# Patient Record
Sex: FEMALE | Race: BLACK OR AFRICAN AMERICAN | NOT HISPANIC OR LATINO | Employment: OTHER | URBAN - METROPOLITAN AREA
[De-identification: names, ages, dates, MRNs, and addresses within clinical notes are randomized per-mention and may not be internally consistent; named-entity substitution may affect disease eponyms.]

---

## 2017-01-03 ENCOUNTER — PATIENT OUTREACH (OUTPATIENT)
Dept: ADMINISTRATIVE | Facility: CLINIC | Age: 77
End: 2017-01-03
Payer: MEDICARE

## 2017-01-03 NOTE — PATIENT INSTRUCTIONS
Discharge Instructions for Aortic Valve Stenosis  You have been diagnosed with aortic valve stenosis. This means the aortic valve in your heart is stiff or remains in a near-closed position and has trouble opening. Because of this, your heart has to work harder to push the blood through the valve. In some cases, this extra work makes the muscle of the heart thicken. The extra work can tire the heart and cause its muscle to weaken over time. This condition often changes very slowly and doesn't need to be treated. But in some people, it may get worse faster and need to be treated. Some people can control their stenosis with medicines. In some severe cases, surgery is needed.  Home care  · Check with your doctor before taking any over-the-counter medicines, herbal products, or vitamins.  · Take your medicines exactly as directed. Dont skip doses.  · Keep all follow-up appointments. Some people with aortic stenosis dont have symptoms. Others need close follow-up and surgery.  Lifestyle changes  · Maintain a healthy weight. Get help to lose any extra pounds.  · Ask your doctor if an exercise program is right for you. Some people with aortic stenosis need to be very careful about exercise as it can result in fainting.  · Break the smoking habit. Enroll in a stop-smoking program to improve your chances of success.  Follow-up care  Make a follow-up appointment with your healthcare provider, or as directed.  When to call 911  Call 911 or go to the emergency room right away if you have any of the following:  · Chest pain or shortness of breath  · Weakness in the muscles of your face, arms, or legs  · Trouble speaking  · Rapid pulse or pounding heartbeat  · Fainting or dizziness  · Sudden vertigo   © 3129-3551 [employer]. [address] [place] [address], [place] [address]. All rights reserved. This information is not intended as a substitute for professional medical care. Always follow your healthcare professional's  instructions.

## 2017-01-17 DIAGNOSIS — K21.9 GASTROESOPHAGEAL REFLUX DISEASE WITHOUT ESOPHAGITIS: ICD-10-CM

## 2017-01-17 RX ORDER — OMEPRAZOLE 40 MG/1
CAPSULE, DELAYED RELEASE ORAL
Qty: 90 CAPSULE | Refills: 0 | Status: SHIPPED | OUTPATIENT
Start: 2017-01-17 | End: 2018-02-17 | Stop reason: SDUPTHER

## 2017-01-20 DIAGNOSIS — I10 BENIGN ESSENTIAL HTN: ICD-10-CM

## 2017-01-20 RX ORDER — CLONIDINE HYDROCHLORIDE 0.1 MG/1
TABLET ORAL
Qty: 180 TABLET | Refills: 0 | Status: SHIPPED | OUTPATIENT
Start: 2017-01-20 | End: 2017-04-11 | Stop reason: SDUPTHER

## 2017-01-21 DIAGNOSIS — F41.9 ANXIETY: ICD-10-CM

## 2017-01-23 RX ORDER — ALPRAZOLAM 0.5 MG/1
TABLET ORAL
Qty: 60 TABLET | Refills: 0 | Status: SHIPPED | OUTPATIENT
Start: 2017-01-23 | End: 2017-03-22 | Stop reason: SDUPTHER

## 2017-01-28 DIAGNOSIS — G47.00 INSOMNIA: ICD-10-CM

## 2017-01-30 RX ORDER — QUETIAPINE FUMARATE 100 MG/1
TABLET, FILM COATED ORAL
Qty: 90 TABLET | Refills: 0 | Status: SHIPPED | OUTPATIENT
Start: 2017-01-30 | End: 2017-05-02 | Stop reason: SDUPTHER

## 2017-02-01 ENCOUNTER — OFFICE VISIT (OUTPATIENT)
Dept: CARDIOLOGY | Facility: CLINIC | Age: 77
End: 2017-02-01
Payer: MEDICARE

## 2017-02-01 ENCOUNTER — LAB VISIT (OUTPATIENT)
Dept: LAB | Facility: HOSPITAL | Age: 77
End: 2017-02-01
Attending: INTERNAL MEDICINE
Payer: MEDICARE

## 2017-02-01 ENCOUNTER — HOSPITAL ENCOUNTER (OUTPATIENT)
Dept: CARDIOLOGY | Facility: CLINIC | Age: 77
Discharge: HOME | End: 2017-02-01
Payer: MEDICARE

## 2017-02-01 VITALS
OXYGEN SATURATION: 98 % | DIASTOLIC BLOOD PRESSURE: 74 MMHG | SYSTOLIC BLOOD PRESSURE: 132 MMHG | BODY MASS INDEX: 31.9 KG/M2 | HEIGHT: 69 IN | HEART RATE: 86 BPM | WEIGHT: 215.38 LBS

## 2017-02-01 DIAGNOSIS — I35.0 NONRHEUMATIC AORTIC VALVE STENOSIS: Primary | ICD-10-CM

## 2017-02-01 DIAGNOSIS — E78.5 HYPERLIPIDEMIA, UNSPECIFIED HYPERLIPIDEMIA TYPE: ICD-10-CM

## 2017-02-01 DIAGNOSIS — I35.0 AORTIC VALVE STENOSIS, UNSPECIFIED ETIOLOGY: ICD-10-CM

## 2017-02-01 DIAGNOSIS — N18.30 CKD (CHRONIC KIDNEY DISEASE), STAGE III: ICD-10-CM

## 2017-02-01 DIAGNOSIS — I10 ESSENTIAL HYPERTENSION: ICD-10-CM

## 2017-02-01 LAB
CREAT SERPL-MCNC: 1.4 MG/DL
DIASTOLIC DYSFUNCTION: YES
ERYTHROCYTE [DISTWIDTH] IN BLOOD BY AUTOMATED COUNT: 15.1 %
EST. GFR  (AFRICAN AMERICAN): 42.4 ML/MIN/1.73 M^2
EST. GFR  (NON AFRICAN AMERICAN): 36.8 ML/MIN/1.73 M^2
ESTIMATED PA SYSTOLIC PRESSURE: 30.04
HCT VFR BLD AUTO: 32.4 %
HGB BLD-MCNC: 10.8 G/DL
MCH RBC QN AUTO: 24.7 PG
MCHC RBC AUTO-ENTMCNC: 33.3 %
MCV RBC AUTO: 74 FL
MITRAL VALVE MOBILITY: NORMAL
PLATELET # BLD AUTO: 159 K/UL
PMV BLD AUTO: 11.6 FL
RBC # BLD AUTO: 4.37 M/UL
RETIRED EF AND QEF - SEE NOTES: 65 (ref 55–65)
TRICUSPID VALVE REGURGITATION: ABNORMAL
WBC # BLD AUTO: 10.03 K/UL

## 2017-02-01 PROCEDURE — 1160F RVW MEDS BY RX/DR IN RCRD: CPT | Mod: S$GLB,,, | Performed by: PHYSICIAN ASSISTANT

## 2017-02-01 PROCEDURE — 82565 ASSAY OF CREATININE: CPT

## 2017-02-01 PROCEDURE — 99999 PR PBB SHADOW E&M-EST. PATIENT-LVL IV: CPT | Mod: PBBFAC,,, | Performed by: PHYSICIAN ASSISTANT

## 2017-02-01 PROCEDURE — 93306 TTE W/DOPPLER COMPLETE: CPT | Mod: S$GLB,,, | Performed by: INTERNAL MEDICINE

## 2017-02-01 PROCEDURE — 1157F ADVNC CARE PLAN IN RCRD: CPT | Mod: S$GLB,,, | Performed by: PHYSICIAN ASSISTANT

## 2017-02-01 PROCEDURE — 1126F AMNT PAIN NOTED NONE PRSNT: CPT | Mod: S$GLB,,, | Performed by: PHYSICIAN ASSISTANT

## 2017-02-01 PROCEDURE — 3075F SYST BP GE 130 - 139MM HG: CPT | Mod: S$GLB,,, | Performed by: PHYSICIAN ASSISTANT

## 2017-02-01 PROCEDURE — 85027 COMPLETE CBC AUTOMATED: CPT

## 2017-02-01 PROCEDURE — 1159F MED LIST DOCD IN RCRD: CPT | Mod: S$GLB,,, | Performed by: PHYSICIAN ASSISTANT

## 2017-02-01 PROCEDURE — 99214 OFFICE O/P EST MOD 30 MIN: CPT | Mod: S$GLB,,, | Performed by: PHYSICIAN ASSISTANT

## 2017-02-01 PROCEDURE — 3078F DIAST BP <80 MM HG: CPT | Mod: S$GLB,,, | Performed by: PHYSICIAN ASSISTANT

## 2017-02-01 PROCEDURE — 36415 COLL VENOUS BLD VENIPUNCTURE: CPT

## 2017-02-01 NOTE — PROGRESS NOTES
Subjective:    Patient ID:  Emily Alicia is a 76 y.o. female who presents for follow-up of TAVR    Referring: Dr. Gasper Duran    HPI  Ms. Alicia presents for 1 mon f/u s/p 23mm Jhonny S3 TAVR via L TF access. She is without significant cardiovascular complaints today. Her WONG has improved somewhat. She states she thought she would have more stamina by now, but she realizes it has only been 1 month since her procedure. She has tried to remain as active as possible. She denies CP, PND, orthopnea, or LE edema. She takes her prn Lasix about twice per week. She has not yet seen Dr. Duran, but she will schedule an appointment with him in the near future.     NYHA Class II, CCS Class 0    Review of Systems   Constitution: Negative for chills, diaphoresis, fever, weakness, weight gain and weight loss.   HENT: Negative for headaches and sore throat.    Eyes: Negative for blurred vision, vision loss in left eye, vision loss in right eye and visual disturbance.   Cardiovascular: Negative for chest pain, claudication, dyspnea on exertion, leg swelling, near-syncope, orthopnea, palpitations, paroxysmal nocturnal dyspnea and syncope.   Respiratory: Negative for cough, hemoptysis, shortness of breath, sputum production and wheezing.    Endocrine: Negative for cold intolerance and heat intolerance.   Hematologic/Lymphatic: Negative for adenopathy. Does not bruise/bleed easily.   Skin: Negative for rash.   Musculoskeletal: Negative for falls, muscle weakness and myalgias.   Gastrointestinal: Negative for abdominal pain, change in bowel habit, constipation, diarrhea, melena and nausea.   Genitourinary: Negative for bladder incontinence.   Neurological: Negative for dizziness, focal weakness, light-headedness and numbness.   Psychiatric/Behavioral: Negative for altered mental status.         Vitals:    02/01/17 0912 02/01/17 0913   BP: 130/76 132/74   BP Location: Right arm Left arm   Patient Position: Sitting Sitting   BP Method:  "Manual Manual   Pulse: 86 86   SpO2: 98%    Weight: 97.7 kg (215 lb 6.2 oz)    Height: 5' 9" (1.753 m)    Body mass index is 31.81 kg/(m^2).    Objective:    Physical Exam   Constitutional: She is oriented to person, place, and time. She appears well-developed and well-nourished. No distress.   HENT:   Head: Normocephalic and atraumatic.   Mouth/Throat: Oropharynx is clear and moist.   Eyes: Conjunctivae and EOM are normal. Pupils are equal, round, and reactive to light. No scleral icterus.   Neck: Neck supple. No JVD present. No tracheal deviation present.   Cardiovascular: Normal rate and regular rhythm.  Exam reveals no gallop and no friction rub.    Murmur heard.  Pulmonary/Chest: Effort normal and breath sounds normal. No respiratory distress. She has no wheezes. She has no rales. She exhibits no tenderness.   Abdominal: Soft. Bowel sounds are normal. She exhibits no distension. There is no hepatosplenomegaly. There is no tenderness.   Musculoskeletal: She exhibits no edema or tenderness.   Neurological: She is alert and oriented to person, place, and time.   Skin: Skin is warm and dry. No rash noted. No erythema.   Psychiatric: She has a normal mood and affect. Her behavior is normal.         Assessment:       1. Nonrheumatic aortic valve stenosis - s/p 23mm Jhonny S3 TAVR via L TF access. Doing well. On ASA and Plavix.    2. Essential hypertension - well controlled   3. CKD (chronic kidney disease), stage III - stable   4. Hyperlipidemia, unspecified hyperlipidemia type - stable, on statin        Plan:       Follow up in 1 year (December 2017) with echo.  SBEP for life.  ASA indefinitely. Ok to D/C Plavix in 6 months (June 2017).            "

## 2017-02-14 ENCOUNTER — OFFICE VISIT (OUTPATIENT)
Dept: INTERNAL MEDICINE | Facility: CLINIC | Age: 77
End: 2017-02-14
Payer: MEDICARE

## 2017-02-14 VITALS
HEART RATE: 76 BPM | WEIGHT: 216 LBS | TEMPERATURE: 99 F | OXYGEN SATURATION: 97 % | BODY MASS INDEX: 31.99 KG/M2 | HEIGHT: 69 IN | RESPIRATION RATE: 20 BRPM | SYSTOLIC BLOOD PRESSURE: 122 MMHG | DIASTOLIC BLOOD PRESSURE: 86 MMHG

## 2017-02-14 DIAGNOSIS — I50.33 ACUTE ON CHRONIC DIASTOLIC HEART FAILURE: ICD-10-CM

## 2017-02-14 DIAGNOSIS — J01.10 ACUTE FRONTAL SINUSITIS, RECURRENCE NOT SPECIFIED: ICD-10-CM

## 2017-02-14 DIAGNOSIS — J20.9 ACUTE BRONCHITIS, UNSPECIFIED ORGANISM: ICD-10-CM

## 2017-02-14 PROCEDURE — 99999 PR PBB SHADOW E&M-EST. PATIENT-LVL IV: CPT | Mod: PBBFAC,,, | Performed by: NURSE PRACTITIONER

## 2017-02-14 PROCEDURE — 1160F RVW MEDS BY RX/DR IN RCRD: CPT | Mod: S$GLB,,, | Performed by: NURSE PRACTITIONER

## 2017-02-14 PROCEDURE — 3079F DIAST BP 80-89 MM HG: CPT | Mod: S$GLB,,, | Performed by: NURSE PRACTITIONER

## 2017-02-14 PROCEDURE — 3074F SYST BP LT 130 MM HG: CPT | Mod: S$GLB,,, | Performed by: NURSE PRACTITIONER

## 2017-02-14 PROCEDURE — 99213 OFFICE O/P EST LOW 20 MIN: CPT | Mod: 25,S$GLB,, | Performed by: NURSE PRACTITIONER

## 2017-02-14 PROCEDURE — 1159F MED LIST DOCD IN RCRD: CPT | Mod: S$GLB,,, | Performed by: NURSE PRACTITIONER

## 2017-02-14 PROCEDURE — 96372 THER/PROPH/DIAG INJ SC/IM: CPT | Mod: S$GLB,,, | Performed by: NURSE PRACTITIONER

## 2017-02-14 PROCEDURE — 1126F AMNT PAIN NOTED NONE PRSNT: CPT | Mod: S$GLB,,, | Performed by: NURSE PRACTITIONER

## 2017-02-14 PROCEDURE — 1157F ADVNC CARE PLAN IN RCRD: CPT | Mod: S$GLB,,, | Performed by: NURSE PRACTITIONER

## 2017-02-14 RX ORDER — AZITHROMYCIN 250 MG/1
TABLET, FILM COATED ORAL
Qty: 6 TABLET | Refills: 0 | Status: SHIPPED | OUTPATIENT
Start: 2017-02-14 | End: 2017-02-19

## 2017-02-14 RX ORDER — METHYLPREDNISOLONE ACETATE 80 MG/ML
80 INJECTION, SUSPENSION INTRA-ARTICULAR; INTRALESIONAL; INTRAMUSCULAR; SOFT TISSUE
Status: COMPLETED | OUTPATIENT
Start: 2017-02-14 | End: 2017-02-14

## 2017-02-14 RX ORDER — BENZONATATE 100 MG/1
100 CAPSULE ORAL 3 TIMES DAILY PRN
Qty: 30 CAPSULE | Refills: 1 | Status: SHIPPED | OUTPATIENT
Start: 2017-02-14 | End: 2017-02-24

## 2017-02-14 RX ADMIN — METHYLPREDNISOLONE ACETATE 80 MG: 80 INJECTION, SUSPENSION INTRA-ARTICULAR; INTRALESIONAL; INTRAMUSCULAR; SOFT TISSUE at 01:02

## 2017-02-14 NOTE — MR AVS SNAPSHOT
[facility]  [address]  [facility] [address]  Phone: [phone]  Fax: [phone]                  Emily Alicia   2017 1:15 PM   Office Visit    Description:  Female : 1940   Provider:  Caroline Do NP   Department:  [facility]           Reason for Visit     Nasal Congestion     Cough           Diagnoses this Visit        Comments    Acute frontal sinusitis, recurrence not specified         Acute bronchitis, unspecified organism         Acute on chronic diastolic heart failure                To Do List           Goals (5 Years of Data)     None      Follow-Up and Disposition     Return if symptoms worsen or fail to improve.       These Medications        Disp Refills Start End    azithromycin (Z-ELLEN) 250 MG tablet 6 tablet 0 2017    Take 2 tablets by mouth on day 1; Take 1 tablet by mouth on days 2-5    Pharmacy: [facility] [address] [place] [address] Ph #: [phone]       benzonatate (TESSALON) 100 MG capsule 30 capsule 1 2017    Take 1 capsule (100 mg total) by mouth 3 (three) times daily as needed for Cough. - Oral    Pharmacy: [facility] [address] [place] [address] Ph #: [phone]         [facility] On Call     [facility] On Call Nurse Care Line -  Assistance  Registered nurses in the [facility] provide clinical advisement, health education, appointment booking, and other advisory services.  Call for this free service at [phone].             Medications           Message regarding Medications     Verify the changes and/or additions to your medication regime listed below are the same as discussed with your clinician today.  If any of these changes or additions are incorrect, please notify your healthcare provider.        START taking these NEW medications        Refills    azithromycin (Z-ELLEN) 250 MG tablet 0    Sig: Take 2 tablets by mouth on day 1; Take 1  tablet by mouth on days 2-5    Class: Normal    benzonatate (TESSALON) 100 MG capsule 1    Sig: Take 1 capsule (100 mg total) by mouth 3 (three) times daily as needed for Cough.    Class: Normal    Route: Oral           Verify that the below list of medications is an accurate representation of the medications you are currently taking.  If none reported, the list may be blank. If incorrect, please contact your healthcare provider. Carry this list with you in case of emergency.           Current Medications     alprazolam (XANAX) 0.5 MG tablet TAKE ONE TABLET BY MOUTH TWICE DAILY AS NEEDED    aspirin (ECOTRIN) 81 MG EC tablet Take 81 mg by mouth once daily.    atorvastatin (LIPITOR) 10 MG tablet Take 10 mg by mouth once daily.     cloNIDine (CATAPRES) 0.1 MG tablet TAKE ONE TABLET BY MOUTH TWICE DAILY    clopidogrel (PLAVIX) 75 mg tablet Take 1 tablet (75 mg total) by mouth once daily. Take 4 tablets tonight, then continue 1 daily PO.    furosemide (LASIX) 40 MG tablet Take 1 tablet (40 mg total) by mouth once daily.    gabapentin (NEURONTIN) 300 MG capsule Take 300 mg by mouth 2 (two) times daily.     hydrocodone-acetaminophen 10-325mg (NORCO)  mg Tab Take 1 tablet by mouth every 4 (four) hours as needed.     metoprolol succinate (TOPROL-XL) 200 MG 24 hr tablet TAKE ONE TABLET BY MOUTH TWICE DAILY    naproxen (NAPROSYN) 500 MG tablet TAKE ONE TABLET BY MOUTH TWICE DAILY    omeprazole (PRILOSEC) 40 MG capsule TAKE ONE CAPSULE BY MOUTH ONCE DAILY    quetiapine (SEROQUEL) 100 MG Tab TAKE ONE TABLET BY MOUTH ONCE DAILY    TRAVATAN Z 0.004 % Drop INSTILL ONE DROP TO LEFT EYE EVERY EVENING    triamterene-hydrochlorothiazide 37.5-25 mg (MAXZIDE-25) 37.5-25 mg per tablet     azithromycin (Z-ELLEN) 250 MG tablet Take 2 tablets by mouth on day 1; Take 1 tablet by mouth on days 2-5    benzonatate (TESSALON) 100 MG capsule Take 1 capsule (100 mg total) by mouth 3 (three) times daily as needed for Cough.    oxybutynin  "(DITROPAN-XL) 5 MG TR24 Take 1 tablet (5 mg total) by mouth once daily.           Clinical Reference Information           Your Vitals Were     BP Pulse Temp Resp Height Weight    122/86 76 98.5 °F (36.9 °C) (Oral) 20 5' 9" (1.753 m) 98 kg (216 lb)    Last Period SpO2 BMI          09/17/1970 97% 31.9 kg/m2        Blood Pressure          Most Recent Value    BP  122/86      Allergies as of 2/14/2017     No Known Allergies      Immunizations Administered on Date of Encounter - 2/14/2017     None      [facility] Sign-Up     Activating your [facility] account is as easy as 1-2-3!     1) Visit [email or web address], select Sign Up Now, enter this activation code and your date of birth, then select Next.  Activation code not generated  Current Patient Portal Status: Account disabled      2) Create a username and password to use when you visit [facility] in the future and select a security question in case you lose your password and select Next.    3) Enter your e-mail address and click Sign Up!    Additional Information  If you have questions, please e-mail [email or web address] or call [phone] to talk to our [facility] staff. Remember, [facility] is NOT to be used for urgent needs. For medical emergencies, dial 911.         Language Assistance Services     ATTENTION: Language assistance services are available, free of charge. Please call [phone].      ATENCIÓN: Si habla español, tiene a schneider disposición servicios gratuitos de asistencia lingüística. Llame al [phone].     [facility] Ý: N?u b?n nói Ti?ng Vi?t, có các d?ch v? h? tr? ngôn ng? mi?n phí dành cho b?n. G?i s? [phone].         [facility] complies with applicable Federal civil rights laws and does not discriminate on the basis of race, color, national origin, age, disability, or sex.        "

## 2017-02-14 NOTE — PROGRESS NOTES
Subjective:       Patient ID: Emily Alicia is a 76 y.o. female.    Chief Complaint: Nasal Congestion (x saturday) and Cough    Patient is known, to me and presents with   Chief Complaint   Patient presents with    Nasal Congestion     x saturday    Cough   .  Denies chest pain and shortness of breath.  Patient presents with nasal congestion and cough. Also recently had aortic valve replacement at [facility] in December and is doing well   HPI  Review of Systems   Constitutional: Negative.    HENT: Positive for congestion, postnasal drip and sore throat.    Eyes: Negative.    Respiratory: Positive for cough.    Cardiovascular: Negative.    Skin: Negative.    Hematological: Negative.        Objective:      Physical Exam   Constitutional: She appears well-developed and well-nourished. No distress.   HENT:   Head: Normocephalic and atraumatic.   Right Ear: Tympanic membrane mobility is abnormal.   Left Ear: Tympanic membrane mobility is abnormal.   Nose: Mucosal edema present.   Mouth/Throat: No oropharyngeal exudate or posterior oropharyngeal erythema.   Eyes: Conjunctivae are normal. Right eye exhibits no discharge. Left eye exhibits no discharge.   Neck: Normal range of motion. Neck supple. No JVD present. No thyromegaly present.   Cardiovascular: Normal rate and regular rhythm.    Murmur heard.  Pulmonary/Chest: Effort normal and breath sounds normal. No stridor. She has no wheezes.   Lymphadenopathy:     She has no cervical adenopathy.   Skin: Skin is warm and dry. No rash noted. She is not diaphoretic. No erythema. No pallor.       Assessment:       1. Acute frontal sinusitis, recurrence not specified    2. Acute bronchitis, unspecified organism    3. Acute on chronic diastolic heart failure        Plan:   Emily was seen today for nasal congestion and cough.    Diagnoses and all orders for this visit:    Acute frontal sinusitis, recurrence not specified  -     azithromycin (Z-ELLEN) 250 MG tablet; Take 2 tablets by mouth on  "day 1; Take 1 tablet by mouth on days 2-5  -     methylPREDNISolone acetate injection 80 mg; Inject 1 mL (80 mg total) into the muscle one time.    Acute bronchitis, unspecified organism  -     azithromycin (Z-ELLEN) 250 MG tablet; Take 2 tablets by mouth on day 1; Take 1 tablet by mouth on days 2-5  -     benzonatate (TESSALON) 100 MG capsule; Take 1 capsule (100 mg total) by mouth 3 (three) times daily as needed for Cough.  -     methylPREDNISolone acetate injection 80 mg; Inject 1 mL (80 mg total) into the muscle one time.    Acute on chronic diastolic heart failure    "This note will not be shared with the patient."  Increase fluids  RTC as scheduled  "

## 2017-03-22 DIAGNOSIS — F41.9 ANXIETY: ICD-10-CM

## 2017-03-23 RX ORDER — ALPRAZOLAM 0.5 MG/1
TABLET ORAL
Qty: 60 TABLET | Refills: 2 | Status: SHIPPED | OUTPATIENT
Start: 2017-03-23 | End: 2017-09-18 | Stop reason: SDUPTHER

## 2017-04-11 DIAGNOSIS — I10 BENIGN ESSENTIAL HTN: ICD-10-CM

## 2017-04-11 RX ORDER — CLONIDINE HYDROCHLORIDE 0.1 MG/1
0.1 TABLET ORAL 3 TIMES DAILY
Qty: 270 TABLET | Refills: 0 | Status: SHIPPED | OUTPATIENT
Start: 2017-04-11 | End: 2017-05-18 | Stop reason: SDUPTHER

## 2017-04-11 NOTE — TELEPHONE ENCOUNTER
----- Message from Jaja Harp MA sent at 2017 12:50 PM CDT -----  Contact: Patient  Emily Alicia  MRN: [id number]  : 1940  PCP: Caroline Do  Home Phone      [phone]  Work Phone      Not on file.  Mobile          [phone]      MESSAGE: Patient needs new scripts for Clonidine, she takes it 3 x daily.  Send to [facility] ([place])

## 2017-05-02 DIAGNOSIS — G47.00 INSOMNIA: ICD-10-CM

## 2017-05-02 RX ORDER — QUETIAPINE FUMARATE 100 MG/1
TABLET, FILM COATED ORAL
Qty: 90 TABLET | Refills: 0 | Status: SHIPPED | OUTPATIENT
Start: 2017-05-02 | End: 2017-08-29 | Stop reason: SDUPTHER

## 2017-05-18 DIAGNOSIS — I10 BENIGN ESSENTIAL HTN: ICD-10-CM

## 2017-05-18 RX ORDER — CLONIDINE HYDROCHLORIDE 0.1 MG/1
TABLET ORAL
Qty: 270 TABLET | Refills: 0 | Status: SHIPPED | OUTPATIENT
Start: 2017-05-18 | End: 2017-07-25 | Stop reason: CLARIF

## 2017-06-08 DIAGNOSIS — M15.9 PRIMARY OSTEOARTHRITIS INVOLVING MULTIPLE JOINTS: ICD-10-CM

## 2017-06-08 DIAGNOSIS — I10 ESSENTIAL HYPERTENSION: ICD-10-CM

## 2017-06-08 RX ORDER — METOPROLOL SUCCINATE 200 MG/1
TABLET, EXTENDED RELEASE ORAL
Qty: 180 TABLET | Refills: 0 | Status: SHIPPED | OUTPATIENT
Start: 2017-06-08 | End: 2017-11-27 | Stop reason: SDUPTHER

## 2017-06-08 RX ORDER — NAPROXEN 500 MG/1
TABLET ORAL
Qty: 180 TABLET | Refills: 0 | Status: SHIPPED | OUTPATIENT
Start: 2017-06-08 | End: 2018-07-17 | Stop reason: SDUPTHER

## 2017-07-03 DIAGNOSIS — I10 ESSENTIAL HYPERTENSION: ICD-10-CM

## 2017-07-03 RX ORDER — LOSARTAN POTASSIUM AND HYDROCHLOROTHIAZIDE 25; 100 MG/1; MG/1
TABLET ORAL
Qty: 90 TABLET | Refills: 0 | Status: SHIPPED | OUTPATIENT
Start: 2017-07-03 | End: 2017-07-25 | Stop reason: CLARIF

## 2017-07-17 PROBLEM — G89.29 CHRONIC PAIN OF LEFT KNEE: Status: ACTIVE | Noted: 2017-07-17

## 2017-07-17 PROBLEM — M25.562 CHRONIC PAIN OF LEFT KNEE: Status: ACTIVE | Noted: 2017-07-17

## 2017-08-02 PROBLEM — Z96.659 S/P TOTAL KNEE REPLACEMENT: Status: ACTIVE | Noted: 2017-08-02

## 2017-08-18 ENCOUNTER — TELEPHONE (OUTPATIENT)
Dept: INTERNAL MEDICINE | Facility: CLINIC | Age: 77
End: 2017-08-18

## 2017-08-18 NOTE — TELEPHONE ENCOUNTER
Nurse with [facility] care calling to report pt s/p left knee sx    Wound to crease of buttocks 2ccm x 1cm stage 2  Requesting if ok to use barrier cream to wound. She states pt states they used duoderm in hospital, but nurse does not think this will stay on wound area and would like to try barrier cream

## 2017-08-22 DIAGNOSIS — Z12.11 COLON CANCER SCREENING: ICD-10-CM

## 2017-08-29 ENCOUNTER — OFFICE VISIT (OUTPATIENT)
Dept: INTERNAL MEDICINE | Facility: CLINIC | Age: 77
End: 2017-08-29
Payer: MEDICARE

## 2017-08-29 ENCOUNTER — TELEPHONE (OUTPATIENT)
Dept: INTERNAL MEDICINE | Facility: CLINIC | Age: 77
End: 2017-08-29

## 2017-08-29 VITALS
HEIGHT: 70 IN | HEART RATE: 80 BPM | SYSTOLIC BLOOD PRESSURE: 140 MMHG | DIASTOLIC BLOOD PRESSURE: 88 MMHG | TEMPERATURE: 98 F | RESPIRATION RATE: 20 BRPM | BODY MASS INDEX: 29.92 KG/M2 | WEIGHT: 209 LBS | OXYGEN SATURATION: 96 %

## 2017-08-29 DIAGNOSIS — S90.822A BLISTER OF LEFT HEEL, INITIAL ENCOUNTER: ICD-10-CM

## 2017-08-29 DIAGNOSIS — L89.159 DECUBITUS ULCER OF SACRAL REGION, UNSPECIFIED ULCER STAGE: Primary | ICD-10-CM

## 2017-08-29 DIAGNOSIS — G47.00 INSOMNIA: ICD-10-CM

## 2017-08-29 PROCEDURE — 99999 PR PBB SHADOW E&M-EST. PATIENT-LVL V: CPT | Mod: PBBFAC,,, | Performed by: NURSE PRACTITIONER

## 2017-08-29 PROCEDURE — 3008F BODY MASS INDEX DOCD: CPT | Mod: S$GLB,,, | Performed by: NURSE PRACTITIONER

## 2017-08-29 PROCEDURE — 3077F SYST BP >= 140 MM HG: CPT | Mod: S$GLB,,, | Performed by: NURSE PRACTITIONER

## 2017-08-29 PROCEDURE — 1159F MED LIST DOCD IN RCRD: CPT | Mod: S$GLB,,, | Performed by: NURSE PRACTITIONER

## 2017-08-29 PROCEDURE — 99213 OFFICE O/P EST LOW 20 MIN: CPT | Mod: S$GLB,,, | Performed by: NURSE PRACTITIONER

## 2017-08-29 PROCEDURE — 3079F DIAST BP 80-89 MM HG: CPT | Mod: S$GLB,,, | Performed by: NURSE PRACTITIONER

## 2017-08-29 PROCEDURE — 1125F AMNT PAIN NOTED PAIN PRSNT: CPT | Mod: S$GLB,,, | Performed by: NURSE PRACTITIONER

## 2017-08-29 RX ORDER — SULFAMETHOXAZOLE AND TRIMETHOPRIM 800; 160 MG/1; MG/1
TABLET ORAL
COMMUNITY
Start: 2017-07-31 | End: 2017-08-29

## 2017-08-29 RX ORDER — SILVER SULFADIAZINE 10 G/1000G
CREAM TOPICAL DAILY
Qty: 400 G | Refills: 1 | Status: SHIPPED | OUTPATIENT
Start: 2017-08-29 | End: 2019-03-04

## 2017-08-29 RX ORDER — OXYCODONE 18 MG/1
CAPSULE, EXTENDED RELEASE ORAL
Status: ON HOLD | COMMUNITY
Start: 2017-07-26 | End: 2021-02-04 | Stop reason: HOSPADM

## 2017-08-29 NOTE — PROGRESS NOTES
"Subjective:       Patient ID: Emily Alicia is a 76 y.o. female.    Chief Complaint: Blister (L. heel - x 2 days) and Other (sore on buttocks x few weeks PS:4)    Patient is known, to me and presents with   Chief Complaint   Patient presents with    Blister     L. heel - x 2 days    Other     sore on buttocks x few weeks PS:4   .  Denies chest pain and shortness of breath.  Patient presents with blister to left heel and breakdown to buttocks. Had left knee replacement at the beginning of august and no longer has home health   HPI  Review of Systems   Constitutional: Negative.    Respiratory: Negative.    Cardiovascular: Negative.    Musculoskeletal:        See hpi   Skin: Positive for color change and wound.       Objective:      Physical Exam   Constitutional: She appears well-developed and well-nourished. No distress.   Neck: Normal range of motion. Neck supple. No JVD present. No tracheal deviation present. No thyromegaly present.   Cardiovascular: Normal rate, regular rhythm and normal heart sounds.    No murmur heard.  Pulmonary/Chest: Effort normal and breath sounds normal. No stridor. She has no wheezes.   Lymphadenopathy:     She has no cervical adenopathy.   Skin: Skin is warm and dry. Capillary refill takes less than 2 seconds. No rash noted. She is not diaphoretic. There is erythema. No pallor.        Small breakdown to sacral area with no breakdown and large left heel blister intact. No evidence of infection       Assessment:       1. Decubitus ulcer of sacral region, unspecified ulcer stage    2. Blister of left heel, initial encounter        Plan:   Emily was seen today for blister and other.    Diagnoses and all orders for this visit:    Decubitus ulcer of sacral region, unspecified ulcer stage  -     collagenase (SANTYL) ointment; Apply topically once daily.    Blister of left heel, initial encounter    "This note will not be shared with the patient."  Will treat with santyl on sacrum and than keep " blister intact for now  Will see next week for re evaluation  RTC as scheduled

## 2017-08-29 NOTE — PATIENT INSTRUCTIONS
Blister (Adult)  A blister is a raised area of skin with clear, watery fluid inside. A blister can occur when the skin is damaged. Blisters can hurt when they are pressed, or if they break open.  Blisters can be caused in many ways. This can happen if the skin is rubbed too hard or often. Or they can occur if the skin is hurt by the sun, a virus, or even a medicine.  Most blisters need little treatment. They often dry up and go away in a few days to weeks after the cause is stopped. A blister may need to be cleaned. A broken (open) blister may be bandaged to prevent infection. Blisters caused by insect bites or drug reactions may be more serious. These should be looked at by a healthcare provider.  Home care  Your healthcare provider may prescribe pain medicine. He or she may also prescribe an antibiotic cream or ointment to put on an open blister. Follow all instructions when using these medicines.  General care:  · Follow all instructions on how to care for the blister. If a bandage was put on, change the bandage as instructed. .  · If the blister breaks, the area will leak a clear fluid for a day or 2. Wash the area with soap and water every day or as advised by your healthcare provider.  · You may use over-the-counter pain medicines to control pain, unless another medicine was prescribed. If you have chronic liver or kidney disease, talk with your healthcare provider before using these medicines. Also talk with your provider if you've had a stomach ulcer or gastrointestinal bleeding.  Follow-up care  Follow up with your healthcare provider, or as advised.  When to seek medical advice  Call your healthcare provider right away if any of these occur:  · Fever of 100.4°F (38°C) or higher  · Redness or swelling that is new or gets worse  · Foul-smelling fluid leaking from the blister  · Pain doesnt go away, or gets worse  · Increase in size of the blister  · Blister doesnt get better after several days  Date Last  Reviewed: 9/1/2016 © 2000-2016 [employer]. [address] [place] [address], [place] [address]. All rights reserved. This information is not intended as a substitute for professional medical care. Always follow your healthcare professional's instructions.        Decubitus Ulcer    A decubitus ulcer starts as a red, tender area on skin (pressure sore). It is caused by lying on a bony area for long periods of time without turning. This reduces the amount of blood flow to that part of the skin. Decubitus ulcers usually occur on the lower back, buttocks, or heels. They affect people who spend most or all of their time in bed. These ulcers take a long time to heal, depending on how big they are and how deep they are.  If a decubitus ulcer becomes infected, it will cause redness in the skin around the ulcer. Pus will drain from the wound. If not treated early, a decubitus infection can spread to the bloodstream or nearby bone.  Home care  Follow these guidelines to help you care for your wound at home:  · Look at your ulcer every day with good lighting to watch for signs of infection. At the same time, check other skin pressure points for early signs of a skin changes.  · Change positions every few hours. This will let blood flow to the pressure areas. This is essential for healing to occur. A foam, water, or air mattress or gel pad will help reduce the pressure on the skin.  · Your healthcare provider may give you a special skin covering that stays in place on the ulcer. If a simple bandage is used, change it daily. Clean the ulcer with sterile saline or another solution your doctor tells you to use. Pat dry. Apply any prescribed lotion or cream. Cover with a clean, dry gauze pad.  · Bed linens should be kept clean and dry.  · Avoid soiling the ulcer with feces or urine. If this isnt possible, keep the time of contact with the feces or urine to a minimum.  Follow-up care  Follow up with your healthcare provider,  or as advised.  When to seek medical advice  Call your healthcare provider right away if any of these occur:  · Redness around the wound that gets worse  · Pain or swelling near the wound that gets worse  · Pus drains from a wound thats not already treated  · Unexplained fever of 100.4°F (38°C) or higher, or as directed by your healthcare provider  Date Last Reviewed: 10/1/2016  © 7456-1144 [employer]. [address] [place] [address], [place] [address]. All rights reserved. This information is not intended as a substitute for professional medical care. Always follow your healthcare professional's instructions.

## 2017-08-31 RX ORDER — QUETIAPINE FUMARATE 100 MG/1
TABLET, FILM COATED ORAL
Qty: 90 TABLET | Refills: 0 | Status: SHIPPED | OUTPATIENT
Start: 2017-08-31 | End: 2017-10-26

## 2017-09-18 DIAGNOSIS — F41.9 ANXIETY: ICD-10-CM

## 2017-09-18 RX ORDER — ALPRAZOLAM 0.5 MG/1
TABLET ORAL
Qty: 60 TABLET | Refills: 2 | Status: SHIPPED | OUTPATIENT
Start: 2017-09-18 | End: 2018-03-07 | Stop reason: SDUPTHER

## 2017-10-02 DIAGNOSIS — I35.0 AORTIC VALVE STENOSIS, ETIOLOGY OF CARDIAC VALVE DISEASE UNSPECIFIED: Primary | ICD-10-CM

## 2017-10-19 ENCOUNTER — TELEPHONE (OUTPATIENT)
Dept: CARDIOLOGY | Facility: CLINIC | Age: 77
End: 2017-10-19

## 2017-10-19 NOTE — TELEPHONE ENCOUNTER
----- Message from Zeenat Mattson sent at 10/19/2017 11:40 AM CDT -----  Contact: self  Pt received a valve replacement on 12/29/16. Pt stated that she received a paper in the mail stating that she needs to schedule an appt for a checkup, but is unsure of who she is supposed to follow up with. Please advise.    Pt can be reached at [phone] or [phone].

## 2017-10-25 ENCOUNTER — TELEPHONE (OUTPATIENT)
Dept: INTERNAL MEDICINE | Facility: CLINIC | Age: 77
End: 2017-10-25

## 2017-10-25 NOTE — TELEPHONE ENCOUNTER
Patient calling, states she is currently on Seroquel 100 mg daily. Would like to increase to 200mg daily. States she has a friend who is on that dose and is doing well. States she has noticed her depression has increased. Advise.    Pharmacy: [facility] in [place]    Phone: [phone]

## 2017-10-26 RX ORDER — QUETIAPINE FUMARATE 200 MG/1
200 TABLET, FILM COATED ORAL NIGHTLY PRN
Qty: 30 TABLET | Refills: 2 | Status: SHIPPED | OUTPATIENT
Start: 2017-10-26 | End: 2018-03-07 | Stop reason: SDUPTHER

## 2017-10-26 NOTE — TELEPHONE ENCOUNTER
Pt notified of dr note/julito, states she has tried the 200 mg at night when she couldn't sleep at times and it helped with no symptoms of confusion or off balance to fall  Please advise  Thanks!

## 2017-11-10 DIAGNOSIS — N39.3 STRESS INCONTINENCE: ICD-10-CM

## 2017-11-13 RX ORDER — OXYBUTYNIN CHLORIDE 5 MG/1
TABLET, EXTENDED RELEASE ORAL
Qty: 90 TABLET | Refills: 1 | Status: SHIPPED | OUTPATIENT
Start: 2017-11-13 | End: 2019-03-04 | Stop reason: SDUPTHER

## 2017-11-24 DIAGNOSIS — I10 ESSENTIAL HYPERTENSION: ICD-10-CM

## 2017-11-24 RX ORDER — METOPROLOL SUCCINATE 200 MG/1
TABLET, EXTENDED RELEASE ORAL
Qty: 180 TABLET | Refills: 0 | Status: CANCELLED | OUTPATIENT
Start: 2017-11-24

## 2017-11-27 ENCOUNTER — TELEPHONE (OUTPATIENT)
Dept: INTERNAL MEDICINE | Facility: CLINIC | Age: 77
End: 2017-11-27

## 2017-11-27 DIAGNOSIS — I10 ESSENTIAL HYPERTENSION: ICD-10-CM

## 2017-11-27 RX ORDER — VALSARTAN AND HYDROCHLOROTHIAZIDE 320; 25 MG/1; MG/1
1 TABLET, FILM COATED ORAL EVERY MORNING
Qty: 90 TABLET | Refills: 1 | Status: SHIPPED | OUTPATIENT
Start: 2017-11-27 | End: 2018-09-13 | Stop reason: DRUGHIGH

## 2017-11-27 RX ORDER — METOPROLOL SUCCINATE 200 MG/1
TABLET, EXTENDED RELEASE ORAL
Qty: 180 TABLET | Refills: 0 | Status: SHIPPED | OUTPATIENT
Start: 2017-11-27 | End: 2018-04-10 | Stop reason: SDUPTHER

## 2017-11-27 RX ORDER — LOSARTAN POTASSIUM AND HYDROCHLOROTHIAZIDE 25; 100 MG/1; MG/1
TABLET ORAL
Qty: 90 TABLET | Refills: 0 | Status: SHIPPED | OUTPATIENT
Start: 2017-11-27 | End: 2018-03-09 | Stop reason: SDUPTHER

## 2017-11-27 NOTE — TELEPHONE ENCOUNTER
----- Message from Jaja Harp MA sent at 2017 10:23 AM CST -----  Contact: Patient  Emily Alicia  MRN: [id number]  : 1940  PCP: Caroline Do  Home Phone      [phone]  Work Phone      Not on file.  Mobile          [phone]      MESSAGE: Patient needs refills on  Losartan and Toprol.  Send to [facility] ([place])

## 2017-12-05 ENCOUNTER — OFFICE VISIT (OUTPATIENT)
Dept: CARDIOLOGY | Facility: CLINIC | Age: 77
End: 2017-12-05
Payer: MEDICARE

## 2017-12-05 ENCOUNTER — HOSPITAL ENCOUNTER (OUTPATIENT)
Dept: CARDIOLOGY | Facility: CLINIC | Age: 77
Discharge: HOME | End: 2017-12-05
Payer: MEDICARE

## 2017-12-05 VITALS
HEART RATE: 73 BPM | SYSTOLIC BLOOD PRESSURE: 140 MMHG | HEIGHT: 70 IN | BODY MASS INDEX: 29.92 KG/M2 | DIASTOLIC BLOOD PRESSURE: 69 MMHG | WEIGHT: 209 LBS | OXYGEN SATURATION: 99 %

## 2017-12-05 DIAGNOSIS — I70.0 ABDOMINAL AORTIC ATHEROSCLEROSIS: ICD-10-CM

## 2017-12-05 DIAGNOSIS — Z96.659 STATUS POST TOTAL KNEE REPLACEMENT, UNSPECIFIED LATERALITY: ICD-10-CM

## 2017-12-05 DIAGNOSIS — E66.9 CLASS 1 OBESITY WITH BODY MASS INDEX (BMI) OF 30.0 TO 30.9 IN ADULT, UNSPECIFIED OBESITY TYPE, UNSPECIFIED WHETHER SERIOUS COMORBIDITY PRESENT: ICD-10-CM

## 2017-12-05 DIAGNOSIS — I35.0 SEVERE AORTIC STENOSIS: ICD-10-CM

## 2017-12-05 DIAGNOSIS — E78.5 HYPERLIPIDEMIA, UNSPECIFIED HYPERLIPIDEMIA TYPE: ICD-10-CM

## 2017-12-05 DIAGNOSIS — N18.30 CKD (CHRONIC KIDNEY DISEASE), STAGE III: ICD-10-CM

## 2017-12-05 DIAGNOSIS — I50.33 ACUTE ON CHRONIC DIASTOLIC HEART FAILURE: ICD-10-CM

## 2017-12-05 DIAGNOSIS — G89.29 CHRONIC PAIN OF LEFT KNEE: ICD-10-CM

## 2017-12-05 DIAGNOSIS — I10 ESSENTIAL HYPERTENSION: Primary | ICD-10-CM

## 2017-12-05 DIAGNOSIS — M25.562 CHRONIC PAIN OF LEFT KNEE: ICD-10-CM

## 2017-12-05 DIAGNOSIS — I35.0 AORTIC VALVE STENOSIS, ETIOLOGY OF CARDIAC VALVE DISEASE UNSPECIFIED: ICD-10-CM

## 2017-12-05 LAB
ESTIMATED PA SYSTOLIC PRESSURE: 37.57
RETIRED EF AND QEF - SEE NOTES: 60 (ref 55–65)
TRICUSPID VALVE REGURGITATION: NORMAL

## 2017-12-05 PROCEDURE — 99214 OFFICE O/P EST MOD 30 MIN: CPT | Mod: S$GLB,,, | Performed by: INTERNAL MEDICINE

## 2017-12-05 PROCEDURE — 93306 TTE W/DOPPLER COMPLETE: CPT | Mod: S$GLB,,, | Performed by: INTERNAL MEDICINE

## 2017-12-05 PROCEDURE — 99999 PR PBB SHADOW E&M-EST. PATIENT-LVL III: CPT | Mod: PBBFAC,,,

## 2017-12-05 RX ORDER — TRIAMTERENE AND HYDROCHLOROTHIAZIDE 37.5; 25 MG/1; MG/1
1 CAPSULE ORAL EVERY MORNING
Status: ON HOLD | COMMUNITY
End: 2021-02-04 | Stop reason: HOSPADM

## 2017-12-05 NOTE — PROGRESS NOTES
Subjective:    Patient ID:  Emily Alicia is a 77 y.o. female who presents for follow-up of TAVR.      HPI  Ms. Alicia presents for 1 mon f/u s/p 23mm Jhonny S3 TAVR via L TF access. Since TAVR she reports improvement in SOB/WONG and is without cardiovascular complaints today. She is recently s/p elective left total knee replacement on 8-1-17. She has recently been seen by Dr. Betlran Gandhi at [facility] in [place].     NYHA Class II, CCS Class 0    Review of Systems   Constitution: Negative for chills, diaphoresis, fever, weakness, weight gain and weight loss.   HENT: Negative for sore throat.    Eyes: Negative for blurred vision, vision loss in left eye, vision loss in right eye and visual disturbance.   Cardiovascular: Negative for chest pain, claudication, dyspnea on exertion, leg swelling, near-syncope, orthopnea, palpitations, paroxysmal nocturnal dyspnea and syncope.   Respiratory: Negative for cough, hemoptysis, shortness of breath, sputum production and wheezing.    Endocrine: Negative for cold intolerance and heat intolerance.   Hematologic/Lymphatic: Negative for adenopathy. Does not bruise/bleed easily.   Skin: Negative for rash.   Musculoskeletal: Negative for falls, muscle weakness and myalgias.   Gastrointestinal: Negative for abdominal pain, change in bowel habit, constipation, diarrhea, melena and nausea.   Genitourinary: Negative for bladder incontinence.   Neurological: Negative for dizziness, focal weakness, headaches, light-headedness and numbness.   Psychiatric/Behavioral: Negative for altered mental status.       Body mass index is 29.99 kg/m².    There were no vitals filed for this visit.There is no height or weight on file to calculate BMI.    Objective:    Physical Exam   Constitutional: She is oriented to person, place, and time. She appears well-developed and well-nourished. No distress.   HENT:   Head: Normocephalic and atraumatic.   Mouth/Throat: Oropharynx is clear and moist.   Eyes: Conjunctivae and  EOM are normal. Pupils are equal, round, and reactive to light. No scleral icterus.   Neck: Neck supple. No JVD present. No tracheal deviation present.   Cardiovascular: Normal rate and regular rhythm.  Exam reveals no gallop and no friction rub.    Murmur heard.  Pulmonary/Chest: Effort normal and breath sounds normal. No respiratory distress. She has no wheezes. She has no rales. She exhibits no tenderness.   Abdominal: Soft. Bowel sounds are normal. She exhibits no distension. There is no hepatosplenomegaly. There is no tenderness.   Musculoskeletal: She exhibits no edema or tenderness.   Neurological: She is alert and oriented to person, place, and time.   Skin: Skin is warm and dry. No rash noted. No erythema.   Psychiatric: She has a normal mood and affect. Her behavior is normal.   ECHO 12/5/2017: CONCLUSIONS     1 - Concentric remodeling.     2 - No wall motion abnormalities.     3 - Normal left ventricular systolic function (EF 60-65%).     4 - Moderate left atrial enlargement.     5 - Normal right ventricular systolic function .     6 - The estimated PA systolic pressure is 38 mmHg.     7 - S/P transcatheter AVR, AMAURI = 1.93 cm2, AVAi = 0.91 cm2/m2, peak velocity = 2.66 m/s, mean gradient = 15 mmHg.     8 - Mild tricuspid regurgitation.     9 - Atrial septal aneurysm .     10 - S/p 23mm Jhonny S3 TF TAVR.       Assessment:            Patient Active Problem List   Diagnosis    HTN (hypertension)- Controlled on   toprol Xl, clonidine, valsartan-HCTZ    Hyperlipidemia- Stable on atorvastatin     Obesity- Body mass index is 29.99 kg/m².      CKD (chronic kidney disease), stage III- Last Crt 0.9 from 1.8    TR (tricuspid regurgitation)    Severe aortic stenosis- s/p 23mm Jhonny S3 TAVR via L TF access, On ASA alone     Chronic diastolic heart failure- Clinically well compensated- on Lasix 40mg daily     Chronic pain of left knee- s/p L knee replacement     Abdominal aortic atherosclerosis- Stable on CT         Plan:      F/u with Dr. Duran as scheduled   SBEP for life.  ASA indefinitely.

## 2018-01-09 DIAGNOSIS — I10 BENIGN ESSENTIAL HTN: ICD-10-CM

## 2018-01-09 RX ORDER — CLONIDINE HYDROCHLORIDE 0.1 MG/1
TABLET ORAL
Qty: 270 TABLET | Refills: 0 | Status: SHIPPED | OUTPATIENT
Start: 2018-01-09 | End: 2018-04-25 | Stop reason: SDUPTHER

## 2018-02-17 DIAGNOSIS — K21.9 GASTROESOPHAGEAL REFLUX DISEASE WITHOUT ESOPHAGITIS: ICD-10-CM

## 2018-02-19 RX ORDER — OMEPRAZOLE 40 MG/1
CAPSULE, DELAYED RELEASE ORAL
Qty: 90 CAPSULE | Refills: 0 | Status: SHIPPED | OUTPATIENT
Start: 2018-02-19 | End: 2018-04-09 | Stop reason: SDUPTHER

## 2018-03-07 DIAGNOSIS — F41.9 ANXIETY: ICD-10-CM

## 2018-03-08 RX ORDER — QUETIAPINE FUMARATE 200 MG/1
TABLET, FILM COATED ORAL
Qty: 30 TABLET | Refills: 2 | Status: SHIPPED | OUTPATIENT
Start: 2018-03-08 | End: 2018-06-26 | Stop reason: SDUPTHER

## 2018-03-08 RX ORDER — ALPRAZOLAM 0.5 MG/1
TABLET ORAL
Qty: 60 TABLET | Refills: 2 | Status: SHIPPED | OUTPATIENT
Start: 2018-03-08 | End: 2018-09-11 | Stop reason: SDUPTHER

## 2018-03-09 DIAGNOSIS — I10 ESSENTIAL HYPERTENSION: ICD-10-CM

## 2018-03-09 RX ORDER — LOSARTAN POTASSIUM AND HYDROCHLOROTHIAZIDE 25; 100 MG/1; MG/1
TABLET ORAL
Qty: 90 TABLET | Refills: 0 | Status: SHIPPED | OUTPATIENT
Start: 2018-03-09 | End: 2018-04-09 | Stop reason: SDUPTHER

## 2018-04-09 DIAGNOSIS — I10 ESSENTIAL HYPERTENSION: ICD-10-CM

## 2018-04-09 DIAGNOSIS — K21.9 GASTROESOPHAGEAL REFLUX DISEASE WITHOUT ESOPHAGITIS: ICD-10-CM

## 2018-04-09 RX ORDER — LOSARTAN POTASSIUM AND HYDROCHLOROTHIAZIDE 25; 100 MG/1; MG/1
TABLET ORAL
Qty: 90 TABLET | Refills: 0 | Status: SHIPPED | OUTPATIENT
Start: 2018-04-09 | End: 2018-11-07 | Stop reason: SDUPTHER

## 2018-04-09 RX ORDER — OMEPRAZOLE 40 MG/1
CAPSULE, DELAYED RELEASE ORAL
Qty: 90 CAPSULE | Refills: 0 | Status: SHIPPED | OUTPATIENT
Start: 2018-04-09 | End: 2021-02-18

## 2018-04-10 DIAGNOSIS — I10 ESSENTIAL HYPERTENSION: ICD-10-CM

## 2018-04-10 RX ORDER — METOPROLOL SUCCINATE 200 MG/1
TABLET, EXTENDED RELEASE ORAL
Qty: 180 TABLET | Refills: 0 | Status: SHIPPED | OUTPATIENT
Start: 2018-04-10 | End: 2018-08-24 | Stop reason: SDUPTHER

## 2018-04-25 DIAGNOSIS — I10 BENIGN ESSENTIAL HTN: ICD-10-CM

## 2018-04-26 RX ORDER — CLONIDINE HYDROCHLORIDE 0.1 MG/1
TABLET ORAL
Qty: 270 TABLET | Refills: 0 | Status: SHIPPED | OUTPATIENT
Start: 2018-04-26 | End: 2018-08-20 | Stop reason: SDUPTHER

## 2018-06-27 RX ORDER — QUETIAPINE FUMARATE 200 MG/1
TABLET, FILM COATED ORAL
Qty: 30 TABLET | Refills: 2 | Status: SHIPPED | OUTPATIENT
Start: 2018-06-27 | End: 2018-06-28 | Stop reason: SDUPTHER

## 2018-06-28 RX ORDER — QUETIAPINE FUMARATE 200 MG/1
200 TABLET, FILM COATED ORAL NIGHTLY PRN
Qty: 30 TABLET | Refills: 2 | Status: SHIPPED | OUTPATIENT
Start: 2018-06-28 | End: 2018-09-13 | Stop reason: SDUPTHER

## 2018-07-17 ENCOUNTER — TELEPHONE (OUTPATIENT)
Dept: INTERNAL MEDICINE | Facility: CLINIC | Age: 78
End: 2018-07-17

## 2018-07-17 DIAGNOSIS — M15.9 PRIMARY OSTEOARTHRITIS INVOLVING MULTIPLE JOINTS: ICD-10-CM

## 2018-07-17 RX ORDER — NAPROXEN 500 MG/1
500 TABLET ORAL 2 TIMES DAILY
Qty: 180 TABLET | Refills: 0 | Status: SHIPPED | OUTPATIENT
Start: 2018-07-17 | End: 2019-02-01 | Stop reason: SDUPTHER

## 2018-07-17 RX ORDER — FUROSEMIDE 40 MG/1
40 TABLET ORAL DAILY
Qty: 30 TABLET | Refills: 2 | Status: ON HOLD | OUTPATIENT
Start: 2018-07-17 | End: 2023-12-01 | Stop reason: HOSPADM

## 2018-07-17 NOTE — TELEPHONE ENCOUNTER
----- Message from Jaja Harp MA sent at 7/17/2018 11:22 AM CDT -----  Contact: Patient   Patient is requesting new scripts for Lasix and Naproxen. LOV 08/29/2017  Send to [facility] ([place])

## 2018-08-20 DIAGNOSIS — I10 BENIGN ESSENTIAL HTN: ICD-10-CM

## 2018-08-20 RX ORDER — CLONIDINE HYDROCHLORIDE 0.1 MG/1
TABLET ORAL
Qty: 270 TABLET | Refills: 0 | Status: SHIPPED | OUTPATIENT
Start: 2018-08-20 | End: 2018-12-19 | Stop reason: SDUPTHER

## 2018-08-24 DIAGNOSIS — I10 ESSENTIAL HYPERTENSION: ICD-10-CM

## 2018-08-24 DIAGNOSIS — K21.9 GASTROESOPHAGEAL REFLUX DISEASE WITHOUT ESOPHAGITIS: ICD-10-CM

## 2018-08-27 RX ORDER — OMEPRAZOLE 40 MG/1
CAPSULE, DELAYED RELEASE ORAL
Qty: 90 CAPSULE | Refills: 0 | Status: SHIPPED | OUTPATIENT
Start: 2018-08-27 | End: 2019-03-04

## 2018-08-27 RX ORDER — METOPROLOL SUCCINATE 200 MG/1
TABLET, EXTENDED RELEASE ORAL
Qty: 180 TABLET | Refills: 0 | Status: SHIPPED | OUTPATIENT
Start: 2018-08-27 | End: 2019-02-01 | Stop reason: SDUPTHER

## 2018-09-11 DIAGNOSIS — F41.9 ANXIETY: ICD-10-CM

## 2018-09-11 RX ORDER — ALPRAZOLAM 0.5 MG/1
TABLET ORAL
Qty: 60 TABLET | Refills: 2 | Status: SHIPPED | OUTPATIENT
Start: 2018-09-11 | End: 2018-09-13 | Stop reason: SDUPTHER

## 2018-09-13 ENCOUNTER — OFFICE VISIT (OUTPATIENT)
Dept: INTERNAL MEDICINE | Facility: CLINIC | Age: 78
End: 2018-09-13
Payer: MEDICARE

## 2018-09-13 VITALS
HEIGHT: 70 IN | BODY MASS INDEX: 33.07 KG/M2 | DIASTOLIC BLOOD PRESSURE: 82 MMHG | RESPIRATION RATE: 18 BRPM | WEIGHT: 231 LBS | SYSTOLIC BLOOD PRESSURE: 120 MMHG | HEART RATE: 73 BPM | OXYGEN SATURATION: 96 %

## 2018-09-13 DIAGNOSIS — G89.29 CHRONIC PAIN OF LEFT KNEE: ICD-10-CM

## 2018-09-13 DIAGNOSIS — E66.9 OBESITY (BMI 30-39.9): ICD-10-CM

## 2018-09-13 DIAGNOSIS — G47.00 INSOMNIA, UNSPECIFIED TYPE: ICD-10-CM

## 2018-09-13 DIAGNOSIS — I10 ESSENTIAL HYPERTENSION: Primary | ICD-10-CM

## 2018-09-13 DIAGNOSIS — N18.30 CKD (CHRONIC KIDNEY DISEASE), STAGE III: ICD-10-CM

## 2018-09-13 DIAGNOSIS — F41.9 ANXIETY: ICD-10-CM

## 2018-09-13 DIAGNOSIS — I70.0 ABDOMINAL AORTIC ATHEROSCLEROSIS: ICD-10-CM

## 2018-09-13 DIAGNOSIS — I50.32 CHRONIC DIASTOLIC HEART FAILURE: ICD-10-CM

## 2018-09-13 DIAGNOSIS — E78.5 HYPERLIPIDEMIA LDL GOAL <70: ICD-10-CM

## 2018-09-13 DIAGNOSIS — M25.562 CHRONIC PAIN OF LEFT KNEE: ICD-10-CM

## 2018-09-13 LAB
ALBUMIN SERPL BCP-MCNC: 3.7 G/DL
ALBUMIN/CREAT UR: 11.1 UG/MG
ALP SERPL-CCNC: 91 U/L
ALT SERPL W/O P-5'-P-CCNC: 14 U/L
ANION GAP SERPL CALC-SCNC: 8 MMOL/L
AST SERPL-CCNC: 21 U/L
BASOPHILS # BLD AUTO: 0.07 K/UL
BASOPHILS NFR BLD: 0.7 %
BILIRUB SERPL-MCNC: 0.5 MG/DL
BUN SERPL-MCNC: 33 MG/DL
CALCIUM SERPL-MCNC: 9.9 MG/DL
CHLORIDE SERPL-SCNC: 108 MMOL/L
CHOLEST SERPL-MCNC: 186 MG/DL
CHOLEST/HDLC SERPL: 3.4 {RATIO}
CO2 SERPL-SCNC: 26 MMOL/L
CREAT SERPL-MCNC: 1.3 MG/DL
CREAT UR-MCNC: 90 MG/DL
DIFFERENTIAL METHOD: ABNORMAL
EOSINOPHIL # BLD AUTO: 0.2 K/UL
EOSINOPHIL NFR BLD: 1.8 %
ERYTHROCYTE [DISTWIDTH] IN BLOOD BY AUTOMATED COUNT: 15.5 %
EST. GFR  (AFRICAN AMERICAN): 46 ML/MIN/1.73 M^2
EST. GFR  (NON AFRICAN AMERICAN): 39.9 ML/MIN/1.73 M^2
GLUCOSE SERPL-MCNC: 106 MG/DL
HCT VFR BLD AUTO: 33.4 %
HDLC SERPL-MCNC: 55 MG/DL
HDLC SERPL: 29.6 %
HGB BLD-MCNC: 11.1 G/DL
IMM GRANULOCYTES # BLD AUTO: 0.05 K/UL
IMM GRANULOCYTES NFR BLD AUTO: 0.5 %
LDLC SERPL CALC-MCNC: 110.4 MG/DL
LYMPHOCYTES # BLD AUTO: 2.8 K/UL
LYMPHOCYTES NFR BLD: 27 %
MCH RBC QN AUTO: 25.4 PG
MCHC RBC AUTO-ENTMCNC: 33.2 G/DL
MCV RBC AUTO: 76 FL
MICROALBUMIN UR DL<=1MG/L-MCNC: 10 UG/ML
MONOCYTES # BLD AUTO: 1.2 K/UL
MONOCYTES NFR BLD: 11.3 %
NEUTROPHILS # BLD AUTO: 6 K/UL
NEUTROPHILS NFR BLD: 58.7 %
NONHDLC SERPL-MCNC: 131 MG/DL
NRBC BLD-RTO: 0 /100 WBC
PLATELET # BLD AUTO: 164 K/UL
PMV BLD AUTO: 11.9 FL
POTASSIUM SERPL-SCNC: 4.5 MMOL/L
PROT SERPL-MCNC: 7.3 G/DL
RBC # BLD AUTO: 4.37 M/UL
SODIUM SERPL-SCNC: 142 MMOL/L
TRIGL SERPL-MCNC: 103 MG/DL
TSH SERPL DL<=0.005 MIU/L-ACNC: 1.39 UIU/ML
WBC # BLD AUTO: 10.29 K/UL

## 2018-09-13 PROCEDURE — 84443 ASSAY THYROID STIM HORMONE: CPT

## 2018-09-13 PROCEDURE — 80061 LIPID PANEL: CPT

## 2018-09-13 PROCEDURE — 1101F PT FALLS ASSESS-DOCD LE1/YR: CPT | Mod: CPTII,,, | Performed by: NURSE PRACTITIONER

## 2018-09-13 PROCEDURE — 82043 UR ALBUMIN QUANTITATIVE: CPT

## 2018-09-13 PROCEDURE — 99215 OFFICE O/P EST HI 40 MIN: CPT | Mod: PBBFAC,PN | Performed by: NURSE PRACTITIONER

## 2018-09-13 PROCEDURE — 80053 COMPREHEN METABOLIC PANEL: CPT

## 2018-09-13 PROCEDURE — 36415 COLL VENOUS BLD VENIPUNCTURE: CPT

## 2018-09-13 PROCEDURE — 99214 OFFICE O/P EST MOD 30 MIN: CPT | Mod: S$PBB,,, | Performed by: NURSE PRACTITIONER

## 2018-09-13 PROCEDURE — 85025 COMPLETE CBC W/AUTO DIFF WBC: CPT

## 2018-09-13 PROCEDURE — 3079F DIAST BP 80-89 MM HG: CPT | Mod: CPTII,,, | Performed by: NURSE PRACTITIONER

## 2018-09-13 PROCEDURE — 99999 PR PBB SHADOW E&M-EST. PATIENT-LVL V: CPT | Mod: PBBFAC,,, | Performed by: NURSE PRACTITIONER

## 2018-09-13 PROCEDURE — 3074F SYST BP LT 130 MM HG: CPT | Mod: CPTII,,, | Performed by: NURSE PRACTITIONER

## 2018-09-13 RX ORDER — QUETIAPINE FUMARATE 200 MG/1
200 TABLET, FILM COATED ORAL NIGHTLY PRN
Qty: 30 TABLET | Refills: 5 | Status: SHIPPED | OUTPATIENT
Start: 2018-09-13 | End: 2019-06-05 | Stop reason: SDUPTHER

## 2018-09-13 RX ORDER — ALPRAZOLAM 0.5 MG/1
0.5 TABLET ORAL 2 TIMES DAILY PRN
Qty: 60 TABLET | Refills: 5 | Status: SHIPPED | OUTPATIENT
Start: 2018-09-13 | End: 2019-04-08 | Stop reason: SDUPTHER

## 2018-09-13 NOTE — PATIENT INSTRUCTIONS
4 Steps for Eating Healthier  Changing the way you eat can improve your health. It can lower your cholesterol and blood pressure, and help you stay at a healthy weight. Your diet doesnt have to be bland and boring to be healthy. Just watch your calories and follow these steps:    1. Eat fewer unhealthy fats  · Choose more fish and lean meats instead of fatty cuts of meat.  · Skip butter and lard, and use less margarine.  · Pass on foods that have palm, coconut, or hydrogenated oils.  · Eat fewer high-fat dairy foods like cheese, ice cream, and whole milk.  · Get a heart-healthy cookbook and try some low-fat recipes.  2. Go light on salt  · Keep the saltshaker off the table.  · Limit high-salt ingredients, such as soy sauce, bouillon, and garlic salt.  · Instead of adding salt when cooking, season your food with herbs and flavorings. Try lemon, garlic, and onion.  · Limit convenience foods, such as boxed or canned foods and restaurant food.  · Read food labels and choose lower-sodium options.  3. Limit sugar  · Pause before you add sugars to pancakes, cereal, coffee, or tea. This includes white and brown table sugar, syrup, honey, and molasses. Cut your usual amount by half.  · Use non-sugar sweeteners. Stevia, aspartame, and sucralose can satisfy a sweet tooth without adding calories.  · Swap out sugar-filled soda and other drinks. Buy sugar-free or low-calorie beverages. Remember water is always the best choice.  · Read labels and choose foods with less added sugar. Keep in mind that dairy foods and foods with fruit will have some natural sugar.  · Cut the sugar in recipes by 1/3 to 1/2. Boost the flavor with extracts like almond, vanilla, or orange. Or add spices such as cinnamon or nutmeg.  4. Eat more fiber  · Eat fresh fruits and vegetables every day.  · Boost your diet with whole grains. Go for oats, whole-grain rice, and bran.  · Add beans and lentils to your meals.  · Drink more water to match your fiber  increase. This is to help prevent constipation.  Date Last Reviewed: 5/11/2015  © 3984-1689 [employer]. [address] [place] [address], [place] [address]. All rights reserved. This information is not intended as a substitute for professional medical care. Always follow your healthcare professional's instructions.

## 2018-09-13 NOTE — PROGRESS NOTES
Subjective:       Patient ID: Emily Alicia is a 77 y.o. female.    Chief Complaint: Follow-up (check up with refills)    Patient is known, to me and presents with   Chief Complaint   Patient presents with    Follow-up     check up with refills   .  Denies chest pain and shortness of breath.  Patient presents for check up and labs. She is doing ok and declines all screenings at this time. Needs a few refills. Declines flu shot as well   HPI  Review of Systems   Constitutional: Negative for activity change, appetite change, fatigue, fever and unexpected weight change.   HENT: Negative for congestion, ear discharge, ear pain, hearing loss, postnasal drip and tinnitus.    Eyes: Negative for photophobia, pain and visual disturbance.   Respiratory: Negative for cough, shortness of breath, wheezing and stridor.    Cardiovascular: Negative for chest pain, palpitations and leg swelling.   Gastrointestinal: Negative for abdominal distention.   Genitourinary: Negative for difficulty urinating, dysuria, frequency, hematuria and urgency.   Musculoskeletal: Positive for arthralgias. Negative for back pain, gait problem, joint swelling and neck pain.   Skin: Negative.    Neurological: Negative for dizziness, seizures, syncope, weakness, light-headedness, numbness and headaches.   Hematological: Negative for adenopathy. Does not bruise/bleed easily.   Psychiatric/Behavioral: Negative for behavioral problems, confusion, decreased concentration, dysphoric mood, hallucinations, self-injury, sleep disturbance and suicidal ideas. The patient is not nervous/anxious and is not hyperactive.        Objective:      Physical Exam   Constitutional: She is oriented to person, place, and time. She appears well-developed and well-nourished. No distress.   HENT:   Head: Normocephalic and atraumatic.   Right Ear: External ear normal.   Left Ear: External ear normal.   Mouth/Throat: Oropharynx is clear and moist. No oropharyngeal exudate.   Eyes:  Conjunctivae and EOM are normal. Pupils are equal, round, and reactive to light. Right eye exhibits no discharge. Left eye exhibits no discharge.   Neck: Normal range of motion. Neck supple. No JVD present. No thyromegaly present.   Cardiovascular: Normal rate, regular rhythm and intact distal pulses. Exam reveals no gallop and no friction rub.   Murmur heard.  Pulmonary/Chest: Effort normal and breath sounds normal. No stridor. No respiratory distress. She has no wheezes. She has no rales. She exhibits no tenderness.   Abdominal: Soft. Bowel sounds are normal. She exhibits no distension and no mass. There is no tenderness. There is no rebound.   Musculoskeletal: She exhibits no edema or tenderness.   Lymphadenopathy:     She has no cervical adenopathy.   Neurological: She is alert and oriented to person, place, and time. She has normal reflexes. She displays normal reflexes. No cranial nerve deficit. She exhibits normal muscle tone. Coordination normal.   Skin: Skin is warm and dry. Capillary refill takes less than 2 seconds. No rash noted. No erythema. No pallor.   Psychiatric: She has a normal mood and affect. Her behavior is normal. Judgment and thought content normal.       Assessment:       1. Essential hypertension    2. Hyperlipidemia LDL goal <70    3. Chronic diastolic heart failure    4. Abdominal aortic atherosclerosis    5. Chronic pain of left knee    6. Insomnia, unspecified type    7. CKD (chronic kidney disease), stage III    8. Anxiety    9. Obesity (BMI 30-39.9)        Plan:   Emily was seen today for follow-up.    Diagnoses and all orders for this visit:    Essential hypertension  -     CBC auto differential; Future  -     Comprehensive metabolic panel; Future  -     Microalbumin/creatinine urine ratio; Future    Hyperlipidemia LDL goal <70  -     CBC auto differential; Future  -     Comprehensive metabolic panel; Future  -     TSH; Future  -     Lipid panel; Future    Chronic diastolic heart  "failure  -     CBC auto differential; Future  -     Comprehensive metabolic panel; Future  stable    Abdominal aortic atherosclerosis  -     CBC auto differential; Future  -     Comprehensive metabolic panel; Future  stable    Chronic pain of left knee  -     CBC auto differential; Future  -     Comprehensive metabolic panel; Future    Insomnia, unspecified type  -     CBC auto differential; Future  -     Comprehensive metabolic panel; Future  -     QUEtiapine (SEROQUEL) 200 MG Tab; Take 1 tablet (200 mg total) by mouth nightly as needed.    CKD (chronic kidney disease), stage III  -     CBC auto differential; Future  -     Comprehensive metabolic panel; Future    Anxiety  -     ALPRAZolam (XANAX) 0.5 MG tablet; Take 1 tablet (0.5 mg total) by mouth 2 (two) times daily as needed.  -     CBC auto differential; Future  -     Comprehensive metabolic panel; Future    Obesity (BMI 30-39.9)  -     CBC auto differential; Future  -     Comprehensive metabolic panel; Future    "This note will not be shared with the patient."  Lab drawn today CBC, CMP, TSH, FLP  Limit the cholesterol in your diet to less than 300 mg per day.  Fats should contribute no more than 20 to 35% of your daily calories.  Less than 7 to 10% of your calories should come from saturated fat.  Avoid saturated fat products e.g., butter, some oils, meat, and poultry fat contain a lot of saturated fat.  Check food labels for fat and cholesterol content. Choose the foods with less fat per serving.  Limit the amount of butter and margarine you eat.  Use salad dressings and margarine made with polyunsaturated and monunsaturated fats.  Use egg whites or egg substitutes rather than whole eggs.  Replace whole-milk dairy products with nonfat or low-fat mild, cheese, spreads, and yogurt.  Eat skinless chicken, turkey, fish, and meatless entrees more often than red meat.  Choose lean cuts of meat and trim off all visible fat. Keep portion sizes moderate.  Avoid fatty " desserts such as ice cream, cream-filled cakes, and cheesecakes. Choose fresh fruits, nonfat frozen yogurt, Popsicles, etc.  Reduce the amount of fried foods, vending machine food, and fast food you eat.  Eat fruits and vegetables (especially fresh fruits and leafy vegetables), beans, and whole grains daily. The fiber in these foods helps lower cholesterol.  Look for low-fat or nonfat varieties of the foods you like to eat or look for substitutes.  You may need to exercise 60 minutes a day to prevent weight gain and 90 minutes a day to lose weight.  RTC in six months.

## 2018-11-07 DIAGNOSIS — I10 ESSENTIAL HYPERTENSION: ICD-10-CM

## 2018-11-07 RX ORDER — LOSARTAN POTASSIUM AND HYDROCHLOROTHIAZIDE 25; 100 MG/1; MG/1
TABLET ORAL
Qty: 90 TABLET | Refills: 0 | Status: SHIPPED | OUTPATIENT
Start: 2018-11-07 | End: 2019-03-04 | Stop reason: SDUPTHER

## 2018-12-19 DIAGNOSIS — I10 BENIGN ESSENTIAL HTN: ICD-10-CM

## 2018-12-20 RX ORDER — CLONIDINE HYDROCHLORIDE 0.1 MG/1
TABLET ORAL
Qty: 270 TABLET | Refills: 0 | Status: SHIPPED | OUTPATIENT
Start: 2018-12-20 | End: 2019-03-04 | Stop reason: SDUPTHER

## 2019-01-07 ENCOUNTER — TELEPHONE (OUTPATIENT)
Dept: INTERNAL MEDICINE | Facility: CLINIC | Age: 79
End: 2019-01-07

## 2019-01-07 NOTE — TELEPHONE ENCOUNTER
She is a 78 year old with horrible OA so I am ok with her taking pain medication as needed along with her anxiety medications

## 2019-01-07 NOTE — TELEPHONE ENCOUNTER
[facility] called just to make sure you are aware that you are prescribing pt xanax as well as her being on hydrocodone  from another provider  please advise  Thanks!

## 2019-02-01 DIAGNOSIS — M15.9 PRIMARY OSTEOARTHRITIS INVOLVING MULTIPLE JOINTS: ICD-10-CM

## 2019-02-01 DIAGNOSIS — I10 ESSENTIAL HYPERTENSION: ICD-10-CM

## 2019-02-05 RX ORDER — NAPROXEN 500 MG/1
TABLET ORAL
Qty: 180 TABLET | Refills: 0 | Status: SHIPPED | OUTPATIENT
Start: 2019-02-05 | End: 2019-07-22 | Stop reason: SDUPTHER

## 2019-02-05 RX ORDER — METOPROLOL SUCCINATE 200 MG/1
TABLET, EXTENDED RELEASE ORAL
Qty: 180 TABLET | Refills: 0 | Status: SHIPPED | OUTPATIENT
Start: 2019-02-05 | End: 2019-08-14 | Stop reason: SDUPTHER

## 2019-03-04 ENCOUNTER — OFFICE VISIT (OUTPATIENT)
Dept: INTERNAL MEDICINE | Facility: CLINIC | Age: 79
End: 2019-03-04
Payer: MEDICARE

## 2019-03-04 ENCOUNTER — LAB VISIT (OUTPATIENT)
Dept: LAB | Facility: HOSPITAL | Age: 79
End: 2019-03-04
Attending: NURSE PRACTITIONER
Payer: MEDICARE

## 2019-03-04 VITALS
HEART RATE: 82 BPM | SYSTOLIC BLOOD PRESSURE: 132 MMHG | RESPIRATION RATE: 16 BRPM | OXYGEN SATURATION: 99 % | DIASTOLIC BLOOD PRESSURE: 66 MMHG | BODY MASS INDEX: 33.62 KG/M2 | WEIGHT: 234.81 LBS | HEIGHT: 70 IN

## 2019-03-04 DIAGNOSIS — N39.0 URINARY TRACT INFECTION WITH HEMATURIA, SITE UNSPECIFIED: ICD-10-CM

## 2019-03-04 DIAGNOSIS — I10 ESSENTIAL HYPERTENSION: ICD-10-CM

## 2019-03-04 DIAGNOSIS — N39.0 URINARY TRACT INFECTION WITH HEMATURIA, SITE UNSPECIFIED: Primary | ICD-10-CM

## 2019-03-04 DIAGNOSIS — R31.9 URINARY TRACT INFECTION WITH HEMATURIA, SITE UNSPECIFIED: ICD-10-CM

## 2019-03-04 DIAGNOSIS — R31.9 URINARY TRACT INFECTION WITH HEMATURIA, SITE UNSPECIFIED: Primary | ICD-10-CM

## 2019-03-04 DIAGNOSIS — I10 BENIGN ESSENTIAL HTN: ICD-10-CM

## 2019-03-04 DIAGNOSIS — N39.3 STRESS INCONTINENCE: ICD-10-CM

## 2019-03-04 LAB
BILIRUB SERPL-MCNC: ABNORMAL MG/DL
BLOOD URINE, POC: 50
COLOR, POC UA: CLEAR
GLUCOSE UR QL STRIP: NORMAL
KETONES UR QL STRIP: ABNORMAL
LEUKOCYTE ESTERASE URINE, POC: ABNORMAL
NITRITE, POC UA: ABNORMAL
PH, POC UA: 6
PROTEIN, POC: ABNORMAL
SPECIFIC GRAVITY, POC UA: 1
UROBILINOGEN, POC UA: NORMAL

## 2019-03-04 PROCEDURE — 3078F DIAST BP <80 MM HG: CPT | Mod: CPTII,S$GLB,, | Performed by: NURSE PRACTITIONER

## 2019-03-04 PROCEDURE — 81002 URINALYSIS NONAUTO W/O SCOPE: CPT | Mod: S$GLB,,, | Performed by: NURSE PRACTITIONER

## 2019-03-04 PROCEDURE — 81002 POCT URINE DIPSTICK WITHOUT MICROSCOPE: ICD-10-PCS | Mod: S$GLB,,, | Performed by: NURSE PRACTITIONER

## 2019-03-04 PROCEDURE — 99213 PR OFFICE/OUTPT VISIT, EST, LEVL III, 20-29 MIN: ICD-10-PCS | Mod: S$GLB,,, | Performed by: NURSE PRACTITIONER

## 2019-03-04 PROCEDURE — 99999 PR PBB SHADOW E&M-EST. PATIENT-LVL IV: ICD-10-PCS | Mod: PBBFAC,,, | Performed by: NURSE PRACTITIONER

## 2019-03-04 PROCEDURE — 87086 URINE CULTURE/COLONY COUNT: CPT

## 2019-03-04 PROCEDURE — 99999 PR PBB SHADOW E&M-EST. PATIENT-LVL IV: CPT | Mod: PBBFAC,,, | Performed by: NURSE PRACTITIONER

## 2019-03-04 PROCEDURE — 99213 OFFICE O/P EST LOW 20 MIN: CPT | Mod: S$GLB,,, | Performed by: NURSE PRACTITIONER

## 2019-03-04 PROCEDURE — 1101F PT FALLS ASSESS-DOCD LE1/YR: CPT | Mod: CPTII,S$GLB,, | Performed by: NURSE PRACTITIONER

## 2019-03-04 PROCEDURE — 3075F PR MOST RECENT SYSTOLIC BLOOD PRESS GE 130-139MM HG: ICD-10-PCS | Mod: CPTII,S$GLB,, | Performed by: NURSE PRACTITIONER

## 2019-03-04 PROCEDURE — 3078F PR MOST RECENT DIASTOLIC BLOOD PRESSURE < 80 MM HG: ICD-10-PCS | Mod: CPTII,S$GLB,, | Performed by: NURSE PRACTITIONER

## 2019-03-04 PROCEDURE — 3075F SYST BP GE 130 - 139MM HG: CPT | Mod: CPTII,S$GLB,, | Performed by: NURSE PRACTITIONER

## 2019-03-04 PROCEDURE — 1101F PR PT FALLS ASSESS DOC 0-1 FALLS W/OUT INJ PAST YR: ICD-10-PCS | Mod: CPTII,S$GLB,, | Performed by: NURSE PRACTITIONER

## 2019-03-04 RX ORDER — OXYBUTYNIN CHLORIDE 5 MG/1
5 TABLET, EXTENDED RELEASE ORAL DAILY
Qty: 30 TABLET | Refills: 5 | Status: SHIPPED | OUTPATIENT
Start: 2019-03-04 | End: 2020-03-06

## 2019-03-04 RX ORDER — CIPROFLOXACIN 500 MG/1
500 TABLET ORAL 2 TIMES DAILY
Qty: 14 TABLET | Refills: 0 | Status: SHIPPED | OUTPATIENT
Start: 2019-03-04 | End: 2019-03-11 | Stop reason: SDUPTHER

## 2019-03-04 RX ORDER — LOSARTAN POTASSIUM AND HYDROCHLOROTHIAZIDE 25; 100 MG/1; MG/1
1 TABLET ORAL DAILY
Qty: 90 TABLET | Refills: 0 | Status: ON HOLD | OUTPATIENT
Start: 2019-03-04 | End: 2021-02-04 | Stop reason: HOSPADM

## 2019-03-04 RX ORDER — CLONIDINE HYDROCHLORIDE 0.1 MG/1
0.1 TABLET ORAL 3 TIMES DAILY
Qty: 270 TABLET | Refills: 0 | Status: SHIPPED | OUTPATIENT
Start: 2019-03-04 | End: 2020-10-06 | Stop reason: SDUPTHER

## 2019-03-04 NOTE — PROGRESS NOTES
Subjective:       Patient ID: Emily Alicia is a 78 y.o. female.    Chief Complaint: Cystitis (small blood in urine and pain in lower abdomen) and Low-back Pain    Patient is known, to me and presents with   Chief Complaint   Patient presents with    Cystitis     small blood in urine and pain in lower abdomen    Low-back Pain   .  Denies chest pain and shortness of breath.  Patient presents with uti s/s for the past couple of weeks.  She will need a few refills   HPI  Review of Systems   Constitutional: Negative.    Respiratory: Negative.    Cardiovascular: Negative.    Genitourinary: Positive for dysuria and frequency.   Musculoskeletal: Positive for arthralgias and back pain. Negative for gait problem, joint swelling, myalgias, neck pain and neck stiffness.   Skin: Negative.    Neurological: Negative.        Objective:      Physical Exam   Constitutional: She is oriented to person, place, and time. She appears well-developed and well-nourished. No distress.   Neck: Normal range of motion. Neck supple. No JVD present. No tracheal deviation present. No thyromegaly present.   Cardiovascular: Normal rate and regular rhythm.   Murmur heard.  Pulmonary/Chest: Effort normal and breath sounds normal. She has no wheezes.   Genitourinary:   Genitourinary Comments: See dip   Musculoskeletal: She exhibits no edema, tenderness or deformity.   Negative CVA tenderness    Lymphadenopathy:     She has no cervical adenopathy.   Neurological: She is alert and oriented to person, place, and time. She displays normal reflexes. No cranial nerve deficit or sensory deficit. She exhibits normal muscle tone. Coordination normal.   Skin: Skin is warm and dry. Capillary refill takes less than 2 seconds. No rash noted. She is not diaphoretic. No erythema. No pallor.       Assessment:       1. Urinary tract infection with hematuria, site unspecified    2. Benign essential HTN    3. Stress incontinence    4. Essential hypertension        Plan:  "  Emily was seen today for cystitis and low-back pain.    Diagnoses and all orders for this visit:    Urinary tract infection with hematuria, site unspecified  -     ciprofloxacin HCl (CIPRO) 500 MG tablet; Take 1 tablet (500 mg total) by mouth 2 (two) times daily. for 7 days  -     POCT URINE DIPSTICK WITHOUT MICROSCOPE  -     Urine culture; Future    Benign essential HTN  -     cloNIDine (CATAPRES) 0.1 MG tablet; Take 1 tablet (0.1 mg total) by mouth 3 (three) times daily.    Stress incontinence  -     oxybutynin (DITROPAN-XL) 5 MG TR24; Take 1 tablet (5 mg total) by mouth once daily.    Essential hypertension  -     losartan-hydrochlorothiazide 100-25 mg (HYZAAR) 100-25 mg per tablet; Take 1 tablet by mouth once daily.    "This note will not be shared with the patient."  Refills done and does see her cardiologists  Keep hydrated  rtc as scheduled  "

## 2019-03-06 LAB — BACTERIA UR CULT: NORMAL

## 2019-03-11 ENCOUNTER — TELEPHONE (OUTPATIENT)
Dept: INTERNAL MEDICINE | Facility: CLINIC | Age: 79
End: 2019-03-11

## 2019-03-11 DIAGNOSIS — R31.9 URINARY TRACT INFECTION WITH HEMATURIA, SITE UNSPECIFIED: ICD-10-CM

## 2019-03-11 DIAGNOSIS — N39.0 URINARY TRACT INFECTION WITH HEMATURIA, SITE UNSPECIFIED: ICD-10-CM

## 2019-03-11 RX ORDER — CIPROFLOXACIN 500 MG/1
500 TABLET ORAL 2 TIMES DAILY
Qty: 14 TABLET | Refills: 0 | Status: SHIPPED | OUTPATIENT
Start: 2019-03-11 | End: 2019-03-18

## 2019-03-11 NOTE — TELEPHONE ENCOUNTER
Pt called stating she finished her antbx yesterday and still with pressure and discharge wondering if she needs another round of antbx for this  Please advise  Thanks!  [facility]

## 2019-03-18 DIAGNOSIS — K21.9 GASTROESOPHAGEAL REFLUX DISEASE WITHOUT ESOPHAGITIS: ICD-10-CM

## 2019-03-18 RX ORDER — OMEPRAZOLE 40 MG/1
CAPSULE, DELAYED RELEASE ORAL
Qty: 90 CAPSULE | Refills: 0 | Status: SHIPPED | OUTPATIENT
Start: 2019-03-18 | End: 2019-08-27 | Stop reason: SDUPTHER

## 2019-04-08 DIAGNOSIS — F41.9 ANXIETY: ICD-10-CM

## 2019-04-08 RX ORDER — ALPRAZOLAM 0.5 MG/1
TABLET ORAL
Qty: 60 TABLET | Refills: 2 | Status: SHIPPED | OUTPATIENT
Start: 2019-04-08 | End: 2019-09-20 | Stop reason: SDUPTHER

## 2019-06-05 DIAGNOSIS — G47.00 INSOMNIA, UNSPECIFIED TYPE: ICD-10-CM

## 2019-06-05 NOTE — TELEPHONE ENCOUNTER
----- Message from Jaja Harp MA sent at 6/5/2019  8:55 AM CDT -----  Contact: Patient  Patient is out of refills on Seroquel.     Send new script to [facility] ([place])

## 2019-06-06 RX ORDER — QUETIAPINE FUMARATE 200 MG/1
200 TABLET, FILM COATED ORAL NIGHTLY PRN
Qty: 30 TABLET | Refills: 0 | Status: SHIPPED | OUTPATIENT
Start: 2019-06-06 | End: 2019-07-08 | Stop reason: SDUPTHER

## 2019-07-07 DIAGNOSIS — G47.00 INSOMNIA, UNSPECIFIED TYPE: ICD-10-CM

## 2019-07-08 ENCOUNTER — TELEPHONE (OUTPATIENT)
Dept: INTERNAL MEDICINE | Facility: CLINIC | Age: 79
End: 2019-07-08

## 2019-07-08 DIAGNOSIS — G47.00 INSOMNIA, UNSPECIFIED TYPE: ICD-10-CM

## 2019-07-08 RX ORDER — QUETIAPINE FUMARATE 200 MG/1
TABLET, FILM COATED ORAL
Qty: 30 TABLET | Refills: 0 | OUTPATIENT
Start: 2019-07-08

## 2019-07-08 RX ORDER — QUETIAPINE FUMARATE 200 MG/1
200 TABLET, FILM COATED ORAL NIGHTLY PRN
Qty: 30 TABLET | Refills: 5 | Status: SHIPPED | OUTPATIENT
Start: 2019-07-08 | End: 2020-01-09

## 2019-07-08 NOTE — TELEPHONE ENCOUNTER
----- Message from Jaja Harp MA sent at 7/8/2019  9:51 AM CDT -----  Contact: Patient  Patient is out of refills on Seroquel.     Send script to [facility] ([place])

## 2019-07-22 ENCOUNTER — TELEPHONE (OUTPATIENT)
Dept: INTERNAL MEDICINE | Facility: CLINIC | Age: 79
End: 2019-07-22

## 2019-07-22 DIAGNOSIS — M15.9 PRIMARY OSTEOARTHRITIS INVOLVING MULTIPLE JOINTS: ICD-10-CM

## 2019-07-22 RX ORDER — NAPROXEN 500 MG/1
500 TABLET ORAL 2 TIMES DAILY
Qty: 180 TABLET | Refills: 0 | Status: SHIPPED | OUTPATIENT
Start: 2019-07-22 | End: 2020-05-04

## 2019-07-22 NOTE — TELEPHONE ENCOUNTER
----- Message from Jaja Harp MA sent at 7/22/2019  2:30 PM CDT -----  Contact: Patient  Patient is requesting a script for Naproxen,  She is out of refills.     Send to [facility] ([place])

## 2019-08-14 DIAGNOSIS — I10 ESSENTIAL HYPERTENSION: ICD-10-CM

## 2019-08-15 RX ORDER — METOPROLOL SUCCINATE 200 MG/1
TABLET, EXTENDED RELEASE ORAL
Qty: 180 TABLET | Refills: 0 | Status: SHIPPED | OUTPATIENT
Start: 2019-08-15 | End: 2020-02-07

## 2019-08-27 DIAGNOSIS — K21.9 GASTROESOPHAGEAL REFLUX DISEASE WITHOUT ESOPHAGITIS: ICD-10-CM

## 2019-08-27 RX ORDER — OMEPRAZOLE 40 MG/1
CAPSULE, DELAYED RELEASE ORAL
Qty: 90 CAPSULE | Refills: 0 | Status: ON HOLD | OUTPATIENT
Start: 2019-08-27 | End: 2021-02-04 | Stop reason: HOSPADM

## 2019-09-20 DIAGNOSIS — F41.9 ANXIETY: ICD-10-CM

## 2019-09-24 RX ORDER — ALPRAZOLAM 0.5 MG/1
TABLET ORAL
Qty: 60 TABLET | Refills: 2 | Status: SHIPPED | OUTPATIENT
Start: 2019-09-24 | End: 2020-05-04

## 2019-10-30 DIAGNOSIS — I10 ESSENTIAL HYPERTENSION: ICD-10-CM

## 2019-10-31 RX ORDER — LOSARTAN POTASSIUM AND HYDROCHLOROTHIAZIDE 25; 100 MG/1; MG/1
TABLET ORAL
Qty: 90 TABLET | Refills: 0 | Status: SHIPPED | OUTPATIENT
Start: 2019-10-31 | End: 2020-06-09 | Stop reason: SDUPTHER

## 2019-11-05 ENCOUNTER — TELEPHONE (OUTPATIENT)
Dept: INTERNAL MEDICINE | Facility: CLINIC | Age: 79
End: 2019-11-05

## 2019-11-05 RX ORDER — LOSARTAN POTASSIUM 100 MG/1
100 TABLET ORAL DAILY
Qty: 30 TABLET | Refills: 5 | Status: SHIPPED | OUTPATIENT
Start: 2019-11-05 | End: 2019-12-05

## 2019-11-05 RX ORDER — HYDROCHLOROTHIAZIDE 25 MG/1
25 TABLET ORAL DAILY
Qty: 30 TABLET | Refills: 5 | Status: ON HOLD | OUTPATIENT
Start: 2019-11-05 | End: 2021-02-04

## 2020-01-09 DIAGNOSIS — G47.00 INSOMNIA, UNSPECIFIED TYPE: ICD-10-CM

## 2020-01-09 RX ORDER — QUETIAPINE FUMARATE 200 MG/1
TABLET, FILM COATED ORAL
Qty: 30 TABLET | Refills: 2 | Status: SHIPPED | OUTPATIENT
Start: 2020-01-09 | End: 2020-04-06

## 2020-01-16 ENCOUNTER — OFFICE VISIT (OUTPATIENT)
Dept: INTERNAL MEDICINE | Facility: CLINIC | Age: 80
End: 2020-01-16
Payer: MEDICARE

## 2020-01-16 ENCOUNTER — HOSPITAL ENCOUNTER (OUTPATIENT)
Dept: RADIOLOGY | Facility: HOSPITAL | Age: 80
Discharge: HOME | End: 2020-01-16
Attending: NURSE PRACTITIONER
Payer: MEDICARE

## 2020-01-16 VITALS
OXYGEN SATURATION: 96 % | DIASTOLIC BLOOD PRESSURE: 86 MMHG | HEART RATE: 82 BPM | HEIGHT: 69 IN | TEMPERATURE: 98 F | BODY MASS INDEX: 33.62 KG/M2 | RESPIRATION RATE: 18 BRPM | WEIGHT: 227 LBS | SYSTOLIC BLOOD PRESSURE: 136 MMHG

## 2020-01-16 DIAGNOSIS — N39.0 URINARY TRACT INFECTION WITH HEMATURIA, SITE UNSPECIFIED: ICD-10-CM

## 2020-01-16 DIAGNOSIS — R31.9 URINARY TRACT INFECTION WITH HEMATURIA, SITE UNSPECIFIED: ICD-10-CM

## 2020-01-16 DIAGNOSIS — N18.30 CKD (CHRONIC KIDNEY DISEASE), STAGE III: ICD-10-CM

## 2020-01-16 DIAGNOSIS — M19.90 ARTHRITIS: ICD-10-CM

## 2020-01-16 DIAGNOSIS — G47.00 INSOMNIA, UNSPECIFIED TYPE: ICD-10-CM

## 2020-01-16 DIAGNOSIS — I50.32 CHRONIC DIASTOLIC HEART FAILURE: ICD-10-CM

## 2020-01-16 DIAGNOSIS — I10 ESSENTIAL HYPERTENSION: ICD-10-CM

## 2020-01-16 DIAGNOSIS — I70.0 ABDOMINAL AORTIC ATHEROSCLEROSIS: ICD-10-CM

## 2020-01-16 DIAGNOSIS — E66.9 OBESITY (BMI 30.0-34.9): ICD-10-CM

## 2020-01-16 DIAGNOSIS — F41.9 ANXIETY: ICD-10-CM

## 2020-01-16 DIAGNOSIS — E78.5 HYPERLIPIDEMIA LDL GOAL <70: ICD-10-CM

## 2020-01-16 PROBLEM — E66.811 OBESITY (BMI 30.0-34.9): Status: ACTIVE | Noted: 2020-01-16

## 2020-01-16 LAB
BILIRUB SERPL-MCNC: ABNORMAL MG/DL
BLOOD URINE, POC: 250
COLOR, POC UA: CLEAR
GLUCOSE UR QL STRIP: NORMAL
KETONES UR QL STRIP: ABNORMAL
LEUKOCYTE ESTERASE URINE, POC: ABNORMAL
NITRITE, POC UA: ABNORMAL
PH, POC UA: 5
PROTEIN, POC: ABNORMAL
SPECIFIC GRAVITY, POC UA: 1.01
UROBILINOGEN, POC UA: NORMAL

## 2020-01-16 PROCEDURE — 1126F AMNT PAIN NOTED NONE PRSNT: CPT | Mod: S$GLB,,, | Performed by: NURSE PRACTITIONER

## 2020-01-16 PROCEDURE — 1126F PR PAIN SEVERITY QUANTIFIED, NO PAIN PRESENT: ICD-10-PCS | Mod: S$GLB,,, | Performed by: NURSE PRACTITIONER

## 2020-01-16 PROCEDURE — 99214 PR OFFICE/OUTPT VISIT, EST, LEVL IV, 30-39 MIN: ICD-10-PCS | Mod: 25,S$GLB,, | Performed by: NURSE PRACTITIONER

## 2020-01-16 PROCEDURE — 3079F DIAST BP 80-89 MM HG: CPT | Mod: CPTII,S$GLB,, | Performed by: NURSE PRACTITIONER

## 2020-01-16 PROCEDURE — 99214 OFFICE O/P EST MOD 30 MIN: CPT | Mod: 25,S$GLB,, | Performed by: NURSE PRACTITIONER

## 2020-01-16 PROCEDURE — 3075F PR MOST RECENT SYSTOLIC BLOOD PRESS GE 130-139MM HG: ICD-10-PCS | Mod: CPTII,S$GLB,, | Performed by: NURSE PRACTITIONER

## 2020-01-16 PROCEDURE — 73130 X-RAY EXAM OF HAND: CPT | Mod: TC,RT

## 2020-01-16 PROCEDURE — 1159F PR MEDICATION LIST DOCUMENTED IN MEDICAL RECORD: ICD-10-PCS | Mod: S$GLB,,, | Performed by: NURSE PRACTITIONER

## 2020-01-16 PROCEDURE — 3079F PR MOST RECENT DIASTOLIC BLOOD PRESSURE 80-89 MM HG: ICD-10-PCS | Mod: CPTII,S$GLB,, | Performed by: NURSE PRACTITIONER

## 2020-01-16 PROCEDURE — 1101F PT FALLS ASSESS-DOCD LE1/YR: CPT | Mod: CPTII,S$GLB,, | Performed by: NURSE PRACTITIONER

## 2020-01-16 PROCEDURE — 1159F MED LIST DOCD IN RCRD: CPT | Mod: S$GLB,,, | Performed by: NURSE PRACTITIONER

## 2020-01-16 PROCEDURE — 1101F PR PT FALLS ASSESS DOC 0-1 FALLS W/OUT INJ PAST YR: ICD-10-PCS | Mod: CPTII,S$GLB,, | Performed by: NURSE PRACTITIONER

## 2020-01-16 PROCEDURE — 87086 URINE CULTURE/COLONY COUNT: CPT

## 2020-01-16 PROCEDURE — 81002 POCT URINE DIPSTICK WITHOUT MICROSCOPE: ICD-10-PCS | Mod: S$GLB,,, | Performed by: NURSE PRACTITIONER

## 2020-01-16 PROCEDURE — 3075F SYST BP GE 130 - 139MM HG: CPT | Mod: CPTII,S$GLB,, | Performed by: NURSE PRACTITIONER

## 2020-01-16 PROCEDURE — 99999 PR PBB SHADOW E&M-EST. PATIENT-LVL IV: CPT | Mod: PBBFAC,,, | Performed by: NURSE PRACTITIONER

## 2020-01-16 PROCEDURE — 81002 URINALYSIS NONAUTO W/O SCOPE: CPT | Mod: S$GLB,,, | Performed by: NURSE PRACTITIONER

## 2020-01-16 PROCEDURE — 73130 X-RAY EXAM OF HAND: CPT | Mod: 26,RT,, | Performed by: RADIOLOGY

## 2020-01-16 PROCEDURE — 99999 PR PBB SHADOW E&M-EST. PATIENT-LVL IV: ICD-10-PCS | Mod: PBBFAC,,, | Performed by: NURSE PRACTITIONER

## 2020-01-16 PROCEDURE — 73130 XR HAND COMPLETE 3 VIEW RIGHT: ICD-10-PCS | Mod: 26,RT,, | Performed by: RADIOLOGY

## 2020-01-16 RX ORDER — CIPROFLOXACIN 500 MG/1
500 TABLET ORAL 2 TIMES DAILY
Qty: 10 TABLET | Refills: 0 | Status: SHIPPED | OUTPATIENT
Start: 2020-01-16 | End: 2020-01-22 | Stop reason: SDUPTHER

## 2020-01-16 RX ORDER — LOSARTAN POTASSIUM 100 MG/1
TABLET ORAL
Status: ON HOLD | COMMUNITY
Start: 2020-01-01 | End: 2021-02-04 | Stop reason: SDUPTHER

## 2020-01-16 RX ORDER — TRIAMTERENE/HYDROCHLOROTHIAZID 37.5-25 MG
TABLET ORAL
Status: ON HOLD | COMMUNITY
Start: 2020-01-03 | End: 2021-02-04 | Stop reason: HOSPADM

## 2020-01-16 NOTE — PATIENT INSTRUCTIONS
4 Steps for Eating Healthier  Changing the way you eat can improve your health. It can lower your cholesterol and blood pressure, and help you stay at a healthy weight. Your diet doesnt have to be bland and boring to be healthy. Just watch your calories and follow these steps:    1. Eat fewer unhealthy fats  · Choose more fish and lean meats instead of fatty cuts of meat.  · Skip butter and lard, and use less margarine.  · Pass on foods that have palm, coconut, or hydrogenated oils.  · Eat fewer high-fat dairy foods like cheese, ice cream, and whole milk.  · Get a heart-healthy cookbook and try some low-fat recipes.  2. Go light on salt  · Keep the saltshaker off the table.  · Limit high-salt ingredients, such as soy sauce, bouillon, and garlic salt.  · Instead of adding salt when cooking, season your food with herbs and flavorings. Try lemon, garlic, and onion.  · Limit convenience foods, such as boxed or canned foods and restaurant food.  · Read food labels and choose lower-sodium options.  3. Limit sugar  · Pause before you add sugars to pancakes, cereal, coffee, or tea. This includes white and brown table sugar, syrup, honey, and molasses. Cut your usual amount by half.  · Use non-sugar sweeteners. Stevia, aspartame, and sucralose can satisfy a sweet tooth without adding calories.  · Swap out sugar-filled soda and other drinks. Buy sugar-free or low-calorie beverages. Remember water is always the best choice.  · Read labels and choose foods with less added sugar. Keep in mind that dairy foods and foods with fruit will have some natural sugar.  · Cut the sugar in recipes by 1/3 to 1/2. Boost the flavor with extracts like almond, vanilla, or orange. Or add spices such as cinnamon or nutmeg.  4. Eat more fiber  · Eat fresh fruits and vegetables every day.  · Boost your diet with whole grains. Go for oats, whole-grain rice, and bran.  · Add beans and lentils to your meals.  · Drink more water to match your fiber  increase. This is to help prevent constipation.  Date Last Reviewed: 5/11/2015  © 9360-7084 [employer]. [address] [place] [address], [place] [address]. All rights reserved. This information is not intended as a substitute for professional medical care. Always follow your healthcare professional's instructions.

## 2020-01-16 NOTE — PROGRESS NOTES
Subjective:       Patient ID: Emily Alicia is a 79 y.o. female.    Chief Complaint: Urinary Tract Infection (ongoing - x few weeks with odor )    Patient is known, to me and presents with   Chief Complaint   Patient presents with    Urinary Tract Infection     ongoing - x few weeks with odor    .  Denies chest pain and shortness of breath.  Patient presents with UTI s/s. States that she has noticed an order lately and some frequency. Also needs blood work and refills. Having right hand pain and her arthritis doctor wants to do x-rays and blood work. Otherwise doing ok. Declines screenings at this time    HPI  Review of Systems   Constitutional: Negative for activity change, appetite change, fatigue, fever and unexpected weight change.   HENT: Negative for congestion, ear discharge, ear pain, hearing loss, postnasal drip and tinnitus.    Eyes: Negative for photophobia, pain and visual disturbance.   Respiratory: Negative for cough, shortness of breath, wheezing and stridor.    Cardiovascular: Negative for chest pain, palpitations and leg swelling.   Gastrointestinal: Negative for abdominal distention.   Genitourinary: Positive for frequency. Negative for difficulty urinating, dysuria, hematuria and urgency.   Musculoskeletal: Positive for arthralgias and back pain. Negative for gait problem, joint swelling, myalgias, neck pain and neck stiffness.   Skin: Negative.    Neurological: Negative for dizziness, seizures, syncope, weakness, light-headedness, numbness and headaches.   Hematological: Negative for adenopathy. Does not bruise/bleed easily.   Psychiatric/Behavioral: Negative for behavioral problems, confusion, dysphoric mood, hallucinations, sleep disturbance and suicidal ideas. The patient is not nervous/anxious.        Objective:      Physical Exam   Constitutional: She is oriented to person, place, and time. She appears well-developed and well-nourished. No distress.   HENT:   Head: Normocephalic and  atraumatic.   Right Ear: External ear normal.   Left Ear: External ear normal.   Mouth/Throat: Oropharynx is clear and moist. No oropharyngeal exudate.   Eyes: Pupils are equal, round, and reactive to light. Conjunctivae and EOM are normal. Right eye exhibits no discharge. Left eye exhibits no discharge.   Neck: Normal range of motion. Neck supple. No JVD present. No thyromegaly present.   Cardiovascular: Normal rate, regular rhythm and intact distal pulses. Exam reveals no gallop and no friction rub.   Murmur heard.  Pulmonary/Chest: Effort normal and breath sounds normal. No stridor. No respiratory distress. She has no wheezes. She has no rales. She exhibits no tenderness.   Abdominal: Soft. Bowel sounds are normal. She exhibits no distension and no mass. There is no tenderness. There is no rebound.   Musculoskeletal: She exhibits no edema or tenderness.   Arthritic changes in hands noted   Lymphadenopathy:     She has no cervical adenopathy.   Neurological: She is alert and oriented to person, place, and time. She has normal reflexes. She displays normal reflexes. No cranial nerve deficit. She exhibits normal muscle tone. Coordination normal.   Skin: Skin is warm and dry. No rash noted. No erythema. No pallor.   Psychiatric: She has a normal mood and affect. Her behavior is normal. Judgment and thought content normal.       Assessment:       1. Urinary tract infection with hematuria, site unspecified    2. CKD (chronic kidney disease), stage III    3. Abdominal aortic atherosclerosis    4. Chronic diastolic heart failure    5. Essential hypertension    6. Hyperlipidemia LDL goal <70    7. Insomnia, unspecified type    8. Anxiety    9. Arthritis    10. Obesity (BMI 30.0-34.9)        Plan:   Emily was seen today for urinary tract infection.    Diagnoses and all orders for this visit:    Urinary tract infection with hematuria, site unspecified  -     ciprofloxacin HCl (CIPRO) 500 MG tablet; Take 1 tablet (500 mg  "total) by mouth 2 (two) times daily. for 5 days  -     POCT URINE DIPSTICK WITHOUT MICROSCOPE  -     Urine culture; Future  -     CBC auto differential; Future  -     Comprehensive metabolic panel; Future    CKD (chronic kidney disease), stage III  -     CBC auto differential; Future  -     Comprehensive metabolic panel; Future  -     Microalbumin/creatinine urine ratio; Future    Abdominal aortic atherosclerosis  -     CBC auto differential; Future  -     Comprehensive metabolic panel; Future    Chronic diastolic heart failure  -     CBC auto differential; Future  -     Comprehensive metabolic panel; Future    Essential hypertension  -     CBC auto differential; Future  -     Comprehensive metabolic panel; Future  -     Microalbumin/creatinine urine ratio; Future    Hyperlipidemia LDL goal <70  -     CBC auto differential; Future  -     Comprehensive metabolic panel; Future  -     TSH; Future  -     Lipid panel; Future    Insomnia, unspecified type  -     CBC auto differential; Future  -     Comprehensive metabolic panel; Future    Anxiety  -     CBC auto differential; Future  -     Comprehensive metabolic panel; Future    Arthritis  -     Uric acid; Future  -     Sedimentation rate; Future  -     DARRELL Screen w/Reflex; Future  -     X-Ray Hand Complete Right; Future    Obesity (BMI 30.0-34.9)  -     CBC auto differential; Future  -     Comprehensive metabolic panel; Future    "This note will not be shared with the patient."  Lab drawn today CBC, CMP, TSH, FLP  Limit the cholesterol in your diet to less than 300 mg per day.  Fats should contribute no more than 20 to 35% of your daily calories.  Less than 7 to 10% of your calories should come from saturated fat.  Avoid saturated fat products e.g., butter, some oils, meat, and poultry fat contain a lot of saturated fat.  Check food labels for fat and cholesterol content. Choose the foods with less fat per serving.  Limit the amount of butter and margarine you eat.  Use " salad dressings and margarine made with polyunsaturated and monunsaturated fats.  Use egg whites or egg substitutes rather than whole eggs.  Replace whole-milk dairy products with nonfat or low-fat mild, cheese, spreads, and yogurt.  Eat skinless chicken, turkey, fish, and meatless entrees more often than red meat.  Choose lean cuts of meat and trim off all visible fat. Keep portion sizes moderate.  Avoid fatty desserts such as ice cream, cream-filled cakes, and cheesecakes. Choose fresh fruits, nonfat frozen yogurt, Popsicles, etc.  Reduce the amount of fried foods, vending machine food, and fast food you eat.  Eat fruits and vegetables (especially fresh fruits and leafy vegetables), beans, and whole grains daily. The fiber in these foods helps lower cholesterol.  Look for low-fat or nonfat varieties of the foods you like to eat or look for substitutes.  You may need to exercise 60 minutes a day to prevent weight gain and 90 minutes a day to lose weight.  RTC in six months.

## 2020-01-17 LAB
BACTERIA UR CULT: NORMAL
BACTERIA UR CULT: NORMAL

## 2020-01-22 ENCOUNTER — TELEPHONE (OUTPATIENT)
Dept: INTERNAL MEDICINE | Facility: CLINIC | Age: 80
End: 2020-01-22

## 2020-01-22 DIAGNOSIS — R31.9 URINARY TRACT INFECTION WITH HEMATURIA, SITE UNSPECIFIED: ICD-10-CM

## 2020-01-22 DIAGNOSIS — N39.0 URINARY TRACT INFECTION WITH HEMATURIA, SITE UNSPECIFIED: ICD-10-CM

## 2020-01-22 RX ORDER — CIPROFLOXACIN 500 MG/1
500 TABLET ORAL 2 TIMES DAILY
Qty: 10 TABLET | Refills: 0 | Status: SHIPPED | OUTPATIENT
Start: 2020-01-22 | End: 2020-01-27

## 2020-01-22 NOTE — TELEPHONE ENCOUNTER
----- Message from Jaja Harp MA sent at 1/22/2020  3:02 PM CST -----  Contact: Patient  Patient was seen last week for a UTI,  Finished all antibiotics but doesn't feel like its completely.     Please Advise:  [phone]    Send Meds to [facility] ([place])

## 2020-01-28 ENCOUNTER — TELEPHONE (OUTPATIENT)
Dept: INTERNAL MEDICINE | Facility: CLINIC | Age: 80
End: 2020-01-28

## 2020-01-28 DIAGNOSIS — E79.0 HYPERURICEMIA: Primary | ICD-10-CM

## 2020-01-28 RX ORDER — ALLOPURINOL 100 MG/1
100 TABLET ORAL DAILY
Qty: 30 TABLET | Refills: 2 | Status: SHIPPED | OUTPATIENT
Start: 2020-01-28 | End: 2020-03-19 | Stop reason: SDUPTHER

## 2020-02-06 DIAGNOSIS — I10 ESSENTIAL HYPERTENSION: ICD-10-CM

## 2020-02-07 ENCOUNTER — TELEPHONE (OUTPATIENT)
Dept: INTERNAL MEDICINE | Facility: CLINIC | Age: 80
End: 2020-02-07

## 2020-02-07 DIAGNOSIS — M85.80 OSTEOPENIA, UNSPECIFIED LOCATION: Primary | ICD-10-CM

## 2020-02-07 RX ORDER — METOPROLOL SUCCINATE 200 MG/1
TABLET, EXTENDED RELEASE ORAL
Qty: 180 TABLET | Refills: 0 | Status: SHIPPED | OUTPATIENT
Start: 2020-02-07 | End: 2020-08-10

## 2020-02-21 ENCOUNTER — HOSPITAL ENCOUNTER (OUTPATIENT)
Dept: RADIOLOGY | Facility: HOSPITAL | Age: 80
Discharge: HOME | End: 2020-02-21
Attending: NURSE PRACTITIONER
Payer: MEDICARE

## 2020-02-21 DIAGNOSIS — M85.80 OSTEOPENIA, UNSPECIFIED LOCATION: ICD-10-CM

## 2020-02-21 PROCEDURE — 77080 DXA BONE DENSITY AXIAL: CPT | Mod: GA,TC

## 2020-03-05 ENCOUNTER — TELEPHONE (OUTPATIENT)
Dept: INTERNAL MEDICINE | Facility: CLINIC | Age: 80
End: 2020-03-05

## 2020-03-05 DIAGNOSIS — N39.0 URINARY TRACT INFECTION WITHOUT HEMATURIA, SITE UNSPECIFIED: Primary | ICD-10-CM

## 2020-03-05 RX ORDER — CIPROFLOXACIN 500 MG/1
500 TABLET ORAL 2 TIMES DAILY
Qty: 10 TABLET | Refills: 0 | Status: SHIPPED | OUTPATIENT
Start: 2020-03-05 | End: 2020-03-10

## 2020-03-05 NOTE — TELEPHONE ENCOUNTER
----- Message from Jaja Harp MA sent at 3/5/2020  8:50 AM CST -----  Contact: Patient  Patient had bone density done in February,   Patient is calling for results.    Please Advise:  [phone]

## 2020-03-06 DIAGNOSIS — N39.3 STRESS INCONTINENCE: ICD-10-CM

## 2020-03-06 RX ORDER — OXYBUTYNIN CHLORIDE 5 MG/1
TABLET, EXTENDED RELEASE ORAL
Qty: 30 TABLET | Refills: 5 | Status: SHIPPED | OUTPATIENT
Start: 2020-03-06 | End: 2021-05-11 | Stop reason: SDUPTHER

## 2020-03-10 ENCOUNTER — CLINICAL SUPPORT (OUTPATIENT)
Dept: INTERNAL MEDICINE | Facility: CLINIC | Age: 80
End: 2020-03-10
Payer: MEDICARE

## 2020-03-10 DIAGNOSIS — E79.0 HYPERURICEMIA: ICD-10-CM

## 2020-03-10 LAB
ALBUMIN SERPL BCP-MCNC: 3.6 G/DL (ref 3.5–5.2)
ALP SERPL-CCNC: 99 U/L (ref 55–135)
ALT SERPL W/O P-5'-P-CCNC: 14 U/L (ref 10–44)
ANION GAP SERPL CALC-SCNC: 10 MMOL/L (ref 8–16)
AST SERPL-CCNC: 20 U/L (ref 10–40)
BILIRUB SERPL-MCNC: 0.4 MG/DL (ref 0.1–1)
BUN SERPL-MCNC: 27 MG/DL (ref 8–23)
CALCIUM SERPL-MCNC: 9.8 MG/DL (ref 8.7–10.5)
CHLORIDE SERPL-SCNC: 101 MMOL/L (ref 95–110)
CO2 SERPL-SCNC: 26 MMOL/L (ref 23–29)
CREAT SERPL-MCNC: 1.5 MG/DL (ref 0.5–1.4)
EST. GFR  (AFRICAN AMERICAN): 38.2 ML/MIN/1.73 M^2
EST. GFR  (NON AFRICAN AMERICAN): 33.1 ML/MIN/1.73 M^2
GLUCOSE SERPL-MCNC: 100 MG/DL (ref 70–110)
POTASSIUM SERPL-SCNC: 4.3 MMOL/L (ref 3.5–5.1)
PROT SERPL-MCNC: 7.6 G/DL (ref 6–8.4)
SODIUM SERPL-SCNC: 137 MMOL/L (ref 136–145)
URATE SERPL-MCNC: 7.3 MG/DL (ref 2.4–5.7)

## 2020-03-10 PROCEDURE — 80053 COMPREHEN METABOLIC PANEL: CPT

## 2020-03-10 PROCEDURE — 36415 COLL VENOUS BLD VENIPUNCTURE: CPT

## 2020-03-10 PROCEDURE — 84550 ASSAY OF BLOOD/URIC ACID: CPT

## 2020-03-19 ENCOUNTER — OFFICE VISIT (OUTPATIENT)
Dept: INTERNAL MEDICINE | Facility: CLINIC | Age: 80
End: 2020-03-19
Payer: MEDICARE

## 2020-03-19 VITALS
BODY MASS INDEX: 32.88 KG/M2 | DIASTOLIC BLOOD PRESSURE: 86 MMHG | TEMPERATURE: 98 F | WEIGHT: 222 LBS | HEIGHT: 69 IN | HEART RATE: 78 BPM | SYSTOLIC BLOOD PRESSURE: 120 MMHG | OXYGEN SATURATION: 95 % | RESPIRATION RATE: 18 BRPM

## 2020-03-19 DIAGNOSIS — N39.0 URINARY TRACT INFECTION WITHOUT HEMATURIA, SITE UNSPECIFIED: ICD-10-CM

## 2020-03-19 DIAGNOSIS — E79.0 HYPERURICEMIA: ICD-10-CM

## 2020-03-19 DIAGNOSIS — G47.00 INSOMNIA, UNSPECIFIED TYPE: ICD-10-CM

## 2020-03-19 PROCEDURE — 1101F PR PT FALLS ASSESS DOC 0-1 FALLS W/OUT INJ PAST YR: ICD-10-PCS | Mod: CPTII,S$GLB,, | Performed by: NURSE PRACTITIONER

## 2020-03-19 PROCEDURE — 1101F PT FALLS ASSESS-DOCD LE1/YR: CPT | Mod: CPTII,S$GLB,, | Performed by: NURSE PRACTITIONER

## 2020-03-19 PROCEDURE — 3079F DIAST BP 80-89 MM HG: CPT | Mod: CPTII,S$GLB,, | Performed by: NURSE PRACTITIONER

## 2020-03-19 PROCEDURE — 1159F PR MEDICATION LIST DOCUMENTED IN MEDICAL RECORD: ICD-10-PCS | Mod: S$GLB,,, | Performed by: NURSE PRACTITIONER

## 2020-03-19 PROCEDURE — 99214 OFFICE O/P EST MOD 30 MIN: CPT | Mod: S$GLB,,, | Performed by: NURSE PRACTITIONER

## 2020-03-19 PROCEDURE — 1126F PR PAIN SEVERITY QUANTIFIED, NO PAIN PRESENT: ICD-10-PCS | Mod: S$GLB,,, | Performed by: NURSE PRACTITIONER

## 2020-03-19 PROCEDURE — 3074F PR MOST RECENT SYSTOLIC BLOOD PRESSURE < 130 MM HG: ICD-10-PCS | Mod: CPTII,S$GLB,, | Performed by: NURSE PRACTITIONER

## 2020-03-19 PROCEDURE — 3074F SYST BP LT 130 MM HG: CPT | Mod: CPTII,S$GLB,, | Performed by: NURSE PRACTITIONER

## 2020-03-19 PROCEDURE — 1159F MED LIST DOCD IN RCRD: CPT | Mod: S$GLB,,, | Performed by: NURSE PRACTITIONER

## 2020-03-19 PROCEDURE — 99999 PR PBB SHADOW E&M-EST. PATIENT-LVL V: CPT | Mod: PBBFAC,,, | Performed by: NURSE PRACTITIONER

## 2020-03-19 PROCEDURE — 1126F AMNT PAIN NOTED NONE PRSNT: CPT | Mod: S$GLB,,, | Performed by: NURSE PRACTITIONER

## 2020-03-19 PROCEDURE — 99214 PR OFFICE/OUTPT VISIT, EST, LEVL IV, 30-39 MIN: ICD-10-PCS | Mod: S$GLB,,, | Performed by: NURSE PRACTITIONER

## 2020-03-19 PROCEDURE — 3079F PR MOST RECENT DIASTOLIC BLOOD PRESSURE 80-89 MM HG: ICD-10-PCS | Mod: CPTII,S$GLB,, | Performed by: NURSE PRACTITIONER

## 2020-03-19 PROCEDURE — 99999 PR PBB SHADOW E&M-EST. PATIENT-LVL V: ICD-10-PCS | Mod: PBBFAC,,, | Performed by: NURSE PRACTITIONER

## 2020-03-19 RX ORDER — CIPROFLOXACIN 500 MG/1
500 TABLET ORAL 2 TIMES DAILY
Qty: 28 TABLET | Refills: 0 | Status: SHIPPED | OUTPATIENT
Start: 2020-03-19 | End: 2020-04-02

## 2020-03-19 RX ORDER — QUETIAPINE FUMARATE 50 MG/1
50 TABLET, FILM COATED ORAL NIGHTLY
Qty: 90 TABLET | Refills: 1 | Status: SHIPPED | OUTPATIENT
Start: 2020-03-19 | End: 2021-03-19

## 2020-03-19 RX ORDER — ALLOPURINOL 100 MG/1
100 TABLET ORAL DAILY
Qty: 90 TABLET | Refills: 1 | Status: SHIPPED | OUTPATIENT
Start: 2020-03-19 | End: 2021-05-27 | Stop reason: SDUPTHER

## 2020-03-19 NOTE — PATIENT INSTRUCTIONS
What Is Insomnia?    Do you have trouble falling asleep? Do you wake up often during the night? Or maybe you wake up too early in the morning. You may be suffering from insomnia. Talk to your healthcare provider if it lasts longer than a few weeks and you feel tired most of the time.  What causes insomnia?  Some common causes of insomnia are:  · Medical problems such as pain, depression, medicine side effects, or trouble breathing  · Circadian rhythm disorder, a shift in the bodys normal 24-hour activity cycle  · Lifestyle factors such as a changing sleep schedule, lack of exercise, or too much caffeine  · Sleep settings such as a poor mattress, noise, or a room thats too hot or too cold  · Stress such as problems at work, money worries, or family events  Talk to your healthcare provider  Describe your sleeping problems to your healthcare provider. Try to keep a daily sleep diary for a couple weeks. Write down the time you go to bed, the time you wake up, and anything that seems to affect your sleep. Your healthcare provider can work with you to develop a treatment plan. You may need to learn good sleeping habits and make some lifestyle changes. If you have any medical problems, these may need to be treated first.  Date Last Reviewed: 7/18/2015  © 8855-8130 [employer]. [address] [place] [address], [place] [address]. All rights reserved. This information is not intended as a substitute for professional medical care. Always follow your healthcare professional's instructions.

## 2020-03-19 NOTE — PROGRESS NOTES
Subjective:       Patient ID: Emily Alicia is a 79 y.o. female.    Chief Complaint: Follow-up (results) and Urinary Tract Infection (discharge)    Patient is known, to me and presents with   Chief Complaint   Patient presents with    Follow-up     results    Urinary Tract Infection     discharge   .  Denies chest pain and shortness of breath.  Patient presents with follow up gout and is doing better. Still with uti s/s. Does good while on cipro but once she gets off she has s/s again. Not sleeping well so wondering if she can go up on seroquel.    HPI  Review of Systems   Constitutional: Negative for activity change, appetite change, fatigue, fever and unexpected weight change.   HENT: Negative for congestion, ear discharge, ear pain, hearing loss, postnasal drip and tinnitus.    Eyes: Negative for photophobia, pain and visual disturbance.   Respiratory: Negative for cough, shortness of breath, wheezing and stridor.    Cardiovascular: Negative for chest pain, palpitations and leg swelling.   Gastrointestinal: Negative for abdominal distention.   Genitourinary: Negative for decreased urine volume, difficulty urinating, dysuria, frequency, hematuria, urgency, vaginal bleeding, vaginal discharge and vaginal pain.   Musculoskeletal: Positive for arthralgias. Negative for back pain, gait problem, joint swelling and neck pain.   Skin: Negative.    Neurological: Negative for dizziness, seizures, syncope, weakness, light-headedness, numbness and headaches.   Hematological: Negative for adenopathy. Does not bruise/bleed easily.   Psychiatric/Behavioral: Positive for sleep disturbance. Negative for behavioral problems, confusion, dysphoric mood, hallucinations and suicidal ideas. The patient is not nervous/anxious.        Objective:      Physical Exam   Constitutional: She is oriented to person, place, and time. She appears well-developed and well-nourished. No distress.   HENT:   Head: Normocephalic and atraumatic.   Right  Ear: External ear normal.   Left Ear: External ear normal.   Mouth/Throat: Oropharynx is clear and moist. No oropharyngeal exudate.   Eyes: Pupils are equal, round, and reactive to light. Conjunctivae and EOM are normal. Right eye exhibits no discharge. Left eye exhibits no discharge.   Neck: Normal range of motion. Neck supple. No JVD present. No thyromegaly present.   Cardiovascular: Normal rate, regular rhythm and intact distal pulses. Exam reveals no gallop and no friction rub.   Murmur heard.  Pulmonary/Chest: Effort normal and breath sounds normal. No stridor. No respiratory distress. She has no wheezes. She has no rales. She exhibits no tenderness.   Abdominal: Soft. Bowel sounds are normal. She exhibits no distension and no mass. There is no tenderness. There is no rebound.   Musculoskeletal: She exhibits no edema or tenderness.   Lymphadenopathy:     She has no cervical adenopathy.   Neurological: She is alert and oriented to person, place, and time. She has normal reflexes. She displays normal reflexes. No cranial nerve deficit. She exhibits normal muscle tone. Coordination normal.   Skin: Skin is warm and dry. Capillary refill takes less than 2 seconds. No rash noted. No erythema. No pallor.   Psychiatric: She has a normal mood and affect. Her behavior is normal. Judgment and thought content normal.       Assessment:       1. Urinary tract infection without hematuria, site unspecified    2. Hyperuricemia    3. Insomnia, unspecified type        Plan:   Emily was seen today for follow-up and urinary tract infection.    Diagnoses and all orders for this visit:    Urinary tract infection without hematuria, site unspecified  -     ciprofloxacin HCl (CIPRO) 500 MG tablet; Take 1 tablet (500 mg total) by mouth 2 (two) times daily. for 14 days    Hyperuricemia  -     allopurinoL (ZYLOPRIM) 100 MG tablet; Take 1 tablet (100 mg total) by mouth once daily.    Insomnia, unspecified type  -     QUEtiapine (SEROQUEL) 50  "MG tablet; Take 1 tablet (50 mg total) by mouth every evening.    "This note will not be shared with the patient."  See above plan of care  She will let me know if the added seroquel works  Also will treat with cipro for fourteen days  rtc as scheduled    "

## 2020-04-06 DIAGNOSIS — G47.00 INSOMNIA, UNSPECIFIED TYPE: ICD-10-CM

## 2020-04-06 RX ORDER — QUETIAPINE FUMARATE 200 MG/1
TABLET, FILM COATED ORAL
Qty: 30 TABLET | Refills: 5 | Status: SHIPPED | OUTPATIENT
Start: 2020-04-06 | End: 2020-10-08

## 2020-05-02 DIAGNOSIS — F41.9 ANXIETY: ICD-10-CM

## 2020-05-02 DIAGNOSIS — M15.9 PRIMARY OSTEOARTHRITIS INVOLVING MULTIPLE JOINTS: ICD-10-CM

## 2020-05-04 RX ORDER — NAPROXEN 500 MG/1
TABLET ORAL
Qty: 180 TABLET | Refills: 0 | Status: SHIPPED | OUTPATIENT
Start: 2020-05-04 | End: 2020-05-12

## 2020-05-04 RX ORDER — ALPRAZOLAM 0.5 MG/1
TABLET ORAL
Qty: 60 TABLET | Refills: 2 | Status: SHIPPED | OUTPATIENT
Start: 2020-05-04 | End: 2020-05-12

## 2020-05-12 DIAGNOSIS — F41.9 ANXIETY: ICD-10-CM

## 2020-05-12 DIAGNOSIS — M15.9 PRIMARY OSTEOARTHRITIS INVOLVING MULTIPLE JOINTS: ICD-10-CM

## 2020-05-12 RX ORDER — ALPRAZOLAM 0.5 MG/1
TABLET ORAL
Qty: 60 TABLET | Refills: 2 | Status: SHIPPED | OUTPATIENT
Start: 2020-05-12 | End: 2020-08-20 | Stop reason: SDUPTHER

## 2020-05-12 RX ORDER — NAPROXEN 500 MG/1
TABLET ORAL
Qty: 180 TABLET | Refills: 0 | Status: SHIPPED | OUTPATIENT
Start: 2020-05-12 | End: 2020-11-12

## 2020-06-09 DIAGNOSIS — I10 ESSENTIAL HYPERTENSION: ICD-10-CM

## 2020-06-09 RX ORDER — LOSARTAN POTASSIUM AND HYDROCHLOROTHIAZIDE 25; 100 MG/1; MG/1
1 TABLET ORAL DAILY
Qty: 90 TABLET | Refills: 0 | Status: ON HOLD | OUTPATIENT
Start: 2020-06-09 | End: 2021-02-04 | Stop reason: HOSPADM

## 2020-06-22 ENCOUNTER — TELEPHONE (OUTPATIENT)
Dept: INTERNAL MEDICINE | Facility: CLINIC | Age: 80
End: 2020-06-22

## 2020-06-22 RX ORDER — GABAPENTIN 300 MG/1
300 CAPSULE ORAL 2 TIMES DAILY
Qty: 60 CAPSULE | Refills: 5 | Status: SHIPPED | OUTPATIENT
Start: 2020-06-22 | End: 2021-05-11 | Stop reason: SDUPTHER

## 2020-06-22 NOTE — TELEPHONE ENCOUNTER
----- Message from Jessica Luis sent at 2020  4:39 PM CDT -----  Regarding: Rx Refill  Contact: dolly Alicia  MRN: [id number]  : 1940  PCP: Caroline Do  Home Phone      [phone]  Work Phone      Not on file.  Mobile          [phone]    MESSAGE:    Rx Refill - gabapentin (NEURONTIN) 300 MG capsule - Patient is out of medication.     Phone # [phone]    Pharmacy - [facility] [address] [place] [address]

## 2020-08-08 DIAGNOSIS — I10 ESSENTIAL HYPERTENSION: ICD-10-CM

## 2020-08-10 RX ORDER — METOPROLOL SUCCINATE 200 MG/1
TABLET, EXTENDED RELEASE ORAL
Qty: 180 TABLET | Refills: 0 | Status: SHIPPED | OUTPATIENT
Start: 2020-08-10 | End: 2021-01-25 | Stop reason: SDUPTHER

## 2020-08-20 ENCOUNTER — LAB VISIT (OUTPATIENT)
Dept: LAB | Facility: HOSPITAL | Age: 80
End: 2020-08-20
Attending: NURSE PRACTITIONER
Payer: MEDICARE

## 2020-08-20 ENCOUNTER — OFFICE VISIT (OUTPATIENT)
Dept: INTERNAL MEDICINE | Facility: CLINIC | Age: 80
End: 2020-08-20
Payer: MEDICARE

## 2020-08-20 VITALS
DIASTOLIC BLOOD PRESSURE: 84 MMHG | SYSTOLIC BLOOD PRESSURE: 126 MMHG | RESPIRATION RATE: 18 BRPM | BODY MASS INDEX: 32.73 KG/M2 | TEMPERATURE: 98 F | HEART RATE: 76 BPM | OXYGEN SATURATION: 96 % | WEIGHT: 221 LBS | HEIGHT: 69 IN

## 2020-08-20 DIAGNOSIS — R31.9 URINARY TRACT INFECTION WITH HEMATURIA, SITE UNSPECIFIED: Primary | ICD-10-CM

## 2020-08-20 DIAGNOSIS — F41.9 ANXIETY: ICD-10-CM

## 2020-08-20 DIAGNOSIS — N39.0 URINARY TRACT INFECTION WITH HEMATURIA, SITE UNSPECIFIED: Primary | ICD-10-CM

## 2020-08-20 DIAGNOSIS — R31.9 URINARY TRACT INFECTION WITH HEMATURIA, SITE UNSPECIFIED: ICD-10-CM

## 2020-08-20 DIAGNOSIS — N39.0 URINARY TRACT INFECTION WITH HEMATURIA, SITE UNSPECIFIED: ICD-10-CM

## 2020-08-20 LAB
BILIRUB SERPL-MCNC: ABNORMAL MG/DL
BLOOD URINE, POC: ABNORMAL
CLARITY, POC UA: ABNORMAL
COLOR, POC UA: YELLOW
GLUCOSE UR QL STRIP: NORMAL
KETONES UR QL STRIP: ABNORMAL
LEUKOCYTE ESTERASE URINE, POC: ABNORMAL
NITRITE, POC UA: ABNORMAL
PH, POC UA: 5
PROTEIN, POC: ABNORMAL
SPECIFIC GRAVITY, POC UA: 1.01
UROBILINOGEN, POC UA: NORMAL

## 2020-08-20 PROCEDURE — 99999 PR PBB SHADOW E&M-EST. PATIENT-LVL V: ICD-10-PCS | Mod: PBBFAC,,, | Performed by: NURSE PRACTITIONER

## 2020-08-20 PROCEDURE — 1101F PT FALLS ASSESS-DOCD LE1/YR: CPT | Mod: CPTII,S$GLB,, | Performed by: NURSE PRACTITIONER

## 2020-08-20 PROCEDURE — 3074F PR MOST RECENT SYSTOLIC BLOOD PRESSURE < 130 MM HG: ICD-10-PCS | Mod: CPTII,S$GLB,, | Performed by: NURSE PRACTITIONER

## 2020-08-20 PROCEDURE — 3079F DIAST BP 80-89 MM HG: CPT | Mod: CPTII,S$GLB,, | Performed by: NURSE PRACTITIONER

## 2020-08-20 PROCEDURE — 1126F PR PAIN SEVERITY QUANTIFIED, NO PAIN PRESENT: ICD-10-PCS | Mod: S$GLB,,, | Performed by: NURSE PRACTITIONER

## 2020-08-20 PROCEDURE — 99213 PR OFFICE/OUTPT VISIT, EST, LEVL III, 20-29 MIN: ICD-10-PCS | Mod: 25,S$GLB,, | Performed by: NURSE PRACTITIONER

## 2020-08-20 PROCEDURE — 1159F MED LIST DOCD IN RCRD: CPT | Mod: S$GLB,,, | Performed by: NURSE PRACTITIONER

## 2020-08-20 PROCEDURE — 87086 URINE CULTURE/COLONY COUNT: CPT

## 2020-08-20 PROCEDURE — 3074F SYST BP LT 130 MM HG: CPT | Mod: CPTII,S$GLB,, | Performed by: NURSE PRACTITIONER

## 2020-08-20 PROCEDURE — 81002 URINALYSIS NONAUTO W/O SCOPE: CPT | Mod: S$GLB,,, | Performed by: NURSE PRACTITIONER

## 2020-08-20 PROCEDURE — 3079F PR MOST RECENT DIASTOLIC BLOOD PRESSURE 80-89 MM HG: ICD-10-PCS | Mod: CPTII,S$GLB,, | Performed by: NURSE PRACTITIONER

## 2020-08-20 PROCEDURE — 99999 PR PBB SHADOW E&M-EST. PATIENT-LVL V: CPT | Mod: PBBFAC,,, | Performed by: NURSE PRACTITIONER

## 2020-08-20 PROCEDURE — 81002 POCT URINE DIPSTICK WITHOUT MICROSCOPE: ICD-10-PCS | Mod: S$GLB,,, | Performed by: NURSE PRACTITIONER

## 2020-08-20 PROCEDURE — 1159F PR MEDICATION LIST DOCUMENTED IN MEDICAL RECORD: ICD-10-PCS | Mod: S$GLB,,, | Performed by: NURSE PRACTITIONER

## 2020-08-20 PROCEDURE — 1126F AMNT PAIN NOTED NONE PRSNT: CPT | Mod: S$GLB,,, | Performed by: NURSE PRACTITIONER

## 2020-08-20 PROCEDURE — 99213 OFFICE O/P EST LOW 20 MIN: CPT | Mod: 25,S$GLB,, | Performed by: NURSE PRACTITIONER

## 2020-08-20 PROCEDURE — 1101F PR PT FALLS ASSESS DOC 0-1 FALLS W/OUT INJ PAST YR: ICD-10-PCS | Mod: CPTII,S$GLB,, | Performed by: NURSE PRACTITIONER

## 2020-08-20 RX ORDER — ALPRAZOLAM 0.5 MG/1
0.5 TABLET ORAL 2 TIMES DAILY PRN
Qty: 60 TABLET | Refills: 2 | Status: SHIPPED | OUTPATIENT
Start: 2020-08-20 | End: 2021-03-18 | Stop reason: SDUPTHER

## 2020-08-20 RX ORDER — CIPROFLOXACIN 500 MG/1
500 TABLET ORAL 2 TIMES DAILY
Qty: 14 TABLET | Refills: 0 | Status: SHIPPED | OUTPATIENT
Start: 2020-08-20 | End: 2020-08-28 | Stop reason: SDUPTHER

## 2020-08-20 NOTE — PATIENT INSTRUCTIONS

## 2020-08-20 NOTE — PROGRESS NOTES
Subjective:       Patient ID: Emily Alicia is a 79 y.o. female.    Chief Complaint: Urinary Tract Infection (x few weeks- odor )    Patient is known, to me and presents with   Chief Complaint   Patient presents with    Urinary Tract Infection     x few weeks- odor    .  Denies chest pain and shortness of breath.  Patient presents with malodorous urine. Also needs refill on xanax  HPI  Review of Systems   Constitutional: Negative.    Respiratory: Negative.    Cardiovascular: Negative.    Genitourinary: Negative for dysuria, frequency and urgency.        Malodorous urine   Skin: Negative.    Psychiatric/Behavioral: Negative.  Negative for agitation, behavioral problems, confusion, decreased concentration, dysphoric mood, hallucinations, self-injury, sleep disturbance and suicidal ideas. The patient is not nervous/anxious and is not hyperactive.        Objective:      Physical Exam  Constitutional:       Appearance: Normal appearance. She is obese.   Neck:      Musculoskeletal: Normal range of motion and neck supple.   Cardiovascular:      Rate and Rhythm: Normal rate and regular rhythm.      Heart sounds: Murmur present.   Pulmonary:      Effort: Pulmonary effort is normal.      Breath sounds: Normal breath sounds. No wheezing or rhonchi.   Genitourinary:     Comments: See dip  Lymphadenopathy:      Cervical: No cervical adenopathy.   Skin:     General: Skin is warm and dry.      Capillary Refill: Capillary refill takes less than 2 seconds.      Findings: No erythema or rash.   Neurological:      Mental Status: She is alert.   Psychiatric:         Mood and Affect: Mood normal.         Behavior: Behavior normal.         Thought Content: Thought content normal.         Judgment: Judgment normal.         Assessment:       1. Urinary tract infection with hematuria, site unspecified    2. Anxiety        Plan:   Emily was seen today for urinary tract infection.    Diagnoses and all orders for this visit:    Urinary tract  "infection with hematuria, site unspecified  -     POCT URINE DIPSTICK WITHOUT MICROSCOPE  -     Urine culture; Future  -     ciprofloxacin HCl (CIPRO) 500 MG tablet; Take 1 tablet (500 mg total) by mouth 2 (two) times daily. for 7 days    Anxiety  -     ALPRAZolam (XANAX) 0.5 MG tablet; Take 1 tablet (0.5 mg total) by mouth 2 (two) times daily as needed.    "This note will not be shared with the patient."  Keep hydrated  rtc as scheduled  "

## 2020-08-22 LAB
BACTERIA UR CULT: NORMAL
BACTERIA UR CULT: NORMAL

## 2020-08-28 ENCOUNTER — TELEPHONE (OUTPATIENT)
Dept: INTERNAL MEDICINE | Facility: CLINIC | Age: 80
End: 2020-08-28

## 2020-08-28 DIAGNOSIS — N39.0 URINARY TRACT INFECTION WITH HEMATURIA, SITE UNSPECIFIED: ICD-10-CM

## 2020-08-28 DIAGNOSIS — R31.9 URINARY TRACT INFECTION WITH HEMATURIA, SITE UNSPECIFIED: ICD-10-CM

## 2020-08-28 RX ORDER — CIPROFLOXACIN 500 MG/1
500 TABLET ORAL 2 TIMES DAILY
Qty: 14 TABLET | Refills: 0 | Status: SHIPPED | OUTPATIENT
Start: 2020-08-28 | End: 2020-09-04

## 2020-08-28 NOTE — TELEPHONE ENCOUNTER
----- Message from Jaja Harp MA sent at 8/28/2020  9:23 AM CDT -----  Patient is requesting another round of antibiotics.     She says the symptoms are better but not completely clear.     Send meds to [facility] ([place])  Please Advise:

## 2020-09-28 ENCOUNTER — HOSPITAL ENCOUNTER (EMERGENCY)
Facility: HOSPITAL | Age: 80
Discharge: HOME | End: 2020-09-28
Attending: SURGERY
Payer: MEDICARE

## 2020-09-28 VITALS
OXYGEN SATURATION: 99 % | RESPIRATION RATE: 16 BRPM | BODY MASS INDEX: 33.96 KG/M2 | DIASTOLIC BLOOD PRESSURE: 86 MMHG | SYSTOLIC BLOOD PRESSURE: 179 MMHG | HEART RATE: 88 BPM | TEMPERATURE: 99 F | HEIGHT: 69 IN | WEIGHT: 229.25 LBS

## 2020-09-28 DIAGNOSIS — M25.552 LEFT HIP PAIN: ICD-10-CM

## 2020-09-28 PROCEDURE — 99284 EMERGENCY DEPT VISIT MOD MDM: CPT | Mod: 25

## 2020-09-28 PROCEDURE — 63600175 PHARM REV CODE 636 W HCPCS: Performed by: SURGERY

## 2020-09-28 PROCEDURE — 25000003 PHARM REV CODE 250: Performed by: SURGERY

## 2020-09-28 PROCEDURE — 96372 THER/PROPH/DIAG INJ SC/IM: CPT

## 2020-09-28 RX ORDER — CLONIDINE HYDROCHLORIDE 0.1 MG/1
0.2 TABLET ORAL
Status: DISCONTINUED | OUTPATIENT
Start: 2020-09-28 | End: 2020-09-28

## 2020-09-28 RX ORDER — ONDANSETRON 2 MG/ML
4 INJECTION INTRAMUSCULAR; INTRAVENOUS
Status: COMPLETED | OUTPATIENT
Start: 2020-09-28 | End: 2020-09-28

## 2020-09-28 RX ORDER — MORPHINE SULFATE 2 MG/ML
2 INJECTION, SOLUTION INTRAMUSCULAR; INTRAVENOUS
Status: COMPLETED | OUTPATIENT
Start: 2020-09-28 | End: 2020-09-28

## 2020-09-28 RX ORDER — ACETAMINOPHEN AND CODEINE PHOSPHATE 300; 30 MG/1; MG/1
1 TABLET ORAL EVERY 6 HOURS PRN
Qty: 15 TABLET | Refills: 0 | Status: SHIPPED | OUTPATIENT
Start: 2020-09-28 | End: 2020-09-30

## 2020-09-28 RX ORDER — CLONIDINE HYDROCHLORIDE 0.1 MG/1
0.1 TABLET ORAL
Status: COMPLETED | OUTPATIENT
Start: 2020-09-28 | End: 2020-09-28

## 2020-09-28 RX ADMIN — ONDANSETRON 4 MG: 2 INJECTION INTRAMUSCULAR; INTRAVENOUS at 03:09

## 2020-09-28 RX ADMIN — CLONIDINE HYDROCHLORIDE 0.1 MG: 0.1 TABLET ORAL at 03:09

## 2020-09-28 RX ADMIN — MORPHINE SULFATE 2 MG: 2 INJECTION, SOLUTION INTRAMUSCULAR; INTRAVENOUS at 03:09

## 2020-09-28 NOTE — ED PROVIDER NOTES
[facility]                                                 Chief Complaint  80 y.o. female with Hip Pain   -- chronic left hip pain  -- increased over past 3/4 days  -- home treatments not helping    History of Present Illness  Emily Alicia presents to the emergency room with chronic left hip pain  States she has chronic osteoarthritis chronic hip pain on a daily basis  Patient denies any acute injury, denies any slip for fall and last weeks  Patient has no leg shortening, arthritic changes on exam today in ER  Pt has no leg weakness, denies any back pain, ambulates with pain    The history is provided by the patient   device was not used during this ER visit    Past Medical History   -- Anemia    -- Anxiety    -- Aortic valve stenosis    -- Chronic kidney disease (CKD)    -- Diastolic heart failure    -- Dyslipidemia    -- Encounter for blood transfusion    -- Glaucoma (increased eye pressure)    -- HHD (hypertensive heart disease)    -- Hypertension    -- Osteoarthritis    -- Retina disorder, bilateral    -- TR (tricuspid regurgitation)    -- UTI (urinary tract infection)      Past Surgical History   -- CARDIAC CATHETERIZATION     -- CARDIAC VALVE SURGERY     -- CATARACT EXTRACTION      -- CHOLECYSTECTOMY     -- HYSTERECTOMY     -- JOINT REPLACEMENT     -- JOINT REPLACEMENT     -- KNEE SURGERY     -- SPINE SURGERY     -- TRANSCATHETER AORTIC VALVE REPLACEMENT        No Known Allergies     I have reviewed all of this patient's past medical, surgical, family, and social   histories as well as active allergies and medications documented in the  electronic medical record    Review of Systems and Physical Exam      Review of Systems  -- Constitution - no fever, denies fatigue, no weakness, no chills  -- Eyes - no tearing or redness, no visual disturbance  -- Ear, Nose - no tinnitus or earache, no nasal congestion or discharge  -- Mouth,Throat - no sore throat, no toothache,  normal voice, normal swallowing  -- Respiratory - denies cough and congestion, no shortness of breath, no WONG  -- Cardiovascular - denies chest pain, no palpitations, denies claudication  -- Gastrointestinal - denies abdominal pain, nausea, vomiting, or diarrhea  -- Genitourinary - no dysuria, denies flank pain, no hematuria, no STD risk  -- Musculoskeletal - chronic left hip pain without acute injury  -- Neurological - no headache, denies weakness or seizure; no LOC  -- Skin - denies pallor, rash, or changes in skin. no hives or welts noted    Vital Signs  Her oral temperature is 98.9 °F (37.2 °C).   Her blood pressure is 179/86 and her pulse is 88.   Her respiration is 16 and oxygen saturation is 99%.     Physical Exam  -- Nursing note and vitals reviewed  -- Constitutional: Appears well-developed and well-nourished  -- Head: Atraumatic. Normocephalic. No obvious abnormality  -- Eyes: Pupils are equal and reactive to light. Normal conjunctiva and lids  -- Cardiac: Normal rate, regular rhythm and normal heart sounds  -- Pulmonary: Normal respiratory effort, breath sounds clear to auscultation  -- Abdominal: Soft, no tenderness. Normal bowel sounds. Normal liver edge  -- Musculoskeletal: Normal range of motion, no effusions. Joints stable   -- Neurological: No focal deficits. Showed good interaction with staff  -- Vascular: Posterior tibial, dorsalis pedis and radial pulses 2+ bilaterally      Emergency Room Course      Lumbar x-ray  1. There is grade 2 anterolisthesis of L4 on L5.   2. There are moderate degenerative changes in the thoracic and lumbar portions of the spine.   3. There is a moderate amount of atherosclerosis.   4. There is a prominent amount of fecal material within the colon.   5. There are surgical clips in the right upper quadrant the abdomen.      Left hip x-ray   1. There is mild narrowing of the joint space of both hips.   2. There is a moderate amount of atherosclerosis.     Medications  Given  morphine injection 2 mg (2 mg Intramuscular Given 9/28/20 1542)   ondansetron injection 4 mg (4 mg Intramuscular Given 9/28/20 1543)   cloNIDine tablet 0.1 mg (0.1 mg Oral Given 9/28/20 1542)     ED Physician Management  -- Diagnosis management comments: 80 y.o. female with left hip pain  -- patient with left hip pain, patient however had elevated blood pressure  -- patient did not take her blood pressure medication today, clonidine t.i.d.  -- patient states she has chronic pain on a daily basis, denies acute injury  -- patient x-ray shows osteoarthritic changes without acute fracture  -- pain will be treated with Tylenol 3, follow up with Orthopedics as instructed  -- return to the ER with any concerning symptoms after discharge    Diagnosis  [M25.552] Left hip pain    Disposition and Plan  -- Disposition: home  -- Condition: stable  -- Follow-up: Patient to follow up with Caroline Do NP in 1-2 days.  -- I advised the patient that we have found no life threatening condition today  -- At this time, I believe the patient is clinically stable for discharge.   -- The patient acknowledges that close follow up with a MD is required   -- Patient agrees to comply with all instruction and direction given in the ER    This note is dictated on M*Modal word recognition program.  There are word recognition mistakes that are occasionally missed on review.         Tang Pool MD  09/28/20 1020

## 2020-09-28 NOTE — ED TRIAGE NOTES
80 y.o. female presents to ER   Chief Complaint   Patient presents with    Hip Pain     chronic left hip pain, increased over past 3/4 days. home treatments not helping.   . No acute distress noted.

## 2020-10-06 ENCOUNTER — TELEPHONE (OUTPATIENT)
Dept: INTERNAL MEDICINE | Facility: CLINIC | Age: 80
End: 2020-10-06

## 2020-10-06 DIAGNOSIS — I10 BENIGN ESSENTIAL HTN: ICD-10-CM

## 2020-10-06 RX ORDER — CLONIDINE HYDROCHLORIDE 0.1 MG/1
0.1 TABLET ORAL 3 TIMES DAILY
Qty: 270 TABLET | Refills: 0 | Status: ON HOLD | OUTPATIENT
Start: 2020-10-06 | End: 2021-02-04 | Stop reason: SDUPTHER

## 2020-10-06 NOTE — TELEPHONE ENCOUNTER
----- Message from Jaja Harp MA sent at 10/6/2020 11:30 AM CDT -----  Patient is out of refills on Clonidine.     She says she takes 3 daily.     Send to [facility] ([place])

## 2020-10-08 DIAGNOSIS — G47.00 INSOMNIA, UNSPECIFIED TYPE: ICD-10-CM

## 2020-10-08 RX ORDER — QUETIAPINE FUMARATE 200 MG/1
TABLET, FILM COATED ORAL
Qty: 30 TABLET | Refills: 0 | Status: SHIPPED | OUTPATIENT
Start: 2020-10-08 | End: 2020-11-10

## 2020-11-10 DIAGNOSIS — G47.00 INSOMNIA, UNSPECIFIED TYPE: ICD-10-CM

## 2020-11-10 RX ORDER — QUETIAPINE FUMARATE 200 MG/1
TABLET, FILM COATED ORAL
Qty: 30 TABLET | Refills: 5 | Status: ON HOLD | OUTPATIENT
Start: 2020-11-10 | End: 2021-02-04 | Stop reason: HOSPADM

## 2020-11-12 DIAGNOSIS — M15.9 PRIMARY OSTEOARTHRITIS INVOLVING MULTIPLE JOINTS: ICD-10-CM

## 2020-11-12 RX ORDER — NAPROXEN 500 MG/1
TABLET ORAL
Qty: 180 TABLET | Refills: 0 | Status: ON HOLD | OUTPATIENT
Start: 2020-11-12 | End: 2021-02-04 | Stop reason: HOSPADM

## 2020-12-13 ENCOUNTER — HOSPITAL ENCOUNTER (EMERGENCY)
Facility: HOSPITAL | Age: 80
Discharge: HOME | End: 2020-12-13
Attending: SURGERY
Payer: MEDICARE

## 2020-12-13 VITALS
OXYGEN SATURATION: 99 % | WEIGHT: 217 LBS | DIASTOLIC BLOOD PRESSURE: 72 MMHG | TEMPERATURE: 98 F | BODY MASS INDEX: 32.05 KG/M2 | HEART RATE: 67 BPM | SYSTOLIC BLOOD PRESSURE: 173 MMHG | RESPIRATION RATE: 20 BRPM

## 2020-12-13 DIAGNOSIS — N30.00 ACUTE CYSTITIS WITHOUT HEMATURIA: ICD-10-CM

## 2020-12-13 DIAGNOSIS — M19.90 CHRONIC ARTHRITIS: Primary | ICD-10-CM

## 2020-12-13 LAB
ALBUMIN SERPL BCP-MCNC: 3.6 G/DL (ref 3.5–5.2)
ALP SERPL-CCNC: 85 U/L (ref 55–135)
ALT SERPL W/O P-5'-P-CCNC: 11 U/L (ref 10–44)
ANION GAP SERPL CALC-SCNC: 10 MMOL/L (ref 8–16)
AST SERPL-CCNC: 14 U/L (ref 10–40)
BACTERIA #/AREA URNS HPF: ABNORMAL /HPF
BASOPHILS # BLD AUTO: 0.08 K/UL (ref 0–0.2)
BASOPHILS NFR BLD: 0.7 % (ref 0–1.9)
BILIRUB SERPL-MCNC: 0.3 MG/DL (ref 0.1–1)
BILIRUB UR QL STRIP: NEGATIVE
BNP SERPL-MCNC: 53 PG/ML (ref 0–99)
BUN SERPL-MCNC: 34 MG/DL (ref 8–23)
CALCIUM SERPL-MCNC: 9.4 MG/DL (ref 8.7–10.5)
CHLORIDE SERPL-SCNC: 107 MMOL/L (ref 95–110)
CK MB SERPL-MCNC: 1.4 NG/ML (ref 0.1–6.5)
CK MB SERPL-MCNC: 1.4 NG/ML (ref 0.1–6.5)
CK MB SERPL-RTO: 1.5 % (ref 0–5)
CK MB SERPL-RTO: 1.7 % (ref 0–5)
CK SERPL-CCNC: 83 U/L (ref 20–180)
CK SERPL-CCNC: 83 U/L (ref 20–180)
CK SERPL-CCNC: 93 U/L (ref 20–180)
CK SERPL-CCNC: 93 U/L (ref 20–180)
CLARITY UR: ABNORMAL
CO2 SERPL-SCNC: 27 MMOL/L (ref 23–29)
COLOR UR: YELLOW
CREAT SERPL-MCNC: 1.3 MG/DL (ref 0.5–1.4)
DIFFERENTIAL METHOD: ABNORMAL
EOSINOPHIL # BLD AUTO: 0.5 K/UL (ref 0–0.5)
EOSINOPHIL NFR BLD: 4.2 % (ref 0–8)
ERYTHROCYTE [DISTWIDTH] IN BLOOD BY AUTOMATED COUNT: 15.4 % (ref 11.5–14.5)
EST. GFR  (AFRICAN AMERICAN): 45 ML/MIN/1.73 M^2
EST. GFR  (NON AFRICAN AMERICAN): 39 ML/MIN/1.73 M^2
GLUCOSE SERPL-MCNC: 112 MG/DL (ref 70–110)
GLUCOSE UR QL STRIP: NEGATIVE
HCT VFR BLD AUTO: 31.6 % (ref 37–48.5)
HGB BLD-MCNC: 10.3 G/DL (ref 12–16)
HGB UR QL STRIP: ABNORMAL
IMM GRANULOCYTES # BLD AUTO: 0.05 K/UL (ref 0–0.04)
IMM GRANULOCYTES NFR BLD AUTO: 0.4 % (ref 0–0.5)
KETONES UR QL STRIP: NEGATIVE
LEUKOCYTE ESTERASE UR QL STRIP: ABNORMAL
LYMPHOCYTES # BLD AUTO: 3.1 K/UL (ref 1–4.8)
LYMPHOCYTES NFR BLD: 26.9 % (ref 18–48)
MAGNESIUM SERPL-MCNC: 2 MG/DL (ref 1.6–2.6)
MCH RBC QN AUTO: 24.3 PG (ref 27–31)
MCHC RBC AUTO-ENTMCNC: 32.6 G/DL (ref 32–36)
MCV RBC AUTO: 75 FL (ref 82–98)
MICROSCOPIC COMMENT: ABNORMAL
MONOCYTES # BLD AUTO: 1.2 K/UL (ref 0.3–1)
MONOCYTES NFR BLD: 10.6 % (ref 4–15)
NEUTROPHILS # BLD AUTO: 6.7 K/UL (ref 1.8–7.7)
NEUTROPHILS NFR BLD: 57.2 % (ref 38–73)
NITRITE UR QL STRIP: NEGATIVE
NRBC BLD-RTO: 0 /100 WBC
PH UR STRIP: 6 [PH] (ref 5–8)
PHOSPHATE SERPL-MCNC: 3.3 MG/DL (ref 2.7–4.5)
PLATELET # BLD AUTO: 185 K/UL (ref 150–350)
PMV BLD AUTO: 11.3 FL (ref 9.2–12.9)
POTASSIUM SERPL-SCNC: 4.5 MMOL/L (ref 3.5–5.1)
PROT SERPL-MCNC: 6.9 G/DL (ref 6–8.4)
PROT UR QL STRIP: NEGATIVE
RBC # BLD AUTO: 4.23 M/UL (ref 4–5.4)
RBC #/AREA URNS HPF: 60 /HPF (ref 0–4)
SARS-COV-2 RDRP RESP QL NAA+PROBE: NEGATIVE
SODIUM SERPL-SCNC: 144 MMOL/L (ref 136–145)
SP GR UR STRIP: 1.02 (ref 1–1.03)
TROPONIN I SERPL DL<=0.01 NG/ML-MCNC: <0.006 NG/ML (ref 0–0.03)
TROPONIN I SERPL DL<=0.01 NG/ML-MCNC: <0.006 NG/ML (ref 0–0.03)
TSH SERPL DL<=0.005 MIU/L-ACNC: 1.01 UIU/ML (ref 0.4–4)
URN SPEC COLLECT METH UR: ABNORMAL
UROBILINOGEN UR STRIP-ACNC: NEGATIVE EU/DL
WBC # BLD AUTO: 11.64 K/UL (ref 3.9–12.7)
WBC #/AREA URNS HPF: 30 /HPF (ref 0–5)
WBC CLUMPS URNS QL MICRO: ABNORMAL

## 2020-12-13 PROCEDURE — 63600175 PHARM REV CODE 636 W HCPCS: Performed by: SURGERY

## 2020-12-13 PROCEDURE — 84443 ASSAY THYROID STIM HORMONE: CPT

## 2020-12-13 PROCEDURE — 83735 ASSAY OF MAGNESIUM: CPT

## 2020-12-13 PROCEDURE — 82550 ASSAY OF CK (CPK): CPT

## 2020-12-13 PROCEDURE — 80053 COMPREHEN METABOLIC PANEL: CPT

## 2020-12-13 PROCEDURE — 83880 ASSAY OF NATRIURETIC PEPTIDE: CPT

## 2020-12-13 PROCEDURE — 82553 CREATINE MB FRACTION: CPT

## 2020-12-13 PROCEDURE — 36415 COLL VENOUS BLD VENIPUNCTURE: CPT

## 2020-12-13 PROCEDURE — 85025 COMPLETE CBC W/AUTO DIFF WBC: CPT

## 2020-12-13 PROCEDURE — 99285 EMERGENCY DEPT VISIT HI MDM: CPT | Mod: 25

## 2020-12-13 PROCEDURE — 84100 ASSAY OF PHOSPHORUS: CPT

## 2020-12-13 PROCEDURE — 96365 THER/PROPH/DIAG IV INF INIT: CPT

## 2020-12-13 PROCEDURE — 84484 ASSAY OF TROPONIN QUANT: CPT

## 2020-12-13 PROCEDURE — 81000 URINALYSIS NONAUTO W/SCOPE: CPT

## 2020-12-13 PROCEDURE — 87086 URINE CULTURE/COLONY COUNT: CPT

## 2020-12-13 PROCEDURE — 96361 HYDRATE IV INFUSION ADD-ON: CPT

## 2020-12-13 PROCEDURE — 25000003 PHARM REV CODE 250: Performed by: SURGERY

## 2020-12-13 PROCEDURE — U0002 COVID-19 LAB TEST NON-CDC: HCPCS

## 2020-12-13 RX ORDER — TRAMADOL HYDROCHLORIDE 50 MG/1
50 TABLET ORAL EVERY 6 HOURS PRN
Qty: 20 TABLET | Refills: 0 | Status: SHIPPED | OUTPATIENT
Start: 2020-12-13 | End: 2020-12-18

## 2020-12-13 RX ORDER — CLONIDINE HYDROCHLORIDE 0.1 MG/1
0.2 TABLET ORAL
Status: DISCONTINUED | OUTPATIENT
Start: 2020-12-13 | End: 2020-12-13 | Stop reason: CLARIF

## 2020-12-13 RX ORDER — NITROFURANTOIN 25; 75 MG/1; MG/1
100 CAPSULE ORAL 2 TIMES DAILY
Qty: 14 CAPSULE | Refills: 0 | Status: SHIPPED | OUTPATIENT
Start: 2020-12-13 | End: 2020-12-20

## 2020-12-13 RX ORDER — CLONIDINE HYDROCHLORIDE 0.1 MG/1
0.1 TABLET ORAL
Status: COMPLETED | OUTPATIENT
Start: 2020-12-13 | End: 2020-12-13

## 2020-12-13 RX ADMIN — CEFTRIAXONE 2 G: 2 INJECTION, SOLUTION INTRAVENOUS at 04:12

## 2020-12-13 RX ADMIN — SODIUM CHLORIDE 500 ML: 0.9 INJECTION, SOLUTION INTRAVENOUS at 03:12

## 2020-12-13 RX ADMIN — CLONIDINE HYDROCHLORIDE 0.1 MG: 0.1 TABLET ORAL at 05:12

## 2020-12-13 NOTE — ED NOTES
Patient resting on stretcher with even and unlabored respirations. NAD noted. Dr. Pool at bedside speaking to patient regarding results and POC.

## 2020-12-14 NOTE — ED PROVIDER NOTES
[facility]                                                 I reviewed the ER triage nurse's note before evaluating the patient    Chief Complaint  80 y.o. female with Fatigue (pt reports her legs are weak an she feels very fatigue. this has been going on around a week. )      History of Present Illness  Emily Alicia presents to the emergency room with leg weakness  Patient has had longstanding leg weakness and radiculopathy  Patient states that she also has longstanding neuropathy chronically  Patient has been on gabapentin, has not helped the chronic pain  Patient has ambulated with a cane, insufficient for last 2 months  Patient has had several falls at home, has been living alone recently    The history is provided by the patient   device was not used during this ER visit    Past Medical History   -- Anemia     -- Anxiety     -- Aortic valve stenosis     -- Chronic kidney disease (CKD)     -- Diastolic heart failure     -- Dyslipidemia     -- Encounter for blood transfusion     -- Glaucoma (increased eye pressure)     -- HHD (hypertensive heart disease)     -- Hypertension     -- Osteoarthritis     -- Retina disorder, bilateral     -- TR (tricuspid regurgitation)     -- UTI (urinary tract infection)        Past Surgical History   -- CARDIAC CATHETERIZATION       -- CARDIAC VALVE SURGERY       -- CATARACT EXTRACTION        -- CHOLECYSTECTOMY       -- HYSTERECTOMY       -- JOINT REPLACEMENT       -- JOINT REPLACEMENT       -- KNEE SURGERY       -- SPINE SURGERY       -- TRANSCATHETER AORTIC VALVE REPLACEMENT        No Known Allergies     I have reviewed all of this patient's past medical, surgical, family, and social   histories as well as active allergies and medications documented in the  electronic medical record    Review of Systems and Physical Exam      Review of Systems  -- Constitution - no fever, denies fatigue, no weakness, no chills  -- Eyes - no tearing or  redness, no visual disturbance  -- Ear, Nose - no tinnitus or earache, no nasal congestion or discharge  -- Mouth,Throat - no sore throat, no toothache, normal voice, normal swallowing  -- Respiratory - denies cough and congestion, no shortness of breath, no WONG  -- Cardiovascular - denies chest pain, no palpitations, denies claudication  -- Gastrointestinal - denies abdominal pain, nausea, vomiting, or diarrhea  -- Genitourinary - no dysuria, denies flank pain, no hematuria, no STD risk  -- Musculoskeletal - chronic back pain and chronic leg weakness  -- Neurological - no headache, denies weakness or seizure; no LOC  -- Skin - denies pallor, rash, or changes in skin. no hives or welts noted    Vital Signs  Her oral temperature is 97.8 °F (36.6 °C).   Her blood pressure is 173/72 and her pulse is 67.   Her respiration is 20 and oxygen saturation is 99%.     Physical Exam  -- Nursing note and vitals reviewed  -- Constitutional: Appears well-developed and well-nourished  -- Head: Atraumatic. Normocephalic. No obvious abnormality  -- Eyes: Pupils are equal and reactive to light. Normal conjunctiva and lids  -- Cardiac: Normal rate, regular rhythm and normal heart sounds  -- Pulmonary: Normal respiratory effort, breath sounds clear to auscultation  -- Abdominal: Soft, no tenderness. Normal bowel sounds. Normal liver edge  -- Musculoskeletal: Normal range of motion, no effusions. Joints stable   -- Neurological: No focal deficits. Showed good interaction with staff  -- Vascular: Posterior tibial, dorsalis pedis and radial pulses 2+ bilaterally      Emergency Room Course      Urinalysis  Appearance, UA Cloudy (*)   Occult Blood UA 3+ (*)   Leukocytes, UA 2+ (*)   RBC, UA 60 (*)   WBC, UA 30 (*)   WBC Clumps, UA Moderate (*)   Bacteria Moderate (*)     Lab Results     K 4.5      CO2 27   BUN 34 (H)   CREATININE 1.3    (H)   ALKPHOS 85   AST 14   ALT 11   BILITOT 0.3   ALBUMIN 3.6   PROT 6.9   WBC 11.64    HGB 10.3 (L)   HCT 31.6 (L)      CPK 83   CPK 83   CPKMB 1.4   TROPONINI <0.006   BNP 53   MG 2.0   TSH 1.006     EKG  -- The EKG findings today were without concerning findings from baseline   -- The troponin drawn in the ER today was within normal limits  -- The 2nd troponin drawn in the ER today was within normal limits      Chest x-ray  No acute abnormality.  Osteoarthritis of both glenohumeral joints is noted.     CT lumbar spine   There is chronic degenerative disc disease at every level except L1-2.  There is grade 1 spondylolisthesis at L4-5.   At L4-5 there is severe spinal canal stenosis secondary to grade 1 spondylolisthesis, short pedicles, marked hypertrophy of the facets and ligamentum flavum and spondylosis.   At L3-4 there is moderate to severe spinal canal and bilateral neural foraminal stenosis secondary to spondylosis, hypertrophy of the facets and ligamentum flavum and short pedicles.   At T12-L1 there is moderate spinal canal stenosis and bilateral neural foraminal stenosis secondary to short pedicles and spondylosis   At L2-3 short pedicles and spondylosis produces moderate spinal canal and bilateral neural foraminal stenosis.     Additional Work up  -- The CT of the head performed in the ER today was negative for acute pathology  -- rapid Coronavirus PCR was negative  -- The urine today has been sent for lab culture, results pending     Medications Given  sodium chloride 0.9% bolus 500 mL (0 mLs Intravenous Stopped 12/13/20 1651)   cefTRIAXone (ROCEPHIN) 2 g/50 mL D5W IVPB (0 g Intravenous Stopped 12/13/20 1650)   cloNIDine tablet 0.1 mg (0.1 mg Oral Given 12/13/20 1708)     ED Physician Management  -- Diagnosis management comments: 80 y.o. female with chronic debility  -- patient with chronic neuropathy and radiculopathy, chronic back pain  -- patient has been gradually getting weaker in the legs months now  -- patient has no evidence of cauda equina syndrome, denies acute trauma  --  patient states she just felt weak no legs for several months, no improvement  -- extensive evaluation today including CT lumbar spine showed DJD today  -- patient also had a negative head CT and a precautionary negative cardiac workup  -- urinalysis shows urinary tract infection, IV antibiotics given, Rx on discharge    Further ED Physician Management  -- I have talked to both daughters at bedside extensively today  -- patient has chronic leg weakness and debility, needs a walker  -- patient also should not live alone given her chronic debility issues  -- patient be treated for urinary tract infection, needs outpatient follow-up  -- I will contact the patient's PCP via [employer] for timely interaction and follow-up  -- extensively counseled return to the ER with any concerning symptoms  -- patient has chronic knee, back, leg pain with chronic neuropathy    Diagnosis  [M19.90] Chronic arthritis (Primary)  [N30.00] Acute cystitis without hematuria    Disposition and Plan  -- Disposition: home  -- Condition: stable  -- Follow-up: Patient to follow up with Caroline Do NP in 1-2 days.  -- I advised the patient that we have found no life threatening condition today  -- At this time, I believe the patient is clinically stable for discharge.   -- The patient acknowledges that close follow up with a MD is required   -- Patient agrees to comply with all instruction and direction given in the ER    This note is dictated on M*Modal word recognition program.  There are word recognition mistakes that are occasionally missed on review.         Tang Pool MD  12/13/20 1935

## 2020-12-15 LAB — BACTERIA UR CULT: NO GROWTH

## 2020-12-30 ENCOUNTER — TELEPHONE (OUTPATIENT)
Dept: FAMILY MEDICINE | Facility: CLINIC | Age: 80
End: 2020-12-30

## 2020-12-30 ENCOUNTER — OFFICE VISIT (OUTPATIENT)
Dept: FAMILY MEDICINE | Facility: CLINIC | Age: 80
End: 2020-12-30
Payer: MEDICARE

## 2020-12-30 VITALS
TEMPERATURE: 98 F | DIASTOLIC BLOOD PRESSURE: 74 MMHG | SYSTOLIC BLOOD PRESSURE: 146 MMHG | HEART RATE: 80 BPM | HEIGHT: 69 IN | WEIGHT: 217.13 LBS | RESPIRATION RATE: 16 BRPM | BODY MASS INDEX: 32.16 KG/M2

## 2020-12-30 DIAGNOSIS — M48.062 SPINAL STENOSIS OF LUMBAR REGION WITH NEUROGENIC CLAUDICATION: Primary | ICD-10-CM

## 2020-12-30 PROCEDURE — 1126F AMNT PAIN NOTED NONE PRSNT: CPT | Mod: S$GLB,,, | Performed by: FAMILY MEDICINE

## 2020-12-30 PROCEDURE — 3288F FALL RISK ASSESSMENT DOCD: CPT | Mod: CPTII,S$GLB,, | Performed by: FAMILY MEDICINE

## 2020-12-30 PROCEDURE — 1100F PTFALLS ASSESS-DOCD GE2>/YR: CPT | Mod: CPTII,S$GLB,, | Performed by: FAMILY MEDICINE

## 2020-12-30 PROCEDURE — 1126F PR PAIN SEVERITY QUANTIFIED, NO PAIN PRESENT: ICD-10-PCS | Mod: S$GLB,,, | Performed by: FAMILY MEDICINE

## 2020-12-30 PROCEDURE — 99213 PR OFFICE/OUTPT VISIT, EST, LEVL III, 20-29 MIN: ICD-10-PCS | Mod: 25,S$GLB,, | Performed by: FAMILY MEDICINE

## 2020-12-30 PROCEDURE — 1100F PR PT FALLS ASSESS DOC 2+ FALLS/FALL W/INJURY/YR: ICD-10-PCS | Mod: CPTII,S$GLB,, | Performed by: FAMILY MEDICINE

## 2020-12-30 PROCEDURE — 1159F PR MEDICATION LIST DOCUMENTED IN MEDICAL RECORD: ICD-10-PCS | Mod: S$GLB,,, | Performed by: FAMILY MEDICINE

## 2020-12-30 PROCEDURE — 99999 PR PBB SHADOW E&M-EST. PATIENT-LVL V: ICD-10-PCS | Mod: PBBFAC,,, | Performed by: FAMILY MEDICINE

## 2020-12-30 PROCEDURE — 3077F SYST BP >= 140 MM HG: CPT | Mod: CPTII,S$GLB,, | Performed by: FAMILY MEDICINE

## 2020-12-30 PROCEDURE — 3288F PR FALLS RISK ASSESSMENT DOCUMENTED: ICD-10-PCS | Mod: CPTII,S$GLB,, | Performed by: FAMILY MEDICINE

## 2020-12-30 PROCEDURE — 96372 PR INJECTION,THERAP/PROPH/DIAG2ST, IM OR SUBCUT: ICD-10-PCS | Mod: S$GLB,,, | Performed by: FAMILY MEDICINE

## 2020-12-30 PROCEDURE — 3078F DIAST BP <80 MM HG: CPT | Mod: CPTII,S$GLB,, | Performed by: FAMILY MEDICINE

## 2020-12-30 PROCEDURE — 96372 THER/PROPH/DIAG INJ SC/IM: CPT | Mod: S$GLB,,, | Performed by: FAMILY MEDICINE

## 2020-12-30 PROCEDURE — 3077F PR MOST RECENT SYSTOLIC BLOOD PRESSURE >= 140 MM HG: ICD-10-PCS | Mod: CPTII,S$GLB,, | Performed by: FAMILY MEDICINE

## 2020-12-30 PROCEDURE — 1159F MED LIST DOCD IN RCRD: CPT | Mod: S$GLB,,, | Performed by: FAMILY MEDICINE

## 2020-12-30 PROCEDURE — 3078F PR MOST RECENT DIASTOLIC BLOOD PRESSURE < 80 MM HG: ICD-10-PCS | Mod: CPTII,S$GLB,, | Performed by: FAMILY MEDICINE

## 2020-12-30 PROCEDURE — 99213 OFFICE O/P EST LOW 20 MIN: CPT | Mod: 25,S$GLB,, | Performed by: FAMILY MEDICINE

## 2020-12-30 PROCEDURE — 99999 PR PBB SHADOW E&M-EST. PATIENT-LVL V: CPT | Mod: PBBFAC,,, | Performed by: FAMILY MEDICINE

## 2020-12-30 RX ORDER — METHYLPREDNISOLONE ACETATE 40 MG/ML
40 INJECTION, SUSPENSION INTRA-ARTICULAR; INTRALESIONAL; INTRAMUSCULAR; SOFT TISSUE
Status: COMPLETED | OUTPATIENT
Start: 2020-12-30 | End: 2020-12-30

## 2020-12-30 RX ADMIN — METHYLPREDNISOLONE ACETATE 40 MG: 40 INJECTION, SUSPENSION INTRA-ARTICULAR; INTRALESIONAL; INTRAMUSCULAR; SOFT TISSUE at 04:12

## 2020-12-30 NOTE — TELEPHONE ENCOUNTER
----- Message from Lali Rosas sent at 2020  4:41 PM CST -----  Emily Alicia  MRN: [id number]  : 1940  PCP: Caroline Do  Home Phone      [phone]  Work Phone      Not on file.  Mobile          [phone]      MESSAGE:   [facility] does not except [employer]. So patient needs a new referral.

## 2020-12-30 NOTE — PROGRESS NOTES
Subjective:       Patient ID: Emily Alicia is a 80 y.o. female.    Chief Complaint: Leg Pain (and weakness to both legs, falling)    Pt is a 80 y.o. female who presents for evaluation and management of   Encounter Diagnosis   Name Primary?    Spinal stenosis of lumbar region with neurogenic claudication Yes   .recent ED visit for LE weakness and being unable to walk without walker. With walker she can only ambulate short distances (bed to bathroom).   CT lumbar spine in ED with severe lumbar stenosis   Sees Caroline Do    Doing well on current meds. Denies any side effects. Prevention is up to date.  Review of Systems   Musculoskeletal: Positive for back pain.   Neurological: Positive for weakness and numbness.       Objective:      Physical Exam  Constitutional:       Appearance: She is well-developed.      Comments: In wheelchair    HENT:      Head: Normocephalic and atraumatic.      Right Ear: External ear normal.      Left Ear: External ear normal.      Nose: Nose normal.   Eyes:      Pupils: Pupils are equal, round, and reactive to light.   Neck:      Musculoskeletal: Normal range of motion and neck supple.      Thyroid: No thyromegaly.      Vascular: No JVD.      Trachea: No tracheal deviation.   Cardiovascular:      Rate and Rhythm: Normal rate.      Heart sounds: Normal heart sounds. No murmur.   Pulmonary:      Effort: Pulmonary effort is normal. No respiratory distress.      Breath sounds: Normal breath sounds. No wheezing or rales.   Chest:      Chest wall: No tenderness.   Abdominal:      General: Bowel sounds are normal. There is no distension.      Palpations: Abdomen is soft. There is no mass.      Tenderness: There is no abdominal tenderness. There is no guarding or rebound.   Musculoskeletal:         General: No tenderness.   Lymphadenopathy:      Cervical: No cervical adenopathy.   Skin:     General: Skin is warm and dry.      Coloration: Skin is not pale.      Findings: No erythema or rash.    Neurological:      Mental Status: She is alert and oriented to person, place, and time.      Cranial Nerves: No cranial nerve deficit.      Sensory: Sensory deficit present.      Motor: Weakness present. No abnormal muscle tone.      Coordination: Coordination normal.      Deep Tendon Reflexes: Reflexes are normal and symmetric. Reflexes normal.   Psychiatric:         Behavior: Behavior normal.         Thought Content: Thought content normal.         Judgment: Judgment normal.         Assessment:       1. Spinal stenosis of lumbar region with neurogenic claudication        Plan:   Emily was seen today for leg pain.    Diagnoses and all orders for this visit:    Spinal stenosis of lumbar region with neurogenic claudication  -     Ambulatory referral/consult to Pain Clinic; Future  -     Ambulatory referral/consult to Home Health; Future      Problem List Items Addressed This Visit     None      Visit Diagnoses     Spinal stenosis of lumbar region with neurogenic claudication    -  Primary    Relevant Orders    Ambulatory referral/consult to Pain Clinic    Ambulatory referral/consult to Home Health      HH for OT and PT   Pain mngt referral to see if injections would help some of her inflammation contributing to spinal stenosis     No follow-ups on file.

## 2020-12-31 NOTE — TELEPHONE ENCOUNTER
[facility] takes [employer].    When I look in her chart it appears that the referral for HH wasn't sent to [facility], but [facility]. Will call them tomorrow to see if they got this referral or not.

## 2021-01-02 PROCEDURE — G0180 MD CERTIFICATION HHA PATIENT: HCPCS | Mod: ,,, | Performed by: FAMILY MEDICINE

## 2021-01-02 PROCEDURE — G0180 PR HOME HEALTH MD CERTIFICATION: ICD-10-PCS | Mod: ,,, | Performed by: FAMILY MEDICINE

## 2021-01-05 ENCOUNTER — HOSPITAL ENCOUNTER (EMERGENCY)
Facility: HOSPITAL | Age: 81
Discharge: HOME | End: 2021-01-05
Attending: SURGERY
Payer: MEDICARE

## 2021-01-05 VITALS
OXYGEN SATURATION: 100 % | HEART RATE: 78 BPM | SYSTOLIC BLOOD PRESSURE: 131 MMHG | TEMPERATURE: 98 F | RESPIRATION RATE: 18 BRPM | DIASTOLIC BLOOD PRESSURE: 78 MMHG

## 2021-01-05 DIAGNOSIS — T14.90XA TRAUMA: ICD-10-CM

## 2021-01-05 DIAGNOSIS — S80.01XA CONTUSION OF RIGHT KNEE, INITIAL ENCOUNTER: Primary | ICD-10-CM

## 2021-01-05 DIAGNOSIS — Z96.659 STATUS POST TOTAL KNEE REPLACEMENT, UNSPECIFIED LATERALITY: ICD-10-CM

## 2021-01-05 PROCEDURE — 63600175 PHARM REV CODE 636 W HCPCS: Performed by: SURGERY

## 2021-01-05 PROCEDURE — 99284 EMERGENCY DEPT VISIT MOD MDM: CPT | Mod: 25

## 2021-01-05 PROCEDURE — 96372 THER/PROPH/DIAG INJ SC/IM: CPT

## 2021-01-05 PROCEDURE — 25000003 PHARM REV CODE 250: Performed by: SURGERY

## 2021-01-05 RX ORDER — HYDROCODONE BITARTRATE AND ACETAMINOPHEN 5; 325 MG/1; MG/1
1 TABLET ORAL
Status: COMPLETED | OUTPATIENT
Start: 2021-01-05 | End: 2021-01-05

## 2021-01-05 RX ORDER — KETOROLAC TROMETHAMINE 30 MG/ML
30 INJECTION, SOLUTION INTRAMUSCULAR; INTRAVENOUS
Status: COMPLETED | OUTPATIENT
Start: 2021-01-05 | End: 2021-01-05

## 2021-01-05 RX ADMIN — KETOROLAC TROMETHAMINE 30 MG: 30 INJECTION, SOLUTION INTRAMUSCULAR at 04:01

## 2021-01-05 RX ADMIN — HYDROCODONE BITARTRATE AND ACETAMINOPHEN 1 TABLET: 5; 325 TABLET ORAL at 04:01

## 2021-01-11 DIAGNOSIS — R53.81 DEBILITY: Primary | ICD-10-CM

## 2021-01-12 ENCOUNTER — DOCUMENT SCAN (OUTPATIENT)
Dept: HOME HEALTH SERVICES | Facility: HOSPITAL | Age: 81
End: 2021-01-12
Payer: MEDICARE

## 2021-01-13 ENCOUNTER — TELEPHONE (OUTPATIENT)
Dept: INTERNAL MEDICINE | Facility: CLINIC | Age: 81
End: 2021-01-13

## 2021-01-13 DIAGNOSIS — S31.000A WOUND OF SACRAL REGION, INITIAL ENCOUNTER: Primary | ICD-10-CM

## 2021-01-13 DIAGNOSIS — I87.8 VENOUS STASIS: ICD-10-CM

## 2021-01-14 ENCOUNTER — DOCUMENT SCAN (OUTPATIENT)
Dept: HOME HEALTH SERVICES | Facility: HOSPITAL | Age: 81
End: 2021-01-14
Payer: MEDICARE

## 2021-01-15 ENCOUNTER — DOCUMENT SCAN (OUTPATIENT)
Dept: HOME HEALTH SERVICES | Facility: HOSPITAL | Age: 81
End: 2021-01-15
Payer: MEDICARE

## 2021-01-19 ENCOUNTER — TELEPHONE (OUTPATIENT)
Dept: FAMILY MEDICINE | Facility: CLINIC | Age: 81
End: 2021-01-19

## 2021-01-19 ENCOUNTER — DOCUMENT SCAN (OUTPATIENT)
Dept: HOME HEALTH SERVICES | Facility: HOSPITAL | Age: 81
End: 2021-01-19
Payer: MEDICARE

## 2021-01-20 ENCOUNTER — TELEPHONE (OUTPATIENT)
Dept: FAMILY MEDICINE | Facility: CLINIC | Age: 81
End: 2021-01-20

## 2021-01-20 ENCOUNTER — DOCUMENT SCAN (OUTPATIENT)
Dept: HOME HEALTH SERVICES | Facility: HOSPITAL | Age: 81
End: 2021-01-20
Payer: MEDICARE

## 2021-01-21 ENCOUNTER — TELEPHONE (OUTPATIENT)
Dept: FAMILY MEDICINE | Facility: CLINIC | Age: 81
End: 2021-01-21

## 2021-01-21 ENCOUNTER — EXTERNAL HOME HEALTH (OUTPATIENT)
Dept: HOME HEALTH SERVICES | Facility: HOSPITAL | Age: 81
End: 2021-01-21
Payer: MEDICARE

## 2021-01-21 DIAGNOSIS — L89.153 PRESSURE INJURY OF SACRAL REGION, STAGE 3: ICD-10-CM

## 2021-01-21 DIAGNOSIS — R53.81 DEBILITY: Primary | ICD-10-CM

## 2021-01-22 ENCOUNTER — DOCUMENT SCAN (OUTPATIENT)
Dept: HOME HEALTH SERVICES | Facility: HOSPITAL | Age: 81
End: 2021-01-22
Payer: MEDICARE

## 2021-01-25 ENCOUNTER — TELEPHONE (OUTPATIENT)
Dept: FAMILY MEDICINE | Facility: CLINIC | Age: 81
End: 2021-01-25

## 2021-01-25 ENCOUNTER — TELEPHONE (OUTPATIENT)
Dept: INTERNAL MEDICINE | Facility: CLINIC | Age: 81
End: 2021-01-25

## 2021-01-25 DIAGNOSIS — I10 ESSENTIAL HYPERTENSION: ICD-10-CM

## 2021-01-25 DIAGNOSIS — L98.499 SKIN ULCER, UNSPECIFIED ULCER STAGE: Primary | ICD-10-CM

## 2021-01-25 RX ORDER — DOXYCYCLINE HYCLATE 100 MG
100 TABLET ORAL EVERY 12 HOURS
Qty: 14 TABLET | Refills: 0 | Status: SHIPPED | OUTPATIENT
Start: 2021-01-25 | End: 2021-02-01

## 2021-01-25 RX ORDER — METOPROLOL SUCCINATE 200 MG/1
200 TABLET, EXTENDED RELEASE ORAL 2 TIMES DAILY
Qty: 180 TABLET | Refills: 0 | Status: SHIPPED | OUTPATIENT
Start: 2021-01-25 | End: 2021-05-11 | Stop reason: SDUPTHER

## 2021-01-26 DIAGNOSIS — R53.81 DEBILITY: Primary | ICD-10-CM

## 2021-01-27 ENCOUNTER — DOCUMENT SCAN (OUTPATIENT)
Dept: HOME HEALTH SERVICES | Facility: HOSPITAL | Age: 81
End: 2021-01-27
Payer: MEDICARE

## 2021-02-01 ENCOUNTER — OFFICE VISIT (OUTPATIENT)
Dept: WOUND CARE | Facility: HOSPITAL | Age: 81
End: 2021-02-01
Attending: SURGERY
Payer: MEDICARE

## 2021-02-01 VITALS
TEMPERATURE: 98 F | RESPIRATION RATE: 20 BRPM | DIASTOLIC BLOOD PRESSURE: 84 MMHG | HEART RATE: 91 BPM | SYSTOLIC BLOOD PRESSURE: 136 MMHG

## 2021-02-01 DIAGNOSIS — L89.153 SACRAL DECUBITUS ULCER, STAGE III: ICD-10-CM

## 2021-02-01 PROCEDURE — 99214 OFFICE O/P EST MOD 30 MIN: CPT

## 2021-02-01 PROCEDURE — 99499 NO LOS: ICD-10-PCS | Mod: ,,, | Performed by: SURGERY

## 2021-02-01 PROCEDURE — 11042 DBRDMT SUBQ TIS 1ST 20SQCM/<: CPT

## 2021-02-01 PROCEDURE — 27201912 HC WOUND CARE DEBRIDEMENT SUPPLIES

## 2021-02-01 PROCEDURE — 99499 UNLISTED E&M SERVICE: CPT | Mod: ,,, | Performed by: SURGERY

## 2021-02-03 ENCOUNTER — PATIENT OUTREACH (OUTPATIENT)
Dept: ADMINISTRATIVE | Facility: OTHER | Age: 81
End: 2021-02-03

## 2021-02-03 ENCOUNTER — HOSPITAL ENCOUNTER (EMERGENCY)
Facility: HOSPITAL | Age: 81
Discharge: ANOTHER HOSPITAL | End: 2021-02-03
Attending: EMERGENCY MEDICINE
Payer: MEDICARE

## 2021-02-03 ENCOUNTER — HOSPITAL ENCOUNTER (INPATIENT)
Facility: HOSPITAL | Age: 81
LOS: 1 days | Discharge: HOME HEALTH | DRG: 393 | End: 2021-02-04
Attending: EMERGENCY MEDICINE | Admitting: HOSPITALIST
Payer: MEDICARE

## 2021-02-03 VITALS
OXYGEN SATURATION: 98 % | HEART RATE: 81 BPM | SYSTOLIC BLOOD PRESSURE: 163 MMHG | RESPIRATION RATE: 20 BRPM | WEIGHT: 224 LBS | DIASTOLIC BLOOD PRESSURE: 67 MMHG | TEMPERATURE: 99 F | BODY MASS INDEX: 33.08 KG/M2

## 2021-02-03 DIAGNOSIS — D64.9 ANEMIA, UNSPECIFIED TYPE: ICD-10-CM

## 2021-02-03 DIAGNOSIS — Z12.31 ENCOUNTER FOR SCREENING MAMMOGRAM FOR MALIGNANT NEOPLASM OF BREAST: Primary | ICD-10-CM

## 2021-02-03 DIAGNOSIS — K92.2 GI BLEED: ICD-10-CM

## 2021-02-03 DIAGNOSIS — I10 BENIGN ESSENTIAL HTN: ICD-10-CM

## 2021-02-03 DIAGNOSIS — D62 ACUTE BLOOD LOSS ANEMIA: ICD-10-CM

## 2021-02-03 DIAGNOSIS — I10 ESSENTIAL HYPERTENSION: Primary | ICD-10-CM

## 2021-02-03 DIAGNOSIS — K62.5 RECTAL BLEEDING: Primary | ICD-10-CM

## 2021-02-03 DIAGNOSIS — S31.000S WOUND OF SACRAL REGION, SEQUELA: Primary | ICD-10-CM

## 2021-02-03 LAB
ABO + RH BLD: NORMAL
ABO + RH BLD: NORMAL
ALBUMIN SERPL BCP-MCNC: 2.6 G/DL (ref 3.5–5.2)
ALP SERPL-CCNC: 130 U/L (ref 55–135)
ALT SERPL W/O P-5'-P-CCNC: 11 U/L (ref 10–44)
ANION GAP SERPL CALC-SCNC: 9 MMOL/L (ref 8–16)
AST SERPL-CCNC: 11 U/L (ref 10–40)
BASOPHILS # BLD AUTO: 0.05 K/UL (ref 0–0.2)
BASOPHILS # BLD AUTO: 0.08 K/UL (ref 0–0.2)
BASOPHILS NFR BLD: 0.3 % (ref 0–1.9)
BASOPHILS NFR BLD: 0.5 % (ref 0–1.9)
BILIRUB SERPL-MCNC: 0.5 MG/DL (ref 0.1–1)
BLD GP AB SCN CELLS X3 SERPL QL: NORMAL
BLD GP AB SCN CELLS X3 SERPL QL: NORMAL
BLOOD GROUP ANTIBODIES SERPL: NORMAL
BUN SERPL-MCNC: 44 MG/DL (ref 8–23)
CALCIUM SERPL-MCNC: 9.1 MG/DL (ref 8.7–10.5)
CHLORIDE SERPL-SCNC: 106 MMOL/L (ref 95–110)
CO2 SERPL-SCNC: 27 MMOL/L (ref 23–29)
CREAT SERPL-MCNC: 1.2 MG/DL (ref 0.5–1.4)
DAT IGG-SP REAG RBC-IMP: NORMAL
DIFFERENTIAL METHOD: ABNORMAL
DIFFERENTIAL METHOD: ABNORMAL
EOSINOPHIL # BLD AUTO: 0.3 K/UL (ref 0–0.5)
EOSINOPHIL # BLD AUTO: 0.3 K/UL (ref 0–0.5)
EOSINOPHIL NFR BLD: 1.9 % (ref 0–8)
EOSINOPHIL NFR BLD: 2 % (ref 0–8)
ERYTHROCYTE [DISTWIDTH] IN BLOOD BY AUTOMATED COUNT: 15.6 % (ref 11.5–14.5)
ERYTHROCYTE [DISTWIDTH] IN BLOOD BY AUTOMATED COUNT: 16.1 % (ref 11.5–14.5)
EST. GFR  (AFRICAN AMERICAN): 50 ML/MIN/1.73 M^2
EST. GFR  (NON AFRICAN AMERICAN): 43 ML/MIN/1.73 M^2
GLUCOSE SERPL-MCNC: 117 MG/DL (ref 70–110)
HCT VFR BLD AUTO: 24.3 % (ref 37–48.5)
HCT VFR BLD AUTO: 24.4 % (ref 37–48.5)
HGB BLD-MCNC: 8 G/DL (ref 12–16)
HGB BLD-MCNC: 8.1 G/DL (ref 12–16)
IMM GRANULOCYTES # BLD AUTO: 0.05 K/UL (ref 0–0.04)
IMM GRANULOCYTES # BLD AUTO: 0.07 K/UL (ref 0–0.04)
IMM GRANULOCYTES NFR BLD AUTO: 0.3 % (ref 0–0.5)
IMM GRANULOCYTES NFR BLD AUTO: 0.5 % (ref 0–0.5)
INR PPP: 1.1 (ref 0.8–1.2)
LYMPHOCYTES # BLD AUTO: 2.6 K/UL (ref 1–4.8)
LYMPHOCYTES # BLD AUTO: 3.6 K/UL (ref 1–4.8)
LYMPHOCYTES NFR BLD: 17.7 % (ref 18–48)
LYMPHOCYTES NFR BLD: 24.6 % (ref 18–48)
MCH RBC QN AUTO: 23.8 PG (ref 27–31)
MCH RBC QN AUTO: 24.3 PG (ref 27–31)
MCHC RBC AUTO-ENTMCNC: 32.9 G/DL (ref 32–36)
MCHC RBC AUTO-ENTMCNC: 33.2 G/DL (ref 32–36)
MCV RBC AUTO: 72 FL (ref 82–98)
MCV RBC AUTO: 73 FL (ref 82–98)
MONOCYTES # BLD AUTO: 1.7 K/UL (ref 0.3–1)
MONOCYTES # BLD AUTO: 1.7 K/UL (ref 0.3–1)
MONOCYTES NFR BLD: 11.1 % (ref 4–15)
MONOCYTES NFR BLD: 11.6 % (ref 4–15)
NEUTROPHILS # BLD AUTO: 10.2 K/UL (ref 1.8–7.7)
NEUTROPHILS # BLD AUTO: 8.9 K/UL (ref 1.8–7.7)
NEUTROPHILS NFR BLD: 61.1 % (ref 38–73)
NEUTROPHILS NFR BLD: 68.4 % (ref 38–73)
NRBC BLD-RTO: 0 /100 WBC
NRBC BLD-RTO: 0 /100 WBC
PLATELET # BLD AUTO: 187 K/UL (ref 150–350)
PLATELET # BLD AUTO: 198 K/UL (ref 150–350)
PMV BLD AUTO: 10.8 FL (ref 9.2–12.9)
PMV BLD AUTO: 11.8 FL (ref 9.2–12.9)
POTASSIUM SERPL-SCNC: 4.8 MMOL/L (ref 3.5–5.1)
PROT SERPL-MCNC: 6.5 G/DL (ref 6–8.4)
PROTHROMBIN TIME: 11.5 SEC (ref 9–12.5)
RBC # BLD AUTO: 3.34 M/UL (ref 4–5.4)
RBC # BLD AUTO: 3.36 M/UL (ref 4–5.4)
SARS-COV-2 RDRP RESP QL NAA+PROBE: NEGATIVE
SODIUM SERPL-SCNC: 142 MMOL/L (ref 136–145)
WBC # BLD AUTO: 14.49 K/UL (ref 3.9–12.7)
WBC # BLD AUTO: 14.88 K/UL (ref 3.9–12.7)

## 2021-02-03 PROCEDURE — 86870 RBC ANTIBODY IDENTIFICATION: CPT

## 2021-02-03 PROCEDURE — 85025 COMPLETE CBC W/AUTO DIFF WBC: CPT

## 2021-02-03 PROCEDURE — 11000001 HC ACUTE MED/SURG PRIVATE ROOM

## 2021-02-03 PROCEDURE — 63600175 PHARM REV CODE 636 W HCPCS: Performed by: HOSPITALIST

## 2021-02-03 PROCEDURE — 99285 EMERGENCY DEPT VISIT HI MDM: CPT | Mod: 25

## 2021-02-03 PROCEDURE — 86900 BLOOD TYPING SEROLOGIC ABO: CPT | Mod: 91

## 2021-02-03 PROCEDURE — 36415 COLL VENOUS BLD VENIPUNCTURE: CPT

## 2021-02-03 PROCEDURE — C9113 INJ PANTOPRAZOLE SODIUM, VIA: HCPCS | Performed by: HOSPITALIST

## 2021-02-03 PROCEDURE — 93010 ELECTROCARDIOGRAM REPORT: CPT | Mod: ,,, | Performed by: INTERNAL MEDICINE

## 2021-02-03 PROCEDURE — 93005 ELECTROCARDIOGRAM TRACING: CPT

## 2021-02-03 PROCEDURE — 86880 COOMBS TEST DIRECT: CPT

## 2021-02-03 PROCEDURE — 93010 EKG 12-LEAD: ICD-10-PCS | Mod: ,,, | Performed by: INTERNAL MEDICINE

## 2021-02-03 PROCEDURE — 63600175 PHARM REV CODE 636 W HCPCS: Performed by: EMERGENCY MEDICINE

## 2021-02-03 PROCEDURE — 86880 COOMBS TEST DIRECT: CPT | Mod: 91

## 2021-02-03 PROCEDURE — 96374 THER/PROPH/DIAG INJ IV PUSH: CPT

## 2021-02-03 PROCEDURE — 80053 COMPREHEN METABOLIC PANEL: CPT

## 2021-02-03 PROCEDURE — 86900 BLOOD TYPING SEROLOGIC ABO: CPT

## 2021-02-03 PROCEDURE — 85610 PROTHROMBIN TIME: CPT

## 2021-02-03 PROCEDURE — C9113 INJ PANTOPRAZOLE SODIUM, VIA: HCPCS | Performed by: EMERGENCY MEDICINE

## 2021-02-03 PROCEDURE — U0002 COVID-19 LAB TEST NON-CDC: HCPCS

## 2021-02-03 RX ORDER — PANTOPRAZOLE SODIUM 40 MG/10ML
80 INJECTION, POWDER, LYOPHILIZED, FOR SOLUTION INTRAVENOUS DAILY
Status: DISCONTINUED | OUTPATIENT
Start: 2021-02-03 | End: 2021-02-03 | Stop reason: HOSPADM

## 2021-02-03 RX ORDER — PANTOPRAZOLE SODIUM 40 MG/10ML
40 INJECTION, POWDER, LYOPHILIZED, FOR SOLUTION INTRAVENOUS 2 TIMES DAILY
Status: DISCONTINUED | OUTPATIENT
Start: 2021-02-03 | End: 2021-02-04 | Stop reason: HOSPADM

## 2021-02-03 RX ORDER — HYDROCODONE BITARTRATE AND ACETAMINOPHEN 500; 5 MG/1; MG/1
TABLET ORAL
Status: DISCONTINUED | OUTPATIENT
Start: 2021-02-03 | End: 2021-02-03 | Stop reason: HOSPADM

## 2021-02-03 RX ADMIN — PANTOPRAZOLE SODIUM 80 MG: 40 INJECTION, POWDER, LYOPHILIZED, FOR SOLUTION INTRAVENOUS at 12:02

## 2021-02-03 RX ADMIN — PANTOPRAZOLE SODIUM 40 MG: 40 INJECTION, POWDER, LYOPHILIZED, FOR SOLUTION INTRAVENOUS at 09:02

## 2021-02-04 ENCOUNTER — ANESTHESIA EVENT (OUTPATIENT)
Dept: ENDOSCOPY | Facility: HOSPITAL | Age: 81
DRG: 393 | End: 2021-02-04
Payer: MEDICARE

## 2021-02-04 ENCOUNTER — ANESTHESIA (OUTPATIENT)
Dept: ENDOSCOPY | Facility: HOSPITAL | Age: 81
DRG: 393 | End: 2021-02-04
Payer: MEDICARE

## 2021-02-04 VITALS
WEIGHT: 218.06 LBS | RESPIRATION RATE: 15 BRPM | TEMPERATURE: 98 F | HEART RATE: 82 BPM | OXYGEN SATURATION: 100 % | SYSTOLIC BLOOD PRESSURE: 102 MMHG | HEIGHT: 69 IN | DIASTOLIC BLOOD PRESSURE: 82 MMHG | BODY MASS INDEX: 32.3 KG/M2

## 2021-02-04 LAB
BACTERIA #/AREA URNS HPF: ABNORMAL /HPF
BASOPHILS # BLD AUTO: 0.07 K/UL (ref 0–0.2)
BASOPHILS # BLD AUTO: 0.07 K/UL (ref 0–0.2)
BASOPHILS # BLD AUTO: 0.08 K/UL (ref 0–0.2)
BASOPHILS # BLD AUTO: 0.1 K/UL (ref 0–0.2)
BASOPHILS NFR BLD: 0.5 % (ref 0–1.9)
BASOPHILS NFR BLD: 0.5 % (ref 0–1.9)
BASOPHILS NFR BLD: 0.6 % (ref 0–1.9)
BASOPHILS NFR BLD: 0.7 % (ref 0–1.9)
BILIRUB UR QL STRIP: NEGATIVE
BLOOD GROUP ANTIBODIES SERPL: NORMAL
CLARITY UR: CLEAR
COLOR UR: YELLOW
DAT IGG-SP REAG RBC-IMP: NORMAL
DIFFERENTIAL METHOD: ABNORMAL
EOSINOPHIL # BLD AUTO: 0.2 K/UL (ref 0–0.5)
EOSINOPHIL # BLD AUTO: 0.2 K/UL (ref 0–0.5)
EOSINOPHIL # BLD AUTO: 0.3 K/UL (ref 0–0.5)
EOSINOPHIL # BLD AUTO: 0.3 K/UL (ref 0–0.5)
EOSINOPHIL NFR BLD: 1.1 % (ref 0–8)
EOSINOPHIL NFR BLD: 1.6 % (ref 0–8)
EOSINOPHIL NFR BLD: 1.9 % (ref 0–8)
EOSINOPHIL NFR BLD: 2.1 % (ref 0–8)
ERYTHROCYTE [DISTWIDTH] IN BLOOD BY AUTOMATED COUNT: 15.2 % (ref 11.5–14.5)
ERYTHROCYTE [DISTWIDTH] IN BLOOD BY AUTOMATED COUNT: 15.3 % (ref 11.5–14.5)
ERYTHROCYTE [DISTWIDTH] IN BLOOD BY AUTOMATED COUNT: 15.3 % (ref 11.5–14.5)
ERYTHROCYTE [DISTWIDTH] IN BLOOD BY AUTOMATED COUNT: 15.7 % (ref 11.5–14.5)
GLUCOSE UR QL STRIP: NEGATIVE
HCT VFR BLD AUTO: 23.7 % (ref 37–48.5)
HCT VFR BLD AUTO: 23.8 % (ref 37–48.5)
HCT VFR BLD AUTO: 25.4 % (ref 37–48.5)
HCT VFR BLD AUTO: 25.8 % (ref 37–48.5)
HGB BLD-MCNC: 7.9 G/DL (ref 12–16)
HGB BLD-MCNC: 8 G/DL (ref 12–16)
HGB BLD-MCNC: 8.5 G/DL (ref 12–16)
HGB BLD-MCNC: 8.7 G/DL (ref 12–16)
HGB UR QL STRIP: ABNORMAL
IMM GRANULOCYTES # BLD AUTO: 0.09 K/UL (ref 0–0.04)
IMM GRANULOCYTES # BLD AUTO: 0.09 K/UL (ref 0–0.04)
IMM GRANULOCYTES # BLD AUTO: 0.1 K/UL (ref 0–0.04)
IMM GRANULOCYTES # BLD AUTO: 0.12 K/UL (ref 0–0.04)
IMM GRANULOCYTES NFR BLD AUTO: 0.6 % (ref 0–0.5)
IMM GRANULOCYTES NFR BLD AUTO: 0.7 % (ref 0–0.5)
IMM GRANULOCYTES NFR BLD AUTO: 0.7 % (ref 0–0.5)
IMM GRANULOCYTES NFR BLD AUTO: 0.9 % (ref 0–0.5)
KETONES UR QL STRIP: ABNORMAL
LEUKOCYTE ESTERASE UR QL STRIP: ABNORMAL
LYMPHOCYTES # BLD AUTO: 3.5 K/UL (ref 1–4.8)
LYMPHOCYTES # BLD AUTO: 3.5 K/UL (ref 1–4.8)
LYMPHOCYTES # BLD AUTO: 3.8 K/UL (ref 1–4.8)
LYMPHOCYTES # BLD AUTO: 3.9 K/UL (ref 1–4.8)
LYMPHOCYTES NFR BLD: 25 % (ref 18–48)
LYMPHOCYTES NFR BLD: 25.2 % (ref 18–48)
LYMPHOCYTES NFR BLD: 26.3 % (ref 18–48)
LYMPHOCYTES NFR BLD: 26.6 % (ref 18–48)
MCH RBC QN AUTO: 23.8 PG (ref 27–31)
MCH RBC QN AUTO: 24 PG (ref 27–31)
MCH RBC QN AUTO: 24.2 PG (ref 27–31)
MCH RBC QN AUTO: 24.2 PG (ref 27–31)
MCHC RBC AUTO-ENTMCNC: 33.3 G/DL (ref 32–36)
MCHC RBC AUTO-ENTMCNC: 33.5 G/DL (ref 32–36)
MCHC RBC AUTO-ENTMCNC: 33.6 G/DL (ref 32–36)
MCHC RBC AUTO-ENTMCNC: 33.7 G/DL (ref 32–36)
MCV RBC AUTO: 71 FL (ref 82–98)
MCV RBC AUTO: 72 FL (ref 82–98)
MICROSCOPIC COMMENT: ABNORMAL
MONOCYTES # BLD AUTO: 1.5 K/UL (ref 0.3–1)
MONOCYTES # BLD AUTO: 1.6 K/UL (ref 0.3–1)
MONOCYTES # BLD AUTO: 1.6 K/UL (ref 0.3–1)
MONOCYTES # BLD AUTO: 1.7 K/UL (ref 0.3–1)
MONOCYTES NFR BLD: 10.9 % (ref 4–15)
MONOCYTES NFR BLD: 11.4 % (ref 4–15)
MONOCYTES NFR BLD: 11.4 % (ref 4–15)
MONOCYTES NFR BLD: 11.9 % (ref 4–15)
NEUTROPHILS # BLD AUTO: 8.4 K/UL (ref 1.8–7.7)
NEUTROPHILS # BLD AUTO: 8.4 K/UL (ref 1.8–7.7)
NEUTROPHILS # BLD AUTO: 8.5 K/UL (ref 1.8–7.7)
NEUTROPHILS # BLD AUTO: 8.6 K/UL (ref 1.8–7.7)
NEUTROPHILS NFR BLD: 58.3 % (ref 38–73)
NEUTROPHILS NFR BLD: 58.9 % (ref 38–73)
NEUTROPHILS NFR BLD: 60.8 % (ref 38–73)
NEUTROPHILS NFR BLD: 61.4 % (ref 38–73)
NITRITE UR QL STRIP: NEGATIVE
NRBC BLD-RTO: 0 /100 WBC
PH UR STRIP: 6 [PH] (ref 5–8)
PLATELET # BLD AUTO: 187 K/UL (ref 150–350)
PLATELET # BLD AUTO: 199 K/UL (ref 150–350)
PLATELET # BLD AUTO: 199 K/UL (ref 150–350)
PLATELET # BLD AUTO: 206 K/UL (ref 150–350)
PMV BLD AUTO: 11.4 FL (ref 9.2–12.9)
PMV BLD AUTO: 11.5 FL (ref 9.2–12.9)
PMV BLD AUTO: 11.6 FL (ref 9.2–12.9)
PMV BLD AUTO: 11.7 FL (ref 9.2–12.9)
PROT UR QL STRIP: NEGATIVE
RBC # BLD AUTO: 3.3 M/UL (ref 4–5.4)
RBC # BLD AUTO: 3.32 M/UL (ref 4–5.4)
RBC # BLD AUTO: 3.54 M/UL (ref 4–5.4)
RBC # BLD AUTO: 3.59 M/UL (ref 4–5.4)
RBC #/AREA URNS HPF: 10 /HPF (ref 0–4)
SP GR UR STRIP: 1.02 (ref 1–1.03)
SQUAMOUS #/AREA URNS HPF: 0 /HPF
URN SPEC COLLECT METH UR: ABNORMAL
UROBILINOGEN UR STRIP-ACNC: NEGATIVE EU/DL
WBC # BLD AUTO: 13.79 K/UL (ref 3.9–12.7)
WBC # BLD AUTO: 13.95 K/UL (ref 3.9–12.7)
WBC # BLD AUTO: 14.41 K/UL (ref 3.9–12.7)
WBC # BLD AUTO: 14.48 K/UL (ref 3.9–12.7)
WBC #/AREA URNS HPF: 2 /HPF (ref 0–5)

## 2021-02-04 PROCEDURE — 81000 URINALYSIS NONAUTO W/SCOPE: CPT

## 2021-02-04 PROCEDURE — 37000009 HC ANESTHESIA EA ADD 15 MINS: Performed by: INTERNAL MEDICINE

## 2021-02-04 PROCEDURE — C9113 INJ PANTOPRAZOLE SODIUM, VIA: HCPCS | Performed by: HOSPITALIST

## 2021-02-04 PROCEDURE — 45330 PR SIGMOIDOSCOPY,DIAG2STIC: ICD-10-PCS | Mod: ,,, | Performed by: INTERNAL MEDICINE

## 2021-02-04 PROCEDURE — 63600175 PHARM REV CODE 636 W HCPCS: Performed by: HOSPITALIST

## 2021-02-04 PROCEDURE — 25000003 PHARM REV CODE 250: Performed by: NURSE PRACTITIONER

## 2021-02-04 PROCEDURE — 45330 DIAGNOSTIC SIGMOIDOSCOPY: CPT | Mod: 52 | Performed by: INTERNAL MEDICINE

## 2021-02-04 PROCEDURE — 25000003 PHARM REV CODE 250: Performed by: NURSE ANESTHETIST, CERTIFIED REGISTERED

## 2021-02-04 PROCEDURE — 63600175 PHARM REV CODE 636 W HCPCS: Performed by: NURSE ANESTHETIST, CERTIFIED REGISTERED

## 2021-02-04 PROCEDURE — 85025 COMPLETE CBC W/AUTO DIFF WBC: CPT | Mod: 91

## 2021-02-04 PROCEDURE — 45330 DIAGNOSTIC SIGMOIDOSCOPY: CPT | Mod: ,,, | Performed by: INTERNAL MEDICINE

## 2021-02-04 PROCEDURE — 37000008 HC ANESTHESIA 1ST 15 MINUTES: Performed by: INTERNAL MEDICINE

## 2021-02-04 PROCEDURE — 36415 COLL VENOUS BLD VENIPUNCTURE: CPT

## 2021-02-04 RX ORDER — HYDROCHLOROTHIAZIDE 25 MG/1
25 TABLET ORAL DAILY
Start: 2021-02-04 | End: 2021-07-19

## 2021-02-04 RX ORDER — LIDOCAINE HCL/PF 100 MG/5ML
SYRINGE (ML) INTRAVENOUS
Status: DISCONTINUED | OUTPATIENT
Start: 2021-02-04 | End: 2021-02-04

## 2021-02-04 RX ORDER — CLONIDINE HYDROCHLORIDE 0.1 MG/1
0.1 TABLET ORAL 3 TIMES DAILY
Status: DISCONTINUED | OUTPATIENT
Start: 2021-02-04 | End: 2021-02-04

## 2021-02-04 RX ORDER — METOPROLOL SUCCINATE 50 MG/1
200 TABLET, EXTENDED RELEASE ORAL 2 TIMES DAILY
Status: DISCONTINUED | OUTPATIENT
Start: 2021-02-04 | End: 2021-02-04

## 2021-02-04 RX ORDER — CLONIDINE HYDROCHLORIDE 0.1 MG/1
0.1 TABLET ORAL 2 TIMES DAILY
Qty: 270 TABLET | Refills: 0
Start: 2021-02-04 | End: 2021-03-08

## 2021-02-04 RX ORDER — LOSARTAN POTASSIUM 50 MG/1
100 TABLET ORAL DAILY
Status: DISCONTINUED | OUTPATIENT
Start: 2021-02-04 | End: 2021-02-04 | Stop reason: HOSPADM

## 2021-02-04 RX ORDER — LOSARTAN POTASSIUM 100 MG/1
100 TABLET ORAL DAILY
Start: 2021-02-04 | End: 2021-05-27 | Stop reason: SDUPTHER

## 2021-02-04 RX ORDER — QUETIAPINE FUMARATE 25 MG/1
50 TABLET, FILM COATED ORAL NIGHTLY
Status: DISCONTINUED | OUTPATIENT
Start: 2021-02-04 | End: 2021-02-04 | Stop reason: HOSPADM

## 2021-02-04 RX ORDER — ATORVASTATIN CALCIUM 10 MG/1
10 TABLET, FILM COATED ORAL NIGHTLY
Status: DISCONTINUED | OUTPATIENT
Start: 2021-02-04 | End: 2021-02-04 | Stop reason: HOSPADM

## 2021-02-04 RX ORDER — PROPOFOL 10 MG/ML
VIAL (ML) INTRAVENOUS
Status: DISCONTINUED | OUTPATIENT
Start: 2021-02-04 | End: 2021-02-04

## 2021-02-04 RX ORDER — GABAPENTIN 300 MG/1
300 CAPSULE ORAL 2 TIMES DAILY
Status: DISCONTINUED | OUTPATIENT
Start: 2021-02-04 | End: 2021-02-04 | Stop reason: HOSPADM

## 2021-02-04 RX ORDER — ALLOPURINOL 100 MG/1
100 TABLET ORAL DAILY
Status: DISCONTINUED | OUTPATIENT
Start: 2021-02-04 | End: 2021-02-04 | Stop reason: HOSPADM

## 2021-02-04 RX ADMIN — LIDOCAINE HYDROCHLORIDE 40 MG: 20 INJECTION, SOLUTION INTRAVENOUS at 03:02

## 2021-02-04 RX ADMIN — ALLOPURINOL 100 MG: 100 TABLET ORAL at 08:02

## 2021-02-04 RX ADMIN — ATORVASTATIN CALCIUM 10 MG: 10 TABLET, FILM COATED ORAL at 04:02

## 2021-02-04 RX ADMIN — PROPOFOL 40 MG: 10 INJECTION, EMULSION INTRAVENOUS at 03:02

## 2021-02-04 RX ADMIN — PANTOPRAZOLE SODIUM 40 MG: 40 INJECTION, POWDER, LYOPHILIZED, FOR SOLUTION INTRAVENOUS at 08:02

## 2021-02-04 RX ADMIN — GABAPENTIN 300 MG: 300 CAPSULE ORAL at 08:02

## 2021-02-04 RX ADMIN — PROPOFOL 20 MG: 10 INJECTION, EMULSION INTRAVENOUS at 04:02

## 2021-02-04 RX ADMIN — LOSARTAN POTASSIUM 100 MG: 50 TABLET, FILM COATED ORAL at 08:02

## 2021-02-04 RX ADMIN — PROPOFOL 20 MG: 10 INJECTION, EMULSION INTRAVENOUS at 03:02

## 2021-02-05 ENCOUNTER — HOSPITAL ENCOUNTER (EMERGENCY)
Facility: HOSPITAL | Age: 81
Discharge: ANOTHER HOSPITAL | End: 2021-02-05
Attending: EMERGENCY MEDICINE
Payer: MEDICARE

## 2021-02-05 ENCOUNTER — PATIENT OUTREACH (OUTPATIENT)
Dept: ADMINISTRATIVE | Facility: CLINIC | Age: 81
End: 2021-02-05

## 2021-02-05 ENCOUNTER — HOSPITAL ENCOUNTER (OUTPATIENT)
Facility: HOSPITAL | Age: 81
LOS: 1 days | Discharge: HOME | End: 2021-02-07
Attending: HOSPITALIST | Admitting: HOSPITALIST
Payer: MEDICARE

## 2021-02-05 VITALS
BODY MASS INDEX: 29.93 KG/M2 | WEIGHT: 204.13 LBS | HEART RATE: 82 BPM | RESPIRATION RATE: 18 BRPM | TEMPERATURE: 99 F | DIASTOLIC BLOOD PRESSURE: 69 MMHG | OXYGEN SATURATION: 99 % | SYSTOLIC BLOOD PRESSURE: 148 MMHG

## 2021-02-05 DIAGNOSIS — D62 ACUTE BLOOD LOSS ANEMIA: ICD-10-CM

## 2021-02-05 DIAGNOSIS — K92.2 GI BLEED: ICD-10-CM

## 2021-02-05 DIAGNOSIS — K92.2 GASTROINTESTINAL HEMORRHAGE, UNSPECIFIED GASTROINTESTINAL HEMORRHAGE TYPE: Primary | ICD-10-CM

## 2021-02-05 DIAGNOSIS — K92.1 HEMATOCHEZIA: ICD-10-CM

## 2021-02-05 DIAGNOSIS — K62.5 RECTAL BLEED: ICD-10-CM

## 2021-02-05 DIAGNOSIS — K63.5 POLYP OF COLON, UNSPECIFIED PART OF COLON, UNSPECIFIED TYPE: ICD-10-CM

## 2021-02-05 DIAGNOSIS — K64.9 HEMORRHOIDS, UNSPECIFIED HEMORRHOID TYPE: Primary | ICD-10-CM

## 2021-02-05 LAB
ABO + RH BLD: NORMAL
ALBUMIN SERPL BCP-MCNC: 2.5 G/DL (ref 3.5–5.2)
ALP SERPL-CCNC: 104 U/L (ref 55–135)
ALT SERPL W/O P-5'-P-CCNC: 10 U/L (ref 10–44)
ANION GAP SERPL CALC-SCNC: 8 MMOL/L (ref 8–16)
AST SERPL-CCNC: 11 U/L (ref 10–40)
BASOPHILS # BLD AUTO: 0.07 K/UL (ref 0–0.2)
BASOPHILS NFR BLD: 0.4 % (ref 0–1.9)
BILIRUB SERPL-MCNC: 0.4 MG/DL (ref 0.1–1)
BLD GP AB SCN CELLS X3 SERPL QL: NORMAL
BLD PROD TYP BPU: NORMAL
BLOOD UNIT EXPIRATION DATE: NORMAL
BLOOD UNIT TYPE CODE: 600
BLOOD UNIT TYPE: NORMAL
BUN SERPL-MCNC: 32 MG/DL (ref 8–23)
CALCIUM SERPL-MCNC: 8.8 MG/DL (ref 8.7–10.5)
CHLORIDE SERPL-SCNC: 109 MMOL/L (ref 95–110)
CO2 SERPL-SCNC: 27 MMOL/L (ref 23–29)
CODING SYSTEM: NORMAL
CREAT SERPL-MCNC: 1.3 MG/DL (ref 0.5–1.4)
DIFFERENTIAL METHOD: ABNORMAL
DISPENSE STATUS: NORMAL
EOSINOPHIL # BLD AUTO: 0.3 K/UL (ref 0–0.5)
EOSINOPHIL NFR BLD: 1.6 % (ref 0–8)
ERYTHROCYTE [DISTWIDTH] IN BLOOD BY AUTOMATED COUNT: 15.7 % (ref 11.5–14.5)
EST. GFR  (AFRICAN AMERICAN): 45 ML/MIN/1.73 M^2
EST. GFR  (NON AFRICAN AMERICAN): 39 ML/MIN/1.73 M^2
GLUCOSE SERPL-MCNC: 134 MG/DL (ref 70–110)
HCT VFR BLD AUTO: 22 % (ref 37–48.5)
HGB BLD-MCNC: 7.2 G/DL (ref 12–16)
IMM GRANULOCYTES # BLD AUTO: 0.31 K/UL (ref 0–0.04)
IMM GRANULOCYTES NFR BLD AUTO: 1.9 % (ref 0–0.5)
INR PPP: 1.1 (ref 0.8–1.2)
LYMPHOCYTES # BLD AUTO: 3.6 K/UL (ref 1–4.8)
LYMPHOCYTES NFR BLD: 22.8 % (ref 18–48)
MCH RBC QN AUTO: 23.9 PG (ref 27–31)
MCHC RBC AUTO-ENTMCNC: 32.7 G/DL (ref 32–36)
MCV RBC AUTO: 73 FL (ref 82–98)
MONOCYTES # BLD AUTO: 1.6 K/UL (ref 0.3–1)
MONOCYTES NFR BLD: 9.8 % (ref 4–15)
NEUTROPHILS # BLD AUTO: 10.1 K/UL (ref 1.8–7.7)
NEUTROPHILS NFR BLD: 63.5 % (ref 38–73)
NRBC BLD-RTO: 0 /100 WBC
PLATELET # BLD AUTO: 184 K/UL (ref 150–350)
PMV BLD AUTO: 10.7 FL (ref 9.2–12.9)
POTASSIUM SERPL-SCNC: 4.2 MMOL/L (ref 3.5–5.1)
PROT SERPL-MCNC: 6 G/DL (ref 6–8.4)
PROTHROMBIN TIME: 11.9 SEC (ref 9–12.5)
RBC # BLD AUTO: 3.01 M/UL (ref 4–5.4)
SARS-COV-2 RDRP RESP QL NAA+PROBE: NEGATIVE
SODIUM SERPL-SCNC: 144 MMOL/L (ref 136–145)
TRANS ERYTHROCYTES VOL PATIENT: NORMAL ML
WBC # BLD AUTO: 15.94 K/UL (ref 3.9–12.7)

## 2021-02-05 PROCEDURE — C9113 INJ PANTOPRAZOLE SODIUM, VIA: HCPCS | Performed by: HOSPITALIST

## 2021-02-05 PROCEDURE — 93010 ELECTROCARDIOGRAM REPORT: CPT | Mod: ,,, | Performed by: INTERNAL MEDICINE

## 2021-02-05 PROCEDURE — 85025 COMPLETE CBC W/AUTO DIFF WBC: CPT

## 2021-02-05 PROCEDURE — 86922 COMPATIBILITY TEST ANTIGLOB: CPT

## 2021-02-05 PROCEDURE — 63600175 PHARM REV CODE 636 W HCPCS: Performed by: HOSPITALIST

## 2021-02-05 PROCEDURE — 86900 BLOOD TYPING SEROLOGIC ABO: CPT

## 2021-02-05 PROCEDURE — 85610 PROTHROMBIN TIME: CPT

## 2021-02-05 PROCEDURE — 93010 EKG 12-LEAD: ICD-10-PCS | Mod: ,,, | Performed by: INTERNAL MEDICINE

## 2021-02-05 PROCEDURE — 80053 COMPREHEN METABOLIC PANEL: CPT

## 2021-02-05 PROCEDURE — C9113 INJ PANTOPRAZOLE SODIUM, VIA: HCPCS | Performed by: EMERGENCY MEDICINE

## 2021-02-05 PROCEDURE — 63600175 PHARM REV CODE 636 W HCPCS: Performed by: EMERGENCY MEDICINE

## 2021-02-05 PROCEDURE — 36430 TRANSFUSION BLD/BLD COMPNT: CPT

## 2021-02-05 PROCEDURE — 99285 EMERGENCY DEPT VISIT HI MDM: CPT | Mod: 25,CS

## 2021-02-05 PROCEDURE — 25000003 PHARM REV CODE 250: Performed by: HOSPITALIST

## 2021-02-05 PROCEDURE — 96374 THER/PROPH/DIAG INJ IV PUSH: CPT

## 2021-02-05 PROCEDURE — 93005 ELECTROCARDIOGRAM TRACING: CPT

## 2021-02-05 PROCEDURE — U0002 COVID-19 LAB TEST NON-CDC: HCPCS

## 2021-02-05 PROCEDURE — P9021 RED BLOOD CELLS UNIT: HCPCS

## 2021-02-05 PROCEDURE — G0378 HOSPITAL OBSERVATION PER HR: HCPCS

## 2021-02-05 PROCEDURE — 94760 N-INVAS EAR/PLS OXIMETRY 1: CPT

## 2021-02-05 RX ORDER — HYDRALAZINE HYDROCHLORIDE 20 MG/ML
10 INJECTION INTRAMUSCULAR; INTRAVENOUS ONCE
Status: COMPLETED | OUTPATIENT
Start: 2021-02-06 | End: 2021-02-06

## 2021-02-05 RX ORDER — POLYETHYLENE GLYCOL 3350, SODIUM SULFATE ANHYDROUS, SODIUM BICARBONATE, SODIUM CHLORIDE, POTASSIUM CHLORIDE 236; 22.74; 6.74; 5.86; 2.97 G/4L; G/4L; G/4L; G/4L; G/4L
4000 POWDER, FOR SOLUTION ORAL ONCE
Status: COMPLETED | OUTPATIENT
Start: 2021-02-05 | End: 2021-02-05

## 2021-02-05 RX ORDER — HYDROCODONE BITARTRATE AND ACETAMINOPHEN 500; 5 MG/1; MG/1
TABLET ORAL
Status: DISCONTINUED | OUTPATIENT
Start: 2021-02-05 | End: 2021-02-05 | Stop reason: HOSPADM

## 2021-02-05 RX ORDER — PANTOPRAZOLE SODIUM 40 MG/10ML
40 INJECTION, POWDER, LYOPHILIZED, FOR SOLUTION INTRAVENOUS 2 TIMES DAILY
Status: DISCONTINUED | OUTPATIENT
Start: 2021-02-05 | End: 2021-02-07

## 2021-02-05 RX ORDER — PANTOPRAZOLE SODIUM 40 MG/10ML
80 INJECTION, POWDER, LYOPHILIZED, FOR SOLUTION INTRAVENOUS
Status: COMPLETED | OUTPATIENT
Start: 2021-02-05 | End: 2021-02-05

## 2021-02-05 RX ADMIN — POLYETHYLENE GLYCOL 3350, SODIUM SULFATE ANHYDROUS, SODIUM BICARBONATE, SODIUM CHLORIDE, POTASSIUM CHLORIDE 4000 ML: 236; 22.74; 6.74; 5.86; 2.97 POWDER, FOR SOLUTION ORAL at 11:02

## 2021-02-05 RX ADMIN — PANTOPRAZOLE SODIUM 40 MG: 40 INJECTION, POWDER, FOR SOLUTION INTRAVENOUS at 11:02

## 2021-02-05 RX ADMIN — PANTOPRAZOLE SODIUM 80 MG: 40 INJECTION, POWDER, LYOPHILIZED, FOR SOLUTION INTRAVENOUS at 01:02

## 2021-02-06 PROBLEM — L89.152 PRESSURE INJURY OF SACRAL REGION, STAGE 2: Status: ACTIVE | Noted: 2021-02-01

## 2021-02-06 LAB
BASOPHILS # BLD AUTO: 0.08 K/UL (ref 0–0.2)
BASOPHILS # BLD AUTO: 0.1 K/UL (ref 0–0.2)
BASOPHILS # BLD AUTO: 0.11 K/UL (ref 0–0.2)
BASOPHILS NFR BLD: 0.5 % (ref 0–1.9)
BASOPHILS NFR BLD: 0.6 % (ref 0–1.9)
BASOPHILS NFR BLD: 0.7 % (ref 0–1.9)
DIFFERENTIAL METHOD: ABNORMAL
EOSINOPHIL # BLD AUTO: 0.3 K/UL (ref 0–0.5)
EOSINOPHIL NFR BLD: 1.7 % (ref 0–8)
EOSINOPHIL NFR BLD: 1.8 % (ref 0–8)
EOSINOPHIL NFR BLD: 2.1 % (ref 0–8)
ERYTHROCYTE [DISTWIDTH] IN BLOOD BY AUTOMATED COUNT: 17.2 % (ref 11.5–14.5)
ERYTHROCYTE [DISTWIDTH] IN BLOOD BY AUTOMATED COUNT: 17.2 % (ref 11.5–14.5)
ERYTHROCYTE [DISTWIDTH] IN BLOOD BY AUTOMATED COUNT: 17.5 % (ref 11.5–14.5)
HCT VFR BLD AUTO: 23.3 % (ref 37–48.5)
HCT VFR BLD AUTO: 23.7 % (ref 37–48.5)
HCT VFR BLD AUTO: 25.1 % (ref 37–48.5)
HGB BLD-MCNC: 7.7 G/DL (ref 12–16)
HGB BLD-MCNC: 8 G/DL (ref 12–16)
HGB BLD-MCNC: 8.5 G/DL (ref 12–16)
IMM GRANULOCYTES # BLD AUTO: 0.3 K/UL (ref 0–0.04)
IMM GRANULOCYTES # BLD AUTO: 0.34 K/UL (ref 0–0.04)
IMM GRANULOCYTES # BLD AUTO: 0.39 K/UL (ref 0–0.04)
IMM GRANULOCYTES NFR BLD AUTO: 1.8 % (ref 0–0.5)
IMM GRANULOCYTES NFR BLD AUTO: 2.1 % (ref 0–0.5)
IMM GRANULOCYTES NFR BLD AUTO: 2.3 % (ref 0–0.5)
LYMPHOCYTES # BLD AUTO: 3.6 K/UL (ref 1–4.8)
LYMPHOCYTES # BLD AUTO: 3.8 K/UL (ref 1–4.8)
LYMPHOCYTES # BLD AUTO: 3.9 K/UL (ref 1–4.8)
LYMPHOCYTES NFR BLD: 22.1 % (ref 18–48)
LYMPHOCYTES NFR BLD: 23.2 % (ref 18–48)
LYMPHOCYTES NFR BLD: 24.1 % (ref 18–48)
MCH RBC QN AUTO: 24.9 PG (ref 27–31)
MCH RBC QN AUTO: 25.1 PG (ref 27–31)
MCH RBC QN AUTO: 25.4 PG (ref 27–31)
MCHC RBC AUTO-ENTMCNC: 33 G/DL (ref 32–36)
MCHC RBC AUTO-ENTMCNC: 33.8 G/DL (ref 32–36)
MCHC RBC AUTO-ENTMCNC: 33.9 G/DL (ref 32–36)
MCV RBC AUTO: 74 FL (ref 82–98)
MCV RBC AUTO: 75 FL (ref 82–98)
MCV RBC AUTO: 75 FL (ref 82–98)
MONOCYTES # BLD AUTO: 1.6 K/UL (ref 0.3–1)
MONOCYTES # BLD AUTO: 1.7 K/UL (ref 0.3–1)
MONOCYTES # BLD AUTO: 1.8 K/UL (ref 0.3–1)
MONOCYTES NFR BLD: 10.8 % (ref 4–15)
MONOCYTES NFR BLD: 10.9 % (ref 4–15)
MONOCYTES NFR BLD: 9.6 % (ref 4–15)
NEUTROPHILS # BLD AUTO: 10.3 K/UL (ref 1.8–7.7)
NEUTROPHILS # BLD AUTO: 10.5 K/UL (ref 1.8–7.7)
NEUTROPHILS # BLD AUTO: 9.5 K/UL (ref 1.8–7.7)
NEUTROPHILS NFR BLD: 60.3 % (ref 38–73)
NEUTROPHILS NFR BLD: 61.2 % (ref 38–73)
NEUTROPHILS NFR BLD: 64.2 % (ref 38–73)
NRBC BLD-RTO: 0 /100 WBC
PLATELET # BLD AUTO: 171 K/UL (ref 150–350)
PLATELET # BLD AUTO: 179 K/UL (ref 150–350)
PLATELET # BLD AUTO: 193 K/UL (ref 150–350)
PMV BLD AUTO: 10.8 FL (ref 9.2–12.9)
PMV BLD AUTO: 11.8 FL (ref 9.2–12.9)
PMV BLD AUTO: 11.9 FL (ref 9.2–12.9)
RBC # BLD AUTO: 3.09 M/UL (ref 4–5.4)
RBC # BLD AUTO: 3.15 M/UL (ref 4–5.4)
RBC # BLD AUTO: 3.39 M/UL (ref 4–5.4)
WBC # BLD AUTO: 15.83 K/UL (ref 3.9–12.7)
WBC # BLD AUTO: 16.3 K/UL (ref 3.9–12.7)
WBC # BLD AUTO: 16.81 K/UL (ref 3.9–12.7)

## 2021-02-06 PROCEDURE — 36415 COLL VENOUS BLD VENIPUNCTURE: CPT

## 2021-02-06 PROCEDURE — C9113 INJ PANTOPRAZOLE SODIUM, VIA: HCPCS | Performed by: HOSPITALIST

## 2021-02-06 PROCEDURE — G0378 HOSPITAL OBSERVATION PER HR: HCPCS

## 2021-02-06 PROCEDURE — 63600175 PHARM REV CODE 636 W HCPCS: Performed by: HOSPITALIST

## 2021-02-06 PROCEDURE — 85025 COMPLETE CBC W/AUTO DIFF WBC: CPT | Mod: 91

## 2021-02-06 PROCEDURE — 63600175 PHARM REV CODE 636 W HCPCS: Performed by: NURSE PRACTITIONER

## 2021-02-06 PROCEDURE — 25000003 PHARM REV CODE 250: Performed by: NURSE PRACTITIONER

## 2021-02-06 PROCEDURE — 25000003 PHARM REV CODE 250: Performed by: STUDENT IN AN ORGANIZED HEALTH CARE EDUCATION/TRAINING PROGRAM

## 2021-02-06 PROCEDURE — 96376 TX/PRO/DX INJ SAME DRUG ADON: CPT

## 2021-02-06 PROCEDURE — 96375 TX/PRO/DX INJ NEW DRUG ADDON: CPT

## 2021-02-06 RX ORDER — METOPROLOL SUCCINATE 50 MG/1
200 TABLET, EXTENDED RELEASE ORAL 2 TIMES DAILY
Status: DISCONTINUED | OUTPATIENT
Start: 2021-02-06 | End: 2021-02-07 | Stop reason: HOSPADM

## 2021-02-06 RX ORDER — LOSARTAN POTASSIUM 50 MG/1
100 TABLET ORAL DAILY
Status: DISCONTINUED | OUTPATIENT
Start: 2021-02-06 | End: 2021-02-07 | Stop reason: HOSPADM

## 2021-02-06 RX ORDER — CLONIDINE HYDROCHLORIDE 0.1 MG/1
0.1 TABLET ORAL 2 TIMES DAILY
Status: DISCONTINUED | OUTPATIENT
Start: 2021-02-06 | End: 2021-02-07 | Stop reason: HOSPADM

## 2021-02-06 RX ORDER — GABAPENTIN 300 MG/1
300 CAPSULE ORAL 2 TIMES DAILY
Status: DISCONTINUED | OUTPATIENT
Start: 2021-02-06 | End: 2021-02-07 | Stop reason: HOSPADM

## 2021-02-06 RX ORDER — OXYBUTYNIN CHLORIDE 5 MG/1
5 TABLET, EXTENDED RELEASE ORAL DAILY
Status: DISCONTINUED | OUTPATIENT
Start: 2021-02-06 | End: 2021-02-07 | Stop reason: HOSPADM

## 2021-02-06 RX ORDER — ALLOPURINOL 100 MG/1
100 TABLET ORAL DAILY
Status: DISCONTINUED | OUTPATIENT
Start: 2021-02-06 | End: 2021-02-07 | Stop reason: HOSPADM

## 2021-02-06 RX ORDER — HYDROCHLOROTHIAZIDE 25 MG/1
25 TABLET ORAL DAILY
Status: DISCONTINUED | OUTPATIENT
Start: 2021-02-06 | End: 2021-02-07 | Stop reason: HOSPADM

## 2021-02-06 RX ORDER — QUETIAPINE FUMARATE 25 MG/1
50 TABLET, FILM COATED ORAL NIGHTLY
Status: DISCONTINUED | OUTPATIENT
Start: 2021-02-07 | End: 2021-02-07 | Stop reason: HOSPADM

## 2021-02-06 RX ORDER — CLONIDINE HYDROCHLORIDE 0.1 MG/1
0.1 TABLET ORAL 2 TIMES DAILY
Status: DISCONTINUED | OUTPATIENT
Start: 2021-02-06 | End: 2021-02-06

## 2021-02-06 RX ORDER — POLYETHYLENE GLYCOL 3350, SODIUM SULFATE ANHYDROUS, SODIUM BICARBONATE, SODIUM CHLORIDE, POTASSIUM CHLORIDE 236; 22.74; 6.74; 5.86; 2.97 G/4L; G/4L; G/4L; G/4L; G/4L
4000 POWDER, FOR SOLUTION ORAL ONCE
Status: COMPLETED | OUTPATIENT
Start: 2021-02-06 | End: 2021-02-06

## 2021-02-06 RX ORDER — ATORVASTATIN CALCIUM 10 MG/1
10 TABLET, FILM COATED ORAL NIGHTLY
Status: DISCONTINUED | OUTPATIENT
Start: 2021-02-06 | End: 2021-02-07 | Stop reason: HOSPADM

## 2021-02-06 RX ADMIN — HYDRALAZINE HYDROCHLORIDE 10 MG: 20 INJECTION INTRAMUSCULAR; INTRAVENOUS at 12:02

## 2021-02-06 RX ADMIN — METOPROLOL SUCCINATE 200 MG: 50 TABLET, EXTENDED RELEASE ORAL at 09:02

## 2021-02-06 RX ADMIN — LOSARTAN POTASSIUM 100 MG: 50 TABLET, FILM COATED ORAL at 09:02

## 2021-02-06 RX ADMIN — GABAPENTIN 300 MG: 300 CAPSULE ORAL at 09:02

## 2021-02-06 RX ADMIN — HYDROCHLOROTHIAZIDE 25 MG: 25 TABLET ORAL at 09:02

## 2021-02-06 RX ADMIN — POLYETHYLENE GLYCOL 3350, SODIUM SULFATE ANHYDROUS, SODIUM BICARBONATE, SODIUM CHLORIDE, POTASSIUM CHLORIDE 4000 ML: 236; 22.74; 6.74; 5.86; 2.97 POWDER, FOR SOLUTION ORAL at 09:02

## 2021-02-06 RX ADMIN — CLONIDINE HYDROCHLORIDE 0.1 MG: 0.1 TABLET ORAL at 06:02

## 2021-02-06 RX ADMIN — ATORVASTATIN CALCIUM 10 MG: 10 TABLET, FILM COATED ORAL at 09:02

## 2021-02-06 RX ADMIN — PANTOPRAZOLE SODIUM 40 MG: 40 INJECTION, POWDER, FOR SOLUTION INTRAVENOUS at 09:02

## 2021-02-06 RX ADMIN — ALLOPURINOL 100 MG: 100 TABLET ORAL at 09:02

## 2021-02-06 RX ADMIN — OXYBUTYNIN CHLORIDE 5 MG: 5 TABLET, EXTENDED RELEASE ORAL at 09:02

## 2021-02-06 RX ADMIN — CLONIDINE HYDROCHLORIDE 0.1 MG: 0.1 TABLET ORAL at 09:02

## 2021-02-06 RX ADMIN — METOPROLOL SUCCINATE 200 MG: 50 TABLET, EXTENDED RELEASE ORAL at 01:02

## 2021-02-06 RX ADMIN — ATORVASTATIN CALCIUM 10 MG: 10 TABLET, FILM COATED ORAL at 01:02

## 2021-02-07 ENCOUNTER — ANESTHESIA (OUTPATIENT)
Dept: ENDOSCOPY | Facility: HOSPITAL | Age: 81
End: 2021-02-07
Payer: MEDICARE

## 2021-02-07 ENCOUNTER — ANESTHESIA EVENT (OUTPATIENT)
Dept: ENDOSCOPY | Facility: HOSPITAL | Age: 81
End: 2021-02-07
Payer: MEDICARE

## 2021-02-07 ENCOUNTER — ANESTHESIA EVENT (OUTPATIENT)
Dept: ADMINISTRATIVE | Facility: HOSPITAL | Age: 81
End: 2021-02-07
Payer: MEDICARE

## 2021-02-07 ENCOUNTER — ANESTHESIA (OUTPATIENT)
Dept: ADMINISTRATIVE | Facility: HOSPITAL | Age: 81
End: 2021-02-07
Payer: MEDICARE

## 2021-02-07 VITALS
BODY MASS INDEX: 31.77 KG/M2 | RESPIRATION RATE: 18 BRPM | DIASTOLIC BLOOD PRESSURE: 68 MMHG | HEIGHT: 69 IN | OXYGEN SATURATION: 98 % | HEART RATE: 69 BPM | TEMPERATURE: 97 F | SYSTOLIC BLOOD PRESSURE: 141 MMHG | WEIGHT: 214.5 LBS

## 2021-02-07 LAB
ANION GAP SERPL CALC-SCNC: 10 MMOL/L (ref 8–16)
BASOPHILS # BLD AUTO: 0.09 K/UL (ref 0–0.2)
BASOPHILS NFR BLD: 0.6 % (ref 0–1.9)
BUN SERPL-MCNC: 19 MG/DL (ref 8–23)
CALCIUM SERPL-MCNC: 9 MG/DL (ref 8.7–10.5)
CHLORIDE SERPL-SCNC: 105 MMOL/L (ref 95–110)
CO2 SERPL-SCNC: 28 MMOL/L (ref 23–29)
CREAT SERPL-MCNC: 1 MG/DL (ref 0.5–1.4)
DIFFERENTIAL METHOD: ABNORMAL
EOSINOPHIL # BLD AUTO: 0.4 K/UL (ref 0–0.5)
EOSINOPHIL NFR BLD: 2.8 % (ref 0–8)
ERYTHROCYTE [DISTWIDTH] IN BLOOD BY AUTOMATED COUNT: 17.6 % (ref 11.5–14.5)
EST. GFR  (AFRICAN AMERICAN): >60 ML/MIN/1.73 M^2
EST. GFR  (NON AFRICAN AMERICAN): 54 ML/MIN/1.73 M^2
GLUCOSE SERPL-MCNC: 98 MG/DL (ref 70–110)
HCT VFR BLD AUTO: 22.4 % (ref 37–48.5)
HGB BLD-MCNC: 7.4 G/DL (ref 12–16)
HYPOCHROMIA BLD QL SMEAR: ABNORMAL
IMM GRANULOCYTES # BLD AUTO: 0.43 K/UL (ref 0–0.04)
IMM GRANULOCYTES NFR BLD AUTO: 3 % (ref 0–0.5)
LYMPHOCYTES # BLD AUTO: 4.1 K/UL (ref 1–4.8)
LYMPHOCYTES NFR BLD: 29 % (ref 18–48)
MCH RBC QN AUTO: 24.9 PG (ref 27–31)
MCHC RBC AUTO-ENTMCNC: 33 G/DL (ref 32–36)
MCV RBC AUTO: 75 FL (ref 82–98)
MONOCYTES # BLD AUTO: 1.5 K/UL (ref 0.3–1)
MONOCYTES NFR BLD: 10.3 % (ref 4–15)
NEUTROPHILS # BLD AUTO: 7.7 K/UL (ref 1.8–7.7)
NEUTROPHILS NFR BLD: 54.3 % (ref 38–73)
NRBC BLD-RTO: 0 /100 WBC
PLATELET # BLD AUTO: 183 K/UL (ref 150–350)
PMV BLD AUTO: 11 FL (ref 9.2–12.9)
POTASSIUM SERPL-SCNC: 3.7 MMOL/L (ref 3.5–5.1)
RBC # BLD AUTO: 2.97 M/UL (ref 4–5.4)
SODIUM SERPL-SCNC: 143 MMOL/L (ref 136–145)
WBC # BLD AUTO: 14.24 K/UL (ref 3.9–12.7)

## 2021-02-07 PROCEDURE — 90670 PCV13 VACCINE IM: CPT | Performed by: HOSPITALIST

## 2021-02-07 PROCEDURE — 45385 COLONOSCOPY W/LESION REMOVAL: CPT | Performed by: INTERNAL MEDICINE

## 2021-02-07 PROCEDURE — 88305 TISSUE EXAM BY PATHOLOGIST: ICD-10-PCS | Mod: 26,,, | Performed by: PATHOLOGY

## 2021-02-07 PROCEDURE — 90472 IMMUNIZATION ADMIN EACH ADD: CPT | Performed by: HOSPITALIST

## 2021-02-07 PROCEDURE — 25000003 PHARM REV CODE 250: Performed by: NURSE PRACTITIONER

## 2021-02-07 PROCEDURE — 88305 TISSUE EXAM BY PATHOLOGIST: CPT | Mod: 26,,, | Performed by: PATHOLOGY

## 2021-02-07 PROCEDURE — 36415 COLL VENOUS BLD VENIPUNCTURE: CPT

## 2021-02-07 PROCEDURE — 90694 VACC AIIV4 NO PRSRV 0.5ML IM: CPT | Performed by: HOSPITALIST

## 2021-02-07 PROCEDURE — 63600175 PHARM REV CODE 636 W HCPCS: Performed by: STUDENT IN AN ORGANIZED HEALTH CARE EDUCATION/TRAINING PROGRAM

## 2021-02-07 PROCEDURE — 37000008 HC ANESTHESIA 1ST 15 MINUTES: Performed by: INTERNAL MEDICINE

## 2021-02-07 PROCEDURE — 85025 COMPLETE CBC W/AUTO DIFF WBC: CPT

## 2021-02-07 PROCEDURE — 63600175 PHARM REV CODE 636 W HCPCS: Performed by: HOSPITALIST

## 2021-02-07 PROCEDURE — G0009 ADMIN PNEUMOCOCCAL VACCINE: HCPCS | Performed by: HOSPITALIST

## 2021-02-07 PROCEDURE — G0008 ADMIN INFLUENZA VIRUS VAC: HCPCS | Performed by: HOSPITALIST

## 2021-02-07 PROCEDURE — 90471 IMMUNIZATION ADMIN: CPT | Performed by: HOSPITALIST

## 2021-02-07 PROCEDURE — G0378 HOSPITAL OBSERVATION PER HR: HCPCS

## 2021-02-07 PROCEDURE — 80048 BASIC METABOLIC PNL TOTAL CA: CPT

## 2021-02-07 PROCEDURE — 88305 TISSUE EXAM BY PATHOLOGIST: CPT | Performed by: PATHOLOGY

## 2021-02-07 PROCEDURE — 37000009 HC ANESTHESIA EA ADD 15 MINS: Performed by: INTERNAL MEDICINE

## 2021-02-07 PROCEDURE — 25000003 PHARM REV CODE 250: Performed by: INTERNAL MEDICINE

## 2021-02-07 PROCEDURE — 27201089 HC SNARE, DISP (ANY): Performed by: INTERNAL MEDICINE

## 2021-02-07 PROCEDURE — C9113 INJ PANTOPRAZOLE SODIUM, VIA: HCPCS | Performed by: HOSPITALIST

## 2021-02-07 PROCEDURE — 96376 TX/PRO/DX INJ SAME DRUG ADON: CPT

## 2021-02-07 RX ORDER — PROPOFOL 10 MG/ML
VIAL (ML) INTRAVENOUS
Status: DISCONTINUED | OUTPATIENT
Start: 2021-02-07 | End: 2021-02-07

## 2021-02-07 RX ORDER — PHENYLEPHRINE HYDROCHLORIDE 10 MG/ML
INJECTION INTRAVENOUS
Status: DISCONTINUED | OUTPATIENT
Start: 2021-02-07 | End: 2021-02-07

## 2021-02-07 RX ORDER — SODIUM CHLORIDE, SODIUM LACTATE, POTASSIUM CHLORIDE, CALCIUM CHLORIDE 600; 310; 30; 20 MG/100ML; MG/100ML; MG/100ML; MG/100ML
INJECTION, SOLUTION INTRAVENOUS CONTINUOUS PRN
Status: DISCONTINUED | OUTPATIENT
Start: 2021-02-07 | End: 2021-02-07

## 2021-02-07 RX ORDER — PROPOFOL 10 MG/ML
VIAL (ML) INTRAVENOUS CONTINUOUS PRN
Status: DISCONTINUED | OUTPATIENT
Start: 2021-02-07 | End: 2021-02-07

## 2021-02-07 RX ORDER — POLYETHYLENE GLYCOL 3350 17 G/17G
17 POWDER, FOR SOLUTION ORAL DAILY
Qty: 90 EACH | Refills: 3 | Status: SHIPPED | OUTPATIENT
Start: 2021-02-07 | End: 2022-02-07

## 2021-02-07 RX ORDER — DEXTROMETHORPHAN/PSEUDOEPHED 2.5-7.5/.8
DROPS ORAL
Status: COMPLETED | OUTPATIENT
Start: 2021-02-07 | End: 2021-02-07

## 2021-02-07 RX ADMIN — CLONIDINE HYDROCHLORIDE 0.1 MG: 0.1 TABLET ORAL at 08:02

## 2021-02-07 RX ADMIN — ALLOPURINOL 100 MG: 100 TABLET ORAL at 08:02

## 2021-02-07 RX ADMIN — LOSARTAN POTASSIUM 100 MG: 50 TABLET, FILM COATED ORAL at 08:02

## 2021-02-07 RX ADMIN — PROPOFOL 20 MG: 10 INJECTION, EMULSION INTRAVENOUS at 10:02

## 2021-02-07 RX ADMIN — PHENYLEPHRINE HYDROCHLORIDE 100 MCG: 10 INJECTION INTRAVENOUS at 10:02

## 2021-02-07 RX ADMIN — PROPOFOL 75 MCG/KG/MIN: 10 INJECTION, EMULSION INTRAVENOUS at 10:02

## 2021-02-07 RX ADMIN — GABAPENTIN 300 MG: 300 CAPSULE ORAL at 08:02

## 2021-02-07 RX ADMIN — METOPROLOL SUCCINATE 200 MG: 50 TABLET, EXTENDED RELEASE ORAL at 08:02

## 2021-02-07 RX ADMIN — A/SINGAPORE/GP1908/2015 IVR-180 (AN A/MICHIGAN/45/2015 (H1N1)PDM09-LIKE VIRUS, A/HONG KONG/4801/2014, NYMC X-263B (H3N2) (AN A/HONG KONG/4801/2014-LIKE VIRUS), AND B/BRISBANE/60/2008, WILD TYPE (A B/BRISBANE/60/2008-LIKE VIRUS) 0.5 ML: 15; 15; 15 INJECTION, SUSPENSION INTRAMUSCULAR at 02:02

## 2021-02-07 RX ADMIN — PNEUMOCOCCAL 13-VALENT CONJUGATE VACCINE 0.5 ML: 2.2; 2.2; 2.2; 2.2; 2.2; 4.4; 2.2; 2.2; 2.2; 2.2; 2.2; 2.2; 2.2 INJECTION, SUSPENSION INTRAMUSCULAR at 02:02

## 2021-02-07 RX ADMIN — OXYBUTYNIN CHLORIDE 5 MG: 5 TABLET, EXTENDED RELEASE ORAL at 08:02

## 2021-02-07 RX ADMIN — PANTOPRAZOLE SODIUM 40 MG: 40 INJECTION, POWDER, FOR SOLUTION INTRAVENOUS at 08:02

## 2021-02-07 RX ADMIN — HYDROCHLOROTHIAZIDE 25 MG: 25 TABLET ORAL at 08:02

## 2021-02-07 RX ADMIN — SODIUM CHLORIDE, SODIUM LACTATE, POTASSIUM CHLORIDE, AND CALCIUM CHLORIDE: .6; .31; .03; .02 INJECTION, SOLUTION INTRAVENOUS at 10:02

## 2021-02-07 RX ADMIN — SIMETHICONE 66.67 MG: 20 SUSPENSION/ DROPS ORAL at 11:02

## 2021-02-09 ENCOUNTER — DOCUMENT SCAN (OUTPATIENT)
Dept: HOME HEALTH SERVICES | Facility: HOSPITAL | Age: 81
End: 2021-02-09

## 2021-02-09 ENCOUNTER — DOCUMENT SCAN (OUTPATIENT)
Dept: HOME HEALTH SERVICES | Facility: HOSPITAL | Age: 81
End: 2021-02-09
Payer: MEDICARE

## 2021-02-10 LAB
FINAL PATHOLOGIC DIAGNOSIS: NORMAL
GROSS: NORMAL
Lab: NORMAL

## 2021-02-11 ENCOUNTER — DOCUMENT SCAN (OUTPATIENT)
Dept: HOME HEALTH SERVICES | Facility: HOSPITAL | Age: 81
End: 2021-02-11
Payer: MEDICARE

## 2021-02-12 ENCOUNTER — DOCUMENT SCAN (OUTPATIENT)
Dept: HOME HEALTH SERVICES | Facility: HOSPITAL | Age: 81
End: 2021-02-12
Payer: MEDICARE

## 2021-02-13 DIAGNOSIS — G47.00 INSOMNIA, UNSPECIFIED TYPE: ICD-10-CM

## 2021-02-15 DIAGNOSIS — K21.9 GASTROESOPHAGEAL REFLUX DISEASE WITHOUT ESOPHAGITIS: ICD-10-CM

## 2021-02-15 RX ORDER — QUETIAPINE FUMARATE 200 MG/1
TABLET, FILM COATED ORAL
Qty: 30 TABLET | Refills: 5 | Status: SHIPPED | OUTPATIENT
Start: 2021-02-15 | End: 2021-05-11 | Stop reason: SDUPTHER

## 2021-02-17 ENCOUNTER — DOCUMENT SCAN (OUTPATIENT)
Dept: HOME HEALTH SERVICES | Facility: HOSPITAL | Age: 81
End: 2021-02-17
Payer: MEDICARE

## 2021-02-18 RX ORDER — OMEPRAZOLE 40 MG/1
CAPSULE, DELAYED RELEASE ORAL
Qty: 90 CAPSULE | Refills: 0 | Status: ON HOLD | OUTPATIENT
Start: 2021-02-18 | End: 2021-03-30 | Stop reason: HOSPADM

## 2021-02-23 ENCOUNTER — DOCUMENT SCAN (OUTPATIENT)
Dept: HOME HEALTH SERVICES | Facility: HOSPITAL | Age: 81
End: 2021-02-23
Payer: MEDICARE

## 2021-02-26 ENCOUNTER — DOCUMENT SCAN (OUTPATIENT)
Dept: HOME HEALTH SERVICES | Facility: HOSPITAL | Age: 81
End: 2021-02-26
Payer: MEDICARE

## 2021-02-26 ENCOUNTER — TELEPHONE (OUTPATIENT)
Dept: FAMILY MEDICINE | Facility: CLINIC | Age: 81
End: 2021-02-26

## 2021-03-01 ENCOUNTER — DOCUMENT SCAN (OUTPATIENT)
Dept: HOME HEALTH SERVICES | Facility: HOSPITAL | Age: 81
End: 2021-03-01
Payer: MEDICARE

## 2021-03-03 ENCOUNTER — DOCUMENT SCAN (OUTPATIENT)
Dept: HOME HEALTH SERVICES | Facility: HOSPITAL | Age: 81
End: 2021-03-03
Payer: MEDICARE

## 2021-03-03 PROCEDURE — G0179 PR HOME HEALTH MD RECERTIFICATION: ICD-10-PCS | Mod: ,,, | Performed by: FAMILY MEDICINE

## 2021-03-03 PROCEDURE — G0179 MD RECERTIFICATION HHA PT: HCPCS | Mod: ,,, | Performed by: FAMILY MEDICINE

## 2021-03-06 DIAGNOSIS — I10 BENIGN ESSENTIAL HTN: ICD-10-CM

## 2021-03-08 ENCOUNTER — OFFICE VISIT (OUTPATIENT)
Dept: WOUND CARE | Facility: HOSPITAL | Age: 81
End: 2021-03-08
Attending: SURGERY
Payer: MEDICARE

## 2021-03-08 VITALS
SYSTOLIC BLOOD PRESSURE: 97 MMHG | HEART RATE: 102 BPM | TEMPERATURE: 98 F | RESPIRATION RATE: 18 BRPM | DIASTOLIC BLOOD PRESSURE: 68 MMHG

## 2021-03-08 DIAGNOSIS — L89.620 PRESSURE ULCER OF LEFT HEEL, UNSTAGEABLE: ICD-10-CM

## 2021-03-08 DIAGNOSIS — S31.000A WOUND OF SACRAL REGION, INITIAL ENCOUNTER: ICD-10-CM

## 2021-03-08 DIAGNOSIS — L89.153 PRESSURE INJURY OF SACRAL REGION, STAGE 3: ICD-10-CM

## 2021-03-08 PROCEDURE — 11045 DBRDMT SUBQ TISS EACH ADDL: CPT

## 2021-03-08 PROCEDURE — 27201912 HC WOUND CARE DEBRIDEMENT SUPPLIES

## 2021-03-08 PROCEDURE — 97597 DBRDMT OPN WND 1ST 20 CM/<: CPT

## 2021-03-08 PROCEDURE — 99499 NO LOS: ICD-10-PCS | Mod: ,,, | Performed by: SURGERY

## 2021-03-08 PROCEDURE — 99499 UNLISTED E&M SERVICE: CPT | Mod: ,,, | Performed by: SURGERY

## 2021-03-08 PROCEDURE — 11042 DBRDMT SUBQ TIS 1ST 20SQCM/<: CPT

## 2021-03-08 PROCEDURE — 99213 OFFICE O/P EST LOW 20 MIN: CPT | Mod: 25

## 2021-03-08 RX ORDER — CLONIDINE HYDROCHLORIDE 0.1 MG/1
TABLET ORAL
Qty: 270 TABLET | Refills: 0 | Status: ON HOLD | OUTPATIENT
Start: 2021-03-08 | End: 2021-03-30 | Stop reason: SDUPTHER

## 2021-03-10 DIAGNOSIS — Z74.01 BEDRIDDEN: Primary | ICD-10-CM

## 2021-03-11 ENCOUNTER — OFFICE VISIT (OUTPATIENT)
Dept: FAMILY MEDICINE | Facility: CLINIC | Age: 81
End: 2021-03-11
Payer: MEDICARE

## 2021-03-11 ENCOUNTER — EXTERNAL HOME HEALTH (OUTPATIENT)
Dept: HOME HEALTH SERVICES | Facility: HOSPITAL | Age: 81
End: 2021-03-11
Payer: MEDICARE

## 2021-03-11 VITALS
RESPIRATION RATE: 16 BRPM | WEIGHT: 214 LBS | HEIGHT: 69 IN | SYSTOLIC BLOOD PRESSURE: 140 MMHG | BODY MASS INDEX: 31.7 KG/M2 | DIASTOLIC BLOOD PRESSURE: 88 MMHG | HEART RATE: 96 BPM | TEMPERATURE: 100 F

## 2021-03-11 DIAGNOSIS — I25.10 ASCVD (ARTERIOSCLEROTIC CARDIOVASCULAR DISEASE): ICD-10-CM

## 2021-03-11 DIAGNOSIS — Z02.2 ENCOUNTER FOR EXAMINATION FOR ADMISSION TO NURSING HOME: ICD-10-CM

## 2021-03-11 DIAGNOSIS — I10 HYPERTENSION, UNSPECIFIED TYPE: ICD-10-CM

## 2021-03-11 DIAGNOSIS — M17.0 PRIMARY OSTEOARTHRITIS OF BOTH KNEES: ICD-10-CM

## 2021-03-11 DIAGNOSIS — M48.062 SPINAL STENOSIS OF LUMBAR REGION WITH NEUROGENIC CLAUDICATION: Primary | ICD-10-CM

## 2021-03-11 DIAGNOSIS — Z20.822 EXPOSURE TO COVID-19 VIRUS: ICD-10-CM

## 2021-03-11 DIAGNOSIS — L89.159 PRESSURE INJURY OF SKIN OF SACRAL REGION, UNSPECIFIED INJURY STAGE: ICD-10-CM

## 2021-03-11 DIAGNOSIS — R32 URINARY INCONTINENCE, UNSPECIFIED TYPE: ICD-10-CM

## 2021-03-11 PROCEDURE — 1101F PT FALLS ASSESS-DOCD LE1/YR: CPT | Mod: CPTII,S$GLB,, | Performed by: FAMILY MEDICINE

## 2021-03-11 PROCEDURE — 1126F AMNT PAIN NOTED NONE PRSNT: CPT | Mod: S$GLB,,, | Performed by: FAMILY MEDICINE

## 2021-03-11 PROCEDURE — 1126F PR PAIN SEVERITY QUANTIFIED, NO PAIN PRESENT: ICD-10-PCS | Mod: S$GLB,,, | Performed by: FAMILY MEDICINE

## 2021-03-11 PROCEDURE — 99213 PR OFFICE/OUTPT VISIT, EST, LEVL III, 20-29 MIN: ICD-10-PCS | Mod: S$GLB,,, | Performed by: FAMILY MEDICINE

## 2021-03-11 PROCEDURE — 99213 OFFICE O/P EST LOW 20 MIN: CPT | Mod: S$GLB,,, | Performed by: FAMILY MEDICINE

## 2021-03-11 PROCEDURE — 99999 PR PBB SHADOW E&M-EST. PATIENT-LVL IV: CPT | Mod: PBBFAC,,, | Performed by: FAMILY MEDICINE

## 2021-03-11 PROCEDURE — 99999 PR PBB SHADOW E&M-EST. PATIENT-LVL IV: ICD-10-PCS | Mod: PBBFAC,,, | Performed by: FAMILY MEDICINE

## 2021-03-11 PROCEDURE — 1101F PR PT FALLS ASSESS DOC 0-1 FALLS W/OUT INJ PAST YR: ICD-10-PCS | Mod: CPTII,S$GLB,, | Performed by: FAMILY MEDICINE

## 2021-03-11 PROCEDURE — 3077F PR MOST RECENT SYSTOLIC BLOOD PRESSURE >= 140 MM HG: ICD-10-PCS | Mod: CPTII,S$GLB,, | Performed by: FAMILY MEDICINE

## 2021-03-11 PROCEDURE — 86580 POCT TB SKIN TEST: ICD-10-PCS | Mod: S$GLB,,, | Performed by: FAMILY MEDICINE

## 2021-03-11 PROCEDURE — 3288F PR FALLS RISK ASSESSMENT DOCUMENTED: ICD-10-PCS | Mod: CPTII,S$GLB,, | Performed by: FAMILY MEDICINE

## 2021-03-11 PROCEDURE — 3079F DIAST BP 80-89 MM HG: CPT | Mod: CPTII,S$GLB,, | Performed by: FAMILY MEDICINE

## 2021-03-11 PROCEDURE — 86580 TB INTRADERMAL TEST: CPT | Mod: S$GLB,,, | Performed by: FAMILY MEDICINE

## 2021-03-11 PROCEDURE — 3288F FALL RISK ASSESSMENT DOCD: CPT | Mod: CPTII,S$GLB,, | Performed by: FAMILY MEDICINE

## 2021-03-11 PROCEDURE — U0002 COVID-19 LAB TEST NON-CDC: HCPCS | Performed by: FAMILY MEDICINE

## 2021-03-11 PROCEDURE — 3079F PR MOST RECENT DIASTOLIC BLOOD PRESSURE 80-89 MM HG: ICD-10-PCS | Mod: CPTII,S$GLB,, | Performed by: FAMILY MEDICINE

## 2021-03-11 PROCEDURE — 1159F PR MEDICATION LIST DOCUMENTED IN MEDICAL RECORD: ICD-10-PCS | Mod: S$GLB,,, | Performed by: FAMILY MEDICINE

## 2021-03-11 PROCEDURE — 1159F MED LIST DOCD IN RCRD: CPT | Mod: S$GLB,,, | Performed by: FAMILY MEDICINE

## 2021-03-11 PROCEDURE — 3077F SYST BP >= 140 MM HG: CPT | Mod: CPTII,S$GLB,, | Performed by: FAMILY MEDICINE

## 2021-03-13 LAB
TB INDURATION - 48 HR READ: 0 MM
TB INDURATION - 72 HR READ: 0 MM
TB SKIN TEST - 48 HR READ: NEGATIVE
TB SKIN TEST - 72 HR READ: NEGATIVE

## 2021-03-15 ENCOUNTER — HOME CARE VISIT (OUTPATIENT)
Dept: FAMILY MEDICINE | Facility: CLINIC | Age: 81
End: 2021-03-15

## 2021-03-15 ENCOUNTER — TELEPHONE (OUTPATIENT)
Dept: FAMILY MEDICINE | Facility: CLINIC | Age: 81
End: 2021-03-15

## 2021-03-16 ENCOUNTER — TELEPHONE (OUTPATIENT)
Dept: FAMILY MEDICINE | Facility: CLINIC | Age: 81
End: 2021-03-16

## 2021-03-18 DIAGNOSIS — Z20.822 EXPOSURE TO COVID-19 VIRUS: ICD-10-CM

## 2021-03-18 DIAGNOSIS — F41.9 ANXIETY: ICD-10-CM

## 2021-03-18 DIAGNOSIS — E79.0 HYPERURICEMIA: ICD-10-CM

## 2021-03-18 LAB — SARS-COV-2 RDRP RESP QL NAA+PROBE: NEGATIVE

## 2021-03-18 RX ORDER — ALPRAZOLAM 0.5 MG/1
0.5 TABLET ORAL 2 TIMES DAILY PRN
Qty: 60 TABLET | Refills: 0 | Status: SHIPPED | OUTPATIENT
Start: 2021-03-18 | End: 2021-05-11 | Stop reason: SDUPTHER

## 2021-03-18 RX ORDER — HYDROCODONE BITARTRATE AND ACETAMINOPHEN 10; 325 MG/1; MG/1
1 TABLET ORAL EVERY 6 HOURS PRN
Qty: 30 TABLET | Refills: 0 | Status: SHIPPED | OUTPATIENT
Start: 2021-03-18 | End: 2021-09-26 | Stop reason: SDUPTHER

## 2021-03-23 ENCOUNTER — HOSPITAL ENCOUNTER (EMERGENCY)
Facility: HOSPITAL | Age: 81
Discharge: HOME | End: 2021-03-23
Attending: SURGERY
Payer: MEDICARE

## 2021-03-23 VITALS
RESPIRATION RATE: 20 BRPM | DIASTOLIC BLOOD PRESSURE: 117 MMHG | HEIGHT: 69 IN | HEART RATE: 85 BPM | SYSTOLIC BLOOD PRESSURE: 173 MMHG | TEMPERATURE: 99 F | BODY MASS INDEX: 31.25 KG/M2 | OXYGEN SATURATION: 98 % | WEIGHT: 211 LBS

## 2021-03-23 DIAGNOSIS — N39.0 URINARY TRACT INFECTION WITHOUT HEMATURIA, SITE UNSPECIFIED: Primary | ICD-10-CM

## 2021-03-23 DIAGNOSIS — R53.83 FATIGUE: ICD-10-CM

## 2021-03-23 DIAGNOSIS — D64.9 ANEMIA, UNSPECIFIED TYPE: ICD-10-CM

## 2021-03-23 LAB
ABO + RH BLD: NORMAL
ALBUMIN SERPL BCP-MCNC: 2.6 G/DL (ref 3.5–5.2)
ALP SERPL-CCNC: 98 U/L (ref 55–135)
ALT SERPL W/O P-5'-P-CCNC: 11 U/L (ref 10–44)
ANION GAP SERPL CALC-SCNC: 10 MMOL/L (ref 8–16)
APTT BLDCRRT: 25.5 SEC (ref 21–32)
AST SERPL-CCNC: 14 U/L (ref 10–40)
BACTERIA #/AREA URNS HPF: ABNORMAL /HPF
BASOPHILS # BLD AUTO: 0.09 K/UL (ref 0–0.2)
BASOPHILS NFR BLD: 0.5 % (ref 0–1.9)
BILIRUB SERPL-MCNC: 0.2 MG/DL (ref 0.1–1)
BILIRUB UR QL STRIP: NEGATIVE
BLD GP AB SCN CELLS X3 SERPL QL: NORMAL
BNP SERPL-MCNC: 82 PG/ML (ref 0–99)
BUN SERPL-MCNC: 30 MG/DL (ref 8–23)
CALCIUM SERPL-MCNC: 9.9 MG/DL (ref 8.7–10.5)
CHLORIDE SERPL-SCNC: 101 MMOL/L (ref 95–110)
CK MB SERPL-MCNC: 1.2 NG/ML (ref 0.1–6.5)
CK MB SERPL-RTO: 2.2 % (ref 0–5)
CK SERPL-CCNC: 54 U/L (ref 20–180)
CK SERPL-CCNC: 54 U/L (ref 20–180)
CLARITY UR: ABNORMAL
CO2 SERPL-SCNC: 29 MMOL/L (ref 23–29)
COLOR UR: YELLOW
CREAT SERPL-MCNC: 1.2 MG/DL (ref 0.5–1.4)
DIFFERENTIAL METHOD: ABNORMAL
EOSINOPHIL # BLD AUTO: 0.4 K/UL (ref 0–0.5)
EOSINOPHIL NFR BLD: 1.8 % (ref 0–8)
ERYTHROCYTE [DISTWIDTH] IN BLOOD BY AUTOMATED COUNT: 19.1 % (ref 11.5–14.5)
EST. GFR  (AFRICAN AMERICAN): 50 ML/MIN/1.73 M^2
EST. GFR  (NON AFRICAN AMERICAN): 43 ML/MIN/1.73 M^2
GLUCOSE SERPL-MCNC: 108 MG/DL (ref 70–110)
GLUCOSE UR QL STRIP: NEGATIVE
HCT VFR BLD AUTO: 24.1 % (ref 37–48.5)
HGB BLD-MCNC: 7.5 G/DL (ref 12–16)
HGB UR QL STRIP: ABNORMAL
IMM GRANULOCYTES # BLD AUTO: 0.23 K/UL (ref 0–0.04)
IMM GRANULOCYTES NFR BLD AUTO: 1.2 % (ref 0–0.5)
INR PPP: 1 (ref 0.8–1.2)
KETONES UR QL STRIP: NEGATIVE
LEUKOCYTE ESTERASE UR QL STRIP: ABNORMAL
LYMPHOCYTES # BLD AUTO: 4.5 K/UL (ref 1–4.8)
LYMPHOCYTES NFR BLD: 22.4 % (ref 18–48)
MAGNESIUM SERPL-MCNC: 1.9 MG/DL (ref 1.6–2.6)
MCH RBC QN AUTO: 22.6 PG (ref 27–31)
MCHC RBC AUTO-ENTMCNC: 31.1 G/DL (ref 32–36)
MCV RBC AUTO: 73 FL (ref 82–98)
MICROSCOPIC COMMENT: ABNORMAL
MONOCYTES # BLD AUTO: 1.3 K/UL (ref 0.3–1)
MONOCYTES NFR BLD: 6.5 % (ref 4–15)
NEUTROPHILS # BLD AUTO: 13.5 K/UL (ref 1.8–7.7)
NEUTROPHILS NFR BLD: 67.6 % (ref 38–73)
NITRITE UR QL STRIP: NEGATIVE
NRBC BLD-RTO: 0 /100 WBC
OB PNL STL: NEGATIVE
PH UR STRIP: 5 [PH] (ref 5–8)
PHOSPHATE SERPL-MCNC: 3.8 MG/DL (ref 2.7–4.5)
PLATELET # BLD AUTO: 397 K/UL (ref 150–350)
PMV BLD AUTO: 11 FL (ref 9.2–12.9)
POTASSIUM SERPL-SCNC: 4.5 MMOL/L (ref 3.5–5.1)
PROT SERPL-MCNC: 7.7 G/DL (ref 6–8.4)
PROT UR QL STRIP: NEGATIVE
PROTHROMBIN TIME: 10.7 SEC (ref 9–12.5)
RBC # BLD AUTO: 3.32 M/UL (ref 4–5.4)
RBC #/AREA URNS HPF: 8 /HPF (ref 0–4)
RETICS/RBC NFR AUTO: 2.2 % (ref 0.5–2.5)
SARS-COV-2 RDRP RESP QL NAA+PROBE: NEGATIVE
SODIUM SERPL-SCNC: 140 MMOL/L (ref 136–145)
SP GR UR STRIP: 1.01 (ref 1–1.03)
SQUAMOUS #/AREA URNS HPF: 2 /HPF
TROPONIN I SERPL DL<=0.01 NG/ML-MCNC: <0.006 NG/ML (ref 0–0.03)
TSH SERPL DL<=0.005 MIU/L-ACNC: 1.63 UIU/ML (ref 0.4–4)
URN SPEC COLLECT METH UR: ABNORMAL
UROBILINOGEN UR STRIP-ACNC: NEGATIVE EU/DL
WBC # BLD AUTO: 20 K/UL (ref 3.9–12.7)
WBC #/AREA URNS HPF: 35 /HPF (ref 0–5)
WBC CLUMPS URNS QL MICRO: ABNORMAL

## 2021-03-23 PROCEDURE — 82550 ASSAY OF CK (CPK): CPT | Performed by: SURGERY

## 2021-03-23 PROCEDURE — 83540 ASSAY OF IRON: CPT | Performed by: SURGERY

## 2021-03-23 PROCEDURE — 83880 ASSAY OF NATRIURETIC PEPTIDE: CPT | Performed by: SURGERY

## 2021-03-23 PROCEDURE — 63600175 PHARM REV CODE 636 W HCPCS: Performed by: SURGERY

## 2021-03-23 PROCEDURE — 93010 ELECTROCARDIOGRAM REPORT: CPT | Mod: ,,, | Performed by: INTERNAL MEDICINE

## 2021-03-23 PROCEDURE — 86870 RBC ANTIBODY IDENTIFICATION: CPT | Mod: 59 | Performed by: NURSE PRACTITIONER

## 2021-03-23 PROCEDURE — 93010 EKG 12-LEAD: ICD-10-PCS | Mod: ,,, | Performed by: INTERNAL MEDICINE

## 2021-03-23 PROCEDURE — 85045 AUTOMATED RETICULOCYTE COUNT: CPT | Performed by: SURGERY

## 2021-03-23 PROCEDURE — 96365 THER/PROPH/DIAG IV INF INIT: CPT

## 2021-03-23 PROCEDURE — 82553 CREATINE MB FRACTION: CPT | Performed by: SURGERY

## 2021-03-23 PROCEDURE — 82746 ASSAY OF FOLIC ACID SERUM: CPT | Performed by: SURGERY

## 2021-03-23 PROCEDURE — 93005 ELECTROCARDIOGRAM TRACING: CPT

## 2021-03-23 PROCEDURE — 83735 ASSAY OF MAGNESIUM: CPT | Performed by: SURGERY

## 2021-03-23 PROCEDURE — 99285 EMERGENCY DEPT VISIT HI MDM: CPT | Mod: 25

## 2021-03-23 PROCEDURE — 63600175 PHARM REV CODE 636 W HCPCS: Performed by: EMERGENCY MEDICINE

## 2021-03-23 PROCEDURE — 85025 COMPLETE CBC W/AUTO DIFF WBC: CPT | Performed by: SURGERY

## 2021-03-23 PROCEDURE — 82607 VITAMIN B-12: CPT | Performed by: SURGERY

## 2021-03-23 PROCEDURE — 84484 ASSAY OF TROPONIN QUANT: CPT | Performed by: SURGERY

## 2021-03-23 PROCEDURE — C9113 INJ PANTOPRAZOLE SODIUM, VIA: HCPCS | Performed by: SURGERY

## 2021-03-23 PROCEDURE — U0002 COVID-19 LAB TEST NON-CDC: HCPCS | Performed by: NURSE PRACTITIONER

## 2021-03-23 PROCEDURE — 96375 TX/PRO/DX INJ NEW DRUG ADDON: CPT

## 2021-03-23 PROCEDURE — 86900 BLOOD TYPING SEROLOGIC ABO: CPT | Performed by: NURSE PRACTITIONER

## 2021-03-23 PROCEDURE — 86880 COOMBS TEST DIRECT: CPT | Performed by: NURSE PRACTITIONER

## 2021-03-23 PROCEDURE — 85610 PROTHROMBIN TIME: CPT | Performed by: SURGERY

## 2021-03-23 PROCEDURE — 82272 OCCULT BLD FECES 1-3 TESTS: CPT | Performed by: SURGERY

## 2021-03-23 PROCEDURE — 87077 CULTURE AEROBIC IDENTIFY: CPT | Performed by: SURGERY

## 2021-03-23 PROCEDURE — 87186 SC STD MICRODIL/AGAR DIL: CPT | Performed by: SURGERY

## 2021-03-23 PROCEDURE — 87086 URINE CULTURE/COLONY COUNT: CPT | Performed by: SURGERY

## 2021-03-23 PROCEDURE — 80053 COMPREHEN METABOLIC PANEL: CPT | Performed by: SURGERY

## 2021-03-23 PROCEDURE — 84443 ASSAY THYROID STIM HORMONE: CPT | Performed by: SURGERY

## 2021-03-23 PROCEDURE — 87088 URINE BACTERIA CULTURE: CPT | Performed by: SURGERY

## 2021-03-23 PROCEDURE — 84100 ASSAY OF PHOSPHORUS: CPT | Performed by: SURGERY

## 2021-03-23 PROCEDURE — 25000003 PHARM REV CODE 250: Performed by: SURGERY

## 2021-03-23 PROCEDURE — 85730 THROMBOPLASTIN TIME PARTIAL: CPT | Performed by: SURGERY

## 2021-03-23 PROCEDURE — 81000 URINALYSIS NONAUTO W/SCOPE: CPT | Performed by: SURGERY

## 2021-03-23 PROCEDURE — 82728 ASSAY OF FERRITIN: CPT | Performed by: SURGERY

## 2021-03-23 RX ORDER — PANTOPRAZOLE SODIUM 40 MG/10ML
80 INJECTION, POWDER, LYOPHILIZED, FOR SOLUTION INTRAVENOUS
Status: COMPLETED | OUTPATIENT
Start: 2021-03-23 | End: 2021-03-23

## 2021-03-23 RX ORDER — CEFDINIR 300 MG/1
300 CAPSULE ORAL 2 TIMES DAILY
Qty: 14 CAPSULE | Refills: 0 | Status: ON HOLD | OUTPATIENT
Start: 2021-03-23 | End: 2021-03-30 | Stop reason: HOSPADM

## 2021-03-23 RX ADMIN — PANTOPRAZOLE SODIUM 80 MG: 40 INJECTION, POWDER, FOR SOLUTION INTRAVENOUS at 05:03

## 2021-03-23 RX ADMIN — CEFTRIAXONE 1 G: 1 INJECTION, SOLUTION INTRAVENOUS at 08:03

## 2021-03-23 RX ADMIN — PANTOPRAZOLE SODIUM 8 MG/HR: 40 INJECTION, POWDER, FOR SOLUTION INTRAVENOUS at 05:03

## 2021-03-24 ENCOUNTER — PES CALL (OUTPATIENT)
Dept: ADMINISTRATIVE | Facility: CLINIC | Age: 81
End: 2021-03-24

## 2021-03-24 LAB
BLOOD GROUP ANTIBODIES SERPL: NORMAL
DAT IGG-SP REAG RBC-IMP: NORMAL
FERRITIN SERPL-MCNC: 454 NG/ML (ref 20–300)
FOLATE SERPL-MCNC: 13.8 NG/ML (ref 4–24)
IRON SERPL-MCNC: 13 UG/DL (ref 30–160)
SATURATED IRON: 6 % (ref 20–50)
TOTAL IRON BINDING CAPACITY: 209 UG/DL (ref 250–450)
TRANSFERRIN SERPL-MCNC: 141 MG/DL (ref 200–375)
VIT B12 SERPL-MCNC: 718 PG/ML (ref 210–950)

## 2021-03-25 ENCOUNTER — HOSPITAL ENCOUNTER (EMERGENCY)
Facility: HOSPITAL | Age: 81
Discharge: ANOTHER HOSPITAL | End: 2021-03-26
Attending: SURGERY
Payer: MEDICARE

## 2021-03-25 DIAGNOSIS — K92.2 GASTROINTESTINAL HEMORRHAGE, UNSPECIFIED GASTROINTESTINAL HEMORRHAGE TYPE: Primary | ICD-10-CM

## 2021-03-25 LAB
ALBUMIN SERPL BCP-MCNC: 2.4 G/DL (ref 3.5–5.2)
ALP SERPL-CCNC: 94 U/L (ref 55–135)
ALT SERPL W/O P-5'-P-CCNC: 10 U/L (ref 10–44)
ANION GAP SERPL CALC-SCNC: 10 MMOL/L (ref 8–16)
APTT BLDCRRT: 26.4 SEC (ref 21–32)
AST SERPL-CCNC: 11 U/L (ref 10–40)
BACTERIA #/AREA URNS HPF: ABNORMAL /HPF
BASOPHILS # BLD AUTO: 0.08 K/UL (ref 0–0.2)
BASOPHILS NFR BLD: 0.4 % (ref 0–1.9)
BILIRUB SERPL-MCNC: 0.3 MG/DL (ref 0.1–1)
BILIRUB UR QL STRIP: NEGATIVE
BLD PROD TYP BPU: NORMAL
BLD PROD TYP BPU: NORMAL
BLOOD UNIT EXPIRATION DATE: NORMAL
BLOOD UNIT EXPIRATION DATE: NORMAL
BLOOD UNIT TYPE CODE: 600
BLOOD UNIT TYPE CODE: 600
BLOOD UNIT TYPE: NORMAL
BLOOD UNIT TYPE: NORMAL
BUN SERPL-MCNC: 24 MG/DL (ref 8–23)
CALCIUM SERPL-MCNC: 9.5 MG/DL (ref 8.7–10.5)
CHLORIDE SERPL-SCNC: 98 MMOL/L (ref 95–110)
CLARITY UR: CLEAR
CO2 SERPL-SCNC: 30 MMOL/L (ref 23–29)
CODING SYSTEM: NORMAL
CODING SYSTEM: NORMAL
COLOR UR: YELLOW
CREAT SERPL-MCNC: 1 MG/DL (ref 0.5–1.4)
DIFFERENTIAL METHOD: ABNORMAL
DISPENSE STATUS: NORMAL
DISPENSE STATUS: NORMAL
EOSINOPHIL # BLD AUTO: 0.2 K/UL (ref 0–0.5)
EOSINOPHIL NFR BLD: 1.1 % (ref 0–8)
ERYTHROCYTE [DISTWIDTH] IN BLOOD BY AUTOMATED COUNT: 19 % (ref 11.5–14.5)
EST. GFR  (AFRICAN AMERICAN): >60 ML/MIN/1.73 M^2
EST. GFR  (NON AFRICAN AMERICAN): 54 ML/MIN/1.73 M^2
GLUCOSE SERPL-MCNC: 124 MG/DL (ref 70–110)
GLUCOSE UR QL STRIP: NEGATIVE
HCT VFR BLD AUTO: 20.5 % (ref 37–48.5)
HGB BLD-MCNC: 6.8 G/DL (ref 12–16)
HGB UR QL STRIP: ABNORMAL
IMM GRANULOCYTES # BLD AUTO: 0.18 K/UL (ref 0–0.04)
IMM GRANULOCYTES NFR BLD AUTO: 0.9 % (ref 0–0.5)
INR PPP: 1.1 (ref 0.8–1.2)
KETONES UR QL STRIP: NEGATIVE
LEUKOCYTE ESTERASE UR QL STRIP: ABNORMAL
LYMPHOCYTES # BLD AUTO: 3.9 K/UL (ref 1–4.8)
LYMPHOCYTES NFR BLD: 19.8 % (ref 18–48)
MCH RBC QN AUTO: 23.4 PG (ref 27–31)
MCHC RBC AUTO-ENTMCNC: 33.2 G/DL (ref 32–36)
MCV RBC AUTO: 70 FL (ref 82–98)
MICROSCOPIC COMMENT: ABNORMAL
MONOCYTES # BLD AUTO: 1.5 K/UL (ref 0.3–1)
MONOCYTES NFR BLD: 7.9 % (ref 4–15)
NEUTROPHILS # BLD AUTO: 13.6 K/UL (ref 1.8–7.7)
NEUTROPHILS NFR BLD: 69.9 % (ref 38–73)
NITRITE UR QL STRIP: NEGATIVE
NRBC BLD-RTO: 0 /100 WBC
PH UR STRIP: 5 [PH] (ref 5–8)
PLATELET # BLD AUTO: 332 K/UL (ref 150–350)
PMV BLD AUTO: 10.5 FL (ref 9.2–12.9)
POTASSIUM SERPL-SCNC: 4.1 MMOL/L (ref 3.5–5.1)
PROT SERPL-MCNC: 7 G/DL (ref 6–8.4)
PROT UR QL STRIP: NEGATIVE
PROTHROMBIN TIME: 11.5 SEC (ref 9–12.5)
RBC # BLD AUTO: 2.91 M/UL (ref 4–5.4)
RBC #/AREA URNS HPF: 3 /HPF (ref 0–4)
SARS-COV-2 RDRP RESP QL NAA+PROBE: NEGATIVE
SODIUM SERPL-SCNC: 138 MMOL/L (ref 136–145)
SP GR UR STRIP: 1.01 (ref 1–1.03)
SQUAMOUS #/AREA URNS HPF: 5 /HPF
TRANS ERYTHROCYTES VOL PATIENT: NORMAL ML
TRANS ERYTHROCYTES VOL PATIENT: NORMAL ML
URN SPEC COLLECT METH UR: ABNORMAL
UROBILINOGEN UR STRIP-ACNC: NEGATIVE EU/DL
WBC # BLD AUTO: 19.49 K/UL (ref 3.9–12.7)
WBC #/AREA URNS HPF: 50 /HPF (ref 0–5)
WBC CLUMPS URNS QL MICRO: ABNORMAL

## 2021-03-25 PROCEDURE — 85610 PROTHROMBIN TIME: CPT | Performed by: SURGERY

## 2021-03-25 PROCEDURE — 81000 URINALYSIS NONAUTO W/SCOPE: CPT | Performed by: SURGERY

## 2021-03-25 PROCEDURE — 87086 URINE CULTURE/COLONY COUNT: CPT | Performed by: SURGERY

## 2021-03-25 PROCEDURE — 25000003 PHARM REV CODE 250: Performed by: SURGERY

## 2021-03-25 PROCEDURE — 36430 TRANSFUSION BLD/BLD COMPNT: CPT

## 2021-03-25 PROCEDURE — 99291 CRITICAL CARE FIRST HOUR: CPT | Mod: 25

## 2021-03-25 PROCEDURE — U0002 COVID-19 LAB TEST NON-CDC: HCPCS | Performed by: SURGERY

## 2021-03-25 PROCEDURE — 25000003 PHARM REV CODE 250: Performed by: FAMILY MEDICINE

## 2021-03-25 PROCEDURE — 80053 COMPREHEN METABOLIC PANEL: CPT | Performed by: SURGERY

## 2021-03-25 PROCEDURE — 85025 COMPLETE CBC W/AUTO DIFF WBC: CPT | Performed by: SURGERY

## 2021-03-25 PROCEDURE — 85730 THROMBOPLASTIN TIME PARTIAL: CPT | Performed by: SURGERY

## 2021-03-25 PROCEDURE — P9021 RED BLOOD CELLS UNIT: HCPCS | Performed by: SURGERY

## 2021-03-25 PROCEDURE — 86922 COMPATIBILITY TEST ANTIGLOB: CPT | Performed by: SURGERY

## 2021-03-25 PROCEDURE — 36415 COLL VENOUS BLD VENIPUNCTURE: CPT | Performed by: SURGERY

## 2021-03-25 RX ORDER — IBUPROFEN 800 MG/1
800 TABLET ORAL
Status: DISCONTINUED | OUTPATIENT
Start: 2021-03-25 | End: 2021-03-26

## 2021-03-25 RX ORDER — HYDROCODONE BITARTRATE AND ACETAMINOPHEN 500; 5 MG/1; MG/1
TABLET ORAL
Status: DISCONTINUED | OUTPATIENT
Start: 2021-03-25 | End: 2021-03-26 | Stop reason: HOSPADM

## 2021-03-25 RX ORDER — ACETAMINOPHEN 500 MG
1000 TABLET ORAL
Status: COMPLETED | OUTPATIENT
Start: 2021-03-25 | End: 2021-03-25

## 2021-03-25 RX ADMIN — SODIUM CHLORIDE 500 ML: 0.9 INJECTION, SOLUTION INTRAVENOUS at 12:03

## 2021-03-25 RX ADMIN — ACETAMINOPHEN 1000 MG: 500 TABLET ORAL at 11:03

## 2021-03-26 ENCOUNTER — HOSPITAL ENCOUNTER (INPATIENT)
Facility: HOSPITAL | Age: 81
LOS: 4 days | Discharge: NURSING, REHAB OR LONG-TERM CARE | DRG: 377 | End: 2021-03-30
Attending: HOSPITALIST | Admitting: HOSPITALIST
Payer: MEDICARE

## 2021-03-26 VITALS
SYSTOLIC BLOOD PRESSURE: 175 MMHG | TEMPERATURE: 99 F | RESPIRATION RATE: 16 BRPM | HEART RATE: 78 BPM | WEIGHT: 211.63 LBS | DIASTOLIC BLOOD PRESSURE: 77 MMHG | OXYGEN SATURATION: 95 % | HEIGHT: 69 IN | BODY MASS INDEX: 31.34 KG/M2

## 2021-03-26 DIAGNOSIS — I10 BENIGN ESSENTIAL HTN: ICD-10-CM

## 2021-03-26 DIAGNOSIS — K92.2 GI BLEED: ICD-10-CM

## 2021-03-26 LAB
ANISOCYTOSIS BLD QL SMEAR: ABNORMAL
BACTERIA UR CULT: ABNORMAL
BASOPHILS # BLD AUTO: ABNORMAL K/UL (ref 0–0.2)
BASOPHILS NFR BLD: 0 % (ref 0–1.9)
DIFFERENTIAL METHOD: ABNORMAL
EOSINOPHIL # BLD AUTO: ABNORMAL K/UL (ref 0–0.5)
EOSINOPHIL NFR BLD: 3 % (ref 0–8)
ERYTHROCYTE [DISTWIDTH] IN BLOOD BY AUTOMATED COUNT: 21.9 % (ref 11.5–14.5)
HCT VFR BLD AUTO: 26.7 % (ref 37–48.5)
HGB BLD-MCNC: 9 G/DL (ref 12–16)
IMM GRANULOCYTES # BLD AUTO: ABNORMAL K/UL (ref 0–0.04)
IMM GRANULOCYTES NFR BLD AUTO: ABNORMAL % (ref 0–0.5)
LYMPHOCYTES # BLD AUTO: ABNORMAL K/UL (ref 1–4.8)
LYMPHOCYTES NFR BLD: 21 % (ref 18–48)
MCH RBC QN AUTO: 24.9 PG (ref 27–31)
MCHC RBC AUTO-ENTMCNC: 33.7 G/DL (ref 32–36)
MCV RBC AUTO: 74 FL (ref 82–98)
METAMYELOCYTES NFR BLD MANUAL: 1 %
MONOCYTES # BLD AUTO: ABNORMAL K/UL (ref 0.3–1)
MONOCYTES NFR BLD: 2 % (ref 4–15)
NEUTROPHILS NFR BLD: 73 % (ref 38–73)
NRBC BLD-RTO: 0 /100 WBC
PLATELET # BLD AUTO: 330 K/UL (ref 150–350)
PLATELET BLD QL SMEAR: ABNORMAL
PMV BLD AUTO: 9.8 FL (ref 9.2–12.9)
POLYCHROMASIA BLD QL SMEAR: ABNORMAL
RBC # BLD AUTO: 3.62 M/UL (ref 4–5.4)
WBC # BLD AUTO: 23.17 K/UL (ref 3.9–12.7)

## 2021-03-26 PROCEDURE — 85610 PROTHROMBIN TIME: CPT | Performed by: STUDENT IN AN ORGANIZED HEALTH CARE EDUCATION/TRAINING PROGRAM

## 2021-03-26 PROCEDURE — 96375 TX/PRO/DX INJ NEW DRUG ADDON: CPT

## 2021-03-26 PROCEDURE — 96365 THER/PROPH/DIAG IV INF INIT: CPT

## 2021-03-26 PROCEDURE — 86900 BLOOD TYPING SEROLOGIC ABO: CPT | Performed by: STUDENT IN AN ORGANIZED HEALTH CARE EDUCATION/TRAINING PROGRAM

## 2021-03-26 PROCEDURE — 11000001 HC ACUTE MED/SURG PRIVATE ROOM

## 2021-03-26 PROCEDURE — 85007 BL SMEAR W/DIFF WBC COUNT: CPT | Performed by: FAMILY MEDICINE

## 2021-03-26 PROCEDURE — 63600175 PHARM REV CODE 636 W HCPCS: Performed by: SURGERY

## 2021-03-26 PROCEDURE — 36415 COLL VENOUS BLD VENIPUNCTURE: CPT | Performed by: FAMILY MEDICINE

## 2021-03-26 PROCEDURE — 96366 THER/PROPH/DIAG IV INF ADDON: CPT

## 2021-03-26 PROCEDURE — 25000003 PHARM REV CODE 250: Performed by: SURGERY

## 2021-03-26 PROCEDURE — 85027 COMPLETE CBC AUTOMATED: CPT | Performed by: FAMILY MEDICINE

## 2021-03-26 PROCEDURE — 85730 THROMBOPLASTIN TIME PARTIAL: CPT | Performed by: STUDENT IN AN ORGANIZED HEALTH CARE EDUCATION/TRAINING PROGRAM

## 2021-03-26 PROCEDURE — 86870 RBC ANTIBODY IDENTIFICATION: CPT | Performed by: STUDENT IN AN ORGANIZED HEALTH CARE EDUCATION/TRAINING PROGRAM

## 2021-03-26 RX ORDER — LOSARTAN POTASSIUM 50 MG/1
100 TABLET ORAL DAILY
Status: DISCONTINUED | OUTPATIENT
Start: 2021-03-26 | End: 2021-03-26 | Stop reason: HOSPADM

## 2021-03-26 RX ORDER — PANTOPRAZOLE SODIUM 40 MG/10ML
80 INJECTION, POWDER, LYOPHILIZED, FOR SOLUTION INTRAVENOUS ONCE
Status: COMPLETED | OUTPATIENT
Start: 2021-03-27 | End: 2021-03-27

## 2021-03-26 RX ORDER — PANTOPRAZOLE SODIUM 40 MG/10ML
40 INJECTION, POWDER, LYOPHILIZED, FOR SOLUTION INTRAVENOUS 2 TIMES DAILY
Status: DISCONTINUED | OUTPATIENT
Start: 2021-03-27 | End: 2021-03-28

## 2021-03-26 RX ORDER — ONDANSETRON 2 MG/ML
4 INJECTION INTRAMUSCULAR; INTRAVENOUS
Status: COMPLETED | OUTPATIENT
Start: 2021-03-26 | End: 2021-03-26

## 2021-03-26 RX ORDER — GABAPENTIN 300 MG/1
300 CAPSULE ORAL 2 TIMES DAILY
Status: DISCONTINUED | OUTPATIENT
Start: 2021-03-27 | End: 2021-03-30 | Stop reason: HOSPADM

## 2021-03-26 RX ORDER — METOPROLOL TARTRATE 50 MG/1
100 TABLET ORAL
Status: COMPLETED | OUTPATIENT
Start: 2021-03-26 | End: 2021-03-26

## 2021-03-26 RX ORDER — ALLOPURINOL 100 MG/1
100 TABLET ORAL DAILY
Status: DISCONTINUED | OUTPATIENT
Start: 2021-03-27 | End: 2021-03-30 | Stop reason: HOSPADM

## 2021-03-26 RX ORDER — CEFTRIAXONE 1 G/1
1 INJECTION, POWDER, FOR SOLUTION INTRAMUSCULAR; INTRAVENOUS
Status: DISCONTINUED | OUTPATIENT
Start: 2021-03-27 | End: 2021-03-30

## 2021-03-26 RX ORDER — OXYBUTYNIN CHLORIDE 5 MG/1
5 TABLET, EXTENDED RELEASE ORAL DAILY
Status: DISCONTINUED | OUTPATIENT
Start: 2021-03-27 | End: 2021-03-30 | Stop reason: HOSPADM

## 2021-03-26 RX ORDER — SODIUM CHLORIDE 9 MG/ML
INJECTION, SOLUTION INTRAVENOUS CONTINUOUS
Status: DISCONTINUED | OUTPATIENT
Start: 2021-03-26 | End: 2021-03-26 | Stop reason: HOSPADM

## 2021-03-26 RX ORDER — ACETAMINOPHEN 325 MG/1
650 TABLET ORAL EVERY 4 HOURS PRN
Status: DISCONTINUED | OUTPATIENT
Start: 2021-03-26 | End: 2021-03-30 | Stop reason: HOSPADM

## 2021-03-26 RX ORDER — MORPHINE SULFATE 2 MG/ML
1 INJECTION, SOLUTION INTRAMUSCULAR; INTRAVENOUS
Status: COMPLETED | OUTPATIENT
Start: 2021-03-26 | End: 2021-03-26

## 2021-03-26 RX ORDER — SODIUM CHLORIDE 0.9 % (FLUSH) 0.9 %
10 SYRINGE (ML) INJECTION
Status: DISCONTINUED | OUTPATIENT
Start: 2021-03-26 | End: 2021-03-30 | Stop reason: HOSPADM

## 2021-03-26 RX ORDER — CLONIDINE HYDROCHLORIDE 0.1 MG/1
0.1 TABLET ORAL
Status: COMPLETED | OUTPATIENT
Start: 2021-03-26 | End: 2021-03-26

## 2021-03-26 RX ADMIN — CLONIDINE HYDROCHLORIDE 0.1 MG: 0.1 TABLET ORAL at 01:03

## 2021-03-26 RX ADMIN — ONDANSETRON 4 MG: 2 INJECTION INTRAMUSCULAR; INTRAVENOUS at 12:03

## 2021-03-26 RX ADMIN — CEFTRIAXONE SODIUM 2 G: 2 INJECTION, POWDER, FOR SOLUTION INTRAMUSCULAR; INTRAVENOUS at 06:03

## 2021-03-26 RX ADMIN — METOPROLOL TARTRATE 100 MG: 50 TABLET, FILM COATED ORAL at 01:03

## 2021-03-26 RX ADMIN — MORPHINE SULFATE 1 MG: 2 INJECTION, SOLUTION INTRAMUSCULAR; INTRAVENOUS at 12:03

## 2021-03-26 RX ADMIN — LOSARTAN POTASSIUM 100 MG: 50 TABLET, FILM COATED ORAL at 02:03

## 2021-03-26 RX ADMIN — SODIUM CHLORIDE: 0.9 INJECTION, SOLUTION INTRAVENOUS at 01:03

## 2021-03-27 PROBLEM — Z95.2 S/P TAVR (TRANSCATHETER AORTIC VALVE REPLACEMENT): Status: ACTIVE | Noted: 2021-03-27

## 2021-03-27 PROBLEM — E78.5 HYPERLIPIDEMIA LDL GOAL <70: Chronic | Status: ACTIVE | Noted: 2018-09-13

## 2021-03-27 PROBLEM — N39.0 UTI (URINARY TRACT INFECTION): Status: ACTIVE | Noted: 2021-03-27

## 2021-03-27 PROBLEM — N30.00 ACUTE CYSTITIS WITHOUT HEMATURIA: Status: ACTIVE | Noted: 2021-03-27

## 2021-03-27 PROBLEM — Z95.3 S/P TAVR (TRANSCATHETER AORTIC VALVE REPLACEMENT): Status: ACTIVE | Noted: 2021-03-27

## 2021-03-27 PROBLEM — R32 URINARY INCONTINENCE: Chronic | Status: ACTIVE | Noted: 2021-03-11

## 2021-03-27 LAB
ABO + RH BLD: NORMAL
ALBUMIN SERPL BCP-MCNC: 2.2 G/DL (ref 3.5–5.2)
ALP SERPL-CCNC: 215 U/L (ref 55–135)
ALT SERPL W/O P-5'-P-CCNC: 16 U/L (ref 10–44)
ANION GAP SERPL CALC-SCNC: 11 MMOL/L (ref 8–16)
APTT BLDCRRT: 25.6 SEC (ref 21–32)
AST SERPL-CCNC: 21 U/L (ref 10–40)
BACTERIA UR CULT: NORMAL
BASOPHILS # BLD AUTO: 0.06 K/UL (ref 0–0.2)
BASOPHILS # BLD AUTO: 0.07 K/UL (ref 0–0.2)
BASOPHILS # BLD AUTO: 0.09 K/UL (ref 0–0.2)
BASOPHILS NFR BLD: 0.4 % (ref 0–1.9)
BASOPHILS NFR BLD: 0.4 % (ref 0–1.9)
BASOPHILS NFR BLD: 0.5 % (ref 0–1.9)
BILIRUB SERPL-MCNC: 0.5 MG/DL (ref 0.1–1)
BLD GP AB SCN CELLS X3 SERPL QL: NORMAL
BLOOD GROUP ANTIBODIES SERPL: NORMAL
BUN SERPL-MCNC: 18 MG/DL (ref 8–23)
CALCIUM SERPL-MCNC: 8.9 MG/DL (ref 8.7–10.5)
CHLORIDE SERPL-SCNC: 101 MMOL/L (ref 95–110)
CO2 SERPL-SCNC: 28 MMOL/L (ref 23–29)
CREAT SERPL-MCNC: 0.8 MG/DL (ref 0.5–1.4)
DIFFERENTIAL METHOD: ABNORMAL
EOSINOPHIL # BLD AUTO: 0.1 K/UL (ref 0–0.5)
EOSINOPHIL # BLD AUTO: 0.1 K/UL (ref 0–0.5)
EOSINOPHIL # BLD AUTO: 0.2 K/UL (ref 0–0.5)
EOSINOPHIL NFR BLD: 0.7 % (ref 0–8)
EOSINOPHIL NFR BLD: 0.7 % (ref 0–8)
EOSINOPHIL NFR BLD: 0.9 % (ref 0–8)
ERYTHROCYTE [DISTWIDTH] IN BLOOD BY AUTOMATED COUNT: 22.3 % (ref 11.5–14.5)
ERYTHROCYTE [DISTWIDTH] IN BLOOD BY AUTOMATED COUNT: 22.5 % (ref 11.5–14.5)
ERYTHROCYTE [DISTWIDTH] IN BLOOD BY AUTOMATED COUNT: 22.6 % (ref 11.5–14.5)
EST. GFR  (AFRICAN AMERICAN): >60 ML/MIN/1.73 M^2
EST. GFR  (NON AFRICAN AMERICAN): >60 ML/MIN/1.73 M^2
FERRITIN SERPL-MCNC: 1133 NG/ML (ref 20–300)
FOLATE SERPL-MCNC: 14.4 NG/ML (ref 4–24)
GGT SERPL-CCNC: 78 U/L (ref 8–55)
GLUCOSE SERPL-MCNC: 100 MG/DL (ref 70–110)
HAPTOGLOB SERPL-MCNC: 445 MG/DL (ref 30–250)
HCT VFR BLD AUTO: 27.1 % (ref 37–48.5)
HCT VFR BLD AUTO: 28.1 % (ref 37–48.5)
HCT VFR BLD AUTO: 28.5 % (ref 37–48.5)
HGB BLD-MCNC: 8.8 G/DL (ref 12–16)
HGB BLD-MCNC: 9.1 G/DL (ref 12–16)
HGB BLD-MCNC: 9.4 G/DL (ref 12–16)
IMM GRANULOCYTES # BLD AUTO: 0.1 K/UL (ref 0–0.04)
IMM GRANULOCYTES # BLD AUTO: 0.13 K/UL (ref 0–0.04)
IMM GRANULOCYTES # BLD AUTO: 0.15 K/UL (ref 0–0.04)
IMM GRANULOCYTES NFR BLD AUTO: 0.6 % (ref 0–0.5)
IMM GRANULOCYTES NFR BLD AUTO: 0.7 % (ref 0–0.5)
IMM GRANULOCYTES NFR BLD AUTO: 0.8 % (ref 0–0.5)
INR PPP: 1.1 (ref 0.8–1.2)
IRON SERPL-MCNC: 14 UG/DL (ref 30–160)
LDH SERPL L TO P-CCNC: 275 U/L (ref 110–260)
LYMPHOCYTES # BLD AUTO: 2.9 K/UL (ref 1–4.8)
LYMPHOCYTES # BLD AUTO: 3.2 K/UL (ref 1–4.8)
LYMPHOCYTES # BLD AUTO: 3.6 K/UL (ref 1–4.8)
LYMPHOCYTES NFR BLD: 17.4 % (ref 18–48)
LYMPHOCYTES NFR BLD: 18 % (ref 18–48)
LYMPHOCYTES NFR BLD: 18.7 % (ref 18–48)
MAGNESIUM SERPL-MCNC: 1.7 MG/DL (ref 1.6–2.6)
MCH RBC QN AUTO: 24.1 PG (ref 27–31)
MCH RBC QN AUTO: 24.3 PG (ref 27–31)
MCH RBC QN AUTO: 24.5 PG (ref 27–31)
MCHC RBC AUTO-ENTMCNC: 32.4 G/DL (ref 32–36)
MCHC RBC AUTO-ENTMCNC: 32.5 G/DL (ref 32–36)
MCHC RBC AUTO-ENTMCNC: 33 G/DL (ref 32–36)
MCV RBC AUTO: 74 FL (ref 82–98)
MCV RBC AUTO: 74 FL (ref 82–98)
MCV RBC AUTO: 75 FL (ref 82–98)
MONOCYTES # BLD AUTO: 1.3 K/UL (ref 0.3–1)
MONOCYTES # BLD AUTO: 1.3 K/UL (ref 0.3–1)
MONOCYTES # BLD AUTO: 1.5 K/UL (ref 0.3–1)
MONOCYTES NFR BLD: 7.6 % (ref 4–15)
MONOCYTES NFR BLD: 7.6 % (ref 4–15)
MONOCYTES NFR BLD: 8 % (ref 4–15)
NEUTROPHILS # BLD AUTO: 11.9 K/UL (ref 1.8–7.7)
NEUTROPHILS # BLD AUTO: 12.8 K/UL (ref 1.8–7.7)
NEUTROPHILS # BLD AUTO: 13.9 K/UL (ref 1.8–7.7)
NEUTROPHILS NFR BLD: 71.8 % (ref 38–73)
NEUTROPHILS NFR BLD: 72.5 % (ref 38–73)
NEUTROPHILS NFR BLD: 72.7 % (ref 38–73)
NRBC BLD-RTO: 0 /100 WBC
PHOSPHATE SERPL-MCNC: 3.4 MG/DL (ref 2.7–4.5)
PLATELET # BLD AUTO: 305 K/UL (ref 150–350)
PLATELET # BLD AUTO: 335 K/UL (ref 150–350)
PLATELET # BLD AUTO: 345 K/UL (ref 150–350)
PMV BLD AUTO: 10.1 FL (ref 9.2–12.9)
PMV BLD AUTO: 10.8 FL (ref 9.2–12.9)
PMV BLD AUTO: 10.8 FL (ref 9.2–12.9)
POCT GLUCOSE: 137 MG/DL (ref 70–110)
POTASSIUM SERPL-SCNC: 3.8 MMOL/L (ref 3.5–5.1)
PROT SERPL-MCNC: 6.5 G/DL (ref 6–8.4)
PROTHROMBIN TIME: 11.6 SEC (ref 9–12.5)
RBC # BLD AUTO: 3.65 M/UL (ref 4–5.4)
RBC # BLD AUTO: 3.74 M/UL (ref 4–5.4)
RBC # BLD AUTO: 3.84 M/UL (ref 4–5.4)
RETICS/RBC NFR AUTO: 2.1 % (ref 0.5–2.5)
SATURATED IRON: 8 % (ref 20–50)
SODIUM SERPL-SCNC: 140 MMOL/L (ref 136–145)
TOTAL IRON BINDING CAPACITY: 182 UG/DL (ref 250–450)
TRANSFERRIN SERPL-MCNC: 123 MG/DL (ref 200–375)
VIT B12 SERPL-MCNC: 647 PG/ML (ref 210–950)
WBC # BLD AUTO: 16.34 K/UL (ref 3.9–12.7)
WBC # BLD AUTO: 17.65 K/UL (ref 3.9–12.7)
WBC # BLD AUTO: 19.3 K/UL (ref 3.9–12.7)

## 2021-03-27 PROCEDURE — 97535 SELF CARE MNGMENT TRAINING: CPT

## 2021-03-27 PROCEDURE — 83735 ASSAY OF MAGNESIUM: CPT | Performed by: STUDENT IN AN ORGANIZED HEALTH CARE EDUCATION/TRAINING PROGRAM

## 2021-03-27 PROCEDURE — 99223 PR INITIAL HOSPITAL CARE,LEVL III: ICD-10-PCS | Mod: AI,GC,, | Performed by: HOSPITALIST

## 2021-03-27 PROCEDURE — 84100 ASSAY OF PHOSPHORUS: CPT | Performed by: STUDENT IN AN ORGANIZED HEALTH CARE EDUCATION/TRAINING PROGRAM

## 2021-03-27 PROCEDURE — 63600175 PHARM REV CODE 636 W HCPCS: Performed by: STUDENT IN AN ORGANIZED HEALTH CARE EDUCATION/TRAINING PROGRAM

## 2021-03-27 PROCEDURE — 83615 LACTATE (LD) (LDH) ENZYME: CPT | Performed by: STUDENT IN AN ORGANIZED HEALTH CARE EDUCATION/TRAINING PROGRAM

## 2021-03-27 PROCEDURE — 83540 ASSAY OF IRON: CPT | Performed by: STUDENT IN AN ORGANIZED HEALTH CARE EDUCATION/TRAINING PROGRAM

## 2021-03-27 PROCEDURE — 25000003 PHARM REV CODE 250: Performed by: STUDENT IN AN ORGANIZED HEALTH CARE EDUCATION/TRAINING PROGRAM

## 2021-03-27 PROCEDURE — 99222 1ST HOSP IP/OBS MODERATE 55: CPT | Mod: ,,, | Performed by: INTERNAL MEDICINE

## 2021-03-27 PROCEDURE — 85045 AUTOMATED RETICULOCYTE COUNT: CPT | Performed by: STUDENT IN AN ORGANIZED HEALTH CARE EDUCATION/TRAINING PROGRAM

## 2021-03-27 PROCEDURE — 82607 VITAMIN B-12: CPT | Performed by: STUDENT IN AN ORGANIZED HEALTH CARE EDUCATION/TRAINING PROGRAM

## 2021-03-27 PROCEDURE — 11000001 HC ACUTE MED/SURG PRIVATE ROOM

## 2021-03-27 PROCEDURE — 82728 ASSAY OF FERRITIN: CPT | Performed by: STUDENT IN AN ORGANIZED HEALTH CARE EDUCATION/TRAINING PROGRAM

## 2021-03-27 PROCEDURE — C9113 INJ PANTOPRAZOLE SODIUM, VIA: HCPCS | Performed by: STUDENT IN AN ORGANIZED HEALTH CARE EDUCATION/TRAINING PROGRAM

## 2021-03-27 PROCEDURE — 85025 COMPLETE CBC W/AUTO DIFF WBC: CPT | Mod: 91 | Performed by: STUDENT IN AN ORGANIZED HEALTH CARE EDUCATION/TRAINING PROGRAM

## 2021-03-27 PROCEDURE — 36415 COLL VENOUS BLD VENIPUNCTURE: CPT | Performed by: STUDENT IN AN ORGANIZED HEALTH CARE EDUCATION/TRAINING PROGRAM

## 2021-03-27 PROCEDURE — 82746 ASSAY OF FOLIC ACID SERUM: CPT | Performed by: STUDENT IN AN ORGANIZED HEALTH CARE EDUCATION/TRAINING PROGRAM

## 2021-03-27 PROCEDURE — 97110 THERAPEUTIC EXERCISES: CPT

## 2021-03-27 PROCEDURE — 83021 HEMOGLOBIN CHROMOTOGRAPHY: CPT | Performed by: STUDENT IN AN ORGANIZED HEALTH CARE EDUCATION/TRAINING PROGRAM

## 2021-03-27 PROCEDURE — 99223 1ST HOSP IP/OBS HIGH 75: CPT | Mod: AI,GC,, | Performed by: HOSPITALIST

## 2021-03-27 PROCEDURE — 83010 ASSAY OF HAPTOGLOBIN QUANT: CPT | Performed by: STUDENT IN AN ORGANIZED HEALTH CARE EDUCATION/TRAINING PROGRAM

## 2021-03-27 PROCEDURE — 97166 OT EVAL MOD COMPLEX 45 MIN: CPT

## 2021-03-27 PROCEDURE — 97162 PT EVAL MOD COMPLEX 30 MIN: CPT

## 2021-03-27 PROCEDURE — 99222 PR INITIAL HOSPITAL CARE,LEVL II: ICD-10-PCS | Mod: ,,, | Performed by: INTERNAL MEDICINE

## 2021-03-27 PROCEDURE — 80053 COMPREHEN METABOLIC PANEL: CPT | Performed by: STUDENT IN AN ORGANIZED HEALTH CARE EDUCATION/TRAINING PROGRAM

## 2021-03-27 PROCEDURE — 82977 ASSAY OF GGT: CPT | Performed by: STUDENT IN AN ORGANIZED HEALTH CARE EDUCATION/TRAINING PROGRAM

## 2021-03-27 RX ORDER — FERROUS SULFATE 325(65) MG
325 TABLET, DELAYED RELEASE (ENTERIC COATED) ORAL DAILY
Status: DISCONTINUED | OUTPATIENT
Start: 2021-03-27 | End: 2021-03-30 | Stop reason: HOSPADM

## 2021-03-27 RX ORDER — SENNOSIDES 8.6 MG/1
8.6 TABLET ORAL DAILY PRN
Status: DISCONTINUED | OUTPATIENT
Start: 2021-03-27 | End: 2021-03-30 | Stop reason: HOSPADM

## 2021-03-27 RX ORDER — AMOXICILLIN 250 MG
1 CAPSULE ORAL DAILY PRN
Status: DISCONTINUED | OUTPATIENT
Start: 2021-03-27 | End: 2021-03-27

## 2021-03-27 RX ORDER — TALC
6 POWDER (GRAM) TOPICAL NIGHTLY PRN
Status: DISCONTINUED | OUTPATIENT
Start: 2021-03-27 | End: 2021-03-30 | Stop reason: HOSPADM

## 2021-03-27 RX ORDER — POLYETHYLENE GLYCOL 3350 17 G/17G
17 POWDER, FOR SOLUTION ORAL DAILY
Status: DISCONTINUED | OUTPATIENT
Start: 2021-03-27 | End: 2021-03-30 | Stop reason: HOSPADM

## 2021-03-27 RX ADMIN — GABAPENTIN 300 MG: 300 CAPSULE ORAL at 08:03

## 2021-03-27 RX ADMIN — ACETAMINOPHEN 650 MG: 325 TABLET ORAL at 09:03

## 2021-03-27 RX ADMIN — GABAPENTIN 300 MG: 300 CAPSULE ORAL at 09:03

## 2021-03-27 RX ADMIN — CEFTRIAXONE 1 G: 1 INJECTION, POWDER, FOR SOLUTION INTRAMUSCULAR; INTRAVENOUS at 05:03

## 2021-03-27 RX ADMIN — POLYETHYLENE GLYCOL 3350 17 G: 17 POWDER, FOR SOLUTION ORAL at 08:03

## 2021-03-27 RX ADMIN — ACETAMINOPHEN 650 MG: 325 TABLET ORAL at 04:03

## 2021-03-27 RX ADMIN — MELATONIN TAB 3 MG 6 MG: 3 TAB at 02:03

## 2021-03-27 RX ADMIN — PANTOPRAZOLE SODIUM 80 MG: 40 INJECTION, POWDER, FOR SOLUTION INTRAVENOUS at 01:03

## 2021-03-27 RX ADMIN — ALLOPURINOL 100 MG: 100 TABLET ORAL at 08:03

## 2021-03-27 RX ADMIN — PANTOPRAZOLE SODIUM 40 MG: 40 INJECTION, POWDER, FOR SOLUTION INTRAVENOUS at 02:03

## 2021-03-27 RX ADMIN — OXYBUTYNIN CHLORIDE 5 MG: 5 TABLET, EXTENDED RELEASE ORAL at 08:03

## 2021-03-27 RX ADMIN — PANTOPRAZOLE SODIUM 40 MG: 40 INJECTION, POWDER, FOR SOLUTION INTRAVENOUS at 10:03

## 2021-03-27 RX ADMIN — FERROUS SULFATE TAB EC 325 MG (65 MG FE EQUIVALENT) 325 MG: 325 (65 FE) TABLET DELAYED RESPONSE at 01:03

## 2021-03-27 RX ADMIN — MELATONIN TAB 3 MG 6 MG: 3 TAB at 09:03

## 2021-03-28 LAB
ANION GAP SERPL CALC-SCNC: 9 MMOL/L (ref 8–16)
BASO STIPL BLD QL SMEAR: ABNORMAL
BASOPHILS # BLD AUTO: 0.08 K/UL (ref 0–0.2)
BASOPHILS # BLD AUTO: 0.08 K/UL (ref 0–0.2)
BASOPHILS NFR BLD: 0.4 % (ref 0–1.9)
BASOPHILS NFR BLD: 0.5 % (ref 0–1.9)
BUN SERPL-MCNC: 19 MG/DL (ref 8–23)
CALCIUM SERPL-MCNC: 9.1 MG/DL (ref 8.7–10.5)
CHLORIDE SERPL-SCNC: 102 MMOL/L (ref 95–110)
CO2 SERPL-SCNC: 29 MMOL/L (ref 23–29)
CREAT SERPL-MCNC: 0.9 MG/DL (ref 0.5–1.4)
DIFFERENTIAL METHOD: ABNORMAL
DIFFERENTIAL METHOD: ABNORMAL
EOSINOPHIL # BLD AUTO: 0.3 K/UL (ref 0–0.5)
EOSINOPHIL # BLD AUTO: 0.3 K/UL (ref 0–0.5)
EOSINOPHIL NFR BLD: 1.5 % (ref 0–8)
EOSINOPHIL NFR BLD: 1.9 % (ref 0–8)
ERYTHROCYTE [DISTWIDTH] IN BLOOD BY AUTOMATED COUNT: 22.7 % (ref 11.5–14.5)
ERYTHROCYTE [DISTWIDTH] IN BLOOD BY AUTOMATED COUNT: 23 % (ref 11.5–14.5)
EST. GFR  (AFRICAN AMERICAN): >60 ML/MIN/1.73 M^2
EST. GFR  (NON AFRICAN AMERICAN): >60 ML/MIN/1.73 M^2
GIANT PLATELETS BLD QL SMEAR: ABNORMAL
GLUCOSE SERPL-MCNC: 115 MG/DL (ref 70–110)
HCT VFR BLD AUTO: 27.5 % (ref 37–48.5)
HCT VFR BLD AUTO: 29.3 % (ref 37–48.5)
HGB BLD-MCNC: 8.9 G/DL (ref 12–16)
HGB BLD-MCNC: 9.6 G/DL (ref 12–16)
IMM GRANULOCYTES # BLD AUTO: 0.1 K/UL (ref 0–0.04)
IMM GRANULOCYTES # BLD AUTO: 0.16 K/UL (ref 0–0.04)
IMM GRANULOCYTES NFR BLD AUTO: 0.6 % (ref 0–0.5)
IMM GRANULOCYTES NFR BLD AUTO: 0.9 % (ref 0–0.5)
LYMPHOCYTES # BLD AUTO: 3.9 K/UL (ref 1–4.8)
LYMPHOCYTES # BLD AUTO: 4.2 K/UL (ref 1–4.8)
LYMPHOCYTES NFR BLD: 22.8 % (ref 18–48)
LYMPHOCYTES NFR BLD: 24 % (ref 18–48)
MAGNESIUM SERPL-MCNC: 1.7 MG/DL (ref 1.6–2.6)
MCH RBC QN AUTO: 24.1 PG (ref 27–31)
MCH RBC QN AUTO: 24.6 PG (ref 27–31)
MCHC RBC AUTO-ENTMCNC: 32.4 G/DL (ref 32–36)
MCHC RBC AUTO-ENTMCNC: 32.8 G/DL (ref 32–36)
MCV RBC AUTO: 75 FL (ref 82–98)
MCV RBC AUTO: 75 FL (ref 82–98)
MONOCYTES # BLD AUTO: 1.2 K/UL (ref 0.3–1)
MONOCYTES # BLD AUTO: 1.4 K/UL (ref 0.3–1)
MONOCYTES NFR BLD: 7.6 % (ref 4–15)
MONOCYTES NFR BLD: 7.6 % (ref 4–15)
NEUTROPHILS # BLD AUTO: 10.5 K/UL (ref 1.8–7.7)
NEUTROPHILS # BLD AUTO: 12.4 K/UL (ref 1.8–7.7)
NEUTROPHILS NFR BLD: 65.4 % (ref 38–73)
NEUTROPHILS NFR BLD: 66.8 % (ref 38–73)
NRBC BLD-RTO: 0 /100 WBC
NRBC BLD-RTO: 0 /100 WBC
PHOSPHATE SERPL-MCNC: 3.4 MG/DL (ref 2.7–4.5)
PLATELET # BLD AUTO: 337 K/UL (ref 150–350)
PLATELET # BLD AUTO: 346 K/UL (ref 150–350)
PLATELET BLD QL SMEAR: ABNORMAL
PMV BLD AUTO: 10.4 FL (ref 9.2–12.9)
PMV BLD AUTO: 10.9 FL (ref 9.2–12.9)
POTASSIUM SERPL-SCNC: 3.5 MMOL/L (ref 3.5–5.1)
RBC # BLD AUTO: 3.69 M/UL (ref 4–5.4)
RBC # BLD AUTO: 3.9 M/UL (ref 4–5.4)
SMUDGE CELLS BLD QL SMEAR: PRESENT
SODIUM SERPL-SCNC: 140 MMOL/L (ref 136–145)
WBC # BLD AUTO: 16.11 K/UL (ref 3.9–12.7)
WBC # BLD AUTO: 18.58 K/UL (ref 3.9–12.7)

## 2021-03-28 PROCEDURE — 25000003 PHARM REV CODE 250: Performed by: STUDENT IN AN ORGANIZED HEALTH CARE EDUCATION/TRAINING PROGRAM

## 2021-03-28 PROCEDURE — 36415 COLL VENOUS BLD VENIPUNCTURE: CPT | Performed by: STUDENT IN AN ORGANIZED HEALTH CARE EDUCATION/TRAINING PROGRAM

## 2021-03-28 PROCEDURE — 80048 BASIC METABOLIC PNL TOTAL CA: CPT | Performed by: STUDENT IN AN ORGANIZED HEALTH CARE EDUCATION/TRAINING PROGRAM

## 2021-03-28 PROCEDURE — 94761 N-INVAS EAR/PLS OXIMETRY MLT: CPT

## 2021-03-28 PROCEDURE — 85025 COMPLETE CBC W/AUTO DIFF WBC: CPT | Performed by: STUDENT IN AN ORGANIZED HEALTH CARE EDUCATION/TRAINING PROGRAM

## 2021-03-28 PROCEDURE — 83735 ASSAY OF MAGNESIUM: CPT | Performed by: STUDENT IN AN ORGANIZED HEALTH CARE EDUCATION/TRAINING PROGRAM

## 2021-03-28 PROCEDURE — C9113 INJ PANTOPRAZOLE SODIUM, VIA: HCPCS | Performed by: STUDENT IN AN ORGANIZED HEALTH CARE EDUCATION/TRAINING PROGRAM

## 2021-03-28 PROCEDURE — 99232 PR SUBSEQUENT HOSPITAL CARE,LEVL II: ICD-10-PCS | Mod: GC,,, | Performed by: STUDENT IN AN ORGANIZED HEALTH CARE EDUCATION/TRAINING PROGRAM

## 2021-03-28 PROCEDURE — 84100 ASSAY OF PHOSPHORUS: CPT | Performed by: STUDENT IN AN ORGANIZED HEALTH CARE EDUCATION/TRAINING PROGRAM

## 2021-03-28 PROCEDURE — 99232 SBSQ HOSP IP/OBS MODERATE 35: CPT | Mod: GC,,, | Performed by: STUDENT IN AN ORGANIZED HEALTH CARE EDUCATION/TRAINING PROGRAM

## 2021-03-28 PROCEDURE — 63600175 PHARM REV CODE 636 W HCPCS: Performed by: STUDENT IN AN ORGANIZED HEALTH CARE EDUCATION/TRAINING PROGRAM

## 2021-03-28 PROCEDURE — 11000001 HC ACUTE MED/SURG PRIVATE ROOM

## 2021-03-28 RX ORDER — HYDROCHLOROTHIAZIDE 25 MG/1
25 TABLET ORAL DAILY
Status: DISCONTINUED | OUTPATIENT
Start: 2021-03-28 | End: 2021-03-28

## 2021-03-28 RX ORDER — LOSARTAN POTASSIUM 50 MG/1
50 TABLET ORAL ONCE
Status: COMPLETED | OUTPATIENT
Start: 2021-03-28 | End: 2021-03-28

## 2021-03-28 RX ORDER — LOSARTAN POTASSIUM 50 MG/1
100 TABLET ORAL DAILY
Status: DISCONTINUED | OUTPATIENT
Start: 2021-03-28 | End: 2021-03-30 | Stop reason: HOSPADM

## 2021-03-28 RX ORDER — PANTOPRAZOLE SODIUM 40 MG/1
40 TABLET, DELAYED RELEASE ORAL 2 TIMES DAILY
Status: DISCONTINUED | OUTPATIENT
Start: 2021-03-28 | End: 2021-03-30 | Stop reason: HOSPADM

## 2021-03-28 RX ORDER — METOPROLOL SUCCINATE 100 MG/1
200 TABLET, EXTENDED RELEASE ORAL 2 TIMES DAILY
Status: DISCONTINUED | OUTPATIENT
Start: 2021-03-28 | End: 2021-03-30 | Stop reason: HOSPADM

## 2021-03-28 RX ORDER — ATORVASTATIN CALCIUM 10 MG/1
10 TABLET, FILM COATED ORAL NIGHTLY
Status: DISCONTINUED | OUTPATIENT
Start: 2021-03-28 | End: 2021-03-30 | Stop reason: HOSPADM

## 2021-03-28 RX ORDER — CLONIDINE HYDROCHLORIDE 0.1 MG/1
0.1 TABLET ORAL 3 TIMES DAILY
Status: DISCONTINUED | OUTPATIENT
Start: 2021-03-28 | End: 2021-03-29

## 2021-03-28 RX ORDER — HYDRALAZINE HYDROCHLORIDE 20 MG/ML
10 INJECTION INTRAMUSCULAR; INTRAVENOUS EVERY 8 HOURS PRN
Status: DISCONTINUED | OUTPATIENT
Start: 2021-03-28 | End: 2021-03-30 | Stop reason: HOSPADM

## 2021-03-28 RX ADMIN — ALLOPURINOL 100 MG: 100 TABLET ORAL at 08:03

## 2021-03-28 RX ADMIN — LOSARTAN POTASSIUM 100 MG: 50 TABLET, FILM COATED ORAL at 06:03

## 2021-03-28 RX ADMIN — GABAPENTIN 300 MG: 300 CAPSULE ORAL at 08:03

## 2021-03-28 RX ADMIN — POTASSIUM BICARBONATE 40 MEQ: 391 TABLET, EFFERVESCENT ORAL at 07:03

## 2021-03-28 RX ADMIN — CLONIDINE HYDROCHLORIDE 0.1 MG: 0.1 TABLET ORAL at 08:03

## 2021-03-28 RX ADMIN — ACETAMINOPHEN 650 MG: 325 TABLET ORAL at 05:03

## 2021-03-28 RX ADMIN — CEFTRIAXONE 1 G: 1 INJECTION, POWDER, FOR SOLUTION INTRAMUSCULAR; INTRAVENOUS at 06:03

## 2021-03-28 RX ADMIN — METOPROLOL SUCCINATE 200 MG: 100 TABLET, EXTENDED RELEASE ORAL at 08:03

## 2021-03-28 RX ADMIN — PANTOPRAZOLE SODIUM 40 MG: 40 INJECTION, POWDER, FOR SOLUTION INTRAVENOUS at 10:03

## 2021-03-28 RX ADMIN — PANTOPRAZOLE SODIUM 40 MG: 40 TABLET, DELAYED RELEASE ORAL at 08:03

## 2021-03-28 RX ADMIN — ATORVASTATIN CALCIUM 10 MG: 10 TABLET, FILM COATED ORAL at 08:03

## 2021-03-28 RX ADMIN — HYDROCHLOROTHIAZIDE 25 MG: 25 TABLET ORAL at 06:03

## 2021-03-28 RX ADMIN — LOSARTAN POTASSIUM 50 MG: 50 TABLET, FILM COATED ORAL at 04:03

## 2021-03-28 RX ADMIN — POLYETHYLENE GLYCOL 3350 17 G: 17 POWDER, FOR SOLUTION ORAL at 08:03

## 2021-03-28 RX ADMIN — OXYBUTYNIN CHLORIDE 5 MG: 5 TABLET, EXTENDED RELEASE ORAL at 08:03

## 2021-03-28 RX ADMIN — FERROUS SULFATE TAB EC 325 MG (65 MG FE EQUIVALENT) 325 MG: 325 (65 FE) TABLET DELAYED RESPONSE at 08:03

## 2021-03-29 LAB
ANION GAP SERPL CALC-SCNC: 9 MMOL/L (ref 8–16)
ANISOCYTOSIS BLD QL SMEAR: SLIGHT
BASOPHILS # BLD AUTO: 0.06 K/UL (ref 0–0.2)
BASOPHILS # BLD AUTO: 0.1 K/UL (ref 0–0.2)
BASOPHILS NFR BLD: 0.4 % (ref 0–1.9)
BASOPHILS NFR BLD: 0.5 % (ref 0–1.9)
BUN SERPL-MCNC: 16 MG/DL (ref 8–23)
CALCIUM SERPL-MCNC: 8.7 MG/DL (ref 8.7–10.5)
CHLORIDE SERPL-SCNC: 101 MMOL/L (ref 95–110)
CO2 SERPL-SCNC: 30 MMOL/L (ref 23–29)
CREAT SERPL-MCNC: 0.9 MG/DL (ref 0.5–1.4)
DIFFERENTIAL METHOD: ABNORMAL
DIFFERENTIAL METHOD: ABNORMAL
EOSINOPHIL # BLD AUTO: 0.5 K/UL (ref 0–0.5)
EOSINOPHIL # BLD AUTO: 0.5 K/UL (ref 0–0.5)
EOSINOPHIL NFR BLD: 2.3 % (ref 0–8)
EOSINOPHIL NFR BLD: 2.9 % (ref 0–8)
ERYTHROCYTE [DISTWIDTH] IN BLOOD BY AUTOMATED COUNT: 23 % (ref 11.5–14.5)
ERYTHROCYTE [DISTWIDTH] IN BLOOD BY AUTOMATED COUNT: 23.1 % (ref 11.5–14.5)
EST. GFR  (AFRICAN AMERICAN): >60 ML/MIN/1.73 M^2
EST. GFR  (NON AFRICAN AMERICAN): >60 ML/MIN/1.73 M^2
GLUCOSE SERPL-MCNC: 104 MG/DL (ref 70–110)
HCT VFR BLD AUTO: 29.1 % (ref 37–48.5)
HCT VFR BLD AUTO: 29.7 % (ref 37–48.5)
HGB BLD-MCNC: 9.4 G/DL (ref 12–16)
HGB BLD-MCNC: 9.6 G/DL (ref 12–16)
HYPOCHROMIA BLD QL SMEAR: ABNORMAL
IMM GRANULOCYTES # BLD AUTO: 0.14 K/UL (ref 0–0.04)
IMM GRANULOCYTES # BLD AUTO: 0.14 K/UL (ref 0–0.04)
IMM GRANULOCYTES NFR BLD AUTO: 0.7 % (ref 0–0.5)
IMM GRANULOCYTES NFR BLD AUTO: 0.8 % (ref 0–0.5)
LYMPHOCYTES # BLD AUTO: 4.4 K/UL (ref 1–4.8)
LYMPHOCYTES # BLD AUTO: 4.6 K/UL (ref 1–4.8)
LYMPHOCYTES NFR BLD: 23 % (ref 18–48)
LYMPHOCYTES NFR BLD: 25.7 % (ref 18–48)
MCH RBC QN AUTO: 24.1 PG (ref 27–31)
MCH RBC QN AUTO: 24.6 PG (ref 27–31)
MCHC RBC AUTO-ENTMCNC: 32.3 G/DL (ref 32–36)
MCHC RBC AUTO-ENTMCNC: 32.3 G/DL (ref 32–36)
MCV RBC AUTO: 75 FL (ref 82–98)
MCV RBC AUTO: 76 FL (ref 82–98)
MONOCYTES # BLD AUTO: 1.1 K/UL (ref 0.3–1)
MONOCYTES # BLD AUTO: 1.3 K/UL (ref 0.3–1)
MONOCYTES NFR BLD: 6.6 % (ref 4–15)
MONOCYTES NFR BLD: 6.7 % (ref 4–15)
NEUTROPHILS # BLD AUTO: 10.8 K/UL (ref 1.8–7.7)
NEUTROPHILS # BLD AUTO: 13.3 K/UL (ref 1.8–7.7)
NEUTROPHILS NFR BLD: 63.6 % (ref 38–73)
NEUTROPHILS NFR BLD: 66.8 % (ref 38–73)
NRBC BLD-RTO: 0 /100 WBC
NRBC BLD-RTO: 0 /100 WBC
PHOSPHATE SERPL-MCNC: 3.1 MG/DL (ref 2.7–4.5)
PLATELET # BLD AUTO: 320 K/UL (ref 150–450)
PLATELET # BLD AUTO: 330 K/UL (ref 150–450)
PLATELET BLD QL SMEAR: ABNORMAL
PMV BLD AUTO: 10.8 FL (ref 9.2–12.9)
PMV BLD AUTO: 11 FL (ref 9.2–12.9)
POTASSIUM SERPL-SCNC: 3.8 MMOL/L (ref 3.5–5.1)
RBC # BLD AUTO: 3.82 M/UL (ref 4–5.4)
RBC # BLD AUTO: 3.98 M/UL (ref 4–5.4)
SODIUM SERPL-SCNC: 140 MMOL/L (ref 136–145)
WBC # BLD AUTO: 17.02 K/UL (ref 3.9–12.7)
WBC # BLD AUTO: 19.9 K/UL (ref 3.9–12.7)

## 2021-03-29 PROCEDURE — 25000003 PHARM REV CODE 250: Performed by: STUDENT IN AN ORGANIZED HEALTH CARE EDUCATION/TRAINING PROGRAM

## 2021-03-29 PROCEDURE — 63600175 PHARM REV CODE 636 W HCPCS: Performed by: STUDENT IN AN ORGANIZED HEALTH CARE EDUCATION/TRAINING PROGRAM

## 2021-03-29 PROCEDURE — 25000003 PHARM REV CODE 250: Performed by: INTERNAL MEDICINE

## 2021-03-29 PROCEDURE — 36415 COLL VENOUS BLD VENIPUNCTURE: CPT | Performed by: STUDENT IN AN ORGANIZED HEALTH CARE EDUCATION/TRAINING PROGRAM

## 2021-03-29 PROCEDURE — 84100 ASSAY OF PHOSPHORUS: CPT | Performed by: STUDENT IN AN ORGANIZED HEALTH CARE EDUCATION/TRAINING PROGRAM

## 2021-03-29 PROCEDURE — 85025 COMPLETE CBC W/AUTO DIFF WBC: CPT | Mod: 91 | Performed by: STUDENT IN AN ORGANIZED HEALTH CARE EDUCATION/TRAINING PROGRAM

## 2021-03-29 PROCEDURE — 99231 SBSQ HOSP IP/OBS SF/LOW 25: CPT | Mod: GC,,, | Performed by: STUDENT IN AN ORGANIZED HEALTH CARE EDUCATION/TRAINING PROGRAM

## 2021-03-29 PROCEDURE — 99231 PR SUBSEQUENT HOSPITAL CARE,LEVL I: ICD-10-PCS | Mod: GC,,, | Performed by: STUDENT IN AN ORGANIZED HEALTH CARE EDUCATION/TRAINING PROGRAM

## 2021-03-29 PROCEDURE — 80048 BASIC METABOLIC PNL TOTAL CA: CPT | Performed by: STUDENT IN AN ORGANIZED HEALTH CARE EDUCATION/TRAINING PROGRAM

## 2021-03-29 PROCEDURE — 11000001 HC ACUTE MED/SURG PRIVATE ROOM

## 2021-03-29 RX ORDER — CLONIDINE HYDROCHLORIDE 0.2 MG/1
0.2 TABLET ORAL 3 TIMES DAILY
Status: DISCONTINUED | OUTPATIENT
Start: 2021-03-29 | End: 2021-03-30 | Stop reason: HOSPADM

## 2021-03-29 RX ORDER — HYDROCHLOROTHIAZIDE 25 MG/1
25 TABLET ORAL DAILY
Status: DISCONTINUED | OUTPATIENT
Start: 2021-03-29 | End: 2021-03-30 | Stop reason: HOSPADM

## 2021-03-29 RX ORDER — ASCORBIC ACID 250 MG
250 TABLET ORAL DAILY
Status: DISCONTINUED | OUTPATIENT
Start: 2021-03-29 | End: 2021-03-30 | Stop reason: HOSPADM

## 2021-03-29 RX ADMIN — CEFTRIAXONE 1 G: 1 INJECTION, POWDER, FOR SOLUTION INTRAMUSCULAR; INTRAVENOUS at 05:03

## 2021-03-29 RX ADMIN — OXYBUTYNIN CHLORIDE 5 MG: 5 TABLET, EXTENDED RELEASE ORAL at 09:03

## 2021-03-29 RX ADMIN — METOPROLOL SUCCINATE 200 MG: 100 TABLET, EXTENDED RELEASE ORAL at 08:03

## 2021-03-29 RX ADMIN — PANTOPRAZOLE SODIUM 40 MG: 40 TABLET, DELAYED RELEASE ORAL at 08:03

## 2021-03-29 RX ADMIN — METOPROLOL SUCCINATE 200 MG: 100 TABLET, EXTENDED RELEASE ORAL at 09:03

## 2021-03-29 RX ADMIN — POLYETHYLENE GLYCOL 3350 17 G: 17 POWDER, FOR SOLUTION ORAL at 09:03

## 2021-03-29 RX ADMIN — ACETAMINOPHEN 650 MG: 325 TABLET ORAL at 08:03

## 2021-03-29 RX ADMIN — LOSARTAN POTASSIUM 100 MG: 50 TABLET, FILM COATED ORAL at 09:03

## 2021-03-29 RX ADMIN — CLONIDINE HYDROCHLORIDE 0.1 MG: 0.1 TABLET ORAL at 02:03

## 2021-03-29 RX ADMIN — GABAPENTIN 300 MG: 300 CAPSULE ORAL at 09:03

## 2021-03-29 RX ADMIN — CLONIDINE HYDROCHLORIDE 0.1 MG: 0.1 TABLET ORAL at 09:03

## 2021-03-29 RX ADMIN — CLONIDINE HYDROCHLORIDE 0.2 MG: 0.2 TABLET ORAL at 10:03

## 2021-03-29 RX ADMIN — PANTOPRAZOLE SODIUM 40 MG: 40 TABLET, DELAYED RELEASE ORAL at 09:03

## 2021-03-29 RX ADMIN — GABAPENTIN 300 MG: 300 CAPSULE ORAL at 08:03

## 2021-03-29 RX ADMIN — FERROUS SULFATE TAB EC 325 MG (65 MG FE EQUIVALENT) 325 MG: 325 (65 FE) TABLET DELAYED RESPONSE at 09:03

## 2021-03-29 RX ADMIN — MELATONIN TAB 3 MG 6 MG: 3 TAB at 08:03

## 2021-03-29 RX ADMIN — Medication 250 MG: at 09:03

## 2021-03-29 RX ADMIN — ATORVASTATIN CALCIUM 10 MG: 10 TABLET, FILM COATED ORAL at 08:03

## 2021-03-29 RX ADMIN — HYDROCHLOROTHIAZIDE 25 MG: 25 TABLET ORAL at 09:03

## 2021-03-29 RX ADMIN — ALLOPURINOL 100 MG: 100 TABLET ORAL at 09:03

## 2021-03-30 VITALS
RESPIRATION RATE: 20 BRPM | SYSTOLIC BLOOD PRESSURE: 105 MMHG | TEMPERATURE: 98 F | BODY MASS INDEX: 30.72 KG/M2 | HEART RATE: 69 BPM | DIASTOLIC BLOOD PRESSURE: 55 MMHG | OXYGEN SATURATION: 94 % | WEIGHT: 207.44 LBS | HEIGHT: 69 IN

## 2021-03-30 LAB
ANION GAP SERPL CALC-SCNC: 9 MMOL/L (ref 8–16)
ANISOCYTOSIS BLD QL SMEAR: SLIGHT
BASOPHILS # BLD AUTO: 0.08 K/UL (ref 0–0.2)
BASOPHILS NFR BLD: 0.5 % (ref 0–1.9)
BUN SERPL-MCNC: 18 MG/DL (ref 8–23)
CALCIUM SERPL-MCNC: 8.6 MG/DL (ref 8.7–10.5)
CHLORIDE SERPL-SCNC: 101 MMOL/L (ref 95–110)
CO2 SERPL-SCNC: 30 MMOL/L (ref 23–29)
CREAT SERPL-MCNC: 0.9 MG/DL (ref 0.5–1.4)
DIFFERENTIAL METHOD: ABNORMAL
EOSINOPHIL # BLD AUTO: 0.5 K/UL (ref 0–0.5)
EOSINOPHIL NFR BLD: 2.6 % (ref 0–8)
ERYTHROCYTE [DISTWIDTH] IN BLOOD BY AUTOMATED COUNT: 23 % (ref 11.5–14.5)
EST. GFR  (AFRICAN AMERICAN): >60 ML/MIN/1.73 M^2
EST. GFR  (NON AFRICAN AMERICAN): >60 ML/MIN/1.73 M^2
GLUCOSE SERPL-MCNC: 114 MG/DL (ref 70–110)
HB ELECTROPHORESIS INTERP CANCEL: ABNORMAL
HB ELECTROPHORESIS INTERPRETATION: ABNORMAL
HB ELECTROPHORESIS SUMMARY INTERPRETATION: NORMAL
HCT VFR BLD AUTO: 26.2 % (ref 37–48.5)
HGB A MFR BLD ELPH: 75.6 % (ref 95.8–98)
HGB A2 MFR BLD: 4 % (ref 2–3.3)
HGB A2+XXX MFR BLD ELPH: ABNORMAL %
HGB BLD-MCNC: 8.4 G/DL (ref 12–16)
HGB F MFR BLD: 0 % (ref 0–0.9)
HGB XXX MFR BLD ELPH: ABNORMAL %
HPLC HB VARIANT: ABNORMAL
HYPOCHROMIA BLD QL SMEAR: ABNORMAL
IMM GRANULOCYTES # BLD AUTO: 0.19 K/UL (ref 0–0.04)
IMM GRANULOCYTES NFR BLD AUTO: 1.1 % (ref 0–0.5)
LYMPHOCYTES # BLD AUTO: 4.3 K/UL (ref 1–4.8)
LYMPHOCYTES NFR BLD: 24.7 % (ref 18–48)
MCH RBC QN AUTO: 24.4 PG (ref 27–31)
MCHC RBC AUTO-ENTMCNC: 32.1 G/DL (ref 32–36)
MCV RBC AUTO: 76 FL (ref 82–98)
MONOCYTES # BLD AUTO: 1.2 K/UL (ref 0.3–1)
MONOCYTES NFR BLD: 7 % (ref 4–15)
NEUTROPHILS # BLD AUTO: 11.2 K/UL (ref 1.8–7.7)
NEUTROPHILS NFR BLD: 64.1 % (ref 38–73)
NRBC BLD-RTO: 0 /100 WBC
OVALOCYTES BLD QL SMEAR: ABNORMAL
PHOSPHATE SERPL-MCNC: 3.7 MG/DL (ref 2.7–4.5)
PLATELET # BLD AUTO: 273 K/UL (ref 150–450)
PLATELET BLD QL SMEAR: ABNORMAL
PMV BLD AUTO: 10.8 FL (ref 9.2–12.9)
POIKILOCYTOSIS BLD QL SMEAR: SLIGHT
POTASSIUM SERPL-SCNC: 3.8 MMOL/L (ref 3.5–5.1)
PROVIDER SIGNING NAME: NORMAL
RBC # BLD AUTO: 3.44 M/UL (ref 4–5.4)
SODIUM SERPL-SCNC: 140 MMOL/L (ref 136–145)
TARGETS BLD QL SMEAR: ABNORMAL
WBC # BLD AUTO: 17.39 K/UL (ref 3.9–12.7)

## 2021-03-30 PROCEDURE — 84100 ASSAY OF PHOSPHORUS: CPT | Performed by: STUDENT IN AN ORGANIZED HEALTH CARE EDUCATION/TRAINING PROGRAM

## 2021-03-30 PROCEDURE — 97535 SELF CARE MNGMENT TRAINING: CPT

## 2021-03-30 PROCEDURE — 99239 PR HOSPITAL DISCHARGE DAY,>30 MIN: ICD-10-PCS | Mod: GC,,, | Performed by: STUDENT IN AN ORGANIZED HEALTH CARE EDUCATION/TRAINING PROGRAM

## 2021-03-30 PROCEDURE — 1111F PR DISCHARGE MEDS RECONCILED W/ CURRENT OUTPATIENT MED LIST: ICD-10-PCS | Mod: CPTII,GC,, | Performed by: STUDENT IN AN ORGANIZED HEALTH CARE EDUCATION/TRAINING PROGRAM

## 2021-03-30 PROCEDURE — 99239 HOSP IP/OBS DSCHRG MGMT >30: CPT | Mod: GC,,, | Performed by: STUDENT IN AN ORGANIZED HEALTH CARE EDUCATION/TRAINING PROGRAM

## 2021-03-30 PROCEDURE — 80048 BASIC METABOLIC PNL TOTAL CA: CPT | Performed by: STUDENT IN AN ORGANIZED HEALTH CARE EDUCATION/TRAINING PROGRAM

## 2021-03-30 PROCEDURE — 97530 THERAPEUTIC ACTIVITIES: CPT | Mod: CQ

## 2021-03-30 PROCEDURE — 25000003 PHARM REV CODE 250: Performed by: STUDENT IN AN ORGANIZED HEALTH CARE EDUCATION/TRAINING PROGRAM

## 2021-03-30 PROCEDURE — 1111F DSCHRG MED/CURRENT MED MERGE: CPT | Mod: CPTII,GC,, | Performed by: STUDENT IN AN ORGANIZED HEALTH CARE EDUCATION/TRAINING PROGRAM

## 2021-03-30 PROCEDURE — 85025 COMPLETE CBC W/AUTO DIFF WBC: CPT | Performed by: STUDENT IN AN ORGANIZED HEALTH CARE EDUCATION/TRAINING PROGRAM

## 2021-03-30 PROCEDURE — 36415 COLL VENOUS BLD VENIPUNCTURE: CPT | Performed by: STUDENT IN AN ORGANIZED HEALTH CARE EDUCATION/TRAINING PROGRAM

## 2021-03-30 PROCEDURE — 63600175 PHARM REV CODE 636 W HCPCS: Performed by: STUDENT IN AN ORGANIZED HEALTH CARE EDUCATION/TRAINING PROGRAM

## 2021-03-30 PROCEDURE — 25000003 PHARM REV CODE 250: Performed by: INTERNAL MEDICINE

## 2021-03-30 RX ORDER — ASCORBIC ACID 250 MG
250 TABLET ORAL DAILY
Status: ON HOLD | COMMUNITY
Start: 2021-03-30 | End: 2023-12-01 | Stop reason: HOSPADM

## 2021-03-30 RX ORDER — CLONIDINE HYDROCHLORIDE 0.1 MG/1
0.2 TABLET ORAL 3 TIMES DAILY
Qty: 270 TABLET | Refills: 0 | Status: SHIPPED | OUTPATIENT
Start: 2021-03-30 | End: 2021-06-02 | Stop reason: SDUPTHER

## 2021-03-30 RX ORDER — ASPIRIN 81 MG/1
81 TABLET ORAL EVERY MORNING
Refills: 0
Start: 2021-03-30 | End: 2022-04-29

## 2021-03-30 RX ORDER — PANTOPRAZOLE SODIUM 40 MG/1
40 TABLET, DELAYED RELEASE ORAL 2 TIMES DAILY
Qty: 60 TABLET | Refills: 2 | Status: SHIPPED | OUTPATIENT
Start: 2021-03-30 | End: 2023-11-27

## 2021-03-30 RX ORDER — FERROUS SULFATE 325(65) MG
325 TABLET ORAL DAILY
Qty: 90 TABLET | Refills: 3 | Status: SHIPPED | OUTPATIENT
Start: 2021-03-30 | End: 2021-05-27 | Stop reason: SDUPTHER

## 2021-03-30 RX ADMIN — OXYBUTYNIN CHLORIDE 5 MG: 5 TABLET, EXTENDED RELEASE ORAL at 12:03

## 2021-03-30 RX ADMIN — Medication 250 MG: at 09:03

## 2021-03-30 RX ADMIN — PANTOPRAZOLE SODIUM 40 MG: 40 TABLET, DELAYED RELEASE ORAL at 09:03

## 2021-03-30 RX ADMIN — FERROUS SULFATE TAB EC 325 MG (65 MG FE EQUIVALENT) 325 MG: 325 (65 FE) TABLET DELAYED RESPONSE at 12:03

## 2021-03-30 RX ADMIN — LOSARTAN POTASSIUM 100 MG: 50 TABLET, FILM COATED ORAL at 09:03

## 2021-03-30 RX ADMIN — POLYETHYLENE GLYCOL 3350 17 G: 17 POWDER, FOR SOLUTION ORAL at 09:03

## 2021-03-30 RX ADMIN — CEFTRIAXONE 1 G: 1 INJECTION, POWDER, FOR SOLUTION INTRAMUSCULAR; INTRAVENOUS at 06:03

## 2021-03-30 RX ADMIN — ALLOPURINOL 100 MG: 100 TABLET ORAL at 09:03

## 2021-03-30 RX ADMIN — HYDROCHLOROTHIAZIDE 25 MG: 25 TABLET ORAL at 09:03

## 2021-03-30 RX ADMIN — CLONIDINE HYDROCHLORIDE 0.2 MG: 0.2 TABLET ORAL at 03:03

## 2021-03-30 RX ADMIN — METOPROLOL SUCCINATE 200 MG: 100 TABLET, EXTENDED RELEASE ORAL at 09:03

## 2021-03-30 RX ADMIN — GABAPENTIN 300 MG: 300 CAPSULE ORAL at 09:03

## 2021-03-30 RX ADMIN — CLONIDINE HYDROCHLORIDE 0.2 MG: 0.2 TABLET ORAL at 12:03

## 2021-04-01 ENCOUNTER — TELEPHONE (OUTPATIENT)
Dept: GASTROENTEROLOGY | Facility: CLINIC | Age: 81
End: 2021-04-01

## 2021-05-03 DIAGNOSIS — Z74.01 BEDBOUND: Primary | ICD-10-CM

## 2021-05-11 DIAGNOSIS — I10 ESSENTIAL HYPERTENSION: ICD-10-CM

## 2021-05-11 DIAGNOSIS — N39.3 STRESS INCONTINENCE: ICD-10-CM

## 2021-05-11 DIAGNOSIS — G47.00 INSOMNIA, UNSPECIFIED TYPE: ICD-10-CM

## 2021-05-11 DIAGNOSIS — F41.9 ANXIETY: ICD-10-CM

## 2021-05-11 RX ORDER — QUETIAPINE FUMARATE 200 MG/1
200 TABLET, FILM COATED ORAL NIGHTLY PRN
Qty: 30 TABLET | Refills: 5 | Status: SHIPPED | OUTPATIENT
Start: 2021-05-11 | End: 2023-11-27

## 2021-05-11 RX ORDER — ALPRAZOLAM 0.5 MG/1
0.5 TABLET ORAL 2 TIMES DAILY PRN
Qty: 60 TABLET | Refills: 5 | Status: SHIPPED | OUTPATIENT
Start: 2021-05-11 | End: 2023-11-27

## 2021-05-11 RX ORDER — METOPROLOL SUCCINATE 200 MG/1
200 TABLET, EXTENDED RELEASE ORAL 2 TIMES DAILY
Qty: 180 TABLET | Refills: 0 | Status: SHIPPED | OUTPATIENT
Start: 2021-05-11 | End: 2022-03-21

## 2021-05-11 RX ORDER — GABAPENTIN 300 MG/1
300 CAPSULE ORAL 2 TIMES DAILY
Qty: 60 CAPSULE | Refills: 5 | Status: SHIPPED | OUTPATIENT
Start: 2021-05-11 | End: 2022-03-21

## 2021-05-11 RX ORDER — OXYBUTYNIN CHLORIDE 5 MG/1
5 TABLET, EXTENDED RELEASE ORAL DAILY
Qty: 30 TABLET | Refills: 5 | Status: SHIPPED | OUTPATIENT
Start: 2021-05-11 | End: 2022-04-29

## 2021-05-12 ENCOUNTER — TELEPHONE (OUTPATIENT)
Dept: FAMILY MEDICINE | Facility: CLINIC | Age: 81
End: 2021-05-12

## 2021-05-21 ENCOUNTER — DOCUMENT SCAN (OUTPATIENT)
Dept: HOME HEALTH SERVICES | Facility: HOSPITAL | Age: 81
End: 2021-05-21
Payer: MEDICARE

## 2021-05-24 ENCOUNTER — DOCUMENT SCAN (OUTPATIENT)
Dept: HOME HEALTH SERVICES | Facility: HOSPITAL | Age: 81
End: 2021-05-24
Payer: MEDICARE

## 2021-05-25 ENCOUNTER — TELEPHONE (OUTPATIENT)
Dept: INTERNAL MEDICINE | Facility: CLINIC | Age: 81
End: 2021-05-25

## 2021-05-25 DIAGNOSIS — M48.07 SPINAL STENOSIS OF LUMBOSACRAL REGION: Primary | ICD-10-CM

## 2021-05-27 ENCOUNTER — TELEPHONE (OUTPATIENT)
Dept: FAMILY MEDICINE | Facility: CLINIC | Age: 81
End: 2021-05-27

## 2021-05-27 DIAGNOSIS — T14.8XXA WOUND, OPEN: Primary | ICD-10-CM

## 2021-05-27 DIAGNOSIS — E79.0 HYPERURICEMIA: ICD-10-CM

## 2021-05-27 RX ORDER — COLLAGENASE SANTYL 250 [ARB'U]/G
OINTMENT TOPICAL DAILY
Qty: 15 G | Refills: 2 | Status: SHIPPED | OUTPATIENT
Start: 2021-05-27

## 2021-05-27 RX ORDER — ATORVASTATIN CALCIUM 10 MG/1
10 TABLET, FILM COATED ORAL NIGHTLY
Qty: 90 TABLET | Refills: 1 | Status: SHIPPED | OUTPATIENT
Start: 2021-05-27 | End: 2022-04-29

## 2021-05-27 RX ORDER — FERROUS SULFATE 325(65) MG
325 TABLET ORAL DAILY
Qty: 90 TABLET | Refills: 3 | Status: SHIPPED | OUTPATIENT
Start: 2021-05-27 | End: 2023-11-27

## 2021-05-27 RX ORDER — ALLOPURINOL 100 MG/1
100 TABLET ORAL DAILY
Qty: 90 TABLET | Refills: 1 | Status: SHIPPED | OUTPATIENT
Start: 2021-05-27 | End: 2022-03-21

## 2021-05-27 RX ORDER — LOSARTAN POTASSIUM 100 MG/1
100 TABLET ORAL DAILY
Qty: 90 TABLET | Refills: 1 | Status: SHIPPED | OUTPATIENT
Start: 2021-05-27 | End: 2022-03-21

## 2021-06-03 ENCOUNTER — DOCUMENT SCAN (OUTPATIENT)
Dept: HOME HEALTH SERVICES | Facility: HOSPITAL | Age: 81
End: 2021-06-03
Payer: MEDICARE

## 2021-06-04 ENCOUNTER — TELEPHONE (OUTPATIENT)
Dept: FAMILY MEDICINE | Facility: CLINIC | Age: 81
End: 2021-06-04

## 2021-07-19 RX ORDER — HYDROCHLOROTHIAZIDE 25 MG/1
TABLET ORAL
Qty: 30 TABLET | Refills: 2 | Status: SHIPPED | OUTPATIENT
Start: 2021-07-19 | End: 2023-11-27

## 2021-07-26 ENCOUNTER — TELEPHONE (OUTPATIENT)
Dept: INTERNAL MEDICINE | Facility: CLINIC | Age: 81
End: 2021-07-26

## 2021-07-26 DIAGNOSIS — N30.00 ACUTE CYSTITIS WITHOUT HEMATURIA: Primary | ICD-10-CM

## 2021-07-26 RX ORDER — AMOXICILLIN AND CLAVULANATE POTASSIUM 875; 125 MG/1; MG/1
1 TABLET, FILM COATED ORAL 2 TIMES DAILY
Qty: 14 TABLET | Refills: 0 | Status: SHIPPED | OUTPATIENT
Start: 2021-07-26 | End: 2021-08-02

## 2021-08-03 ENCOUNTER — DOCUMENT SCAN (OUTPATIENT)
Dept: HOME HEALTH SERVICES | Facility: HOSPITAL | Age: 81
End: 2021-08-03
Payer: MEDICARE

## 2021-09-26 ENCOUNTER — HOSPITAL ENCOUNTER (EMERGENCY)
Facility: HOSPITAL | Age: 81
Discharge: HOME | End: 2021-09-26
Attending: STUDENT IN AN ORGANIZED HEALTH CARE EDUCATION/TRAINING PROGRAM
Payer: MEDICARE

## 2021-09-26 VITALS
RESPIRATION RATE: 18 BRPM | BODY MASS INDEX: 28.31 KG/M2 | WEIGHT: 191.69 LBS | SYSTOLIC BLOOD PRESSURE: 175 MMHG | TEMPERATURE: 99 F | OXYGEN SATURATION: 97 % | DIASTOLIC BLOOD PRESSURE: 96 MMHG | HEART RATE: 82 BPM

## 2021-09-26 DIAGNOSIS — L89.40 PRESSURE INJURY OF SKIN OF CONTIGUOUS REGION INVOLVING BUTTOCK AND HIP, UNSPECIFIED INJURY STAGE, UNSPECIFIED LATERALITY: Primary | ICD-10-CM

## 2021-09-26 PROCEDURE — 99283 EMERGENCY DEPT VISIT LOW MDM: CPT

## 2021-09-26 PROCEDURE — 25000003 PHARM REV CODE 250: Performed by: STUDENT IN AN ORGANIZED HEALTH CARE EDUCATION/TRAINING PROGRAM

## 2021-09-26 RX ORDER — MUPIROCIN 20 MG/G
1 OINTMENT TOPICAL
Status: COMPLETED | OUTPATIENT
Start: 2021-09-26 | End: 2021-09-26

## 2021-09-26 RX ORDER — HYDROCODONE BITARTRATE AND ACETAMINOPHEN 5; 325 MG/1; MG/1
1 TABLET ORAL
Status: COMPLETED | OUTPATIENT
Start: 2021-09-26 | End: 2021-09-26

## 2021-09-26 RX ORDER — HYDROCODONE BITARTRATE AND ACETAMINOPHEN 10; 325 MG/1; MG/1
1 TABLET ORAL EVERY 6 HOURS PRN
Qty: 18 TABLET | Refills: 0 | Status: ON HOLD | OUTPATIENT
Start: 2021-09-26 | End: 2023-12-01 | Stop reason: SDUPTHER

## 2021-09-26 RX ORDER — BACITRACIN ZINC 500 UNIT/G
OINTMENT (GRAM) TOPICAL 2 TIMES DAILY
Status: DISCONTINUED | OUTPATIENT
Start: 2021-09-26 | End: 2021-09-26

## 2021-09-26 RX ADMIN — MUPIROCIN 22 G: 20 OINTMENT TOPICAL at 03:09

## 2021-09-26 RX ADMIN — HYDROCODONE BITARTRATE AND ACETAMINOPHEN 1 TABLET: 5; 325 TABLET ORAL at 03:09

## 2022-03-21 ENCOUNTER — TELEPHONE (OUTPATIENT)
Dept: INTERNAL MEDICINE | Facility: CLINIC | Age: 82
End: 2022-03-21
Payer: MEDICARE

## 2022-03-21 DIAGNOSIS — I10 ESSENTIAL HYPERTENSION: ICD-10-CM

## 2022-03-21 DIAGNOSIS — E79.0 HYPERURICEMIA: ICD-10-CM

## 2022-03-21 RX ORDER — LOSARTAN POTASSIUM 100 MG/1
100 TABLET ORAL DAILY
Qty: 30 TABLET | Refills: 5 | Status: SHIPPED | OUTPATIENT
Start: 2022-03-21 | End: 2022-08-04 | Stop reason: SDUPTHER

## 2022-03-21 RX ORDER — TRAZODONE HYDROCHLORIDE 50 MG/1
50 TABLET ORAL NIGHTLY PRN
Qty: 90 TABLET | Refills: 0 | Status: SHIPPED | OUTPATIENT
Start: 2022-03-21 | End: 2022-06-02

## 2022-03-21 RX ORDER — METOPROLOL SUCCINATE 200 MG/1
200 TABLET, EXTENDED RELEASE ORAL DAILY
Qty: 180 TABLET | Refills: 0 | Status: SHIPPED | OUTPATIENT
Start: 2022-03-21 | End: 2022-08-04 | Stop reason: SDUPTHER

## 2022-03-21 RX ORDER — GABAPENTIN 300 MG/1
300 CAPSULE ORAL 2 TIMES DAILY
Qty: 180 CAPSULE | Refills: 0 | Status: SHIPPED | OUTPATIENT
Start: 2022-03-21 | End: 2022-08-04 | Stop reason: SDUPTHER

## 2022-03-21 RX ORDER — ALLOPURINOL 100 MG/1
100 TABLET ORAL DAILY
Qty: 90 TABLET | Refills: 0 | Status: SHIPPED | OUTPATIENT
Start: 2022-03-21 | End: 2022-06-02

## 2022-03-21 NOTE — TELEPHONE ENCOUNTER
Pt is in a nursing home. States they are not doing therapy anymore and would like to go home with home health. She would like this set up with [facility] and states you need to call them at [phone]. Informed her for  services she would need an appointment, but she said it is hard for her to get anywhere in her wheelchair. Please advise.

## 2022-03-22 ENCOUNTER — TELEPHONE (OUTPATIENT)
Dept: INTERNAL MEDICINE | Facility: CLINIC | Age: 82
End: 2022-03-22
Payer: MEDICARE

## 2022-03-22 NOTE — TELEPHONE ENCOUNTER
----- Message from Jessica Luis sent at 3/22/2022  8:44 AM CDT -----  Regarding: Request to speak to a nurse  Contact: dolly Alicia  MRN: [id number]  : 1940  PCP: Caroline Do  Home Phone      [phone]  Work Phone      Not on file.  Mobile          [phone]      MESSAGE:   Request to speak to a nurse regarding admit to [facility]   Patient no longer would like to be a resident with in the nursing center.  She would like to go home with the assistance of home health.     Phone # [phone]    Pharmacy - [facility] - [place], [place] - [address]

## 2022-04-29 DIAGNOSIS — N39.3 STRESS INCONTINENCE: ICD-10-CM

## 2022-04-29 RX ORDER — OXYBUTYNIN CHLORIDE 5 MG/1
5 TABLET, EXTENDED RELEASE ORAL DAILY
Qty: 90 TABLET | Refills: 0 | Status: SHIPPED | OUTPATIENT
Start: 2022-04-29 | End: 2022-07-07

## 2022-04-29 RX ORDER — ASPIRIN 81 MG/1
81 TABLET ORAL NIGHTLY
Qty: 90 TABLET | Refills: 3 | Status: ON HOLD | OUTPATIENT
Start: 2022-04-29 | End: 2023-12-01 | Stop reason: HOSPADM

## 2022-04-29 RX ORDER — AMLODIPINE BESYLATE 10 MG/1
10 TABLET ORAL DAILY
Qty: 90 TABLET | Refills: 3 | Status: SHIPPED | OUTPATIENT
Start: 2022-04-29 | End: 2023-03-14

## 2022-04-29 RX ORDER — ATORVASTATIN CALCIUM 10 MG/1
10 TABLET, FILM COATED ORAL NIGHTLY
Qty: 90 TABLET | Refills: 0 | Status: SHIPPED | OUTPATIENT
Start: 2022-04-29 | End: 2022-07-07

## 2022-06-02 DIAGNOSIS — E79.0 HYPERURICEMIA: ICD-10-CM

## 2022-06-02 RX ORDER — TRAZODONE HYDROCHLORIDE 50 MG/1
TABLET ORAL
Qty: 90 TABLET | Refills: 0 | Status: SHIPPED | OUTPATIENT
Start: 2022-06-02 | End: 2022-10-20

## 2022-06-02 RX ORDER — ALLOPURINOL 100 MG/1
TABLET ORAL
Qty: 90 TABLET | Refills: 0 | Status: SHIPPED | OUTPATIENT
Start: 2022-06-02 | End: 2022-10-20

## 2022-07-06 DIAGNOSIS — N39.3 STRESS INCONTINENCE: ICD-10-CM

## 2022-07-07 RX ORDER — OXYBUTYNIN CHLORIDE 5 MG/1
TABLET, EXTENDED RELEASE ORAL
Qty: 90 TABLET | Refills: 0 | Status: SHIPPED | OUTPATIENT
Start: 2022-07-07 | End: 2022-12-22

## 2022-07-07 RX ORDER — ATORVASTATIN CALCIUM 10 MG/1
TABLET, FILM COATED ORAL
Qty: 90 TABLET | Refills: 0 | Status: SHIPPED | OUTPATIENT
Start: 2022-07-07 | End: 2022-12-22

## 2022-07-13 DIAGNOSIS — I10 ESSENTIAL HYPERTENSION: ICD-10-CM

## 2022-09-15 DIAGNOSIS — Z78.0 MENOPAUSE: ICD-10-CM

## 2022-09-21 ENCOUNTER — PATIENT OUTREACH (OUTPATIENT)
Dept: ADMINISTRATIVE | Facility: HOSPITAL | Age: 82
End: 2022-09-21
Payer: MEDICARE

## 2022-10-20 ENCOUNTER — TELEPHONE (OUTPATIENT)
Dept: INTERNAL MEDICINE | Facility: CLINIC | Age: 82
End: 2022-10-20

## 2022-10-20 DIAGNOSIS — I10 ESSENTIAL HYPERTENSION: Primary | ICD-10-CM

## 2022-10-20 NOTE — TELEPHONE ENCOUNTER
----- Message from Jaja Harp MA sent at 10/20/2022  2:31 PM CDT -----  Emily Alicia  MRN: [id number]  : 1940  PCP: Caroline Do  Home Phone      [phone]  Work Phone      Not on file.  Mobile          [phone]      MESSAGE:     [facility] is shipping out Metoprolol today but patientis out and would need a small script sent to [facility] ([place])    Patient was last seen 2 years ago.     Please Advise:  [phone]

## 2022-10-22 RX ORDER — METOPROLOL SUCCINATE 200 MG/1
200 TABLET, EXTENDED RELEASE ORAL DAILY
Qty: 14 TABLET | Refills: 0 | Status: SHIPPED | OUTPATIENT
Start: 2022-10-22 | End: 2022-12-19

## 2022-11-11 ENCOUNTER — TELEPHONE (OUTPATIENT)
Dept: INTERNAL MEDICINE | Facility: CLINIC | Age: 82
End: 2022-11-11
Payer: MEDICARE

## 2022-11-11 NOTE — TELEPHONE ENCOUNTER
----- Message from Jaja Harp MA sent at 2022  1:19 PM CST -----  Emily Alicia  MRN: [id number]  : 1940  PCP: Caroline Do  Home Phone      [phone]  Work Phone      Not on file.  Mobile          [phone]      MESSAGE:     Patient c/o ? UTI  Requesting antibiotics.    Send to [facility])    Please Advise:  [phone]

## 2022-11-11 NOTE — TELEPHONE ENCOUNTER
Pt is c/o UTI and requesting abx. LOV 8/20/2020. How would you like to proceed? Would you like me to tell them she needs to be seen at [facility]?

## 2022-12-21 ENCOUNTER — TELEPHONE (OUTPATIENT)
Dept: INTERNAL MEDICINE | Facility: CLINIC | Age: 82
End: 2022-12-21
Payer: MEDICARE

## 2022-12-21 DIAGNOSIS — I10 ESSENTIAL HYPERTENSION: ICD-10-CM

## 2022-12-21 NOTE — TELEPHONE ENCOUNTER
----- Message from Jaja Harp MA sent at 2022  9:08 AM CST -----  Emily Alicia  MRN: [id number]  : 1940  PCP: Caroline Do  Home Phone      [phone]  Work Phone      Not on file.  Mobile          [phone]      MESSAGE:     Patient is calling for a refill on her Metoprolol.  Metoprolol was sent in for once daily.  Its for Metoprolol XL 24 hour tablet but she has been taking 2 daily due to that's how they gave it to her at the nursing home.      Patient says she cannot come in for a visit because she is bedridde.    Please Advise:  [phone]

## 2022-12-22 RX ORDER — METOPROLOL SUCCINATE 200 MG/1
200 TABLET, EXTENDED RELEASE ORAL 2 TIMES DAILY
Qty: 180 TABLET | Refills: 2 | Status: SHIPPED | OUTPATIENT
Start: 2022-12-22 | End: 2023-11-27 | Stop reason: DRUGHIGH

## 2023-06-26 DIAGNOSIS — N39.3 STRESS INCONTINENCE: ICD-10-CM

## 2023-06-26 RX ORDER — ATORVASTATIN CALCIUM 10 MG/1
TABLET, FILM COATED ORAL
Qty: 90 TABLET | Refills: 0 | Status: SHIPPED | OUTPATIENT
Start: 2023-06-26

## 2023-06-26 RX ORDER — OXYBUTYNIN CHLORIDE 5 MG/1
TABLET, EXTENDED RELEASE ORAL
Qty: 90 TABLET | Refills: 0 | Status: ON HOLD | OUTPATIENT
Start: 2023-06-26 | End: 2023-11-28

## 2023-11-27 ENCOUNTER — HOSPITAL ENCOUNTER (INPATIENT)
Facility: HOSPITAL | Age: 83
LOS: 4 days | Discharge: HOME HEALTH | DRG: 871 | End: 2023-12-01
Attending: STUDENT IN AN ORGANIZED HEALTH CARE EDUCATION/TRAINING PROGRAM | Admitting: INTERNAL MEDICINE
Payer: MEDICARE

## 2023-11-27 DIAGNOSIS — A40.3: Primary | ICD-10-CM

## 2023-11-27 DIAGNOSIS — R05.9 COUGH: ICD-10-CM

## 2023-11-27 DIAGNOSIS — R65.20: Primary | ICD-10-CM

## 2023-11-27 DIAGNOSIS — I50.43 ACUTE ON CHRONIC COMBINED SYSTOLIC AND DIASTOLIC HEART FAILURE: ICD-10-CM

## 2023-11-27 DIAGNOSIS — I10 ESSENTIAL HYPERTENSION: ICD-10-CM

## 2023-11-27 DIAGNOSIS — N39.3 STRESS INCONTINENCE: ICD-10-CM

## 2023-11-27 DIAGNOSIS — J96.01: Primary | ICD-10-CM

## 2023-11-27 DIAGNOSIS — I50.9 CHF (CONGESTIVE HEART FAILURE): ICD-10-CM

## 2023-11-27 DIAGNOSIS — R06.02 SOB (SHORTNESS OF BREATH): ICD-10-CM

## 2023-11-27 LAB
ALBUMIN SERPL BCP-MCNC: 2.6 G/DL (ref 3.5–5.2)
ALP SERPL-CCNC: 254 U/L (ref 55–135)
ALT SERPL W/O P-5'-P-CCNC: 61 U/L (ref 10–44)
ANION GAP SERPL CALC-SCNC: 9 MMOL/L (ref 8–16)
AST SERPL-CCNC: 21 U/L (ref 10–40)
BASOPHILS # BLD AUTO: 0.16 K/UL (ref 0–0.2)
BASOPHILS NFR BLD: 0.9 % (ref 0–1.9)
BILIRUB SERPL-MCNC: 0.4 MG/DL (ref 0.1–1)
BNP SERPL-MCNC: 203 PG/ML (ref 0–99)
BUN SERPL-MCNC: 40 MG/DL (ref 8–23)
CALCIUM SERPL-MCNC: 9.9 MG/DL (ref 8.7–10.5)
CHLORIDE SERPL-SCNC: 110 MMOL/L (ref 95–110)
CO2 SERPL-SCNC: 25 MMOL/L (ref 23–29)
CREAT SERPL-MCNC: 1.3 MG/DL (ref 0.5–1.4)
DIFFERENTIAL METHOD: ABNORMAL
EOSINOPHIL # BLD AUTO: 0.5 K/UL (ref 0–0.5)
EOSINOPHIL NFR BLD: 2.6 % (ref 0–8)
ERYTHROCYTE [DISTWIDTH] IN BLOOD BY AUTOMATED COUNT: 16 % (ref 11.5–14.5)
EST. GFR  (NO RACE VARIABLE): 41 ML/MIN/1.73 M^2
GLUCOSE SERPL-MCNC: 114 MG/DL (ref 70–110)
GROUP A STREP, MOLECULAR: NEGATIVE
HCT VFR BLD AUTO: 33.2 % (ref 37–48.5)
HGB BLD-MCNC: 10.8 G/DL (ref 12–16)
IMM GRANULOCYTES # BLD AUTO: 0.65 K/UL (ref 0–0.04)
IMM GRANULOCYTES NFR BLD AUTO: 3.8 % (ref 0–0.5)
INFLUENZA A, MOLECULAR: NEGATIVE
INFLUENZA B, MOLECULAR: NEGATIVE
LACTATE SERPL-SCNC: 1.1 MMOL/L (ref 0.5–2.2)
LYMPHOCYTES # BLD AUTO: 3.5 K/UL (ref 1–4.8)
LYMPHOCYTES NFR BLD: 20.5 % (ref 18–48)
MCH RBC QN AUTO: 25.2 PG (ref 27–31)
MCHC RBC AUTO-ENTMCNC: 32.5 G/DL (ref 32–36)
MCV RBC AUTO: 78 FL (ref 82–98)
MONOCYTES # BLD AUTO: 2.2 K/UL (ref 0.3–1)
MONOCYTES NFR BLD: 12.9 % (ref 4–15)
NEUTROPHILS # BLD AUTO: 10.1 K/UL (ref 1.8–7.7)
NEUTROPHILS NFR BLD: 59.3 % (ref 38–73)
NRBC BLD-RTO: 0 /100 WBC
PLATELET # BLD AUTO: 267 K/UL (ref 150–450)
PMV BLD AUTO: 11.5 FL (ref 9.2–12.9)
POTASSIUM SERPL-SCNC: 4.4 MMOL/L (ref 3.5–5.1)
PROT SERPL-MCNC: 7.8 G/DL (ref 6–8.4)
RBC # BLD AUTO: 4.28 M/UL (ref 4–5.4)
SARS-COV-2 RDRP RESP QL NAA+PROBE: NEGATIVE
SODIUM SERPL-SCNC: 144 MMOL/L (ref 136–145)
SPECIMEN SOURCE: NORMAL
TROPONIN I SERPL DL<=0.01 NG/ML-MCNC: 0.02 NG/ML (ref 0–0.03)
WBC # BLD AUTO: 17.1 K/UL (ref 3.9–12.7)

## 2023-11-27 PROCEDURE — 87651 STREP A DNA AMP PROBE: CPT | Performed by: STUDENT IN AN ORGANIZED HEALTH CARE EDUCATION/TRAINING PROGRAM

## 2023-11-27 PROCEDURE — 94760 N-INVAS EAR/PLS OXIMETRY 1: CPT

## 2023-11-27 PROCEDURE — U0002 COVID-19 LAB TEST NON-CDC: HCPCS | Performed by: STUDENT IN AN ORGANIZED HEALTH CARE EDUCATION/TRAINING PROGRAM

## 2023-11-27 PROCEDURE — 63600175 PHARM REV CODE 636 W HCPCS: Performed by: STUDENT IN AN ORGANIZED HEALTH CARE EDUCATION/TRAINING PROGRAM

## 2023-11-27 PROCEDURE — 84484 ASSAY OF TROPONIN QUANT: CPT | Performed by: NURSE PRACTITIONER

## 2023-11-27 PROCEDURE — 93010 ELECTROCARDIOGRAM REPORT: CPT | Mod: ,,, | Performed by: INTERNAL MEDICINE

## 2023-11-27 PROCEDURE — 93005 ELECTROCARDIOGRAM TRACING: CPT

## 2023-11-27 PROCEDURE — 87502 INFLUENZA DNA AMP PROBE: CPT

## 2023-11-27 PROCEDURE — 83880 ASSAY OF NATRIURETIC PEPTIDE: CPT | Performed by: NURSE PRACTITIONER

## 2023-11-27 PROCEDURE — 36415 COLL VENOUS BLD VENIPUNCTURE: CPT | Performed by: NURSE PRACTITIONER

## 2023-11-27 PROCEDURE — 11000001 HC ACUTE MED/SURG PRIVATE ROOM

## 2023-11-27 PROCEDURE — 25000003 PHARM REV CODE 250: Performed by: STUDENT IN AN ORGANIZED HEALTH CARE EDUCATION/TRAINING PROGRAM

## 2023-11-27 PROCEDURE — 25000003 PHARM REV CODE 250: Performed by: INTERNAL MEDICINE

## 2023-11-27 PROCEDURE — 85025 COMPLETE CBC W/AUTO DIFF WBC: CPT | Performed by: STUDENT IN AN ORGANIZED HEALTH CARE EDUCATION/TRAINING PROGRAM

## 2023-11-27 PROCEDURE — 80053 COMPREHEN METABOLIC PANEL: CPT | Performed by: STUDENT IN AN ORGANIZED HEALTH CARE EDUCATION/TRAINING PROGRAM

## 2023-11-27 PROCEDURE — 36415 COLL VENOUS BLD VENIPUNCTURE: CPT | Performed by: STUDENT IN AN ORGANIZED HEALTH CARE EDUCATION/TRAINING PROGRAM

## 2023-11-27 PROCEDURE — 93010 EKG 12-LEAD: ICD-10-PCS | Mod: ,,, | Performed by: INTERNAL MEDICINE

## 2023-11-27 PROCEDURE — 99285 EMERGENCY DEPT VISIT HI MDM: CPT | Mod: 25

## 2023-11-27 PROCEDURE — 87040 BLOOD CULTURE FOR BACTERIA: CPT | Performed by: STUDENT IN AN ORGANIZED HEALTH CARE EDUCATION/TRAINING PROGRAM

## 2023-11-27 PROCEDURE — 87502 INFLUENZA DNA AMP PROBE: CPT | Performed by: STUDENT IN AN ORGANIZED HEALTH CARE EDUCATION/TRAINING PROGRAM

## 2023-11-27 PROCEDURE — 83605 ASSAY OF LACTIC ACID: CPT | Performed by: STUDENT IN AN ORGANIZED HEALTH CARE EDUCATION/TRAINING PROGRAM

## 2023-11-27 RX ORDER — AMLODIPINE BESYLATE 10 MG/1
TABLET ORAL
COMMUNITY
End: 2023-11-27 | Stop reason: SDUPTHER

## 2023-11-27 RX ORDER — CHOLECALCIFEROL (VITAMIN D3) 25 MCG
1000 TABLET ORAL DAILY
COMMUNITY

## 2023-11-27 RX ORDER — POTASSIUM CHLORIDE 20 MEQ/1
20 TABLET, EXTENDED RELEASE ORAL DAILY
Status: ON HOLD | COMMUNITY
Start: 2023-10-17 | End: 2023-12-01 | Stop reason: HOSPADM

## 2023-11-27 RX ORDER — TRAZODONE HYDROCHLORIDE 50 MG/1
50 TABLET ORAL NIGHTLY PRN
Status: DISCONTINUED | OUTPATIENT
Start: 2023-11-27 | End: 2023-11-28

## 2023-11-27 RX ORDER — MELOXICAM 7.5 MG/1
TABLET ORAL
COMMUNITY
End: 2023-11-27

## 2023-11-27 RX ORDER — HYDROCHLOROTHIAZIDE 12.5 MG/1
TABLET ORAL
COMMUNITY
Start: 2023-01-04 | End: 2023-11-27

## 2023-11-27 RX ORDER — FERROUS SULFATE 325(65) MG
TABLET, DELAYED RELEASE (ENTERIC COATED) ORAL
COMMUNITY
End: 2023-11-27

## 2023-11-27 RX ORDER — NAPROXEN 500 MG/1
500 TABLET ORAL 2 TIMES DAILY WITH MEALS
Status: ON HOLD | COMMUNITY
End: 2023-12-01 | Stop reason: HOSPADM

## 2023-11-27 RX ORDER — OMEPRAZOLE 40 MG/1
CAPSULE, DELAYED RELEASE ORAL
COMMUNITY
End: 2023-11-27

## 2023-11-27 RX ORDER — AMLODIPINE BESYLATE 10 MG/1
10 TABLET ORAL DAILY
Status: DISCONTINUED | OUTPATIENT
Start: 2023-11-27 | End: 2023-11-28

## 2023-11-27 RX ORDER — METOPROLOL SUCCINATE 50 MG/1
100 TABLET, EXTENDED RELEASE ORAL 2 TIMES DAILY
Status: DISCONTINUED | OUTPATIENT
Start: 2023-11-27 | End: 2023-12-01 | Stop reason: HOSPADM

## 2023-11-27 RX ORDER — TALC
6 POWDER (GRAM) TOPICAL NIGHTLY PRN
Status: DISCONTINUED | OUTPATIENT
Start: 2023-11-27 | End: 2023-12-01 | Stop reason: HOSPADM

## 2023-11-27 RX ORDER — CLONIDINE HYDROCHLORIDE 0.1 MG/1
0.1 TABLET ORAL
Status: COMPLETED | OUTPATIENT
Start: 2023-11-27 | End: 2023-11-27

## 2023-11-27 RX ORDER — METOPROLOL SUCCINATE 100 MG/1
100 TABLET, EXTENDED RELEASE ORAL 2 TIMES DAILY
COMMUNITY
Start: 2023-01-04

## 2023-11-27 RX ORDER — LATANOPROST 50 UG/ML
SOLUTION/ DROPS OPHTHALMIC
COMMUNITY
End: 2023-11-27

## 2023-11-27 RX ORDER — SODIUM CHLORIDE 0.9 % (FLUSH) 0.9 %
10 SYRINGE (ML) INJECTION
Status: DISCONTINUED | OUTPATIENT
Start: 2023-11-27 | End: 2023-12-01 | Stop reason: HOSPADM

## 2023-11-27 RX ORDER — LOSARTAN POTASSIUM 100 MG/1
100 TABLET ORAL DAILY
Status: ON HOLD | COMMUNITY
End: 2023-12-01 | Stop reason: HOSPADM

## 2023-11-27 RX ADMIN — CLONIDINE HYDROCHLORIDE 0.1 MG: 0.1 TABLET ORAL at 05:11

## 2023-11-27 RX ADMIN — CEFTRIAXONE SODIUM 1 G: 1 INJECTION, POWDER, FOR SOLUTION INTRAMUSCULAR; INTRAVENOUS at 08:11

## 2023-11-27 RX ADMIN — TRAZODONE HYDROCHLORIDE 50 MG: 50 TABLET ORAL at 10:11

## 2023-11-27 RX ADMIN — AMLODIPINE BESYLATE 10 MG: 10 TABLET ORAL at 08:11

## 2023-11-27 RX ADMIN — Medication 6 MG: at 10:11

## 2023-11-27 RX ADMIN — AZITHROMYCIN DIHYDRATE 500 MG: 500 INJECTION, POWDER, LYOPHILIZED, FOR SOLUTION INTRAVENOUS at 08:11

## 2023-11-27 RX ADMIN — METOPROLOL SUCCINATE 100 MG: 50 TABLET, EXTENDED RELEASE ORAL at 08:11

## 2023-11-27 NOTE — ED PROVIDER NOTES
Encounter Date: 11/27/2023       History     Chief Complaint   Patient presents with    Cough     Cough x 3 days. SOB today. 92% on RA. 97% on 2L NC. Pt states she is not improving despite medications.      83-year-old female with history of CHF, aortic valve stenosis, diastolic heart failure, presenting with cough and shortness breath that began today.  No chest pain.  Patient is satting 92% on room.  Patient reports she is had some lower extremity swelling bilaterally in the last few days.  Patient denies fever but does endorse congestion.  No vomiting or diarrhea.  No abdominal pain.  No other complaints.      Review of patient's allergies indicates:  No Known Allergies  Past Medical History:   Diagnosis Date    Anemia     Anxiety     Aortic valve stenosis     CHF (congestive heart failure)     Chronic kidney disease (CKD)     Diastolic heart failure     Dyslipidemia     Encounter for blood transfusion     Glaucoma (increased eye pressure)     LEFT EYE    HHD (hypertensive heart disease)     Hypertension     Osteoarthritis     Retina disorder, bilateral     Severe aortic stenosis 11/9/2016    TR (tricuspid regurgitation)     UTI (urinary tract infection)      Past Surgical History:   Procedure Laterality Date    CARDIAC CATHETERIZATION      CARDIAC VALVE SURGERY      CATARACT EXTRACTION W/ INTRAOCULAR LENS  IMPLANT, BILATERAL      CHOLECYSTECTOMY      COLONOSCOPY N/A 2/7/2021    Procedure: COLONOSCOPY;  Surgeon: Gaston Pablo MD;  Location: [facility];  Service: Endoscopy;  Laterality: N/A;    FLEXIBLE SIGMOIDOSCOPY N/A 2/4/2021    Procedure: SIGMOIDOSCOPY, FLEXIBLE;  Surgeon: Jarred Forrester MD;  Location: [facility];  Service: Gastroenterology;  Laterality: N/A;    HYSTERECTOMY      JOINT REPLACEMENT      right knee    JOINT REPLACEMENT      left knee    KNEE SURGERY Left     SPINE SURGERY      lumbar    TRANSCATHETER AORTIC VALVE REPLACEMENT       Family History   Problem Relation Age of Onset     Stroke Mother     Diabetes Mother     Heart disease Father      Social History     Tobacco Use    Smoking status: Former     Current packs/day: 0.00     Types: Cigarettes     Quit date: 1982     Years since quittin.2    Smokeless tobacco: Never   Substance Use Topics    Alcohol use: No    Drug use: No     Review of Systems   Constitutional:  Negative for fever.   HENT:  Positive for congestion. Negative for sore throat.    Respiratory:  Positive for cough and shortness of breath.    Cardiovascular:  Negative for chest pain.   Gastrointestinal:  Negative for abdominal pain, diarrhea, nausea and vomiting.   Genitourinary:  Negative for dysuria.   Musculoskeletal:  Negative for back pain.   Skin:  Negative for rash.   Neurological:  Negative for weakness.   Hematological:  Does not bruise/bleed easily.       Physical Exam     Initial Vitals   BP Pulse Resp Temp SpO2   23 1410 23 1410 23 1410 23 1404 23 1410   (!) 220/100 108 20 99 °F (37.2 °C) 97 %      MAP       --                Physical Exam    Nursing note and vitals reviewed.  Constitutional: She appears well-developed. She is not diaphoretic. No distress.   HENT:   Head: Normocephalic.   Eyes: Pupils are equal, round, and reactive to light.   Neck:   Normal range of motion.  Cardiovascular:            No murmur heard.  Pulmonary/Chest: No respiratory distress.   Clear lungs bilaterally.  No respiratory distress.  No wheezing or rales.  Good air movement.     Abdominal: Abdomen is soft.   Musculoskeletal:         General: No edema.      Cervical back: Normal range of motion.     Neurological: She is alert.   Skin: Skin is warm.   Psychiatric: She has a normal mood and affect.         ED Course   Procedures  Labs Reviewed   CBC W/ AUTO DIFFERENTIAL - Abnormal; Notable for the following components:       Result Value    WBC 17.10 (*)     Hemoglobin 10.8 (*)     Hematocrit 33.2 (*)     MCV 78 (*)     MCH 25.2 (*)     RDW 16.0  (*)     Immature Granulocytes 3.8 (*)     Gran # (ANC) 10.1 (*)     Immature Grans (Abs) 0.65 (*)     Mono # 2.2 (*)     All other components within normal limits   COMPREHENSIVE METABOLIC PANEL - Abnormal; Notable for the following components:    Glucose 114 (*)     BUN 40 (*)     Albumin 2.6 (*)     Alkaline Phosphatase 254 (*)     ALT 61 (*)     eGFR 41 (*)     All other components within normal limits   B-TYPE NATRIURETIC PEPTIDE - Abnormal; Notable for the following components:     (*)     All other components within normal limits   INFLUENZA A & B BY MOLECULAR   GROUP A STREP, MOLECULAR   CULTURE, BLOOD   CULTURE, BLOOD   SARS-COV-2 RNA AMPLIFICATION, QUAL   TROPONIN I   LACTIC ACID, PLASMA     EKG Readings: (Independently Interpreted)   Initial Reading: No STEMI. Rhythm: Sinus Tachycardia. Heart Rate: 104. Ectopy: No Ectopy. Conduction: LBBB.   Not significantly changed from prior       Imaging Results               X-Ray Chest 1 View (Final result)  Result time 11/27/23 16:43:12      Final result by Richard Cisse MD (11/27/23 16:43:12)                   Impression:      1. Limited evaluation secondary to technique.  2. Findings suspicious for congestive heart failure.  3. Small left pleural effusion with atelectasis versus infiltrate of the left lower lobe.  Correlate clinically with possible fever and/or elevated white count.  As deemed clinically necessary, further evaluation may be obtained with dedicated PA and lateral views of the chest.  Close interval follow-up is recommended.    This report was flagged in Epic as abnormal.      Electronically signed by: Richard Cisse  Date:    11/27/2023  Time:    16:43               Narrative:    EXAMINATION:  XR CHEST 1 VIEW    CLINICAL HISTORY:  Cough, unspecified    TECHNIQUE:  Single frontal view of the chest was performed.    COMPARISON:  03/23/2021.    FINDINGS:  Pulmonary hypoinflation crowding of the bronchopulmonary vessels.  Patient also  rotated to the LPO position.  This limits evaluation.    There is a diffuse prominence of the pulmonary interstitium suspicious for interstitial edema.  There is a small left pleural effusion with atelectasis versus infiltrate within the left lower lobe.  No significant right pleural effusion.  Heart size is enlarged.  Trachea midline.    Bony thorax intact with demineralization.                                       Medications   cloNIDine tablet 0.1 mg (has no administration in time range)     Medical Decision Making  DDX:  Patient presenting with symptoms of an upper respiratory virus.  Concern for COVID, especially given recent surges in the current pandemic.  Given age and shortness of breath will rule out pneumonia.  Given patient's elevated blood pressure (patient reports she did not take her medications this morning) possible a PE, however patient was satting 97% on room at this time.  Will evaluate for signs of pulmonary edema.  DX:  CBC, CMP, troponin, BNP, EKG, chest x-ray, COVID. Influenza. Strep  TX: Analgesia PRN.   Dispo: Discharge to follow up with PMD within 3-5 days with precautions for RTED and supportive care recommendations.        Amount and/or Complexity of Data Reviewed  Labs: ordered.  Radiology: ordered.    Risk  Prescription drug management.                                      Clinical Impression:  Final diagnoses:  [R05.9] Cough  [R06.02] SOB (shortness of breath)                 Edison Adler MD  11/27/23 3517

## 2023-11-28 PROBLEM — J15.69 PNEUMONIA DUE TO GRAM-NEGATIVE BACTERIA: Status: ACTIVE | Noted: 2023-11-28

## 2023-11-28 PROBLEM — J96.01 ACUTE RESPIRATORY FAILURE WITH HYPOXIA: Status: ACTIVE | Noted: 2023-11-28

## 2023-11-28 PROBLEM — I50.9 ACUTE ON CHRONIC HEART FAILURE: Status: ACTIVE | Noted: 2023-11-28

## 2023-11-28 PROBLEM — R65.20 SEPSIS DUE TO STREPTOCOCCUS PNEUMONIAE WITH ACUTE HYPOXIC RESPIRATORY FAILURE: Status: ACTIVE | Noted: 2023-11-28

## 2023-11-28 PROBLEM — J96.01 SEPSIS DUE TO STREPTOCOCCUS PNEUMONIAE WITH ACUTE HYPOXIC RESPIRATORY FAILURE: Status: ACTIVE | Noted: 2023-11-28

## 2023-11-28 PROBLEM — Z71.89 ADVANCED CARE PLANNING/COUNSELING DISCUSSION: Status: ACTIVE | Noted: 2023-11-28

## 2023-11-28 PROBLEM — A40.3 SEPSIS DUE TO STREPTOCOCCUS PNEUMONIAE WITH ACUTE HYPOXIC RESPIRATORY FAILURE: Status: ACTIVE | Noted: 2023-11-28

## 2023-11-28 LAB
ALBUMIN SERPL BCP-MCNC: 2.3 G/DL (ref 3.5–5.2)
ALP SERPL-CCNC: 198 U/L (ref 55–135)
ALT SERPL W/O P-5'-P-CCNC: 40 U/L (ref 10–44)
ANION GAP SERPL CALC-SCNC: 7 MMOL/L (ref 8–16)
AST SERPL-CCNC: 13 U/L (ref 10–40)
BASOPHILS # BLD AUTO: 0.1 K/UL (ref 0–0.2)
BASOPHILS NFR BLD: 0.6 % (ref 0–1.9)
BILIRUB SERPL-MCNC: 0.4 MG/DL (ref 0.1–1)
BUN SERPL-MCNC: 36 MG/DL (ref 8–23)
CALCIUM SERPL-MCNC: 9.3 MG/DL (ref 8.7–10.5)
CHLORIDE SERPL-SCNC: 111 MMOL/L (ref 95–110)
CO2 SERPL-SCNC: 24 MMOL/L (ref 23–29)
CREAT SERPL-MCNC: 1.1 MG/DL (ref 0.5–1.4)
D DIMER PPP IA.FEU-MCNC: 2.06 MG/L FEU
DIFFERENTIAL METHOD: ABNORMAL
EOSINOPHIL # BLD AUTO: 0.2 K/UL (ref 0–0.5)
EOSINOPHIL NFR BLD: 1.4 % (ref 0–8)
ERYTHROCYTE [DISTWIDTH] IN BLOOD BY AUTOMATED COUNT: 15.8 % (ref 11.5–14.5)
EST. GFR  (NO RACE VARIABLE): 50 ML/MIN/1.73 M^2
GLUCOSE SERPL-MCNC: 133 MG/DL (ref 70–110)
HCT VFR BLD AUTO: 29.1 % (ref 37–48.5)
HGB BLD-MCNC: 9.6 G/DL (ref 12–16)
IMM GRANULOCYTES # BLD AUTO: 0.61 K/UL (ref 0–0.04)
IMM GRANULOCYTES NFR BLD AUTO: 3.8 % (ref 0–0.5)
LYMPHOCYTES # BLD AUTO: 2.4 K/UL (ref 1–4.8)
LYMPHOCYTES NFR BLD: 15.3 % (ref 18–48)
MAGNESIUM SERPL-MCNC: 1.8 MG/DL (ref 1.6–2.6)
MCH RBC QN AUTO: 25.3 PG (ref 27–31)
MCHC RBC AUTO-ENTMCNC: 33 G/DL (ref 32–36)
MCV RBC AUTO: 77 FL (ref 82–98)
MONOCYTES # BLD AUTO: 1.8 K/UL (ref 0.3–1)
MONOCYTES NFR BLD: 11 % (ref 4–15)
NEUTROPHILS # BLD AUTO: 10.8 K/UL (ref 1.8–7.7)
NEUTROPHILS NFR BLD: 67.9 % (ref 38–73)
NRBC BLD-RTO: 0 /100 WBC
PLATELET # BLD AUTO: 228 K/UL (ref 150–450)
PMV BLD AUTO: 11.2 FL (ref 9.2–12.9)
POTASSIUM SERPL-SCNC: 4.4 MMOL/L (ref 3.5–5.1)
PROT SERPL-MCNC: 6.8 G/DL (ref 6–8.4)
RBC # BLD AUTO: 3.8 M/UL (ref 4–5.4)
SODIUM SERPL-SCNC: 142 MMOL/L (ref 136–145)
WBC # BLD AUTO: 15.91 K/UL (ref 3.9–12.7)

## 2023-11-28 PROCEDURE — 36415 COLL VENOUS BLD VENIPUNCTURE: CPT | Performed by: PHYSICIAN ASSISTANT

## 2023-11-28 PROCEDURE — 11000001 HC ACUTE MED/SURG PRIVATE ROOM

## 2023-11-28 PROCEDURE — 99900035 HC TECH TIME PER 15 MIN (STAT)

## 2023-11-28 PROCEDURE — 85379 FIBRIN DEGRADATION QUANT: CPT | Performed by: PHYSICIAN ASSISTANT

## 2023-11-28 PROCEDURE — 94761 N-INVAS EAR/PLS OXIMETRY MLT: CPT

## 2023-11-28 PROCEDURE — 97165 OT EVAL LOW COMPLEX 30 MIN: CPT

## 2023-11-28 PROCEDURE — 94640 AIRWAY INHALATION TREATMENT: CPT

## 2023-11-28 PROCEDURE — 25000003 PHARM REV CODE 250

## 2023-11-28 PROCEDURE — 80053 COMPREHEN METABOLIC PANEL: CPT | Performed by: PHYSICIAN ASSISTANT

## 2023-11-28 PROCEDURE — 25000003 PHARM REV CODE 250: Performed by: STUDENT IN AN ORGANIZED HEALTH CARE EDUCATION/TRAINING PROGRAM

## 2023-11-28 PROCEDURE — 25000242 PHARM REV CODE 250 ALT 637 W/ HCPCS: Performed by: PHYSICIAN ASSISTANT

## 2023-11-28 PROCEDURE — 85025 COMPLETE CBC W/AUTO DIFF WBC: CPT | Performed by: PHYSICIAN ASSISTANT

## 2023-11-28 PROCEDURE — 27000221 HC OXYGEN, UP TO 24 HOURS

## 2023-11-28 PROCEDURE — 25000003 PHARM REV CODE 250: Performed by: INTERNAL MEDICINE

## 2023-11-28 PROCEDURE — 63600175 PHARM REV CODE 636 W HCPCS: Performed by: PHYSICIAN ASSISTANT

## 2023-11-28 PROCEDURE — 25000003 PHARM REV CODE 250: Performed by: PHYSICIAN ASSISTANT

## 2023-11-28 PROCEDURE — 63600175 PHARM REV CODE 636 W HCPCS: Performed by: STUDENT IN AN ORGANIZED HEALTH CARE EDUCATION/TRAINING PROGRAM

## 2023-11-28 PROCEDURE — 94799 UNLISTED PULMONARY SVC/PX: CPT | Mod: XB

## 2023-11-28 PROCEDURE — 99223 PR INITIAL HOSPITAL CARE,LEVL III: ICD-10-PCS | Mod: ,,, | Performed by: PHYSICIAN ASSISTANT

## 2023-11-28 PROCEDURE — 99223 1ST HOSP IP/OBS HIGH 75: CPT | Mod: ,,, | Performed by: PHYSICIAN ASSISTANT

## 2023-11-28 PROCEDURE — 83735 ASSAY OF MAGNESIUM: CPT | Performed by: PHYSICIAN ASSISTANT

## 2023-11-28 RX ORDER — ATORVASTATIN CALCIUM 10 MG/1
10 TABLET, FILM COATED ORAL NIGHTLY
Status: DISCONTINUED | OUTPATIENT
Start: 2023-11-28 | End: 2023-12-01 | Stop reason: HOSPADM

## 2023-11-28 RX ORDER — ALLOPURINOL 100 MG/1
100 TABLET ORAL NIGHTLY
Status: DISCONTINUED | OUTPATIENT
Start: 2023-11-28 | End: 2023-11-29

## 2023-11-28 RX ORDER — CHOLECALCIFEROL (VITAMIN D3) 25 MCG
1000 TABLET ORAL DAILY
Status: DISCONTINUED | OUTPATIENT
Start: 2023-11-28 | End: 2023-12-01 | Stop reason: HOSPADM

## 2023-11-28 RX ORDER — VALSARTAN 40 MG/1
40 TABLET ORAL 2 TIMES DAILY
Status: DISCONTINUED | OUTPATIENT
Start: 2023-11-28 | End: 2023-11-29

## 2023-11-28 RX ORDER — AMLODIPINE BESYLATE 5 MG/1
5 TABLET ORAL DAILY
Status: DISCONTINUED | OUTPATIENT
Start: 2023-11-29 | End: 2023-12-01 | Stop reason: HOSPADM

## 2023-11-28 RX ORDER — HYDROCODONE BITARTRATE AND ACETAMINOPHEN 5; 325 MG/1; MG/1
1 TABLET ORAL EVERY 8 HOURS PRN
Status: DISCONTINUED | OUTPATIENT
Start: 2023-11-28 | End: 2023-12-01 | Stop reason: HOSPADM

## 2023-11-28 RX ORDER — OXYBUTYNIN CHLORIDE 5 MG/1
TABLET, EXTENDED RELEASE ORAL
Qty: 90 TABLET | Refills: 3 | Status: SHIPPED | OUTPATIENT
Start: 2023-11-28

## 2023-11-28 RX ORDER — FUROSEMIDE 40 MG/1
40 TABLET ORAL DAILY
Status: DISCONTINUED | OUTPATIENT
Start: 2023-11-29 | End: 2023-12-01 | Stop reason: HOSPADM

## 2023-11-28 RX ORDER — GABAPENTIN 100 MG/1
200 CAPSULE ORAL 2 TIMES DAILY
Status: DISCONTINUED | OUTPATIENT
Start: 2023-11-28 | End: 2023-12-01 | Stop reason: HOSPADM

## 2023-11-28 RX ORDER — GUAIFENESIN 600 MG/1
600 TABLET, EXTENDED RELEASE ORAL 2 TIMES DAILY
Status: DISCONTINUED | OUTPATIENT
Start: 2023-11-28 | End: 2023-12-01 | Stop reason: HOSPADM

## 2023-11-28 RX ORDER — QUETIAPINE FUMARATE 100 MG/1
100 TABLET, FILM COATED ORAL NIGHTLY PRN
Status: DISCONTINUED | OUTPATIENT
Start: 2023-11-28 | End: 2023-12-01 | Stop reason: HOSPADM

## 2023-11-28 RX ORDER — FUROSEMIDE 10 MG/ML
20 INJECTION INTRAMUSCULAR; INTRAVENOUS ONCE
Status: COMPLETED | OUTPATIENT
Start: 2023-11-28 | End: 2023-11-28

## 2023-11-28 RX ORDER — IPRATROPIUM BROMIDE AND ALBUTEROL SULFATE 2.5; .5 MG/3ML; MG/3ML
3 SOLUTION RESPIRATORY (INHALATION)
Status: DISCONTINUED | OUTPATIENT
Start: 2023-11-28 | End: 2023-11-28

## 2023-11-28 RX ORDER — TRAVOPROST OPHTHALMIC SOLUTION 0.04 MG/ML
1 SOLUTION OPHTHALMIC NIGHTLY
Status: DISCONTINUED | OUTPATIENT
Start: 2023-11-28 | End: 2023-12-01 | Stop reason: HOSPADM

## 2023-11-28 RX ORDER — IPRATROPIUM BROMIDE AND ALBUTEROL SULFATE 2.5; .5 MG/3ML; MG/3ML
3 SOLUTION RESPIRATORY (INHALATION)
Status: DISCONTINUED | OUTPATIENT
Start: 2023-11-28 | End: 2023-12-01 | Stop reason: HOSPADM

## 2023-11-28 RX ADMIN — IPRATROPIUM BROMIDE AND ALBUTEROL SULFATE 3 ML: 2.5; .5 SOLUTION RESPIRATORY (INHALATION) at 07:11

## 2023-11-28 RX ADMIN — IPRATROPIUM BROMIDE AND ALBUTEROL SULFATE 3 ML: 2.5; .5 SOLUTION RESPIRATORY (INHALATION) at 01:11

## 2023-11-28 RX ADMIN — GABAPENTIN 200 MG: 100 CAPSULE ORAL at 10:11

## 2023-11-28 RX ADMIN — METOPROLOL SUCCINATE 100 MG: 50 TABLET, EXTENDED RELEASE ORAL at 09:11

## 2023-11-28 RX ADMIN — FUROSEMIDE 20 MG: 10 INJECTION, SOLUTION INTRAMUSCULAR; INTRAVENOUS at 10:11

## 2023-11-28 RX ADMIN — VALSARTAN 40 MG: 40 TABLET, FILM COATED ORAL at 09:11

## 2023-11-28 RX ADMIN — ALLOPURINOL 100 MG: 100 TABLET ORAL at 09:11

## 2023-11-28 RX ADMIN — AMLODIPINE BESYLATE 10 MG: 10 TABLET ORAL at 08:11

## 2023-11-28 RX ADMIN — AZITHROMYCIN DIHYDRATE 500 MG: 500 INJECTION, POWDER, LYOPHILIZED, FOR SOLUTION INTRAVENOUS at 09:11

## 2023-11-28 RX ADMIN — CEFTRIAXONE SODIUM 1 G: 1 INJECTION, POWDER, FOR SOLUTION INTRAMUSCULAR; INTRAVENOUS at 07:11

## 2023-11-28 RX ADMIN — ATORVASTATIN CALCIUM 10 MG: 10 TABLET, FILM COATED ORAL at 09:11

## 2023-11-28 RX ADMIN — GUAIFENESIN 600 MG: 600 TABLET, EXTENDED RELEASE ORAL at 10:11

## 2023-11-28 RX ADMIN — Medication 1000 UNITS: at 10:11

## 2023-11-28 RX ADMIN — GUAIFENESIN 600 MG: 600 TABLET, EXTENDED RELEASE ORAL at 09:11

## 2023-11-28 RX ADMIN — GABAPENTIN 200 MG: 100 CAPSULE ORAL at 09:11

## 2023-11-28 RX ADMIN — TRAVOPROST 1 DROP: 0.04 SOLUTION OPHTHALMIC at 09:11

## 2023-11-28 RX ADMIN — METOPROLOL SUCCINATE 100 MG: 50 TABLET, EXTENDED RELEASE ORAL at 08:11

## 2023-11-28 NOTE — ASSESSMENT & PLAN NOTE
Patient with tachycardia, leukocytosis and left lower lobe consolidation on CXR at admission  IV rocephin and IV azithromycin   Mucinex for supportive care     Deferring IV fluids at this time in the setting of pulmonary edema & LOWELL.  LOWELL now resolving.  Possibly due to sepsis.  Blood cultures pending/ Urinalysis pending.

## 2023-11-28 NOTE — ASSESSMENT & PLAN NOTE
Patient with Hypoxic Respiratory failure which is Acute.  she is not on home oxygen. Supplemental oxygen was provided and noted-      .   Signs/symptoms of respiratory failure include- lethargy. Contributing diagnoses includes - CHF and Pneumonia Labs and images were reviewed. Patient Has not had a recent ABG. Will treat underlying causes and adjust management of respiratory failure as follows- Pulmonary edema with possible developing pneumonia

## 2023-11-28 NOTE — HPI
Patient is an 83 year old female with medical history of HTN, Tricuspid regurgitation, aortic valve stenosis/TAVR, diastolic HF,  who presented to the ED with SOB.  Patient has had a productive cough for several days now.  She has not been around any other sick contacts.  She has had lower extremity edema but denies CP.  No fever, chills, nausea & vomiting.  She has not had any recent medication changes.  She has been bed bound for three years now.      Admitted for hypoxia secondary to pneumonia and pleural effusion.

## 2023-11-28 NOTE — PT/OT/SLP EVAL
"Occupational Therapy   Evaluation    Name: Emily Alicia  MRN: [id number]  Admitting Diagnosis: Sepsis due to Streptococcus pneumoniae with acute hypoxic respiratory failure  Recent Surgery: * No surgery found *      Recommendations:     Discharge Recommendations: Moderate Intensity Therapy (SNF / Home with home health)  Discharge Equipment Recommendations:  lift device  Barriers to discharge:  Decreased caregiver support    Assessment:     Emily Alicia is a 83 y.o. female with a medical diagnosis of Sepsis due to Streptococcus pneumoniae with acute hypoxic respiratory failure.  She presents with performance deficits affecting function: impaired self care skills, impaired endurance, weakness, impaired balance, decreased upper extremity function, decreased lower extremity function, decreased ROM, impaired cardiopulmonary response to activity.      Rehab Prognosis: Fair; patient would benefit from acute skilled OT services to address these deficits and reach maximum level of function.       Plan:     Patient to be seen 5 x/week to address the above listed problems via    Plan of Care Expires: 12/12/23  Plan of Care Reviewed with: patient    Subjective       Chief Complaint: "I feel OK."  Patient/Family Comments/goals: None stated    Occupational Profile:  Living Environment: Pt lives on ground floor of a 2SH with no ASIF with son and his girlfriend.   Previous level of function: Pt was dependent with all ADL at bed level with Pt reporting being bed bound for over 1 year. She states that she is given sponge baths at bed level as well as dressed at bed level. She reports primarily using briefs for toileting with her son's girlfriend performing cleaning. Pt states that she only gets up from bed when going to a doctor appointment.  Equipment Used at home: hospital bed, wheelchair, walker, rolling  Assistance upon Discharge: Son and his girlfriend.    Pain/Comfort:  Pain Rating 1: other (see comments) (Did not quantify/none " reported)  Location - Orientation 1: lower  Location 1: sacral spine    Patients cultural, spiritual, Church conflicts given the current situation: no    Objective:     Communicated with: Nursing prior to session.  Patient found HOB elevated with telemetry, peripheral IV, oxygen, Other (comments) (Bilateral heel protectors) upon OT entry to room.    General Precautions: Standard,    Orthopedic Precautions: N/A  Braces: N/A  Respiratory Status: Nasal cannula, flow 2 L/min     Occupational Performance:    Bed Mobility:    Patient completed Rolling/Turning to Left with  dependent  Patient completed Scooting/Bridging with dependent  Patient completed Supine to Sit with dependent  Patient completed Sit to Supine with dependent    Functional Mobility/Transfers:  Unable to perform    Activities of Daily Living:  Grooming: minimum assistance to open toothpaste tube at bed level. Pt able to perform remaining ADL with set up.  Upper Body Dressing: dependence to don gown over back and remove.  Lower Body Dressing: dependence to don/doff socks  Toileting: total assistance Pt with PureWick    Cognitive/Visual Perceptual:  Cognitive/Psychosocial Skills:     -       Oriented to: Person, Place, Time, and Situation   -       Follows Commands/attention:Follows multistep  commands    Physical Exam:  Balance:    -       Poor seated balance noted once reaching EOB. Pt with dependent assistance to remain upright unable to support with bilateral upper extremity.  Postural examination/scapula alignment:    -       Rounded shoulders  -       Forward head  Skin integrity: Stage 1 Sacral Ucer  Upper Extremity Range of Motion:     -       Right Upper Extremity: WFL except shoulder range of motion very limited with minimal active movement. Passive range of motion tolerated.  -       Left Upper Extremity: WFL except shoulder range of motion very limited with minimal active movement. Passive range of motion tolerated.  Upper Extremity  Strength:    -       Right Upper Extremity: WFL except -2/5 to abduction and flexion of shoulder.  -       Left Upper Extremity: WFL except -2/5 to abduction and flexion of shoulder.   Strength:    -       Right Upper Extremity: WFL  -       Left Upper Extremity: WFL    AMPAC 6 Click ADL:  AMPAC Total Score: 11    Treatment & Education:  OT evaluation completed with Pt. No further acute OT recommended at this time due to current level of function.    Patient left HOB elevated with all lines intact, call button in reach, and nursing notified      GOALS:   Multidisciplinary Problems       Occupational Therapy Goals          Problem: Occupational Therapy    Goal Priority Disciplines Outcome Interventions   Occupational Therapy Goal     OT, PT/OT     Description: Pt to perform hygiene ADL at bed level with MOD I with increased bilateral upper extremity use and coordination.  Pt to demonstrate seated balance with MOD A for balance for improved participation in transfers and reduced caregiver burden.  Pt to demonstrate fair- BUE strength during functional task   Pt to participate in bilateral upper extremity range of motion and strengthening routine as instructed by OT.                       History:     Past Medical History:   Diagnosis Date    Anemia     Anxiety     Aortic valve stenosis     CHF (congestive heart failure)     Chronic kidney disease (CKD)     Diastolic heart failure     Dyslipidemia     Encounter for blood transfusion     Glaucoma (increased eye pressure)     LEFT EYE    HHD (hypertensive heart disease)     Hypertension     Osteoarthritis     Retina disorder, bilateral     Severe aortic stenosis 11/9/2016    TR (tricuspid regurgitation)     UTI (urinary tract infection)          Past Surgical History:   Procedure Laterality Date    CARDIAC CATHETERIZATION      CARDIAC VALVE SURGERY      CATARACT EXTRACTION W/ INTRAOCULAR LENS  IMPLANT, BILATERAL      CHOLECYSTECTOMY      COLONOSCOPY N/A 2/7/2021     Procedure: COLONOSCOPY;  Surgeon: Gaston Pablo MD;  Location: [facility] ENDO;  Service: Endoscopy;  Laterality: N/A;    FLEXIBLE SIGMOIDOSCOPY N/A 2/4/2021    Procedure: SIGMOIDOSCOPY, FLEXIBLE;  Surgeon: Jarred Forrester MD;  Location: [facility] ENDO;  Service: Gastroenterology;  Laterality: N/A;    HYSTERECTOMY      JOINT REPLACEMENT      right knee    JOINT REPLACEMENT      left knee    KNEE SURGERY Left     SPINE SURGERY      lumbar    TRANSCATHETER AORTIC VALVE REPLACEMENT         Time Tracking:     OT Date of Treatment:    OT Start Time: 1329  OT Stop Time: 1400  OT Total Time (min): 31 min    Billable Minutes:Evaluation 31 11/28/2023

## 2023-11-28 NOTE — PLAN OF CARE
[facility] - Med Surg (3rd Fl)  Initial Discharge Assessment       Primary Care Provider: Caroline Do NP    Admission Diagnosis: Cough [R05.9]  SOB (shortness of breath) [R06.02]    Admission Date: 11/27/2023  Expected Discharge Date:     Transition of Care Barriers: (P) None    Payor: HU[employer] MANAGED MEDICARE / Plan: HU[employer] SNP HMO PPO SPECIAL NEEDS / Product Type: Medicare Advantage /     Extended Emergency Contact Information  Primary Emergency Contact: Lubna Monroe   [place]  Mobile Phone: [phone]  Relation: Daughter  Secondary Emergency Contact: Brittany Estrella   [place]  Mobile Phone: [phone]  Relation: Daughter    Discharge Plan A: (P) Home Health  Discharge Plan B: (P) Skilled Nursing Facility      [facility] - [place] - [address]  [facility]  [facility] [address]  Phone: [phone] Fax: [phone]    [facility] - [facility] [address] [facility]  [address] [facility] [address]  Phone: [phone] Fax: [phone]    [facility] - [place] - [address] [facility] [address] [address] [facility] [address]  [facility] [address]  Phone: [phone] Fax: [phone]      Initial Assessment (most recent)       Adult Discharge Assessment - 11/28/23 1107          Discharge Assessment    Assessment Type Discharge Planning Assessment     Confirmed/corrected address, phone number and insurance Yes     Confirmed Demographics Correct on Facesheet     Source of Information patient     Communicated LINA with patient/caregiver Date not available/Unable to determine     Reason For Admission Cough, SOB (P)      People in Home child(marta), adult (P)      Facility Arrived From: Home (P)      Do you expect to return to your current living situation? Yes (P)      Do you have help at home or someone to help you manage your care at home? Yes (P)      Who are your caregiver(s) and their phone number(s)? Brittany  granddaughter, [phone] (P)    Patient resides with her son and patient's granddaughter lives down the street.    Prior to hospitilization cognitive status: Alert/Oriented (P)      Current cognitive status: Alert/Oriented (P)      Walking or Climbing Stairs ambulation difficulty, dependent;transferring difficulty, dependent;stair climbing difficulty, dependent (P)    Patient is bed bound    Dressing/Bathing bathing difficulty, dependent;dressing difficulty, dependent (P)    Patient is bed bound    Dressing/Bathing Management Family dresses and bathes patient (P)      Do you have any problems with: Errands/Grocery (P)      Home Accessibility wheelchair accessible (P)      Home Layout Able to live on 1st floor (P)      Equipment Currently Used at Home hospital bed;wheelchair (P)      Readmission within 30 days? No (P)      Patient currently being followed by outpatient case management? No (P)      Do you currently have service(s) that help you manage your care at home? No (P)      Do you take prescription medications? Yes (P)      Do you have prescription coverage? Yes (P)      Coverage [employer] (P)      Do you have any problems affording any of your prescribed medications? No (P)      Is the patient taking medications as prescribed? yes (P)      Who is going to help you get home at discharge? family (P)      How do you get to doctors appointments? family or friend will provide (P)      Are you on dialysis? No (P)      Do you take coumadin? No (P)      DME Needed Upon Discharge  none (P)      Discharge Plan discussed with: Patient (P)      Transition of Care Barriers None (P)      Discharge Plan A Home Health (P)      Discharge Plan B Skilled Nursing Facility (P)                       Discharge assessment completed with patient at bedside. Patient reports she lives with her son and his girlfriend. Patient states that her daughter and granddaughter live down the street and assist with her care. Patient states she has  been bed bound for about the last 6 months. Patient states she receives Meals on Wheels from the [employer]. Patient states her family assist her with ADL's but she knows she needs more care then they are able to provide. Patient inquired about home health as it is something she received in the past. STEFANIE Alba informed Patient that home health is only offered a few days a week and not 24hr care. STEFANIE Alba informed patient that PT and OT therapist will come and evaluate patient. Patient stated she had previously been to [facility] for skilled nursing and not sure she wants to do that again. Conversation will be held again pending therapy evaluations. SW to remain available.

## 2023-11-28 NOTE — ASSESSMENT & PLAN NOTE
Patient with acute kidney injury/acute renal failure likely due to  sepsis  LOWELL is currently improving. Baseline creatinine  0.9  - Labs reviewed- Renal function/electrolytes with Estimated Creatinine Clearance: 54.9 mL/min (based on SCr of 1.1 mg/dL). according to latest data. Monitor urine output and serial BMP and adjust therapy as needed. Avoid nephrotoxins and renally dose meds for GFR listed above.

## 2023-11-28 NOTE — H&P
[facility] ([address] [place] [address] Medicine  History & Physical    Patient Name: Emily Alicia  MRN: [id number]  Patient Class: IP- Inpatient  Admission Date: 11/27/2023  Attending Physician: Viivana Lindsay MD   Primary Care Provider: Caroline Do NP         Patient information was obtained from patient and ER records.     Subjective:     Principal Problem:Sepsis due to Streptococcus pneumoniae with acute hypoxic respiratory failure    Chief Complaint:   Chief Complaint   Patient presents with    Cough     Cough x 3 days. SOB today. 92% on RA. 97% on 2L NC. Pt states she is not improving despite medications.         HPI: Patient is an 83 year old female with medical history of HTN, Tricuspid regurgitation, aortic valve stenosis/TAVR, diastolic HF,  who presented to the ED with SOB.  Patient has had a productive cough for several days now.  She has not been around any other sick contacts.  She has had lower extremity edema but denies CP.  No fever, chills, nausea & vomiting.  She has not had any recent medication changes.  She has been bed bound for three years now.      Admitted for hypoxia secondary to pneumonia and pleural effusion.                Past Medical History:   Diagnosis Date    Anemia     Anxiety     Aortic valve stenosis     CHF (congestive heart failure)     Chronic kidney disease (CKD)     Diastolic heart failure     Dyslipidemia     Encounter for blood transfusion     Glaucoma (increased eye pressure)     LEFT EYE    HHD (hypertensive heart disease)     Hypertension     Osteoarthritis     Retina disorder, bilateral     Severe aortic stenosis 11/9/2016    TR (tricuspid regurgitation)     UTI (urinary tract infection)        Past Surgical History:   Procedure Laterality Date    CARDIAC CATHETERIZATION      CARDIAC VALVE SURGERY      CATARACT EXTRACTION W/ INTRAOCULAR LENS  IMPLANT, BILATERAL      CHOLECYSTECTOMY      COLONOSCOPY N/A 2/7/2021    Procedure: COLONOSCOPY;  Surgeon: Gaston HENDERSON  MD Bev;  Location: [facility] ENDO;  Service: Endoscopy;  Laterality: N/A;    FLEXIBLE SIGMOIDOSCOPY N/A 2/4/2021    Procedure: SIGMOIDOSCOPY, FLEXIBLE;  Surgeon: Jarred Forrester MD;  Location: [facility] ENDO;  Service: Gastroenterology;  Laterality: N/A;    HYSTERECTOMY      JOINT REPLACEMENT      right knee    JOINT REPLACEMENT      left knee    KNEE SURGERY Left     SPINE SURGERY      lumbar    TRANSCATHETER AORTIC VALVE REPLACEMENT         Review of patient's allergies indicates:  No Known Allergies    No current facility-administered medications on file prior to encounter.     Current Outpatient Medications on File Prior to Encounter   Medication Sig    allopurinoL (ZYLOPRIM) 100 MG tablet TAKE 1 TABLET EVERY DAY (Patient taking differently: Take 100 mg by mouth every evening.)    amLODIPine (NORVASC) 10 MG tablet TAKE 1 TABLET EVERY DAY (Patient taking differently: Take 10 mg by mouth once daily.)    ascorbic acid, vitamin C, (VITAMIN C) 250 MG tablet Take 1 tablet (250 mg total) by mouth once daily.    aspirin (ASPIRIN LOW DOSE) 81 MG EC tablet Take 1 tablet (81 mg total) by mouth nightly.    atorvastatin (LIPITOR) 10 MG tablet TAKE 1 TABLET EVERY EVENING (Patient taking differently: Take 10 mg by mouth every evening.)    collagenase (SANTYL) ointment Apply topically once daily.    furosemide (LASIX) 40 MG tablet Take 1 tablet (40 mg total) by mouth once daily.    gabapentin (NEURONTIN) 300 MG capsule TAKE 1 CAPSULE TWICE DAILY (Patient taking differently: Take 300 mg by mouth 2 (two) times daily.)    HYDROcodone-acetaminophen (NORCO)  mg per tablet Take 1 tablet by mouth every 6 (six) hours as needed for Pain.    losartan (COZAAR) 100 MG tablet Take 100 mg by mouth once daily.    metoprolol succinate (TOPROL-XL) 100 MG 24 hr tablet Take 100 mg by mouth 2 (two) times daily.    naproxen (NAPROSYN) 500 MG tablet Take 500 mg by mouth 2 (two) times daily with meals.    oxybutynin (DITROPAN-XL) 5 MG TR24  TAKE 1 TABLET EVERY DAY (Patient taking differently: Take 5 mg by mouth once daily.)    potassium chloride SA (K-DUR,KLOR-CON) 20 MEQ tablet Take 20 mEq by mouth once daily.    travoprost (TRAVATAN Z) 0.004 % ophthalmic solution Place 1 drop into both eyes every evening.    traZODone (DESYREL) 50 MG tablet TAKE 1 TABLET EVERY NIGHT AS NEEDED FOR INSOMNIA    vitamin D (VITAMIN D3) 1000 units Tab Take 1,000 Units by mouth once daily.     Family History       Problem Relation (Age of Onset)    Diabetes Mother    Heart disease Father    Stroke Mother          Tobacco Use    Smoking status: Former     Current packs/day: 0.00     Types: Cigarettes     Quit date: 1982     Years since quittin.2    Smokeless tobacco: Never   Substance and Sexual Activity    Alcohol use: No    Drug use: No    Sexual activity: Never     Partners: Male     Review of Systems   Constitutional:  Positive for fatigue. Negative for chills and fever.   HENT:  Negative for congestion and drooling.    Eyes:  Negative for visual disturbance.   Respiratory:  Positive for cough and shortness of breath.    Cardiovascular:  Positive for leg swelling. Negative for chest pain.   Gastrointestinal:  Negative for abdominal distention, abdominal pain, nausea and vomiting.   Genitourinary:  Negative for difficulty urinating and dysuria.   Musculoskeletal:  Positive for arthralgias, back pain and gait problem.   Skin:  Positive for wound (on her back).   Neurological:  Positive for weakness (generalized). Negative for dizziness and headaches.   Psychiatric/Behavioral:  Negative for agitation and decreased concentration.      Objective:     Vital Signs (Most Recent):  Temp: 98.9 °F (37.2 °C) (23 1140)  Pulse: 76 (23 1140)  Resp: (!) 24 (23 1140)  BP: 133/61 (23 1140)  SpO2: 97 % (23 1140) Vital Signs (24h Range):  Temp:  [98.1 °F (36.7 °C)-99.8 °F (37.7 °C)] 98.9 °F (37.2 °C)  Pulse:  [] 76  Resp:  [16-26] 24  SpO2:  [86  "%-100 %] 97 %  BP: (113-220)/() 133/61     Weight: 121.2 kg (267 lb 3.2 oz)  Body mass index is 39.46 kg/m².     Physical Exam  Constitutional:       General: She is not in acute distress.  HENT:      Head: Normocephalic and atraumatic.   Eyes:      General:         Right eye: No discharge.         Left eye: No discharge.   Cardiovascular:      Rate and Rhythm: Normal rate and regular rhythm.   Pulmonary:      Effort: Pulmonary effort is normal.      Comments: Decreased breath sounds bilateral lower lung fields and extends to middle lung field on left.    Abdominal:      General: There is no distension.      Tenderness: There is no abdominal tenderness.   Musculoskeletal:         General: No swelling or tenderness.      Cervical back: Neck supple. No tenderness.      Right lower leg: Edema (+1) present.      Left lower leg: Edema (+1) present.      Comments: Decreased ROM left arm.   Skin:     General: Skin is warm and dry.      Comments: Stage 1 sacral ulcer    Neurological:      General: No focal deficit present.      Mental Status: She is alert and oriented to person, place, and time.   Psychiatric:         Mood and Affect: Mood normal.         Behavior: Behavior normal.                Significant Labs: A1C: No results for input(s): "HGBA1C" in the last 4320 hours.  ABGs: No results for input(s): "PH", "PCO2", "HCO3", "POCSATURATED", "BE", "TOTALHB", "COHB", "METHB", "O2HB", "POCFIO2", "PO2" in the last 48 hours.  Bilirubin:   Recent Labs   Lab 11/27/23  1449 11/28/23  1033   BILITOT 0.4 0.4     Blood Culture:   Recent Labs   Lab 11/27/23  1558 11/27/23  1559   LABBLOO No Growth to date No Growth to date     BMP:   Recent Labs   Lab 11/28/23  1033   *      K 4.4   *   CO2 24   BUN 36*   CREATININE 1.1   CALCIUM 9.3   MG 1.8     CBC:   Recent Labs   Lab 11/27/23  1449 11/28/23  1033   WBC 17.10* 15.91*   HGB 10.8* 9.6*   HCT 33.2* 29.1*    228     CMP:   Recent Labs   Lab " "11/27/23  1449 11/28/23  1033    142   K 4.4 4.4    111*   CO2 25 24   * 133*   BUN 40* 36*   CREATININE 1.3 1.1   CALCIUM 9.9 9.3   PROT 7.8 6.8   ALBUMIN 2.6* 2.3*   BILITOT 0.4 0.4   ALKPHOS 254* 198*   AST 21 13   ALT 61* 40   ANIONGAP 9 7*     Cardiac Markers:   Recent Labs   Lab 11/27/23  1449   *     Coagulation: No results for input(s): "PT", "INR", "APTT" in the last 48 hours.  Lactic Acid:   Recent Labs   Lab 11/27/23  1558   LACTATE 1.1     Lipase: No results for input(s): "LIPASE" in the last 48 hours.  Lipid Panel: No results for input(s): "CHOL", "HDL", "LDLCALC", "TRIG", "CHOLHDL" in the last 48 hours.  Magnesium:   Recent Labs   Lab 11/28/23  1033   MG 1.8     POCT Glucose: No results for input(s): "POCTGLUCOSE" in the last 48 hours.  Prealbumin: No results for input(s): "PREALBUMIN" in the last 48 hours.  Respiratory Culture: No results for input(s): "GSRESP", "RESPIRATORYC" in the last 48 hours.  Troponin:   Recent Labs   Lab 11/27/23  1450   TROPONINI 0.017     TSH: No results for input(s): "TSH" in the last 4320 hours.  Urine Culture: No results for input(s): "LABURIN" in the last 48 hours.  Urine Studies: No results for input(s): "COLORU", "APPEARANCEUA", "PHUR", "SPECGRAV", "PROTEINUA", "GLUCUA", "KETONESU", "BILIRUBINUA", "OCCULTUA", "NITRITE", "UROBILINOGEN", "LEUKOCYTESUR", "RBCUA", "WBCUA", "BACTERIA", "SQUAMEPITHEL", "HYALINECASTS" in the last 48 hours.    Invalid input(s): "WRIGHTSUR"    Significant Imaging: I have reviewed all pertinent imaging results/findings within the past 24 hours.  Assessment/Plan:     * Sepsis due to Streptococcus pneumoniae with acute hypoxic respiratory failure  Patient with tachycardia, leukocytosis and left lower lobe consolidation on CXR at admission  IV rocephin and IV azithromycin   Mucinex for supportive care     Deferring IV fluids at this time in the setting of pulmonary edema & LOWELL.  LOWELL now resolving.  Possibly due to sepsis.  " Blood cultures pending/ Urinalysis pending.        Advanced care planning/counseling discussion  Patient currently a full code at this time.        Acute on chronic heart failure  + hypoxia, mild lower extremity edema   BNP elevated at 203  CXR with pulmonary edema and left pleural effusion  [facility]IS consulted and will get records for recent echo  IV lasix 20mg x 1.  Patient states not taking her lasix.  Will be cautious with diuresis in the setting or aortic valve stenosis.    Continue home metoprolol           Acute respiratory failure with hypoxia  Patient with Hypoxic Respiratory failure which is Acute.  she is not on home oxygen. Supplemental oxygen was provided and noted-      .   Signs/symptoms of respiratory failure include- lethargy. Contributing diagnoses includes - CHF and Pneumonia Labs and images were reviewed. Patient Has not had a recent ABG. Will treat underlying causes and adjust management of respiratory failure as follows- Pulmonary edema with possible developing pneumonia     ASCVD (arteriosclerotic cardiovascular disease)  Continue aspirin and statin       Hyperlipidemia LDL goal <70  Continue home statin       Acute kidney injury superimposed on CKD  Patient with acute kidney injury/acute renal failure likely due to  sepsis  LOWELL is currently improving. Baseline creatinine  0.9  - Labs reviewed- Renal function/electrolytes with Estimated Creatinine Clearance: 54.9 mL/min (based on SCr of 1.1 mg/dL). according to latest data. Monitor urine output and serial BMP and adjust therapy as needed. Avoid nephrotoxins and renally dose meds for GFR listed above.    Insomnia  Continue home trazodone         VTE Risk Mitigation (From admission, onward)           Ordered     IP VTE HIGH RISK PATIENT  Once         11/27/23 1906     Place sequential compression device  Until discontinued         11/27/23 1906                                    Anjali Mesa PA-C  Department of Hospital Medicine  [facility]  Surg (3rd Fl)

## 2023-11-28 NOTE — ASSESSMENT & PLAN NOTE
+ hypoxia, mild lower extremity edema   BNP elevated at 203  CXR with pulmonary edema and left pleural effusion  CIS consulted and will get records for recent echo  IV lasix 20mg x 1.  Patient states not taking her lasix.  Will be cautious with diuresis in the setting or aortic valve stenosis.    Continue home metoprolol

## 2023-11-28 NOTE — PHARMACY MED REC
"  Admission Medication History     The home medication history was taken by Lise Salcedo CPhT.    Medication history obtained from, Patient's Daughter In Law Verified    You may go to "Admission" then "Reconcile Home Medications" tabs to review and/or act upon these items.     The home medication list has been updated by the Pharmacy department.   Please read ALL comments highlighted in yellow.   Please address this information as you see fit.    Feel free to contact us if you have any questions or require assistance.      The medications listed below were removed from the home medication list.  Please reorder if appropriate:  Patient reports no longer taking the following medication(s):  Alprazolam 0.5 mg  Clonidine 0.1 mg  Ferrous Sulfate 325 mg  HCTZ 12.5 and 25 mg  Latanoprost 0.005% eye drops  Mobic 7.5 mg  Omeprazole 40 mg  Protonix 40 mg  Quetiapine 200 mg      Lise Salcedo CPhT.  Ext [phone]             .          "

## 2023-11-28 NOTE — CONSULTS
[place] - [facility] ([facility])  Cardiology  Consult Note    Patient Name: Emily Alicia  MRN: [id number]  Admission Date: 11/27/2023  Hospital Length of Stay: 1 days  Code Status: Full Code   Attending Provider: Viviana Lindsay MD   Consulting Provider: Stella Oconnell NP  Primary Care Physician: Caroline Do NP  Principal Problem:<principal problem not specified>    Patient information was obtained from patient, past medical records, and ER records.     Inpatient consult to Cardiology[facility]  Consult performed by: Stella Oconnell, NP  Consult ordered by: Viviana Lindsay MD        Subjective:     Chief Complaint:  Cough x 3 days and SOB     HPI: Emily Alicia is a 83 y.o. female with a medical history of Chronic diastolic heart failure, HHD, Aortic stenosis s/p TAVR, dyslipidemia, and CKD presented to the ER with complaints of a cough for 3 days with associated SOB and BLE edema. EKG showed ST with LBBB. CXR with findings suspicious for CHF and small left pleural effusion with atelectasis vs infiltrate of the LLL. Labs significant for Leukocytosis, anemia, and BNP - 203. Troponin normal. Viral studies negative. [facility] asked to evaluate for CHF.     Patient last seen clinic Feb. 2019.     Past Medical History:   Diagnosis Date    Anemia     Anxiety     Aortic valve stenosis     CHF (congestive heart failure)     Chronic kidney disease (CKD)     Diastolic heart failure     Dyslipidemia     Encounter for blood transfusion     Glaucoma (increased eye pressure)     LEFT EYE    HHD (hypertensive heart disease)     Hypertension     Osteoarthritis     Retina disorder, bilateral     Severe aortic stenosis 11/9/2016    TR (tricuspid regurgitation)     UTI (urinary tract infection)        Past Surgical History:   Procedure Laterality Date    CARDIAC CATHETERIZATION      CARDIAC VALVE SURGERY      CATARACT EXTRACTION W/ INTRAOCULAR LENS  IMPLANT, BILATERAL      CHOLECYSTECTOMY      COLONOSCOPY N/A 2/7/2021    Procedure: COLONOSCOPY;   Surgeon: Gaston Pablo MD;  Location: [facility] ENDO;  Service: Endoscopy;  Laterality: N/A;    FLEXIBLE SIGMOIDOSCOPY N/A 2/4/2021    Procedure: SIGMOIDOSCOPY, FLEXIBLE;  Surgeon: Jarred Forrester MD;  Location: [facility] ENDO;  Service: Gastroenterology;  Laterality: N/A;    HYSTERECTOMY      JOINT REPLACEMENT      right knee    JOINT REPLACEMENT      left knee    KNEE SURGERY Left     SPINE SURGERY      lumbar    TRANSCATHETER AORTIC VALVE REPLACEMENT         Review of patient's allergies indicates:  No Known Allergies    No current facility-administered medications on file prior to encounter.     Current Outpatient Medications on File Prior to Encounter   Medication Sig    allopurinoL (ZYLOPRIM) 100 MG tablet TAKE 1 TABLET EVERY DAY (Patient taking differently: Take 100 mg by mouth every evening.)    amLODIPine (NORVASC) 10 MG tablet TAKE 1 TABLET EVERY DAY (Patient taking differently: Take 10 mg by mouth once daily.)    ascorbic acid, vitamin C, (VITAMIN C) 250 MG tablet Take 1 tablet (250 mg total) by mouth once daily.    aspirin (ASPIRIN LOW DOSE) 81 MG EC tablet Take 1 tablet (81 mg total) by mouth nightly.    atorvastatin (LIPITOR) 10 MG tablet TAKE 1 TABLET EVERY EVENING (Patient taking differently: Take 10 mg by mouth every evening.)    collagenase (SANTYL) ointment Apply topically once daily.    furosemide (LASIX) 40 MG tablet Take 1 tablet (40 mg total) by mouth once daily.    gabapentin (NEURONTIN) 300 MG capsule TAKE 1 CAPSULE TWICE DAILY (Patient taking differently: Take 300 mg by mouth 2 (two) times daily.)    HYDROcodone-acetaminophen (NORCO)  mg per tablet Take 1 tablet by mouth every 6 (six) hours as needed for Pain.    losartan (COZAAR) 100 MG tablet Take 100 mg by mouth once daily.    metoprolol succinate (TOPROL-XL) 100 MG 24 hr tablet Take 100 mg by mouth 2 (two) times daily.    naproxen (NAPROSYN) 500 MG tablet Take 500 mg by mouth 2 (two) times daily with meals.    oxybutynin  (DITROPAN-XL) 5 MG TR24 TAKE 1 TABLET EVERY DAY (Patient taking differently: Take 5 mg by mouth once daily.)    potassium chloride SA (K-DUR,KLOR-CON) 20 MEQ tablet Take 20 mEq by mouth once daily.    travoprost (TRAVATAN Z) 0.004 % ophthalmic solution Place 1 drop into both eyes every evening.    traZODone (DESYREL) 50 MG tablet TAKE 1 TABLET EVERY NIGHT AS NEEDED FOR INSOMNIA    vitamin D (VITAMIN D3) 1000 units Tab Take 1,000 Units by mouth once daily.     Family History       Problem Relation (Age of Onset)    Diabetes Mother    Heart disease Father    Stroke Mother          Tobacco Use    Smoking status: Former     Current packs/day: 0.00     Types: Cigarettes     Quit date: 1982     Years since quittin.2    Smokeless tobacco: Never   Substance and Sexual Activity    Alcohol use: No    Drug use: No    Sexual activity: Never     Partners: Male     Review of Systems   Constitutional: Negative.   HENT: Negative.     Cardiovascular: Negative.    Respiratory:  Positive for cough.    Skin: Negative.    Musculoskeletal: Negative.    Gastrointestinal: Negative.    Neurological: Negative.      Objective:     Vital Signs (Most Recent):  Temp: 99.1 °F (37.3 °C) (23 0752)  Pulse: 82 (23 1014)  Resp: 20 (23 0752)  BP: (!) 118/59 (23 0841)  SpO2: 100 % (23 0752) Vital Signs (24h Range):  Temp:  [98.1 °F (36.7 °C)-99.8 °F (37.7 °C)] 99.1 °F (37.3 °C)  Pulse:  [] 82  Resp:  [16-26] 20  SpO2:  [86 %-100 %] 100 %  BP: (113-220)/() 118/59     Weight: 121.2 kg (267 lb 3.2 oz)  Body mass index is 39.46 kg/m².    SpO2: 100 %         Intake/Output Summary (Last 24 hours) at 2023 1131  Last data filed at 2023 0623  Gross per 24 hour   Intake 464.75 ml   Output 500 ml   Net -35.25 ml       Lines/Drains/Airways       Drain  Duration             Female External Urinary Catheter 23 <1 day              Peripheral Intravenous Line  Duration                   Peripheral IV - Single Lumen 03/25/21 1142 18 G Left Forearm 978 days         Peripheral IV - Single Lumen 03/25/21 1501 20 G Right Antecubital 977 days         Peripheral IV - Single Lumen 11/27/23 1730 20 G Left Antecubital <1 day                    Physical Exam  Constitutional:       Appearance: Normal appearance.   Cardiovascular:      Rate and Rhythm: Tachycardia present.      Pulses: Normal pulses.      Heart sounds: Normal heart sounds.   Pulmonary:      Effort: Pulmonary effort is normal.   Musculoskeletal:         General: Normal range of motion.      Cervical back: Normal range of motion and neck supple.   Skin:     Capillary Refill: Capillary refill takes less than 2 seconds.   Neurological:      Mental Status: She is alert. Mental status is at baseline.         Significant Labs: CMP   Recent Labs   Lab 11/27/23  1449 11/28/23  1033    142   K 4.4 4.4    111*   CO2 25 24   * 133*   BUN 40* 36*   CREATININE 1.3 1.1   CALCIUM 9.9 9.3   PROT 7.8 6.8   ALBUMIN 2.6* 2.3*   BILITOT 0.4 0.4   ALKPHOS 254* 198*   AST 21 13   ALT 61* 40   ANIONGAP 9 7*   , CBC   Recent Labs   Lab 11/27/23  1449 11/28/23  1033   WBC 17.10* 15.91*   HGB 10.8* 9.6*   HCT 33.2* 29.1*    228   , Troponin   Recent Labs   Lab 11/27/23  1450   TROPONINI 0.017   , and All pertinent lab results from the last 24 hours have been reviewed.    Significant Imaging: X-Ray: CXR: X-Ray Chest 1 View (CXR):   Results for orders placed or performed during the hospital encounter of 11/27/23   X-Ray Chest 1 View    Narrative    EXAMINATION:  XR CHEST 1 VIEW    CLINICAL HISTORY:  Cough, unspecified    TECHNIQUE:  Single frontal view of the chest was performed.    COMPARISON:  03/23/2021.    FINDINGS:  Pulmonary hypoinflation crowding of the bronchopulmonary vessels.  Patient also rotated to the LPO position.  This limits evaluation.    There is a diffuse prominence of the pulmonary interstitium suspicious for interstitial  edema.  There is a small left pleural effusion with atelectasis versus infiltrate within the left lower lobe.  No significant right pleural effusion.  Heart size is enlarged.  Trachea midline.    Bony thorax intact with demineralization.      Impression    1. Limited evaluation secondary to technique.  2. Findings suspicious for congestive heart failure.  3. Small left pleural effusion with atelectasis versus infiltrate of the left lower lobe.  Correlate clinically with possible fever and/or elevated white count.  As deemed clinically necessary, further evaluation may be obtained with dedicated PA and lateral views of the chest.  Close interval follow-up is recommended.    This report was flagged in Epic as abnormal.      Electronically signed by: Richard Cisse  Date:    11/27/2023  Time:    16:43     Echocardiogram 2/12/2019:  LVEF 55% with grade I diastolic dysfunction and mild LVH  Mildly increased left atrial diameter  A bio prosthetic valve is noted at the level of the aortic valve, AMAURI 1.5 cm2  No significant valvular regurgitation is noted  Pulmonary artery systolic pressure is 33.2 mmHg      Assessment and Plan:    Chronic diastolic heart failure  -last know EF 55%  --received one dose of lasix IV 20mg  Start Farxica or Jardiance 10 mg qd    Aortic Stenosis s/p TAVR (12/2016)    Leukocytosis  -IV antibiotics    Mild nonobstructive CAD:  -[facility] 2016  ECASA as tolerated    CKD  -BUN/Creat 36/1.1 with GFR 50    Hypertension  -amlodipine 10mg daily  -metoprolol succinate 100mg po BID  low dose ARB Valsartan as tolerated    Dyslipidemia  -Atorvastatin 10mg HS     There are no hospital problems to display for this patient.      VTE Risk Mitigation (From admission, onward)           Ordered     IP VTE HIGH RISK PATIENT  Once         11/27/23 1906     Place sequential compression device  Until discontinued         11/27/23 1906                  Current Facility-Administered Medications   Medication     albuterol-ipratropium 2.5 mg-0.5 mg/3 mL nebulizer solution 3 mL    allopurinoL tablet 100 mg    amLODIPine tablet 10 mg    atorvastatin tablet 10 mg    azithromycin (ZITHROMAX) 500 mg in dextrose 5 % (D5W) 250 mL IVPB (Vial-Mate)    cefTRIAXone (ROCEPHIN) 1 g in dextrose 5 % in water (D5W) 100 mL IVPB (MB+)    collagenase ointment    gabapentin capsule 200 mg    guaiFENesin 12 hr tablet 600 mg    melatonin tablet 6 mg    metoprolol succinate (TOPROL-XL) 24 hr tablet 100 mg    sodium chloride 0.9% flush 10 mL    travoprost 0.004 % ophthalmic solution 1 drop    traZODone tablet 50 mg    vitamin D 1000 units tablet 1,000 Units         Thank you for your consult. I will follow-up with patient. Please contact us if you have any additional questions.    Stella Oconnell NP scribed for Dr. Laboy  Cardiology   [place] - [facility] (3rd Fl)    I have personally interviewed and examined this patient face-to-face, and as the physician. Documented the above plan and rendered all medical decision making for this encounter. I have read and agree with the above documentation unless otherwise noted.

## 2023-11-28 NOTE — PT/OT/SLP EVAL
"Occupational Therapy   Evaluation and Discharge Note    Name: Emily Alicia  MRN: [id number]  Admitting Diagnosis: Sepsis due to Streptococcus pneumoniae with acute hypoxic respiratory failure  Recent Surgery: * No surgery found *      Recommendations:     Discharge Recommendations: Moderate Intensity Therapy (SNF / Home with home health)  Discharge Equipment Recommendations: lift device  Barriers to discharge:  Decreased caregiver support    Assessment:     Emily Alicia is a 83 y.o. female with a medical diagnosis of Sepsis due to Streptococcus pneumoniae with acute hypoxic respiratory failure. At this time, patient is functioning at their very low prior level of function and does not require further acute OT services.     Plan:     During this hospitalization, patient does not require further acute OT services.  Please re-consult if situation changes.    Plan of Care Reviewed with: patient    Subjective     Chief Complaint: "I feel OK."  Patient/Family Comments/goals: None stated    Occupational Profile:  Living Environment: Pt lives on ground floor of a 2SH with no ASIF with son and his girlfriend.   Previous level of function: Pt was dependent with all ADL at bed level with Pt reporting being bed bound for over 1 year. She states that she is given sponge baths at bed level as well as dressed at bed level. She reports primarily using briefs for toileting with her son's girlfriend performing cleaning. Pt states that she only gets up from bed when going to a doctor appointment.  Equipment Used at home: hospital bed, wheelchair, walker, rolling  Assistance upon Discharge: Son and his girlfriend.    Pain/Comfort:  Pain Rating 1: other (see comments) (Did not quantify/none reported)  Location - Orientation 1: lower  Location 1: sacral spine    Patients cultural, spiritual, Scientologist conflicts given the current situation: no    Objective:     Communicated with: Nursing prior to session.  Patient found HOB elevated with " telemetry, peripheral IV, oxygen, Other (comments) (Bilateral heel protectors) upon OT entry to room.    General Precautions: Standard,    Orthopedic Precautions: N/A  Braces: N/A  Respiratory Status: Nasal cannula, flow 2 L/min     Occupational Performance:    Bed Mobility:    Patient completed Rolling/Turning to Left with  dependent  Patient completed Scooting/Bridging with dependent  Patient completed Supine to Sit with dependent  Patient completed Sit to Supine with dependent    Functional Mobility/Transfers:  Unable to perform    Activities of Daily Living:  Grooming: minimum assistance to open toothpaste tube at bed level. Pt able to perform remaining ADL with set up.  Upper Body Dressing: dependence to don gown over back and remove.  Lower Body Dressing: dependence to don/doff socks  Toileting: total assistance Pt with PureWick    Cognitive/Visual Perceptual:  Cognitive/Psychosocial Skills:     -       Oriented to: Person, Place, Time, and Situation   -       Follows Commands/attention:Follows multistep  commands    Physical Exam:  Balance:    -       Poor seated balance noted once reaching EOB. Pt with dependent assistance to remain upright unable to support with bilateral upper extremity.  Postural examination/scapula alignment:    -       Rounded shoulders  -       Forward head  Skin integrity: Stage 1 Sacral Ucer  Upper Extremity Range of Motion:     -       Right Upper Extremity: WFL except shoulder range of motion very limited with minimal active movement. Passive range of motion tolerated.  -       Left Upper Extremity: WFL except shoulder range of motion very limited with minimal active movement. Passive range of motion tolerated.  Upper Extremity Strength:    -       Right Upper Extremity: WFL except -2/5 to abduction and flexion of shoulder.  -       Left Upper Extremity: WFL except -2/5 to abduction and flexion of shoulder.   Strength:    -       Right Upper Extremity: WFL  -       Left Upper  Extremity: WFL    AMPAC 6 Click ADL:  AMPAC Total Score: 11    Treatment & Education:  OT evaluation completed with Pt. No further acute OT recommended at this time due to current level of function.    Patient left HOB elevated with all lines intact, call button in reach, and nursing notified    GOALS:   Multidisciplinary Problems       Occupational Therapy Goals       Not on file                    History:     Past Medical History:   Diagnosis Date    Anemia     Anxiety     Aortic valve stenosis     CHF (congestive heart failure)     Chronic kidney disease (CKD)     Diastolic heart failure     Dyslipidemia     Encounter for blood transfusion     Glaucoma (increased eye pressure)     LEFT EYE    HHD (hypertensive heart disease)     Hypertension     Osteoarthritis     Retina disorder, bilateral     Severe aortic stenosis 11/9/2016    TR (tricuspid regurgitation)     UTI (urinary tract infection)          Past Surgical History:   Procedure Laterality Date    CARDIAC CATHETERIZATION      CARDIAC VALVE SURGERY      CATARACT EXTRACTION W/ INTRAOCULAR LENS  IMPLANT, BILATERAL      CHOLECYSTECTOMY      COLONOSCOPY N/A 2/7/2021    Procedure: COLONOSCOPY;  Surgeon: Gaston Pablo MD;  Location: [facility];  Service: Endoscopy;  Laterality: N/A;    FLEXIBLE SIGMOIDOSCOPY N/A 2/4/2021    Procedure: SIGMOIDOSCOPY, FLEXIBLE;  Surgeon: Jarred Forrester MD;  Location: [facility];  Service: Gastroenterology;  Laterality: N/A;    HYSTERECTOMY      JOINT REPLACEMENT      right knee    JOINT REPLACEMENT      left knee    KNEE SURGERY Left     SPINE SURGERY      lumbar    TRANSCATHETER AORTIC VALVE REPLACEMENT         Time Tracking:     OT Date of Treatment:    OT Start Time: 1329  OT Stop Time: 1400  OT Total Time (min): 31 min    Billable Minutes:Evaluation 31 11/28/2023

## 2023-11-28 NOTE — SUBJECTIVE & OBJECTIVE
Past Medical History:   Diagnosis Date    Anemia     Anxiety     Aortic valve stenosis     CHF (congestive heart failure)     Chronic kidney disease (CKD)     Diastolic heart failure     Dyslipidemia     Encounter for blood transfusion     Glaucoma (increased eye pressure)     LEFT EYE    HHD (hypertensive heart disease)     Hypertension     Osteoarthritis     Retina disorder, bilateral     Severe aortic stenosis 11/9/2016    TR (tricuspid regurgitation)     UTI (urinary tract infection)        Past Surgical History:   Procedure Laterality Date    CARDIAC CATHETERIZATION      CARDIAC VALVE SURGERY      CATARACT EXTRACTION W/ INTRAOCULAR LENS  IMPLANT, BILATERAL      CHOLECYSTECTOMY      COLONOSCOPY N/A 2/7/2021    Procedure: COLONOSCOPY;  Surgeon: Gaston Pablo MD;  Location: [facility] ENDO;  Service: Endoscopy;  Laterality: N/A;    FLEXIBLE SIGMOIDOSCOPY N/A 2/4/2021    Procedure: SIGMOIDOSCOPY, FLEXIBLE;  Surgeon: Jarred Forrester MD;  Location: [facility] ENDO;  Service: Gastroenterology;  Laterality: N/A;    HYSTERECTOMY      JOINT REPLACEMENT      right knee    JOINT REPLACEMENT      left knee    KNEE SURGERY Left     SPINE SURGERY      lumbar    TRANSCATHETER AORTIC VALVE REPLACEMENT         Review of patient's allergies indicates:  No Known Allergies    No current facility-administered medications on file prior to encounter.     Current Outpatient Medications on File Prior to Encounter   Medication Sig    allopurinoL (ZYLOPRIM) 100 MG tablet TAKE 1 TABLET EVERY DAY (Patient taking differently: Take 100 mg by mouth every evening.)    amLODIPine (NORVASC) 10 MG tablet TAKE 1 TABLET EVERY DAY (Patient taking differently: Take 10 mg by mouth once daily.)    ascorbic acid, vitamin C, (VITAMIN C) 250 MG tablet Take 1 tablet (250 mg total) by mouth once daily.    aspirin (ASPIRIN LOW DOSE) 81 MG EC tablet Take 1 tablet (81 mg total) by mouth nightly.    atorvastatin (LIPITOR) 10 MG tablet TAKE 1 TABLET EVERY  EVENING (Patient taking differently: Take 10 mg by mouth every evening.)    collagenase (SANTYL) ointment Apply topically once daily.    furosemide (LASIX) 40 MG tablet Take 1 tablet (40 mg total) by mouth once daily.    gabapentin (NEURONTIN) 300 MG capsule TAKE 1 CAPSULE TWICE DAILY (Patient taking differently: Take 300 mg by mouth 2 (two) times daily.)    HYDROcodone-acetaminophen (NORCO)  mg per tablet Take 1 tablet by mouth every 6 (six) hours as needed for Pain.    losartan (COZAAR) 100 MG tablet Take 100 mg by mouth once daily.    metoprolol succinate (TOPROL-XL) 100 MG 24 hr tablet Take 100 mg by mouth 2 (two) times daily.    naproxen (NAPROSYN) 500 MG tablet Take 500 mg by mouth 2 (two) times daily with meals.    oxybutynin (DITROPAN-XL) 5 MG TR24 TAKE 1 TABLET EVERY DAY (Patient taking differently: Take 5 mg by mouth once daily.)    potassium chloride SA (K-DUR,KLOR-CON) 20 MEQ tablet Take 20 mEq by mouth once daily.    travoprost (TRAVATAN Z) 0.004 % ophthalmic solution Place 1 drop into both eyes every evening.    traZODone (DESYREL) 50 MG tablet TAKE 1 TABLET EVERY NIGHT AS NEEDED FOR INSOMNIA    vitamin D (VITAMIN D3) 1000 units Tab Take 1,000 Units by mouth once daily.     Family History       Problem Relation (Age of Onset)    Diabetes Mother    Heart disease Father    Stroke Mother          Tobacco Use    Smoking status: Former     Current packs/day: 0.00     Types: Cigarettes     Quit date: 1982     Years since quittin.2    Smokeless tobacco: Never   Substance and Sexual Activity    Alcohol use: No    Drug use: No    Sexual activity: Never     Partners: Male     Review of Systems   Constitutional:  Positive for fatigue. Negative for chills and fever.   HENT:  Negative for congestion and drooling.    Eyes:  Negative for visual disturbance.   Respiratory:  Positive for cough and shortness of breath.    Cardiovascular:  Positive for leg swelling. Negative for chest pain.    Gastrointestinal:  Negative for abdominal distention, abdominal pain, nausea and vomiting.   Genitourinary:  Negative for difficulty urinating and dysuria.   Musculoskeletal:  Positive for arthralgias, back pain and gait problem.   Skin:  Positive for wound (on her back).   Neurological:  Positive for weakness (generalized). Negative for dizziness and headaches.   Psychiatric/Behavioral:  Negative for agitation and decreased concentration.      Objective:     Vital Signs (Most Recent):  Temp: 98.9 °F (37.2 °C) (11/28/23 1140)  Pulse: 76 (11/28/23 1140)  Resp: (!) 24 (11/28/23 1140)  BP: 133/61 (11/28/23 1140)  SpO2: 97 % (11/28/23 1140) Vital Signs (24h Range):  Temp:  [98.1 °F (36.7 °C)-99.8 °F (37.7 °C)] 98.9 °F (37.2 °C)  Pulse:  [] 76  Resp:  [16-26] 24  SpO2:  [86 %-100 %] 97 %  BP: (113-220)/() 133/61     Weight: 121.2 kg (267 lb 3.2 oz)  Body mass index is 39.46 kg/m².     Physical Exam  Constitutional:       General: She is not in acute distress.  HENT:      Head: Normocephalic and atraumatic.   Eyes:      General:         Right eye: No discharge.         Left eye: No discharge.   Cardiovascular:      Rate and Rhythm: Normal rate and regular rhythm.   Pulmonary:      Effort: Pulmonary effort is normal.      Comments: Decreased breath sounds bilateral lower lung fields and extends to middle lung field on left.    Abdominal:      General: There is no distension.      Tenderness: There is no abdominal tenderness.   Musculoskeletal:         General: No swelling or tenderness.      Cervical back: Neck supple. No tenderness.      Right lower leg: Edema (+1) present.      Left lower leg: Edema (+1) present.      Comments: Decreased ROM left arm.   Skin:     General: Skin is warm and dry.      Comments: Stage 1 sacral ulcer    Neurological:      General: No focal deficit present.      Mental Status: She is alert and oriented to person, place, and time.   Psychiatric:         Mood and Affect: Mood  "normal.         Behavior: Behavior normal.                Significant Labs: A1C: No results for input(s): "HGBA1C" in the last 4320 hours.  ABGs: No results for input(s): "PH", "PCO2", "HCO3", "POCSATURATED", "BE", "TOTALHB", "COHB", "METHB", "O2HB", "POCFIO2", "PO2" in the last 48 hours.  Bilirubin:   Recent Labs   Lab 11/27/23  1449 11/28/23  1033   BILITOT 0.4 0.4     Blood Culture:   Recent Labs   Lab 11/27/23  1558 11/27/23  1559   LABBLOO No Growth to date No Growth to date     BMP:   Recent Labs   Lab 11/28/23  1033   *      K 4.4   *   CO2 24   BUN 36*   CREATININE 1.1   CALCIUM 9.3   MG 1.8     CBC:   Recent Labs   Lab 11/27/23  1449 11/28/23  1033   WBC 17.10* 15.91*   HGB 10.8* 9.6*   HCT 33.2* 29.1*    228     CMP:   Recent Labs   Lab 11/27/23  1449 11/28/23  1033    142   K 4.4 4.4    111*   CO2 25 24   * 133*   BUN 40* 36*   CREATININE 1.3 1.1   CALCIUM 9.9 9.3   PROT 7.8 6.8   ALBUMIN 2.6* 2.3*   BILITOT 0.4 0.4   ALKPHOS 254* 198*   AST 21 13   ALT 61* 40   ANIONGAP 9 7*     Cardiac Markers:   Recent Labs   Lab 11/27/23  1449   *     Coagulation: No results for input(s): "PT", "INR", "APTT" in the last 48 hours.  Lactic Acid:   Recent Labs   Lab 11/27/23  1558   LACTATE 1.1     Lipase: No results for input(s): "LIPASE" in the last 48 hours.  Lipid Panel: No results for input(s): "CHOL", "HDL", "LDLCALC", "TRIG", "CHOLHDL" in the last 48 hours.  Magnesium:   Recent Labs   Lab 11/28/23  1033   MG 1.8     POCT Glucose: No results for input(s): "POCTGLUCOSE" in the last 48 hours.  Prealbumin: No results for input(s): "PREALBUMIN" in the last 48 hours.  Respiratory Culture: No results for input(s): "GSRESP", "RESPIRATORYC" in the last 48 hours.  Troponin:   Recent Labs   Lab 11/27/23  1450   TROPONINI 0.017     TSH: No results for input(s): "TSH" in the last 4320 hours.  Urine Culture: No results for input(s): "LABURIN" in the last 48 hours.  Urine " "Studies: No results for input(s): "COLORU", "APPEARANCEUA", "PHUR", "SPECGRAV", "PROTEINUA", "GLUCUA", "KETONESU", "BILIRUBINUA", "OCCULTUA", "NITRITE", "UROBILINOGEN", "LEUKOCYTESUR", "RBCUA", "WBCUA", "BACTERIA", "SQUAMEPITHEL", "HYALINECASTS" in the last 48 hours.    Invalid input(s): "WRIGHTSUR"    Significant Imaging: I have reviewed all pertinent imaging results/findings within the past 24 hours.  "

## 2023-11-29 LAB
ALBUMIN SERPL BCP-MCNC: 2.1 G/DL (ref 3.5–5.2)
ALP SERPL-CCNC: 169 U/L (ref 55–135)
ALT SERPL W/O P-5'-P-CCNC: 30 U/L (ref 10–44)
ANION GAP SERPL CALC-SCNC: 9 MMOL/L (ref 8–16)
ANISOCYTOSIS BLD QL SMEAR: SLIGHT
ASCENDING AORTA: 2.67 CM
AST SERPL-CCNC: 12 U/L (ref 10–40)
AV INDEX (PROSTH): 0.62
AV MEAN GRADIENT: 10 MMHG
AV PEAK GRADIENT: 16 MMHG
AV VALVE AREA BY VELOCITY RATIO: 1.81 CM²
AV VALVE AREA: 1.88 CM²
AV VELOCITY RATIO: 0.6
BASOPHILS # BLD AUTO: ABNORMAL K/UL (ref 0–0.2)
BASOPHILS NFR BLD: 0 % (ref 0–1.9)
BILIRUB SERPL-MCNC: 0.2 MG/DL (ref 0.1–1)
BSA FOR ECHO PROCEDURE: 2.4 M2
BUN SERPL-MCNC: 39 MG/DL (ref 8–23)
CALCIUM SERPL-MCNC: 8.8 MG/DL (ref 8.7–10.5)
CHLORIDE SERPL-SCNC: 110 MMOL/L (ref 95–110)
CO2 SERPL-SCNC: 21 MMOL/L (ref 23–29)
CREAT SERPL-MCNC: 1.3 MG/DL (ref 0.5–1.4)
CV ECHO LV RWT: 0.55 CM
DIFFERENTIAL METHOD: ABNORMAL
DOP CALC AO PEAK VEL: 1.98 M/S
DOP CALC AO VTI: 49.3 CM
DOP CALC LVOT AREA: 3 CM2
DOP CALC LVOT DIAMETER: 1.96 CM
DOP CALC LVOT PEAK VEL: 1.19 M/S
DOP CALC LVOT STROKE VOLUME: 92.88 CM3
DOP CALC RVOT PEAK VEL: 0.92 M/S
DOP CALC RVOT VTI: 21.9 CM
DOP CALCLVOT PEAK VEL VTI: 30.8 CM
E WAVE DECELERATION TIME: 285.76 MSEC
E/A RATIO: 0.89
E/E' RATIO: 23.83 M/S
ECHO LV POSTERIOR WALL: 1.32 CM (ref 0.6–1.1)
EOSINOPHIL # BLD AUTO: ABNORMAL K/UL (ref 0–0.5)
EOSINOPHIL NFR BLD: 6 % (ref 0–8)
ERYTHROCYTE [DISTWIDTH] IN BLOOD BY AUTOMATED COUNT: 15.8 % (ref 11.5–14.5)
EST. GFR  (NO RACE VARIABLE): 41 ML/MIN/1.73 M^2
FRACTIONAL SHORTENING: 7 % (ref 28–44)
GLUCOSE SERPL-MCNC: 121 MG/DL (ref 70–110)
HCT VFR BLD AUTO: 25.7 % (ref 37–48.5)
HGB BLD-MCNC: 8.3 G/DL (ref 12–16)
HYPOCHROMIA BLD QL SMEAR: ABNORMAL
IMM GRANULOCYTES # BLD AUTO: ABNORMAL K/UL (ref 0–0.04)
IMM GRANULOCYTES NFR BLD AUTO: ABNORMAL % (ref 0–0.5)
INTERVENTRICULAR SEPTUM: 1.49 CM (ref 0.6–1.1)
IVRT: 114.18 MSEC
LA MAJOR: 5.56 CM
LA MINOR: 5.45 CM
LA WIDTH: 4.1 CM
LEFT ATRIUM SIZE: 4.4 CM
LEFT ATRIUM VOLUME INDEX MOD: 47.7 ML/M2
LEFT ATRIUM VOLUME INDEX: 36.1 ML/M2
LEFT ATRIUM VOLUME MOD: 111.65 CM3
LEFT ATRIUM VOLUME: 84.41 CM3
LEFT INTERNAL DIMENSION IN SYSTOLE: 4.49 CM (ref 2.1–4)
LEFT VENTRICLE DIASTOLIC VOLUME INDEX: 46.45 ML/M2
LEFT VENTRICLE DIASTOLIC VOLUME: 108.69 ML
LEFT VENTRICLE MASS INDEX: 118 G/M2
LEFT VENTRICLE SYSTOLIC VOLUME INDEX: 39.3 ML/M2
LEFT VENTRICLE SYSTOLIC VOLUME: 91.96 ML
LEFT VENTRICULAR INTERNAL DIMENSION IN DIASTOLE: 4.82 CM (ref 3.5–6)
LEFT VENTRICULAR MASS: 277 G
LV LATERAL E/E' RATIO: 23.83 M/S
LV SEPTAL E/E' RATIO: 23.83 M/S
LVOT MG: 4.12 MMHG
LVOT MV: 0.98 CM/S
LYMPHOCYTES # BLD AUTO: ABNORMAL K/UL (ref 1–4.8)
LYMPHOCYTES NFR BLD: 27 % (ref 18–48)
MAGNESIUM SERPL-MCNC: 1.7 MG/DL (ref 1.6–2.6)
MCH RBC QN AUTO: 24.9 PG (ref 27–31)
MCHC RBC AUTO-ENTMCNC: 32.3 G/DL (ref 32–36)
MCV RBC AUTO: 77 FL (ref 82–98)
MONOCYTES # BLD AUTO: ABNORMAL K/UL (ref 0.3–1)
MONOCYTES NFR BLD: 9 % (ref 4–15)
MV PEAK A VEL: 1.61 M/S
MV PEAK E VEL: 1.43 M/S
MV STENOSIS PRESSURE HALF TIME: 82.87 MS
MV VALVE AREA P 1/2 METHOD: 2.65 CM2
MYELOCYTES NFR BLD MANUAL: 2 %
NEUTROPHILS NFR BLD: 55 % (ref 38–73)
NEUTS BAND NFR BLD MANUAL: 1 %
NRBC BLD-RTO: 0 /100 WBC
PLATELET # BLD AUTO: 249 K/UL (ref 150–450)
PLATELET BLD QL SMEAR: ABNORMAL
PMV BLD AUTO: 11.6 FL (ref 9.2–12.9)
POLYCHROMASIA BLD QL SMEAR: ABNORMAL
POTASSIUM SERPL-SCNC: 4.5 MMOL/L (ref 3.5–5.1)
PROT SERPL-MCNC: 6.3 G/DL (ref 6–8.4)
PV MEAN GRADIENT: 2 MMHG
PV MV: 0.67 M/S
PV PEAK GRADIENT: 3 MMHG
PV PEAK VELOCITY: 0.86 M/S
RA MAJOR: 5.69 CM
RBC # BLD AUTO: 3.34 M/UL (ref 4–5.4)
RIGHT VENTRICULAR END-DIASTOLIC DIMENSION: 2.89 CM
SINUS: 2.23 CM
SODIUM SERPL-SCNC: 140 MMOL/L (ref 136–145)
STJ: 2.42 CM
TARGETS BLD QL SMEAR: ABNORMAL
TDI LATERAL: 0.06 M/S
TDI SEPTAL: 0.06 M/S
TDI: 0.06 M/S
WBC # BLD AUTO: 15.88 K/UL (ref 3.9–12.7)
Z-SCORE OF LEFT VENTRICULAR DIMENSION IN END DIASTOLE: -6.92
Z-SCORE OF LEFT VENTRICULAR DIMENSION IN END SYSTOLE: -2.02

## 2023-11-29 PROCEDURE — 80053 COMPREHEN METABOLIC PANEL: CPT | Performed by: PHYSICIAN ASSISTANT

## 2023-11-29 PROCEDURE — 36415 COLL VENOUS BLD VENIPUNCTURE: CPT | Performed by: PHYSICIAN ASSISTANT

## 2023-11-29 PROCEDURE — 99233 SBSQ HOSP IP/OBS HIGH 50: CPT | Mod: ,,, | Performed by: PHYSICIAN ASSISTANT

## 2023-11-29 PROCEDURE — 25000003 PHARM REV CODE 250: Performed by: PHYSICIAN ASSISTANT

## 2023-11-29 PROCEDURE — 99900031 HC PATIENT EDUCATION (STAT)

## 2023-11-29 PROCEDURE — 99900035 HC TECH TIME PER 15 MIN (STAT)

## 2023-11-29 PROCEDURE — 27000221 HC OXYGEN, UP TO 24 HOURS

## 2023-11-29 PROCEDURE — 25000003 PHARM REV CODE 250: Performed by: INTERNAL MEDICINE

## 2023-11-29 PROCEDURE — 25000003 PHARM REV CODE 250: Performed by: NURSE PRACTITIONER

## 2023-11-29 PROCEDURE — 63600175 PHARM REV CODE 636 W HCPCS: Performed by: STUDENT IN AN ORGANIZED HEALTH CARE EDUCATION/TRAINING PROGRAM

## 2023-11-29 PROCEDURE — 11000001 HC ACUTE MED/SURG PRIVATE ROOM

## 2023-11-29 PROCEDURE — 85027 COMPLETE CBC AUTOMATED: CPT | Performed by: PHYSICIAN ASSISTANT

## 2023-11-29 PROCEDURE — 25000003 PHARM REV CODE 250

## 2023-11-29 PROCEDURE — 99233 PR SUBSEQUENT HOSPITAL CARE,LEVL III: ICD-10-PCS | Mod: ,,, | Performed by: PHYSICIAN ASSISTANT

## 2023-11-29 PROCEDURE — 94761 N-INVAS EAR/PLS OXIMETRY MLT: CPT

## 2023-11-29 PROCEDURE — 94799 UNLISTED PULMONARY SVC/PX: CPT | Mod: XB

## 2023-11-29 PROCEDURE — 85007 BL SMEAR W/DIFF WBC COUNT: CPT | Performed by: PHYSICIAN ASSISTANT

## 2023-11-29 PROCEDURE — 25000242 PHARM REV CODE 250 ALT 637 W/ HCPCS: Performed by: PHYSICIAN ASSISTANT

## 2023-11-29 PROCEDURE — 83735 ASSAY OF MAGNESIUM: CPT | Performed by: PHYSICIAN ASSISTANT

## 2023-11-29 PROCEDURE — 63600175 PHARM REV CODE 636 W HCPCS: Performed by: PHYSICIAN ASSISTANT

## 2023-11-29 PROCEDURE — 25000003 PHARM REV CODE 250: Performed by: STUDENT IN AN ORGANIZED HEALTH CARE EDUCATION/TRAINING PROGRAM

## 2023-11-29 PROCEDURE — 97110 THERAPEUTIC EXERCISES: CPT | Mod: CO

## 2023-11-29 PROCEDURE — 94640 AIRWAY INHALATION TREATMENT: CPT

## 2023-11-29 PROCEDURE — 97162 PT EVAL MOD COMPLEX 30 MIN: CPT

## 2023-11-29 RX ORDER — ALLOPURINOL 100 MG/1
100 TABLET ORAL NIGHTLY
Status: DISCONTINUED | OUTPATIENT
Start: 2023-11-30 | End: 2023-12-01 | Stop reason: HOSPADM

## 2023-11-29 RX ORDER — MAGNESIUM SULFATE HEPTAHYDRATE 40 MG/ML
2 INJECTION, SOLUTION INTRAVENOUS ONCE
Status: COMPLETED | OUTPATIENT
Start: 2023-11-29 | End: 2023-11-29

## 2023-11-29 RX ORDER — VALSARTAN 40 MG/1
40 TABLET ORAL 2 TIMES DAILY
Status: DISCONTINUED | OUTPATIENT
Start: 2023-11-30 | End: 2023-12-01 | Stop reason: HOSPADM

## 2023-11-29 RX ORDER — DAPAGLIFLOZIN 10 MG/1
10 TABLET, FILM COATED ORAL DAILY
Status: DISCONTINUED | OUTPATIENT
Start: 2023-11-29 | End: 2023-12-01 | Stop reason: HOSPADM

## 2023-11-29 RX ADMIN — GABAPENTIN 200 MG: 100 CAPSULE ORAL at 09:11

## 2023-11-29 RX ADMIN — CEFTRIAXONE SODIUM 1 G: 1 INJECTION, POWDER, FOR SOLUTION INTRAMUSCULAR; INTRAVENOUS at 06:11

## 2023-11-29 RX ADMIN — TRAVOPROST 1 DROP: 0.04 SOLUTION OPHTHALMIC at 08:11

## 2023-11-29 RX ADMIN — GUAIFENESIN 600 MG: 600 TABLET, EXTENDED RELEASE ORAL at 09:11

## 2023-11-29 RX ADMIN — DAPAGLIFLOZIN 10 MG: 10 TABLET, FILM COATED ORAL at 01:11

## 2023-11-29 RX ADMIN — COLLAGENASE SANTYL: 250 OINTMENT TOPICAL at 09:11

## 2023-11-29 RX ADMIN — GUAIFENESIN 600 MG: 600 TABLET, EXTENDED RELEASE ORAL at 08:11

## 2023-11-29 RX ADMIN — FUROSEMIDE 40 MG: 40 TABLET ORAL at 09:11

## 2023-11-29 RX ADMIN — AMLODIPINE BESYLATE 5 MG: 5 TABLET ORAL at 09:11

## 2023-11-29 RX ADMIN — MAGNESIUM SULFATE HEPTAHYDRATE 2 G: 40 INJECTION, SOLUTION INTRAVENOUS at 11:11

## 2023-11-29 RX ADMIN — IPRATROPIUM BROMIDE AND ALBUTEROL SULFATE 3 ML: 2.5; .5 SOLUTION RESPIRATORY (INHALATION) at 01:11

## 2023-11-29 RX ADMIN — METOPROLOL SUCCINATE 100 MG: 50 TABLET, EXTENDED RELEASE ORAL at 08:11

## 2023-11-29 RX ADMIN — Medication 1000 UNITS: at 09:11

## 2023-11-29 RX ADMIN — ATORVASTATIN CALCIUM 10 MG: 10 TABLET, FILM COATED ORAL at 08:11

## 2023-11-29 RX ADMIN — HYDROCODONE BITARTRATE AND ACETAMINOPHEN 1 TABLET: 5; 325 TABLET ORAL at 08:11

## 2023-11-29 RX ADMIN — VALSARTAN 40 MG: 40 TABLET, FILM COATED ORAL at 09:11

## 2023-11-29 RX ADMIN — GABAPENTIN 200 MG: 100 CAPSULE ORAL at 08:11

## 2023-11-29 RX ADMIN — AZITHROMYCIN DIHYDRATE 500 MG: 500 INJECTION, POWDER, LYOPHILIZED, FOR SOLUTION INTRAVENOUS at 08:11

## 2023-11-29 RX ADMIN — IPRATROPIUM BROMIDE AND ALBUTEROL SULFATE 3 ML: 2.5; .5 SOLUTION RESPIRATORY (INHALATION) at 07:11

## 2023-11-29 RX ADMIN — METOPROLOL SUCCINATE 100 MG: 50 TABLET, EXTENDED RELEASE ORAL at 09:11

## 2023-11-29 NOTE — SUBJECTIVE & OBJECTIVE
Past Medical History:   Diagnosis Date    Anemia     Anxiety     Aortic valve stenosis     CHF (congestive heart failure)     Chronic kidney disease (CKD)     Diastolic heart failure     Dyslipidemia     Encounter for blood transfusion     Glaucoma (increased eye pressure)     LEFT EYE    HHD (hypertensive heart disease)     Hypertension     Osteoarthritis     Retina disorder, bilateral     Severe aortic stenosis 11/9/2016    TR (tricuspid regurgitation)     UTI (urinary tract infection)        Past Surgical History:   Procedure Laterality Date    CARDIAC CATHETERIZATION      CARDIAC VALVE SURGERY      CATARACT EXTRACTION W/ INTRAOCULAR LENS  IMPLANT, BILATERAL      CHOLECYSTECTOMY      COLONOSCOPY N/A 2/7/2021    Procedure: COLONOSCOPY;  Surgeon: Gaston Pablo MD;  Location: [facility] ENDO;  Service: Endoscopy;  Laterality: N/A;    FLEXIBLE SIGMOIDOSCOPY N/A 2/4/2021    Procedure: SIGMOIDOSCOPY, FLEXIBLE;  Surgeon: Jarred Forrester MD;  Location: [facility] ENDO;  Service: Gastroenterology;  Laterality: N/A;    HYSTERECTOMY      JOINT REPLACEMENT      right knee    JOINT REPLACEMENT      left knee    KNEE SURGERY Left     SPINE SURGERY      lumbar    TRANSCATHETER AORTIC VALVE REPLACEMENT         Review of patient's allergies indicates:  No Known Allergies    No current facility-administered medications on file prior to encounter.     Current Outpatient Medications on File Prior to Encounter   Medication Sig    allopurinoL (ZYLOPRIM) 100 MG tablet TAKE 1 TABLET EVERY DAY (Patient taking differently: Take 100 mg by mouth every evening.)    amLODIPine (NORVASC) 10 MG tablet TAKE 1 TABLET EVERY DAY (Patient taking differently: Take 10 mg by mouth once daily.)    ascorbic acid, vitamin C, (VITAMIN C) 250 MG tablet Take 1 tablet (250 mg total) by mouth once daily.    aspirin (ASPIRIN LOW DOSE) 81 MG EC tablet Take 1 tablet (81 mg total) by mouth nightly.    atorvastatin (LIPITOR) 10 MG tablet TAKE 1 TABLET EVERY  EVENING (Patient taking differently: Take 10 mg by mouth every evening.)    collagenase (SANTYL) ointment Apply topically once daily.    furosemide (LASIX) 40 MG tablet Take 1 tablet (40 mg total) by mouth once daily.    gabapentin (NEURONTIN) 300 MG capsule TAKE 1 CAPSULE TWICE DAILY (Patient taking differently: Take 300 mg by mouth 2 (two) times daily.)    HYDROcodone-acetaminophen (NORCO)  mg per tablet Take 1 tablet by mouth every 6 (six) hours as needed for Pain.    losartan (COZAAR) 100 MG tablet Take 100 mg by mouth once daily.    metoprolol succinate (TOPROL-XL) 100 MG 24 hr tablet Take 100 mg by mouth 2 (two) times daily.    naproxen (NAPROSYN) 500 MG tablet Take 500 mg by mouth 2 (two) times daily with meals.    potassium chloride SA (K-DUR,KLOR-CON) 20 MEQ tablet Take 20 mEq by mouth once daily.    travoprost (TRAVATAN Z) 0.004 % ophthalmic solution Place 1 drop into both eyes every evening.    traZODone (DESYREL) 50 MG tablet TAKE 1 TABLET EVERY NIGHT AS NEEDED FOR INSOMNIA    vitamin D (VITAMIN D3) 1000 units Tab Take 1,000 Units by mouth once daily.    oxybutynin (DITROPAN-XL) 5 MG TR24 TAKE 1 TABLET EVERY DAY     Family History       Problem Relation (Age of Onset)    Diabetes Mother    Heart disease Father    Stroke Mother          Tobacco Use    Smoking status: Former     Current packs/day: 0.00     Types: Cigarettes     Quit date: 1982     Years since quittin.2    Smokeless tobacco: Never   Substance and Sexual Activity    Alcohol use: No    Drug use: No    Sexual activity: Never     Partners: Male     Review of Systems   Constitutional:  Positive for fatigue. Negative for chills and fever.   HENT:  Negative for congestion and drooling.    Eyes:  Negative for visual disturbance.   Respiratory:  Positive for cough and shortness of breath.    Cardiovascular:  Positive for leg swelling. Negative for chest pain.   Gastrointestinal:  Negative for abdominal distention, abdominal pain,  nausea and vomiting.   Genitourinary:  Negative for difficulty urinating and dysuria.   Musculoskeletal:  Positive for arthralgias, back pain and gait problem.   Skin:  Positive for wound (on her back).   Neurological:  Positive for weakness (generalized). Negative for dizziness and headaches.   Psychiatric/Behavioral:  Negative for agitation and decreased concentration.      Objective:     Vital Signs (Most Recent):  Temp: 98.7 °F (37.1 °C) (11/29/23 1116)  Pulse: 76 (11/29/23 1400)  Resp: 16 (11/29/23 1316)  BP: 130/60 (11/29/23 1116)  SpO2: 97 % (11/29/23 1316) Vital Signs (24h Range):  Temp:  [98.6 °F (37 °C)-99.1 °F (37.3 °C)] 98.7 °F (37.1 °C)  Pulse:  [64-85] 76  Resp:  [16-22] 16  SpO2:  [97 %-100 %] 97 %  BP: (108-146)/(56-65) 130/60     Weight: 121.2 kg (267 lb 3.2 oz)  Body mass index is 39.46 kg/m².     Physical Exam  Constitutional:       General: She is not in acute distress.  HENT:      Head: Normocephalic and atraumatic.   Eyes:      General:         Right eye: No discharge.         Left eye: No discharge.   Cardiovascular:      Rate and Rhythm: Normal rate and regular rhythm.   Pulmonary:      Effort: Pulmonary effort is normal.      Comments: Decreased breath sounds bilateral lower lung fields and extends to middle lung field on left.    Abdominal:      General: There is no distension.      Tenderness: There is no abdominal tenderness.   Musculoskeletal:         General: No swelling or tenderness.      Cervical back: Neck supple. No tenderness.      Right lower leg: Edema (+2) present.      Left lower leg: Edema (+2) present.      Comments: Decreased ROM left arm.   Skin:     General: Skin is warm and dry.      Comments: Stage 1 sacral ulcer    Neurological:      General: No focal deficit present.      Mental Status: She is alert and oriented to person, place, and time.   Psychiatric:         Mood and Affect: Mood normal.         Behavior: Behavior normal.                Significant Labs: A1C: No  "results for input(s): "HGBA1C" in the last 4320 hours.  ABGs: No results for input(s): "PH", "PCO2", "HCO3", "POCSATURATED", "BE", "TOTALHB", "COHB", "METHB", "O2HB", "POCFIO2", "PO2" in the last 48 hours.  Bilirubin:   Recent Labs   Lab 11/27/23  1449 11/28/23  1033 11/29/23  0536   BILITOT 0.4 0.4 0.2       Blood Culture:   Recent Labs   Lab 11/27/23  1558 11/27/23  1559   LABBLOO No Growth to date  No Growth to date No Growth to date  No Growth to date       BMP:   Recent Labs   Lab 11/29/23  0536   *      K 4.5      CO2 21*   BUN 39*   CREATININE 1.3   CALCIUM 8.8   MG 1.7       CBC:   Recent Labs   Lab 11/27/23  1449 11/28/23  1033 11/29/23  0536   WBC 17.10* 15.91* 15.88*   HGB 10.8* 9.6* 8.3*   HCT 33.2* 29.1* 25.7*    228 249       CMP:   Recent Labs   Lab 11/27/23  1449 11/28/23  1033 11/29/23  0536    142 140   K 4.4 4.4 4.5    111* 110   CO2 25 24 21*   * 133* 121*   BUN 40* 36* 39*   CREATININE 1.3 1.1 1.3   CALCIUM 9.9 9.3 8.8   PROT 7.8 6.8 6.3   ALBUMIN 2.6* 2.3* 2.1*   BILITOT 0.4 0.4 0.2   ALKPHOS 254* 198* 169*   AST 21 13 12   ALT 61* 40 30   ANIONGAP 9 7* 9       Cardiac Markers:   Recent Labs   Lab 11/27/23  1449   *       Coagulation: No results for input(s): "PT", "INR", "APTT" in the last 48 hours.  Lactic Acid:   Recent Labs   Lab 11/27/23  1558   LACTATE 1.1       Lipase: No results for input(s): "LIPASE" in the last 48 hours.  Lipid Panel: No results for input(s): "CHOL", "HDL", "LDLCALC", "TRIG", "CHOLHDL" in the last 48 hours.  Magnesium:   Recent Labs   Lab 11/28/23  1033 11/29/23  0536   MG 1.8 1.7       POCT Glucose: No results for input(s): "POCTGLUCOSE" in the last 48 hours.  Prealbumin: No results for input(s): "PREALBUMIN" in the last 48 hours.  Respiratory Culture: No results for input(s): "GSRESP", "RESPIRATORYC" in the last 48 hours.  Troponin:   Recent Labs   Lab 11/27/23  1450   TROPONINI 0.017       TSH: No results for " "input(s): "TSH" in the last 4320 hours.  Urine Culture: No results for input(s): "LABURIN" in the last 48 hours.  Urine Studies: No results for input(s): "COLORU", "APPEARANCEUA", "PHUR", "SPECGRAV", "PROTEINUA", "GLUCUA", "KETONESU", "BILIRUBINUA", "OCCULTUA", "NITRITE", "UROBILINOGEN", "LEUKOCYTESUR", "RBCUA", "WBCUA", "BACTERIA", "SQUAMEPITHEL", "HYALINECASTS" in the last 48 hours.    Invalid input(s): "WRIGHTSUR"    Significant Imaging: I have reviewed all pertinent imaging results/findings within the past 24 hours.  "

## 2023-11-29 NOTE — PROGRESS NOTES
[facility] ([facility])  [facility]  Progress Note    Patient Name: Emily Alicia  MRN: [id number]  Patient Class: IP- Inpatient   Admission Date: 11/27/2023  Length of Stay: 2 days  Attending Physician: Viivana Lindsay MD  Primary Care Provider: Caroline Do NP        Subjective:     Principal Problem:Sepsis due to Streptococcus pneumoniae with acute hypoxic respiratory failure        HPI:  Patient is an 83 year old female with medical history of HTN, Tricuspid regurgitation, aortic valve stenosis/TAVR, diastolic HF,  who presented to the ED with SOB.  Patient has had a productive cough for several days now.  She has not been around any other sick contacts.  She has had lower extremity edema but denies CP.  No fever, chills, nausea & vomiting.  She has not had any recent medication changes.  She has been bed bound for three years now.      Admitted for hypoxia secondary to pneumonia and pleural effusion.                Overview/Hospital Course:  Pt HD stable still on 2 L N.C. with oxygen saturation 97%.  Will wean oxygen as tolerated.  D dimer elevated.  CTA chest pending.  Continue IV abx for pneumonia and echo pending for pulmonary edema.      Past Medical History:   Diagnosis Date    Anemia     Anxiety     Aortic valve stenosis     CHF (congestive heart failure)     Chronic kidney disease (CKD)     Diastolic heart failure     Dyslipidemia     Encounter for blood transfusion     Glaucoma (increased eye pressure)     LEFT EYE    HHD (hypertensive heart disease)     Hypertension     Osteoarthritis     Retina disorder, bilateral     Severe aortic stenosis 11/9/2016    TR (tricuspid regurgitation)     UTI (urinary tract infection)        Past Surgical History:   Procedure Laterality Date    CARDIAC CATHETERIZATION      CARDIAC VALVE SURGERY      CATARACT EXTRACTION W/ INTRAOCULAR LENS  IMPLANT, BILATERAL      CHOLECYSTECTOMY      COLONOSCOPY N/A 2/7/2021    Procedure: COLONOSCOPY;  Surgeon: Gaston HENDERSON  MD Bev;  Location: [facility] ENDO;  Service: Endoscopy;  Laterality: N/A;    FLEXIBLE SIGMOIDOSCOPY N/A 2/4/2021    Procedure: SIGMOIDOSCOPY, FLEXIBLE;  Surgeon: Jarred Forrester MD;  Location: [facility] ENDO;  Service: Gastroenterology;  Laterality: N/A;    HYSTERECTOMY      JOINT REPLACEMENT      right knee    JOINT REPLACEMENT      left knee    KNEE SURGERY Left     SPINE SURGERY      lumbar    TRANSCATHETER AORTIC VALVE REPLACEMENT         Review of patient's allergies indicates:  No Known Allergies    No current facility-administered medications on file prior to encounter.     Current Outpatient Medications on File Prior to Encounter   Medication Sig    allopurinoL (ZYLOPRIM) 100 MG tablet TAKE 1 TABLET EVERY DAY (Patient taking differently: Take 100 mg by mouth every evening.)    amLODIPine (NORVASC) 10 MG tablet TAKE 1 TABLET EVERY DAY (Patient taking differently: Take 10 mg by mouth once daily.)    ascorbic acid, vitamin C, (VITAMIN C) 250 MG tablet Take 1 tablet (250 mg total) by mouth once daily.    aspirin (ASPIRIN LOW DOSE) 81 MG EC tablet Take 1 tablet (81 mg total) by mouth nightly.    atorvastatin (LIPITOR) 10 MG tablet TAKE 1 TABLET EVERY EVENING (Patient taking differently: Take 10 mg by mouth every evening.)    collagenase (SANTYL) ointment Apply topically once daily.    furosemide (LASIX) 40 MG tablet Take 1 tablet (40 mg total) by mouth once daily.    gabapentin (NEURONTIN) 300 MG capsule TAKE 1 CAPSULE TWICE DAILY (Patient taking differently: Take 300 mg by mouth 2 (two) times daily.)    HYDROcodone-acetaminophen (NORCO)  mg per tablet Take 1 tablet by mouth every 6 (six) hours as needed for Pain.    losartan (COZAAR) 100 MG tablet Take 100 mg by mouth once daily.    metoprolol succinate (TOPROL-XL) 100 MG 24 hr tablet Take 100 mg by mouth 2 (two) times daily.    naproxen (NAPROSYN) 500 MG tablet Take 500 mg by mouth 2 (two) times daily with meals.    potassium chloride SA  (K-DUR,KLOR-CON) 20 MEQ tablet Take 20 mEq by mouth once daily.    travoprost (TRAVATAN Z) 0.004 % ophthalmic solution Place 1 drop into both eyes every evening.    traZODone (DESYREL) 50 MG tablet TAKE 1 TABLET EVERY NIGHT AS NEEDED FOR INSOMNIA    vitamin D (VITAMIN D3) 1000 units Tab Take 1,000 Units by mouth once daily.    oxybutynin (DITROPAN-XL) 5 MG TR24 TAKE 1 TABLET EVERY DAY     Family History       Problem Relation (Age of Onset)    Diabetes Mother    Heart disease Father    Stroke Mother          Tobacco Use    Smoking status: Former     Current packs/day: 0.00     Types: Cigarettes     Quit date: 1982     Years since quittin.2    Smokeless tobacco: Never   Substance and Sexual Activity    Alcohol use: No    Drug use: No    Sexual activity: Never     Partners: Male     Review of Systems   Constitutional:  Positive for fatigue. Negative for chills and fever.   HENT:  Negative for congestion and drooling.    Eyes:  Negative for visual disturbance.   Respiratory:  Positive for cough and shortness of breath.    Cardiovascular:  Positive for leg swelling. Negative for chest pain.   Gastrointestinal:  Negative for abdominal distention, abdominal pain, nausea and vomiting.   Genitourinary:  Negative for difficulty urinating and dysuria.   Musculoskeletal:  Positive for arthralgias, back pain and gait problem.   Skin:  Positive for wound (on her back).   Neurological:  Positive for weakness (generalized). Negative for dizziness and headaches.   Psychiatric/Behavioral:  Negative for agitation and decreased concentration.      Objective:     Vital Signs (Most Recent):  Temp: 98.7 °F (37.1 °C) (23 1116)  Pulse: 76 (23 1400)  Resp: 16 (23 1316)  BP: 130/60 (23 1116)  SpO2: 97 % (23 1316) Vital Signs (24h Range):  Temp:  [98.6 °F (37 °C)-99.1 °F (37.3 °C)] 98.7 °F (37.1 °C)  Pulse:  [64-85] 76  Resp:  [16-22] 16  SpO2:  [97 %-100 %] 97 %  BP: (108-146)/(56-65) 130/60  "    Weight: 121.2 kg (267 lb 3.2 oz)  Body mass index is 39.46 kg/m².     Physical Exam  Constitutional:       General: She is not in acute distress.  HENT:      Head: Normocephalic and atraumatic.   Eyes:      General:         Right eye: No discharge.         Left eye: No discharge.   Cardiovascular:      Rate and Rhythm: Normal rate and regular rhythm.   Pulmonary:      Effort: Pulmonary effort is normal.      Comments: Decreased breath sounds bilateral lower lung fields and extends to middle lung field on left.    Abdominal:      General: There is no distension.      Tenderness: There is no abdominal tenderness.   Musculoskeletal:         General: No swelling or tenderness.      Cervical back: Neck supple. No tenderness.      Right lower leg: Edema (+2) present.      Left lower leg: Edema (+2) present.      Comments: Decreased ROM left arm.   Skin:     General: Skin is warm and dry.      Comments: Stage 1 sacral ulcer    Neurological:      General: No focal deficit present.      Mental Status: She is alert and oriented to person, place, and time.   Psychiatric:         Mood and Affect: Mood normal.         Behavior: Behavior normal.                Significant Labs: A1C: No results for input(s): "HGBA1C" in the last 4320 hours.  ABGs: No results for input(s): "PH", "PCO2", "HCO3", "POCSATURATED", "BE", "TOTALHB", "COHB", "METHB", "O2HB", "POCFIO2", "PO2" in the last 48 hours.  Bilirubin:   Recent Labs   Lab 11/27/23  1449 11/28/23  1033 11/29/23  0536   BILITOT 0.4 0.4 0.2       Blood Culture:   Recent Labs   Lab 11/27/23  1558 11/27/23  1559   LABBLOO No Growth to date  No Growth to date No Growth to date  No Growth to date       BMP:   Recent Labs   Lab 11/29/23  0536   *      K 4.5      CO2 21*   BUN 39*   CREATININE 1.3   CALCIUM 8.8   MG 1.7       CBC:   Recent Labs   Lab 11/27/23  1449 11/28/23  1033 11/29/23  0536   WBC 17.10* 15.91* 15.88*   HGB 10.8* 9.6* 8.3*   HCT 33.2* 29.1* 25.7* " "   228 249       CMP:   Recent Labs   Lab 11/27/23  1449 11/28/23  1033 11/29/23  0536    142 140   K 4.4 4.4 4.5    111* 110   CO2 25 24 21*   * 133* 121*   BUN 40* 36* 39*   CREATININE 1.3 1.1 1.3   CALCIUM 9.9 9.3 8.8   PROT 7.8 6.8 6.3   ALBUMIN 2.6* 2.3* 2.1*   BILITOT 0.4 0.4 0.2   ALKPHOS 254* 198* 169*   AST 21 13 12   ALT 61* 40 30   ANIONGAP 9 7* 9       Cardiac Markers:   Recent Labs   Lab 11/27/23  1449   *       Coagulation: No results for input(s): "PT", "INR", "APTT" in the last 48 hours.  Lactic Acid:   Recent Labs   Lab 11/27/23  1558   LACTATE 1.1       Lipase: No results for input(s): "LIPASE" in the last 48 hours.  Lipid Panel: No results for input(s): "CHOL", "HDL", "LDLCALC", "TRIG", "CHOLHDL" in the last 48 hours.  Magnesium:   Recent Labs   Lab 11/28/23  1033 11/29/23  0536   MG 1.8 1.7       POCT Glucose: No results for input(s): "POCTGLUCOSE" in the last 48 hours.  Prealbumin: No results for input(s): "PREALBUMIN" in the last 48 hours.  Respiratory Culture: No results for input(s): "GSRESP", "RESPIRATORYC" in the last 48 hours.  Troponin:   Recent Labs   Lab 11/27/23  1450   TROPONINI 0.017       TSH: No results for input(s): "TSH" in the last 4320 hours.  Urine Culture: No results for input(s): "LABURIN" in the last 48 hours.  Urine Studies: No results for input(s): "COLORU", "APPEARANCEUA", "PHUR", "SPECGRAV", "PROTEINUA", "GLUCUA", "KETONESU", "BILIRUBINUA", "OCCULTUA", "NITRITE", "UROBILINOGEN", "LEUKOCYTESUR", "RBCUA", "WBCUA", "BACTERIA", "SQUAMEPITHEL", "HYALINECASTS" in the last 48 hours.    Invalid input(s): "WRIGHTSUR"    Significant Imaging: I have reviewed all pertinent imaging results/findings within the past 24 hours.    Assessment/Plan:      * Sepsis due to Streptococcus pneumoniae with acute hypoxic respiratory failure  Patient with tachycardia, leukocytosis and left lower lobe consolidation on CXR at admission  IV rocephin and IV " azithromycin   Mucinex for supportive care     Deferring IV fluids at this time in the setting of pulmonary edema & LOWELL.  LOWELL now resolving.  Possibly due to sepsis.  Blood cultures pending/ Urinalysis pending.        Advanced care planning/counseling discussion  Patient currently a full code at this time.        Acute on chronic heart failure  + hypoxia, mild lower extremity edema   BNP elevated at 203  CXR with pulmonary edema and left pleural effusion  CIS consulted   IV lasix 20mg x 1.  Patient states not taking her lasix.  Will be cautious with diuresis in the setting or aortic valve stenosis.    Continue home metoprolol     Repeat echo pending.  Lasix 40mg qd           Acute respiratory failure with hypoxia  Patient with Hypoxic Respiratory failure which is Acute.  she is not on home oxygen. Supplemental oxygen was provided and noted-      .   Signs/symptoms of respiratory failure include- lethargy. Contributing diagnoses includes - CHF and Pneumonia Labs and images were reviewed. Patient Has not had a recent ABG. Will treat underlying causes and adjust management of respiratory failure as follows- Pulmonary edema with possible developing pneumonia     ASCVD (arteriosclerotic cardiovascular disease)  Continue aspirin and statin       Hyperlipidemia LDL goal <70  Continue home statin       Acute kidney injury superimposed on CKD  Patient with acute kidney injury/acute renal failure likely due to  sepsis  LOWELL is currently improving. Baseline creatinine  0.9  - Labs reviewed- Renal function/electrolytes with Estimated Creatinine Clearance: 46.5 mL/min (based on SCr of 1.3 mg/dL). according to latest data. Monitor urine output and serial BMP and adjust therapy as needed. Avoid nephrotoxins and renally dose meds for GFR listed above.    Insomnia  Continue home trazodone         VTE Risk Mitigation (From admission, onward)           Ordered     IP VTE HIGH RISK PATIENT  Once         11/27/23 1906     Place  sequential compression device  Until discontinued         11/27/23 1906                    Discharge Planning   LINA:      Code Status: Full Code   Is the patient medically ready for discharge?:     Reason for patient still in hospital (select all that apply): Patient trending condition  Discharge Plan A: Home Health, Home with family, Skilled Nursing Facility                  Anjali Mesa PA-C  Department of Hospital Medicine   [place] [facility] (3rd Fl)

## 2023-11-29 NOTE — HOSPITAL COURSE
Pt HD stable still on 2 L N.C. with oxygen saturation 97%.  Will wean oxygen as tolerated.  D dimer elevated.  CTA chest pending.  Continue IV abx for pneumonia and echo pending for pulmonary edema.      PT HD stable on 2 L N.C with oxygen saturation 97%.  Will wean oxygen today.  Echo with EF 55% and grade I diastolic dysfunction.  D dimer elevated but no PE on CTA and no DVT on Venous u/s.  Patient will need heme/onc referral for chronic leukocytosis.  Plan to discharge within the next 24 hours.  Patient would like to go home with home health.      12/1 PT HD stable on room air.  Plan to discharge today with home health.  F/u with cardiology and PCP outpatient.  Discussed referral to heme/onc due to chronic leukocytosis but she states she is in her 80's and not interested in work up.  She knows that underlying malignancy could be a possibility.

## 2023-11-29 NOTE — ASSESSMENT & PLAN NOTE
Patient with acute kidney injury/acute renal failure likely due to  sepsis  LOWELL is currently improving. Baseline creatinine  0.9  - Labs reviewed- Renal function/electrolytes with Estimated Creatinine Clearance: 46.5 mL/min (based on SCr of 1.3 mg/dL). according to latest data. Monitor urine output and serial BMP and adjust therapy as needed. Avoid nephrotoxins and renally dose meds for GFR listed above.

## 2023-11-29 NOTE — PLAN OF CARE
Problem: Occupational Therapy  Goal: Occupational Therapy Goal  Description: Pt to perform hygiene ADL at bed level with MOD I with increased bilateral upper extremity use and coordination.  Pt to demonstrate seated balance with MOD A for balance for improved participation in transfers and reduced caregiver burden.  Pt to demonstrate fair- BUE strength during functional task   Pt to participate in bilateral upper extremity range of motion and strengthening routine as instructed by OT.  Outcome: Ongoing, Progressing   Cont with OT POC

## 2023-11-29 NOTE — ASSESSMENT & PLAN NOTE
+ hypoxia, mild lower extremity edema   BNP elevated at 203  CXR with pulmonary edema and left pleural effusion  CIS consulted   IV lasix 20mg x 1.  Patient states not taking her lasix.  Will be cautious with diuresis in the setting or aortic valve stenosis.    Continue home metoprolol     Repeat echo pending.  Lasix 40mg qd

## 2023-11-29 NOTE — PLAN OF CARE
Problem: Adult Inpatient Plan of Care  Goal: Plan of Care Review  Outcome: Ongoing, Progressing  Goal: Patient-Specific Goal (Individualized)  Outcome: Ongoing, Progressing  Goal: Absence of Hospital-Acquired Illness or Injury  Outcome: Ongoing, Progressing  Goal: Optimal Comfort and Wellbeing  Outcome: Ongoing, Progressing  Goal: Readiness for Transition of Care  Outcome: Ongoing, Progressing     Problem: Skin Injury Risk Increased  Goal: Skin Health and Integrity  Outcome: Ongoing, Progressing     Problem: Fall Injury Risk  Goal: Absence of Fall and Fall-Related Injury  Outcome: Ongoing, Progressing     Problem: Fluid and Electrolyte Imbalance (Acute Kidney Injury/Impairment)  Goal: Fluid and Electrolyte Balance  Outcome: Ongoing, Progressing     Problem: Oral Intake Inadequate (Acute Kidney Injury/Impairment)  Goal: Optimal Nutrition Intake  Outcome: Ongoing, Progressing     Problem: Renal Function Impairment (Acute Kidney Injury/Impairment)  Goal: Effective Renal Function  Outcome: Ongoing, Progressing

## 2023-11-29 NOTE — PLAN OF CARE
11/29/23 1430   Post-Acute Status   Post-Acute Authorization Placement   Post-Acute Placement Status Patient List Provided   Coverage [employer]   Patient choice form signed by patient/caregiver List from [facility] Post-Acute Care   Discharge Delays None known at this time   Discharge Plan   Discharge Plan A Skilled Nursing Facility   Discharge Plan B Home Health         PT and OT evaluation complete. They both recommend moderate intensity therapy (SNF).   CM approached patient at bedside to discuss recommendation and discharge plan. Patient is agreeable to SNF. She states she would like [facility], as he has been there before.   Choice form signed.  CM did contact grand-daughter Brittany, who states SNF would be a good idea as well for rehabilitation.   Grand-daughter is calling patient to discuss possible placement at [facility] instead as this is closer to all the family. Brittany will call CM back after phone call, if she states patient would like [facility] nursing home instead, CM will confirm before referral is placed.  LOCET and PASRR to be complete by ANTONIA.

## 2023-11-29 NOTE — PROGRESS NOTES
[place] [facility] ([facility])  Cardiology  Consult Note    Patient Name: Emily Alicia  MRN: [id number]  Admission Date: 11/27/2023  Hospital Length of Stay: 2 days  Code Status: Full Code   Attending Provider: Viviana Lindsay MD   Consulting Provider: Britni Sen NP  Primary Care Physician: Caroline Do NP  Principal Problem:Sepsis due to Streptococcus pneumoniae with acute hypoxic respiratory failure    Patient information was obtained from patient, past medical records, and ER records.     Consults  Subjective:     Chief Complaint:  Cough x 3 days and SOB     HPI: Emily Alicia is a 83 y.o. female with a medical history of Chronic diastolic heart failure, HHD, Aortic stenosis s/p TAVR, dyslipidemia, and CKD presented to the ER with complaints of a cough for 3 days with associated SOB and BLE edema. EKG showed ST with LBBB. CXR with findings suspicious for CHF and small left pleural effusion with atelectasis vs infiltrate of the LLL. Labs significant for Leukocytosis, anemia, and BNP - 203. Troponin normal. Viral studies negative. CIS asked to evaluate for CHF.     Patient last seen clinic Feb. 2019.     11/29/23--I/O (-) 300mL. She has been transitioned to oral diuretic  Appears euvolemic, still with wheezing  Tx of PNA ongoing   Noted d-dimer of 2.0  Renal function at baseline with GFR 41.  Note drop in H/H overnight to 8.3/25.7    Past Medical History:   Diagnosis Date    Anemia     Anxiety     Aortic valve stenosis     CHF (congestive heart failure)     Chronic kidney disease (CKD)     Diastolic heart failure     Dyslipidemia     Encounter for blood transfusion     Glaucoma (increased eye pressure)     LEFT EYE    HHD (hypertensive heart disease)     Hypertension     Osteoarthritis     Retina disorder, bilateral     Severe aortic stenosis 11/9/2016    TR (tricuspid regurgitation)     UTI (urinary tract infection)        Past Surgical History:   Procedure Laterality Date    CARDIAC CATHETERIZATION       CARDIAC VALVE SURGERY      CATARACT EXTRACTION W/ INTRAOCULAR LENS  IMPLANT, BILATERAL      CHOLECYSTECTOMY      COLONOSCOPY N/A 2/7/2021    Procedure: COLONOSCOPY;  Surgeon: Gaston Pablo MD;  Location: [facility] ENDO;  Service: Endoscopy;  Laterality: N/A;    FLEXIBLE SIGMOIDOSCOPY N/A 2/4/2021    Procedure: SIGMOIDOSCOPY, FLEXIBLE;  Surgeon: Jarred Forrester MD;  Location: [facility] ENDO;  Service: Gastroenterology;  Laterality: N/A;    HYSTERECTOMY      JOINT REPLACEMENT      right knee    JOINT REPLACEMENT      left knee    KNEE SURGERY Left     SPINE SURGERY      lumbar    TRANSCATHETER AORTIC VALVE REPLACEMENT         Review of patient's allergies indicates:  No Known Allergies    No current facility-administered medications on file prior to encounter.     Current Outpatient Medications on File Prior to Encounter   Medication Sig    allopurinoL (ZYLOPRIM) 100 MG tablet TAKE 1 TABLET EVERY DAY (Patient taking differently: Take 100 mg by mouth every evening.)    amLODIPine (NORVASC) 10 MG tablet TAKE 1 TABLET EVERY DAY (Patient taking differently: Take 10 mg by mouth once daily.)    ascorbic acid, vitamin C, (VITAMIN C) 250 MG tablet Take 1 tablet (250 mg total) by mouth once daily.    aspirin (ASPIRIN LOW DOSE) 81 MG EC tablet Take 1 tablet (81 mg total) by mouth nightly.    atorvastatin (LIPITOR) 10 MG tablet TAKE 1 TABLET EVERY EVENING (Patient taking differently: Take 10 mg by mouth every evening.)    collagenase (SANTYL) ointment Apply topically once daily.    furosemide (LASIX) 40 MG tablet Take 1 tablet (40 mg total) by mouth once daily.    gabapentin (NEURONTIN) 300 MG capsule TAKE 1 CAPSULE TWICE DAILY (Patient taking differently: Take 300 mg by mouth 2 (two) times daily.)    HYDROcodone-acetaminophen (NORCO)  mg per tablet Take 1 tablet by mouth every 6 (six) hours as needed for Pain.    losartan (COZAAR) 100 MG tablet Take 100 mg by mouth once daily.    metoprolol succinate (TOPROL-XL) 100 MG  24 hr tablet Take 100 mg by mouth 2 (two) times daily.    naproxen (NAPROSYN) 500 MG tablet Take 500 mg by mouth 2 (two) times daily with meals.    potassium chloride SA (K-DUR,KLOR-CON) 20 MEQ tablet Take 20 mEq by mouth once daily.    travoprost (TRAVATAN Z) 0.004 % ophthalmic solution Place 1 drop into both eyes every evening.    traZODone (DESYREL) 50 MG tablet TAKE 1 TABLET EVERY NIGHT AS NEEDED FOR INSOMNIA    vitamin D (VITAMIN D3) 1000 units Tab Take 1,000 Units by mouth once daily.    oxybutynin (DITROPAN-XL) 5 MG TR24 TAKE 1 TABLET EVERY DAY     Family History       Problem Relation (Age of Onset)    Diabetes Mother    Heart disease Father    Stroke Mother          Tobacco Use    Smoking status: Former     Current packs/day: 0.00     Types: Cigarettes     Quit date: 1982     Years since quittin.2    Smokeless tobacco: Never   Substance and Sexual Activity    Alcohol use: No    Drug use: No    Sexual activity: Never     Partners: Male     Review of Systems   Constitutional: Negative.   HENT: Negative.     Eyes: Negative.    Cardiovascular:  Positive for dyspnea on exertion. Negative for chest pain.   Respiratory:  Positive for cough and shortness of breath.    Endocrine: Negative.    Skin: Negative.    Musculoskeletal: Negative.    Gastrointestinal: Negative.    Neurological: Negative.      Objective:     Vital Signs (Most Recent):  Temp: 99.1 °F (37.3 °C) (23)  Pulse: 72 (23 0800)  Resp: 18 (23 07)  BP: (!) 146/65 (23 07)  SpO2: 99 % (23) Vital Signs (24h Range):  Temp:  [98.6 °F (37 °C)-99.1 °F (37.3 °C)] 99.1 °F (37.3 °C)  Pulse:  [64-85] 72  Resp:  [16-24] 18  SpO2:  [96 %-100 %] 99 %  BP: (108-146)/(56-65) 146/65     Weight: 121.2 kg (267 lb 3.2 oz)  Body mass index is 39.46 kg/m².    SpO2: 99 %         Intake/Output Summary (Last 24 hours) at 2023 0919  Last data filed at 2023 0907  Gross per 24 hour   Intake 255 ml   Output 300 ml    Net -45 ml         Lines/Drains/Airways       Drain  Duration             Female External Urinary Catheter 11/27/23 2000 1 day              Peripheral Intravenous Line  Duration                  Peripheral IV - Single Lumen 03/25/21 1142 18 G Left Forearm 978 days         Peripheral IV - Single Lumen 03/25/21 1501 20 G Right Antecubital 978 days         Peripheral IV - Single Lumen 11/27/23 1730 20 G Left Antecubital 1 day                    Physical Exam  Vitals and nursing note reviewed.   Constitutional:       Appearance: Normal appearance.   Cardiovascular:      Rate and Rhythm: Normal rate and regular rhythm.      Pulses: Normal pulses.      Heart sounds: Normal heart sounds.   Pulmonary:      Effort: Pulmonary effort is normal.      Breath sounds: Wheezing present.   Abdominal:      General: Abdomen is flat.   Musculoskeletal:         General: Normal range of motion.      Cervical back: Normal range of motion and neck supple.   Skin:     General: Skin is warm and dry.      Capillary Refill: Capillary refill takes less than 2 seconds.   Neurological:      General: No focal deficit present.      Mental Status: She is alert and oriented to person, place, and time. Mental status is at baseline.   Psychiatric:         Mood and Affect: Mood normal.       Significant Labs: CMP   Recent Labs   Lab 11/27/23  1449 11/28/23  1033 11/29/23  0536    142 140   K 4.4 4.4 4.5    111* 110   CO2 25 24 21*   * 133* 121*   BUN 40* 36* 39*   CREATININE 1.3 1.1 1.3   CALCIUM 9.9 9.3 8.8   PROT 7.8 6.8 6.3   ALBUMIN 2.6* 2.3* 2.1*   BILITOT 0.4 0.4 0.2   ALKPHOS 254* 198* 169*   AST 21 13 12   ALT 61* 40 30   ANIONGAP 9 7* 9     , CBC   Recent Labs   Lab 11/27/23  1449 11/28/23  1033 11/29/23  0536   WBC 17.10* 15.91* 15.88*   HGB 10.8* 9.6* 8.3*   HCT 33.2* 29.1* 25.7*    228 249     , Troponin   Recent Labs   Lab 11/27/23  1450   TROPONINI 0.017     , and All pertinent lab results from the last 24 hours  have been reviewed.    Significant Imaging: X-Ray: CXR: X-Ray Chest 1 View (CXR):   Results for orders placed or performed during the hospital encounter of 11/27/23   X-Ray Chest 1 View    Narrative    EXAMINATION:  XR CHEST 1 VIEW    CLINICAL HISTORY:  Cough, unspecified    TECHNIQUE:  Single frontal view of the chest was performed.    COMPARISON:  03/23/2021.    FINDINGS:  Pulmonary hypoinflation crowding of the bronchopulmonary vessels.  Patient also rotated to the LPO position.  This limits evaluation.    There is a diffuse prominence of the pulmonary interstitium suspicious for interstitial edema.  There is a small left pleural effusion with atelectasis versus infiltrate within the left lower lobe.  No significant right pleural effusion.  Heart size is enlarged.  Trachea midline.    Bony thorax intact with demineralization.      Impression    1. Limited evaluation secondary to technique.  2. Findings suspicious for congestive heart failure.  3. Small left pleural effusion with atelectasis versus infiltrate of the left lower lobe.  Correlate clinically with possible fever and/or elevated white count.  As deemed clinically necessary, further evaluation may be obtained with dedicated PA and lateral views of the chest.  Close interval follow-up is recommended.    This report was flagged in Epic as abnormal.      Electronically signed by: Richard Cisse  Date:    11/27/2023  Time:    16:43     Echocardiogram 2/12/2019:  LVEF 55% with grade I diastolic dysfunction and mild LVH  Mildly increased left atrial diameter  A bio prosthetic valve is noted at the level of the aortic valve, AMAURI 1.5 cm2  No significant valvular regurgitation is noted  Pulmonary artery systolic pressure is 33.2 mmHg      Assessment and Plan:    Chronic diastolic heart failure  -repeat echo shows a LVEF 55%, elevated filling pressure (11/23)  --received one dose of lasix IV 20mg in Er, now on furosemide 40mg po qday  Start Farxica or Jardiance 10  mg qd    Aortic Stenosis s/p TAVR (12/2016)' prosthesis intact (11/23)    Leukocytosis  -IV antibiotics    Mild nonobstructive CAD:  -[facility] 2016  ECASA as tolerated    CKD  -BUN/Creat 39/1.3 with GFR 41; slighly more dry than yesterday    Hypertension  -amlodipine 10mg daily  -metoprolol succinate 100mg po BID  low dose ARB Valsartan as tolerated    Dyslipidemia  -Atorvastatin 10mg HS    Anemia  -per PCP  No evidence of bleeding    Elevated d-dimer  -per PCP     Active Diagnoses:    Diagnosis Date Noted POA    PRINCIPAL PROBLEM:  Sepsis due to Streptococcus pneumoniae with acute hypoxic respiratory failure [A40.3, R65.20, J96.01] 11/28/2023 Yes    Acute respiratory failure with hypoxia [J96.01] 11/28/2023 Yes    Acute on chronic heart failure [I50.9] 11/28/2023 Yes    Advanced care planning/counseling discussion [Z71.89] 11/28/2023 Not Applicable    ASCVD (arteriosclerotic cardiovascular disease) [I25.10] 03/11/2021 Yes    Hyperlipidemia LDL goal <70 [E78.5] 09/13/2018 Yes     Chronic    Acute kidney injury superimposed on CKD [N17.9, N18.9] 10/07/2015 Yes    Insomnia [G47.00] 09/17/2012 Yes      Problems Resolved During this Admission:         VTE Risk Mitigation (From admission, onward)           Ordered     IP VTE HIGH RISK PATIENT  Once         11/27/23 1906     Place sequential compression device  Until discontinued         11/27/23 1906                  Current Facility-Administered Medications   Medication    albuterol-ipratropium 2.5 mg-0.5 mg/3 mL nebulizer solution 3 mL    allopurinoL tablet 100 mg    amLODIPine tablet 5 mg    atorvastatin tablet 10 mg    azithromycin (ZITHROMAX) 500 mg in dextrose 5 % (D5W) 250 mL IVPB (Vial-Mate)    cefTRIAXone (ROCEPHIN) 1 g in dextrose 5 % in water (D5W) 100 mL IVPB (MB+)    collagenase ointment    furosemide tablet 40 mg    gabapentin capsule 200 mg    guaiFENesin 12 hr tablet 600 mg    HYDROcodone-acetaminophen 5-325 mg per tablet 1 tablet    melatonin tablet 6 mg     metoprolol succinate (TOPROL-XL) 24 hr tablet 100 mg    QUEtiapine tablet 100 mg    sodium chloride 0.9% flush 10 mL    travoprost 0.004 % ophthalmic solution 1 drop    valsartan tablet 40 mg    vitamin D 1000 units tablet 1,000 Units         Thank you for your consult. I will follow-up with patient. Please contact us if you have any additional questions.    Britni Sen NP scribed for Dr. Laboy  Cardiology   [place] - [facility] Surg (3rd Fl)    I have personally interviewed and examined this patient face-to-face, and as the physician. Documented the above plan and rendered all medical decision making for this encounter. I have read and agree with the above documentation unless otherwise noted.

## 2023-11-29 NOTE — PT/OT/SLP EVAL
"Physical Therapy Evaluation    Patient Name:  Emily Alicia   MRN:  [id number]    Recommendations:     Discharge Recommendations: Moderate Intensity Therapy (SNF / Home with home health)   Discharge Equipment Recommendations: none   Barriers to discharge: Decreased caregiver support    Assessment:     Emily Alicia is a 83 y.o. female admitted with a medical diagnosis of Sepsis due to Streptococcus pneumoniae with acute hypoxic respiratory failure.  She presents with the following impairments/functional limitations: weakness, impaired endurance, impaired self care skills, impaired functional mobility, decreased upper extremity function, decreased lower extremity function, decreased ROM, impaired balance, decreased safety awareness, impaired skin, impaired cardiopulmonary response to activity. Patient is currently bed bound and requires dependent assistance with bed mobility.    Rehab Prognosis: Fair; patient would benefit from acute skilled PT services to address these deficits and reach maximum level of function.    Recent Surgery: * No surgery found *      Plan:     During this hospitalization, patient to be seen 5 x/week to address the identified rehab impairments via therapeutic activities, therapeutic exercises and progress toward the following goals:    Plan of Care Expires:       Subjective     Chief Complaint: "I feel fine"- per pt  Patient/Family Comments/goals: "to return home  and to have home health for assistance"- per pt  Pain/Comfort:  Pain Rating 1: 0/10    Patients cultural, spiritual, Restoration conflicts given the current situation: no    Living Environment:Pt lives on ground floor of a 2SH with no ASIF with son and his girlfriend.   Prior to admission, patients level of function: Pt is being bed bound over 1 year, dependent with mobility and uses wang lift(assisted by son) for wheelchair transfers only to go to a doctor's appointment. Patient has sacral ulcer  Equipment used at home: hospital bed, lift " device, wheelchair, walker, rolling.  DME owned (not currently used): none.  Upon discharge, patient will have assistance from son and his girlfriend..    Objective:     Communicated with patient prior to session.  Patient found HOB elevated with PureWick, telemetry, peripheral IV, oxygen  upon PT entry to room.    General Precautions: Standard, fall, other (see comments) (skin integrity/sacral pressure sore)  Orthopedic Precautions:N/A   Braces: N/A  Respiratory Status: Nasal cannula, flow 2 L/min    Exams:  Cognitive Exam:  Patient is oriented to Person, Place, Time, and Situation  Fine Motor Coordination:    -       Intact  Gross Motor Coordination:  WFL  Postural Exam:  Patient presented with the following abnormalities:    -       Rounded shoulders  -       Forward head  Skin Integrity/Edema:      -       sacral ulcer  RLE ROM: WFL  RLE Strength: 2/5  LLE ROM: WFL  LLE Strength: 2/5    Functional Mobility:  Bed Mobility:     Rolling Left:  dependence  Rolling Right: dependence  Supine to Sit: unable  Transfers:  wang lift dependent transfers  Gait: non ambulatory      AM-PAC 6 CLICK MOBILITY  Total Score:7       Treatment & Education:  PT evaluation. Educated patient the role of PT. Initiated B LE ROM ex and rolling to sides/pillow positioning.    Patient left  half left side lying using pillow  with all lines intact, call button in reach, and nurse notified.    GOALS:   Multidisciplinary Problems       Physical Therapy Goals          Problem: Physical Therapy    Goal Priority Disciplines Outcome Goal Variances Interventions   Physical Therapy Goal     PT, PT/OT      Description:   Patient will increase functional independence with mobility by performin. Able to perform and tolerate ROM ex on B LE x 10 reps on each plane at supine position to prevent joint stiffness/contractures, ease mobility/body repositioning and prevent to further decline in functions.  2. Increase rolling to sides with max assistance  and cues  3. Supine to sit with Maximum Assistance and cues  4. Able to perform and tolerate static sit at edge of the bed x   ~5 minutes with maximal  assistance and cues.                            History:     Past Medical History:   Diagnosis Date    Anemia     Anxiety     Aortic valve stenosis     CHF (congestive heart failure)     Chronic kidney disease (CKD)     Diastolic heart failure     Dyslipidemia     Encounter for blood transfusion     Glaucoma (increased eye pressure)     LEFT EYE    HHD (hypertensive heart disease)     Hypertension     Osteoarthritis     Retina disorder, bilateral     Severe aortic stenosis 11/9/2016    TR (tricuspid regurgitation)     UTI (urinary tract infection)        Past Surgical History:   Procedure Laterality Date    CARDIAC CATHETERIZATION      CARDIAC VALVE SURGERY      CATARACT EXTRACTION W/ INTRAOCULAR LENS  IMPLANT, BILATERAL      CHOLECYSTECTOMY      COLONOSCOPY N/A 2/7/2021    Procedure: COLONOSCOPY;  Surgeon: Gaston Pablo MD;  Location: [facility] ENDO;  Service: Endoscopy;  Laterality: N/A;    FLEXIBLE SIGMOIDOSCOPY N/A 2/4/2021    Procedure: SIGMOIDOSCOPY, FLEXIBLE;  Surgeon: Jarred Forrester MD;  Location: [facility];  Service: Gastroenterology;  Laterality: N/A;    HYSTERECTOMY      JOINT REPLACEMENT      right knee    JOINT REPLACEMENT      left knee    KNEE SURGERY Left     SPINE SURGERY      lumbar    TRANSCATHETER AORTIC VALVE REPLACEMENT         Time Tracking:     PT Received On: 11/29/23  PT Start Time: 0855     PT Stop Time: 0910  PT Total Time (min): 15 min     Billable Minutes: Evaluation 15      11/29/2023

## 2023-11-29 NOTE — PLAN OF CARE
11/29/23 1036   Rounds   Attendance Provider;Nurse ;   Discharge Plan A Home Health;Home with family;Skilled Nursing Facility   Why the patient remains in the hospital Requires continued medical care   Transition of Care Barriers Mobility         ECHO results pending at this time.  OT eval complete and they recommended SNF. Awaiting PT eval.  Will speak with patient to see if she is agreeable to therapy recommendations.

## 2023-11-29 NOTE — PT/OT/SLP PROGRESS
"Occupational Therapy   Treatment    Name: Emily Alicia  MRN: [id number]  Admitting Diagnosis:  Sepsis due to Streptococcus pneumoniae with acute hypoxic respiratory failure       Recommendations:     Discharge Recommendations: Moderate Intensity Therapy  Discharge Equipment Recommendations:  none  Barriers to discharge:  Decreased caregiver support    Assessment:     Emily Alicia is a 83 y.o. female with a medical diagnosis of Sepsis due to Streptococcus pneumoniae with acute hypoxic respiratory failure.  Performance deficits affecting function are weakness, impaired endurance, impaired self care skills, impaired functional mobility, gait instability, impaired balance, decreased upper extremity function, decreased lower extremity function, decreased safety awareness, impaired skin, impaired cardiopulmonary response to activity.     Rehab Prognosis:  Poor; patient would benefit from acute skilled OT services to address these deficits and reach maximum level of function.       Plan:     Patient to be seen 5 x/week to address the above listed problems via self-care/home management, therapeutic activities, therapeutic exercises  Plan of Care Expires: 12/12/23  Plan of Care Reviewed with: patient    Subjective     Chief Complaint: " My stomach is upset"  Patient/Family Comments/goals: none  Pain/Comfort:  Pain Rating 1: 0/10    Objective:     Communicated with: RN prior to session.  Patient found HOB elevated with PureWick, peripheral IV, oxygen upon OT entry to room.    General Precautions: Standard, fall    Orthopedic Precautions:N/A  Braces: N/A  Respiratory Status: Nasal cannula, flow 2 L/min     Occupational Performance:     Bed Mobility:    Did not perform     Functional Mobility/Transfers:  Did not perform    Activities of Daily Living:  Did not perform      AMPAC 6 Click ADL: 11    Treatment & Education:  Pt was lying in bed upon entering room with no complaints. Pt was instructed in B MEMO [place]AKES for shoulder " flexion/abduction,bicep flexion and wrist/hand flexion/extension. Pt has limited movement on R UE due to contractures with hard end fields making shoulder movement minimal and elbow extension difficult. L UE had more ROM but still unable to reach terminal end with hard end fields present. Pt alyse tx well but is limited with use of B Ue's.    Patient left HOB elevated with all lines intact, call button in reach, and RN notified    GOALS:   Multidisciplinary Problems       Occupational Therapy Goals          Problem: Occupational Therapy    Goal Priority Disciplines Outcome Interventions   Occupational Therapy Goal     OT, PT/OT     Description: Pt to perform hygiene ADL at bed level with MOD I with increased bilateral upper extremity use and coordination.  Pt to demonstrate seated balance with MOD A for balance for improved participation in transfers and reduced caregiver burden.  Pt to demonstrate fair- BUE strength during functional task   Pt to participate in bilateral upper extremity range of motion and strengthening routine as instructed by OT.                       Time Tracking:     OT Date of Treatment: 11/29/23  OT Start Time: 0414  OT Stop Time: 0422  OT Total Time (min): 8 min    Billable Minutes:Therapeutic Exercise 8    OT/CARLOS: CARLOS     Number of CARLOS visits since last OT visit: 1    11/29/2023

## 2023-11-30 LAB
ALBUMIN SERPL BCP-MCNC: 2.4 G/DL (ref 3.5–5.2)
ALP SERPL-CCNC: 171 U/L (ref 55–135)
ALT SERPL W/O P-5'-P-CCNC: 27 U/L (ref 10–44)
ANION GAP SERPL CALC-SCNC: 10 MMOL/L (ref 8–16)
ANISOCYTOSIS BLD QL SMEAR: SLIGHT
AST SERPL-CCNC: 13 U/L (ref 10–40)
BASOPHILS NFR BLD: 2 % (ref 0–1.9)
BILIRUB SERPL-MCNC: 0.2 MG/DL (ref 0.1–1)
BUN SERPL-MCNC: 35 MG/DL (ref 8–23)
CALCIUM SERPL-MCNC: 9.1 MG/DL (ref 8.7–10.5)
CHLORIDE SERPL-SCNC: 108 MMOL/L (ref 95–110)
CO2 SERPL-SCNC: 22 MMOL/L (ref 23–29)
CREAT SERPL-MCNC: 1.3 MG/DL (ref 0.5–1.4)
DACRYOCYTES BLD QL SMEAR: ABNORMAL
DIFFERENTIAL METHOD: ABNORMAL
EOSINOPHIL NFR BLD: 8 % (ref 0–8)
ERYTHROCYTE [DISTWIDTH] IN BLOOD BY AUTOMATED COUNT: 15.9 % (ref 11.5–14.5)
EST. GFR  (NO RACE VARIABLE): 41 ML/MIN/1.73 M^2
GIANT PLATELETS BLD QL SMEAR: PRESENT
GLUCOSE SERPL-MCNC: 112 MG/DL (ref 70–110)
HCT VFR BLD AUTO: 27.7 % (ref 37–48.5)
HGB BLD-MCNC: 9 G/DL (ref 12–16)
HOWELL-JOLLY BOD BLD QL SMEAR: ABNORMAL
IMM GRANULOCYTES # BLD AUTO: ABNORMAL K/UL
IMM GRANULOCYTES NFR BLD AUTO: ABNORMAL %
LYMPHOCYTES NFR BLD: 35 % (ref 18–48)
MAGNESIUM SERPL-MCNC: 2.2 MG/DL (ref 1.6–2.6)
MCH RBC QN AUTO: 25.2 PG (ref 27–31)
MCHC RBC AUTO-ENTMCNC: 32.5 G/DL (ref 32–36)
MCV RBC AUTO: 78 FL (ref 82–98)
MONOCYTES NFR BLD: 8 % (ref 4–15)
MYELOCYTES NFR BLD MANUAL: 3 %
NEUTROPHILS NFR BLD: 44 % (ref 38–73)
NRBC BLD-RTO: 0 /100 WBC
PLATELET # BLD AUTO: 275 K/UL (ref 150–450)
PLATELET BLD QL SMEAR: ABNORMAL
PMV BLD AUTO: 11.3 FL (ref 9.2–12.9)
POLYCHROMASIA BLD QL SMEAR: ABNORMAL
POTASSIUM SERPL-SCNC: 4.8 MMOL/L (ref 3.5–5.1)
PROT SERPL-MCNC: 7 G/DL (ref 6–8.4)
RBC # BLD AUTO: 3.57 M/UL (ref 4–5.4)
SODIUM SERPL-SCNC: 140 MMOL/L (ref 136–145)
WBC # BLD AUTO: 15.21 K/UL (ref 3.9–12.7)

## 2023-11-30 PROCEDURE — 85007 BL SMEAR W/DIFF WBC COUNT: CPT | Performed by: PHYSICIAN ASSISTANT

## 2023-11-30 PROCEDURE — 99233 PR SUBSEQUENT HOSPITAL CARE,LEVL III: ICD-10-PCS | Mod: ,,, | Performed by: PHYSICIAN ASSISTANT

## 2023-11-30 PROCEDURE — 25000003 PHARM REV CODE 250: Performed by: NURSE PRACTITIONER

## 2023-11-30 PROCEDURE — 25000003 PHARM REV CODE 250: Performed by: PHYSICIAN ASSISTANT

## 2023-11-30 PROCEDURE — 25000003 PHARM REV CODE 250: Performed by: STUDENT IN AN ORGANIZED HEALTH CARE EDUCATION/TRAINING PROGRAM

## 2023-11-30 PROCEDURE — 97530 THERAPEUTIC ACTIVITIES: CPT

## 2023-11-30 PROCEDURE — 85027 COMPLETE CBC AUTOMATED: CPT | Performed by: PHYSICIAN ASSISTANT

## 2023-11-30 PROCEDURE — 63600175 PHARM REV CODE 636 W HCPCS: Performed by: STUDENT IN AN ORGANIZED HEALTH CARE EDUCATION/TRAINING PROGRAM

## 2023-11-30 PROCEDURE — 94640 AIRWAY INHALATION TREATMENT: CPT

## 2023-11-30 PROCEDURE — 99233 SBSQ HOSP IP/OBS HIGH 50: CPT | Mod: ,,, | Performed by: PHYSICIAN ASSISTANT

## 2023-11-30 PROCEDURE — 11000001 HC ACUTE MED/SURG PRIVATE ROOM

## 2023-11-30 PROCEDURE — 83735 ASSAY OF MAGNESIUM: CPT | Performed by: PHYSICIAN ASSISTANT

## 2023-11-30 PROCEDURE — 80053 COMPREHEN METABOLIC PANEL: CPT | Performed by: PHYSICIAN ASSISTANT

## 2023-11-30 PROCEDURE — 25000242 PHARM REV CODE 250 ALT 637 W/ HCPCS: Performed by: PHYSICIAN ASSISTANT

## 2023-11-30 PROCEDURE — 25000003 PHARM REV CODE 250: Performed by: INTERNAL MEDICINE

## 2023-11-30 PROCEDURE — 99900031 HC PATIENT EDUCATION (STAT)

## 2023-11-30 PROCEDURE — 27000221 HC OXYGEN, UP TO 24 HOURS

## 2023-11-30 PROCEDURE — 94761 N-INVAS EAR/PLS OXIMETRY MLT: CPT

## 2023-11-30 PROCEDURE — 25500020 PHARM REV CODE 255: Performed by: INTERNAL MEDICINE

## 2023-11-30 PROCEDURE — 94799 UNLISTED PULMONARY SVC/PX: CPT | Mod: XB

## 2023-11-30 PROCEDURE — 25000003 PHARM REV CODE 250

## 2023-11-30 PROCEDURE — 36415 COLL VENOUS BLD VENIPUNCTURE: CPT | Performed by: PHYSICIAN ASSISTANT

## 2023-11-30 PROCEDURE — 99900035 HC TECH TIME PER 15 MIN (STAT)

## 2023-11-30 RX ORDER — DOCUSATE SODIUM 100 MG/1
100 CAPSULE, LIQUID FILLED ORAL DAILY
Status: DISCONTINUED | OUTPATIENT
Start: 2023-11-30 | End: 2023-12-01 | Stop reason: HOSPADM

## 2023-11-30 RX ADMIN — METOPROLOL SUCCINATE 100 MG: 50 TABLET, EXTENDED RELEASE ORAL at 08:11

## 2023-11-30 RX ADMIN — AZITHROMYCIN DIHYDRATE 500 MG: 500 INJECTION, POWDER, LYOPHILIZED, FOR SOLUTION INTRAVENOUS at 08:11

## 2023-11-30 RX ADMIN — METOPROLOL SUCCINATE 100 MG: 50 TABLET, EXTENDED RELEASE ORAL at 09:11

## 2023-11-30 RX ADMIN — GUAIFENESIN 600 MG: 600 TABLET, EXTENDED RELEASE ORAL at 09:11

## 2023-11-30 RX ADMIN — DOCUSATE SODIUM 100 MG: 100 CAPSULE, LIQUID FILLED ORAL at 08:11

## 2023-11-30 RX ADMIN — IPRATROPIUM BROMIDE AND ALBUTEROL SULFATE 3 ML: 2.5; .5 SOLUTION RESPIRATORY (INHALATION) at 07:11

## 2023-11-30 RX ADMIN — HYDROCODONE BITARTRATE AND ACETAMINOPHEN 1 TABLET: 5; 325 TABLET ORAL at 08:11

## 2023-11-30 RX ADMIN — FUROSEMIDE 40 MG: 40 TABLET ORAL at 09:11

## 2023-11-30 RX ADMIN — IOHEXOL 100 ML: 350 INJECTION, SOLUTION INTRAVENOUS at 12:11

## 2023-11-30 RX ADMIN — ALLOPURINOL 100 MG: 100 TABLET ORAL at 08:11

## 2023-11-30 RX ADMIN — COLLAGENASE SANTYL: 250 OINTMENT TOPICAL at 01:11

## 2023-11-30 RX ADMIN — IPRATROPIUM BROMIDE AND ALBUTEROL SULFATE 3 ML: 2.5; .5 SOLUTION RESPIRATORY (INHALATION) at 01:11

## 2023-11-30 RX ADMIN — ATORVASTATIN CALCIUM 10 MG: 10 TABLET, FILM COATED ORAL at 08:11

## 2023-11-30 RX ADMIN — Medication 1000 UNITS: at 09:11

## 2023-11-30 RX ADMIN — GABAPENTIN 200 MG: 100 CAPSULE ORAL at 09:11

## 2023-11-30 RX ADMIN — VALSARTAN 40 MG: 40 TABLET, FILM COATED ORAL at 09:11

## 2023-11-30 RX ADMIN — DAPAGLIFLOZIN 10 MG: 10 TABLET, FILM COATED ORAL at 09:11

## 2023-11-30 RX ADMIN — GABAPENTIN 200 MG: 100 CAPSULE ORAL at 08:11

## 2023-11-30 RX ADMIN — AMLODIPINE BESYLATE 5 MG: 5 TABLET ORAL at 09:11

## 2023-11-30 RX ADMIN — GUAIFENESIN 600 MG: 600 TABLET, EXTENDED RELEASE ORAL at 08:11

## 2023-11-30 RX ADMIN — TRAVOPROST 1 DROP: 0.04 SOLUTION OPHTHALMIC at 08:11

## 2023-11-30 RX ADMIN — CEFTRIAXONE SODIUM 1 G: 1 INJECTION, POWDER, FOR SOLUTION INTRAMUSCULAR; INTRAVENOUS at 06:11

## 2023-11-30 RX ADMIN — VALSARTAN 40 MG: 40 TABLET, FILM COATED ORAL at 08:11

## 2023-11-30 NOTE — PLAN OF CARE
11/30/23 0844   Post-Acute Status   Post-Acute Authorization Placement   Post-Acute Placement Status Pending state direction/certification   Coverage [employer]   Patient choice form signed by patient/caregiver List from System Post-Acute Care   Discharge Delays None known at this time   Discharge Plan   Discharge Plan A Skilled Nursing Facility   Discharge Plan B Skilled Nursing Facility     LOCET completed and PASRR faxed for approval. Waiting for 142 approval.

## 2023-11-30 NOTE — PROGRESS NOTES
[facility] ([facility])  [facility] Medicine  Progress Note    Patient Name: Emily Alicia  MRN: [id number]  Patient Class: IP- Inpatient   Admission Date: 11/27/2023  Length of Stay: 3 days  Attending Physician: Viviana Lindsay MD  Primary Care Provider: Caroline Do NP        Subjective:     Principal Problem:Sepsis due to Streptococcus pneumoniae with acute hypoxic respiratory failure        HPI:  Patient is an 83 year old female with medical history of HTN, Tricuspid regurgitation, aortic valve stenosis/TAVR, diastolic HF,  who presented to the ED with SOB.  Patient has had a productive cough for several days now.  She has not been around any other sick contacts.  She has had lower extremity edema but denies CP.  No fever, chills, nausea & vomiting.  She has not had any recent medication changes.  She has been bed bound for three years now.      Admitted for hypoxia secondary to pneumonia and pleural effusion.                Overview/Hospital Course:  Pt HD stable still on 2 L N.C. with oxygen saturation 97%.  Will wean oxygen as tolerated.  D dimer elevated.  CTA chest pending.  Continue IV abx for pneumonia and echo pending for pulmonary edema.      PT HD stable on 2 L N.C with oxygen saturation 97%.  Will wean oxygen today.  Echo with EF 55% and grade I diastolic dysfunction.  D dimer elevated but no PE on CTA and no DVT on Venous u/s.  Patient will need heme/onc referral for chronic leukocytosis.  Plan to discharge within the next 24 hours.  Patient would like to go home with home health.      Past Medical History:   Diagnosis Date    Anemia     Anxiety     Aortic valve stenosis     CHF (congestive heart failure)     Chronic kidney disease (CKD)     Diastolic heart failure     Dyslipidemia     Encounter for blood transfusion     Glaucoma (increased eye pressure)     LEFT EYE    HHD (hypertensive heart disease)     Hypertension     Osteoarthritis     Retina disorder, bilateral     Severe aortic stenosis  11/9/2016    TR (tricuspid regurgitation)     UTI (urinary tract infection)        Past Surgical History:   Procedure Laterality Date    CARDIAC CATHETERIZATION      CARDIAC VALVE SURGERY      CATARACT EXTRACTION W/ INTRAOCULAR LENS  IMPLANT, BILATERAL      CHOLECYSTECTOMY      COLONOSCOPY N/A 2/7/2021    Procedure: COLONOSCOPY;  Surgeon: Gaston Pablo MD;  Location: [facility] ENDO;  Service: Endoscopy;  Laterality: N/A;    FLEXIBLE SIGMOIDOSCOPY N/A 2/4/2021    Procedure: SIGMOIDOSCOPY, FLEXIBLE;  Surgeon: Jarred Forrester MD;  Location: [facility] ENDO;  Service: Gastroenterology;  Laterality: N/A;    HYSTERECTOMY      JOINT REPLACEMENT      right knee    JOINT REPLACEMENT      left knee    KNEE SURGERY Left     SPINE SURGERY      lumbar    TRANSCATHETER AORTIC VALVE REPLACEMENT         Review of patient's allergies indicates:  No Known Allergies    No current facility-administered medications on file prior to encounter.     Current Outpatient Medications on File Prior to Encounter   Medication Sig    allopurinoL (ZYLOPRIM) 100 MG tablet TAKE 1 TABLET EVERY DAY (Patient taking differently: Take 100 mg by mouth every evening.)    amLODIPine (NORVASC) 10 MG tablet TAKE 1 TABLET EVERY DAY (Patient taking differently: Take 10 mg by mouth once daily.)    ascorbic acid, vitamin C, (VITAMIN C) 250 MG tablet Take 1 tablet (250 mg total) by mouth once daily.    aspirin (ASPIRIN LOW DOSE) 81 MG EC tablet Take 1 tablet (81 mg total) by mouth nightly.    atorvastatin (LIPITOR) 10 MG tablet TAKE 1 TABLET EVERY EVENING (Patient taking differently: Take 10 mg by mouth every evening.)    collagenase (SANTYL) ointment Apply topically once daily.    furosemide (LASIX) 40 MG tablet Take 1 tablet (40 mg total) by mouth once daily.    gabapentin (NEURONTIN) 300 MG capsule TAKE 1 CAPSULE TWICE DAILY (Patient taking differently: Take 300 mg by mouth 2 (two) times daily.)    HYDROcodone-acetaminophen (NORCO)  mg per tablet Take 1  tablet by mouth every 6 (six) hours as needed for Pain.    losartan (COZAAR) 100 MG tablet Take 100 mg by mouth once daily.    metoprolol succinate (TOPROL-XL) 100 MG 24 hr tablet Take 100 mg by mouth 2 (two) times daily.    naproxen (NAPROSYN) 500 MG tablet Take 500 mg by mouth 2 (two) times daily with meals.    potassium chloride SA (K-DUR,KLOR-CON) 20 MEQ tablet Take 20 mEq by mouth once daily.    travoprost (TRAVATAN Z) 0.004 % ophthalmic solution Place 1 drop into both eyes every evening.    traZODone (DESYREL) 50 MG tablet TAKE 1 TABLET EVERY NIGHT AS NEEDED FOR INSOMNIA    vitamin D (VITAMIN D3) 1000 units Tab Take 1,000 Units by mouth once daily.    oxybutynin (DITROPAN-XL) 5 MG TR24 TAKE 1 TABLET EVERY DAY     Family History       Problem Relation (Age of Onset)    Diabetes Mother    Heart disease Father    Stroke Mother          Tobacco Use    Smoking status: Former     Current packs/day: 0.00     Types: Cigarettes     Quit date: 1982     Years since quittin.2    Smokeless tobacco: Never   Substance and Sexual Activity    Alcohol use: No    Drug use: No    Sexual activity: Never     Partners: Male     Review of Systems   Constitutional:  Positive for fatigue. Negative for chills and fever.   HENT:  Negative for congestion and drooling.    Eyes:  Negative for visual disturbance.   Respiratory:  Positive for cough and shortness of breath.    Cardiovascular:  Positive for leg swelling. Negative for chest pain.   Gastrointestinal:  Negative for abdominal distention, abdominal pain, nausea and vomiting.   Genitourinary:  Negative for difficulty urinating and dysuria.   Musculoskeletal:  Positive for arthralgias, back pain and gait problem.   Skin:  Positive for wound (on her back).   Neurological:  Positive for weakness (generalized). Negative for dizziness and headaches.   Psychiatric/Behavioral:  Negative for agitation and decreased concentration.      Objective:     Vital Signs (Most  "Recent):  Temp: 97.5 °F (36.4 °C) (11/30/23 1127)  Pulse: 80 (11/30/23 1329)  Resp: 16 (11/30/23 1329)  BP: (!) 154/71 (11/30/23 1127)  SpO2: 97 % (11/30/23 1329) Vital Signs (24h Range):  Temp:  [97.5 °F (36.4 °C)-98.9 °F (37.2 °C)] 97.5 °F (36.4 °C)  Pulse:  [54-86] 80  Resp:  [16-20] 16  SpO2:  [95 %-100 %] 97 %  BP: (104-155)/(55-76) 154/71     Weight: 121.2 kg (267 lb 3.2 oz)  Body mass index is 39.46 kg/m².     Physical Exam  Constitutional:       General: She is not in acute distress.  HENT:      Head: Normocephalic and atraumatic.   Eyes:      General:         Right eye: No discharge.         Left eye: No discharge.   Cardiovascular:      Rate and Rhythm: Normal rate and regular rhythm.   Pulmonary:      Effort: Pulmonary effort is normal.      Comments: Improvement in aeration of breath sounds   Abdominal:      General: There is no distension.      Tenderness: There is no abdominal tenderness.   Musculoskeletal:         General: No swelling or tenderness.      Cervical back: Neck supple. No tenderness.      Right lower leg: Edema (+2) present.      Left lower leg: Edema (+2) present.      Comments: Decreased ROM left arm.   Skin:     General: Skin is warm and dry.      Comments: Stage 1 sacral ulcer    Neurological:      General: No focal deficit present.      Mental Status: She is alert and oriented to person, place, and time.   Psychiatric:         Mood and Affect: Mood normal.         Behavior: Behavior normal.                Significant Labs: A1C: No results for input(s): "HGBA1C" in the last 4320 hours.  ABGs: No results for input(s): "PH", "PCO2", "HCO3", "POCSATURATED", "BE", "TOTALHB", "COHB", "METHB", "O2HB", "POCFIO2", "PO2" in the last 48 hours.  Bilirubin:   Recent Labs   Lab 11/27/23  1449 11/28/23  1033 11/29/23  0536 11/30/23 0528   BILITOT 0.4 0.4 0.2 0.2       Blood Culture:   No results for input(s): "LABBLOO" in the last 48 hours.    BMP:   Recent Labs   Lab 11/30/23 0528   * " "     K 4.8      CO2 22*   BUN 35*   CREATININE 1.3   CALCIUM 9.1   MG 2.2       CBC:   Recent Labs   Lab 11/29/23  0536 11/30/23  0528   WBC 15.88* 15.21*   HGB 8.3* 9.0*   HCT 25.7* 27.7*    275       CMP:   Recent Labs   Lab 11/29/23  0536 11/30/23  0528    140   K 4.5 4.8    108   CO2 21* 22*   * 112*   BUN 39* 35*   CREATININE 1.3 1.3   CALCIUM 8.8 9.1   PROT 6.3 7.0   ALBUMIN 2.1* 2.4*   BILITOT 0.2 0.2   ALKPHOS 169* 171*   AST 12 13   ALT 30 27   ANIONGAP 9 10       Cardiac Markers:   No results for input(s): "CKMB", "MYOGLOBIN", "BNP", "TROPISTAT" in the last 48 hours.    Coagulation: No results for input(s): "PT", "INR", "APTT" in the last 48 hours.  Lactic Acid:   No results for input(s): "LACTATE" in the last 48 hours.    Lipase: No results for input(s): "LIPASE" in the last 48 hours.  Lipid Panel: No results for input(s): "CHOL", "HDL", "LDLCALC", "TRIG", "CHOLHDL" in the last 48 hours.  Magnesium:   Recent Labs   Lab 11/29/23 0536 11/30/23  0528   MG 1.7 2.2       POCT Glucose: No results for input(s): "POCTGLUCOSE" in the last 48 hours.  Prealbumin: No results for input(s): "PREALBUMIN" in the last 48 hours.  Respiratory Culture: No results for input(s): "GSRESP", "RESPIRATORYC" in the last 48 hours.  Troponin:   No results for input(s): "TROPONINI", "TROPONINIHS" in the last 48 hours.    TSH: No results for input(s): "TSH" in the last 4320 hours.  Urine Culture: No results for input(s): "LABURIN" in the last 48 hours.  Urine Studies: No results for input(s): "COLORU", "APPEARANCEUA", "PHUR", "SPECGRAV", "PROTEINUA", "GLUCUA", "KETONESU", "BILIRUBINUA", "OCCULTUA", "NITRITE", "UROBILINOGEN", "LEUKOCYTESUR", "RBCUA", "WBCUA", "BACTERIA", "SQUAMEPITHEL", "HYALINECASTS" in the last 48 hours.    Invalid input(s): "WRIGHTSUR"    Significant Imaging: I have reviewed all pertinent imaging results/findings within the past 24 hours.    Assessment/Plan:      * Sepsis due to " Streptococcus pneumoniae with acute hypoxic respiratory failure  Patient with tachycardia, leukocytosis and left lower lobe consolidation on CXR at admission  IV rocephin and IV azithromycin   Mucinex for supportive care     Deferring IV fluids at this time in the setting of pulmonary edema & LOWELL.  LOWELL now resolving.  Possibly due to sepsis.  Blood cultures pending/ Urinalysis pending.    11/30 Leukocytosis chronic.  Will wean off oxygen.  IV azithromycin and IV rocephin         Advanced care planning/counseling discussion  Patient currently a full code at this time.        Acute on chronic heart failure  + hypoxia, mild lower extremity edema   BNP elevated at 203  CXR with pulmonary edema and left pleural effusion  CIS consulted   IV lasix 20mg x 1.  Patient states not taking her lasix.  Will be cautious with diuresis in the setting or aortic valve stenosis.    Continue home metoprolol     Repeat echo EF 55% and grade I diastolic dysfunction.  Lasix 40mg qd           Acute respiratory failure with hypoxia  Patient with Hypoxic Respiratory failure which is Acute.  she is not on home oxygen. Supplemental oxygen was provided and noted-      .   Signs/symptoms of respiratory failure include- lethargy. Contributing diagnoses includes - CHF and Pneumonia Labs and images were reviewed. Patient Has not had a recent ABG. Will treat underlying causes and adjust management of respiratory failure as follows- Pulmonary edema with possible developing pneumonia     11/30 Wean oxygen today and plan for discharge tomorrow.      ASCVD (arteriosclerotic cardiovascular disease)  Continue aspirin and statin       Hyperlipidemia LDL goal <70  Continue home statin       Acute kidney injury superimposed on CKD  Patient with acute kidney injury/acute renal failure likely due to  sepsis  LOWELL is currently improving. Baseline creatinine  0.9  - Labs reviewed- Renal function/electrolytes with Estimated Creatinine Clearance: 46.5 mL/min (based  on SCr of 1.3 mg/dL). according to latest data. Monitor urine output and serial BMP and adjust therapy as needed. Avoid nephrotoxins and renally dose meds for GFR listed above.    Insomnia  Continue home trazodone         VTE Risk Mitigation (From admission, onward)           Ordered     IP VTE HIGH RISK PATIENT  Once         11/27/23 1906     Place sequential compression device  Until discontinued         11/27/23 1906                    Discharge Planning   LINA:      Code Status: Full Code   Is the patient medically ready for discharge?:     Reason for patient still in hospital (select all that apply): Patient trending condition  Discharge Plan A: Home Health   Discharge Delays: None known at this time              Anjali Mesa PA-C  Department of Hospital Medicine   [facility] (3rd Fl)

## 2023-11-30 NOTE — PROGRESS NOTES
[place] - [facility] ([facility])  Cardiology  Progress Note    Patient Name: Emily Alicia  MRN: [id number]  Admission Date: 11/27/2023  Hospital Length of Stay: 3 days  Code Status: Full Code   Attending Physician: Viviana Lindsay MD   Primary Care Physician: Caroline Do NP  Expected Discharge Date:   Principal Problem:Sepsis due to Streptococcus pneumoniae with acute hypoxic respiratory failure    Subjective:     Chief Complaint:  Cough x 3 days and SOB      HPI: Emily Alicia is a 83 y.o. female with a medical history of Chronic diastolic heart failure, HHD, Aortic stenosis s/p TAVR, dyslipidemia, and CKD presented to the ER with complaints of a cough for 3 days with associated SOB and BLE edema. EKG showed ST with LBBB. CXR with findings suspicious for CHF and small left pleural effusion with atelectasis vs infiltrate of the LLL. Labs significant for Leukocytosis, anemia, and BNP - 203. Troponin normal. Viral studies negative. CIS asked to evaluate for CHF.      Patient last seen clinic Feb. 2019.      11/29/23--I/O (-) 300mL. She has been transitioned to oral diuretic  Appears euvolemic, still with wheezing  Tx of PNA ongoing   Noted d-dimer of 2.0  Renal function at baseline with GFR 41.  Note drop in H/H overnight to 8.3/25.7    ROS  Constitutional: Negative.   HENT: Negative.     Eyes: Negative.    Cardiovascular:  Positive for dyspnea on exertion. Negative for chest pain.   Respiratory:  Positive for cough and shortness of breath.    Endocrine: Negative.    Skin: Negative.    Musculoskeletal: Negative.    Gastrointestinal: Negative.    Neurological: Negative.      Objective:     Vital Signs (Most Recent):  Temp: 98.9 °F (37.2 °C) (11/30/23 0406)  Pulse: 71 (11/30/23 0708)  Resp: 16 (11/30/23 0708)  BP: (!) 117/55 (11/30/23 0406)  SpO2: 96 % (11/30/23 0708) Vital Signs (24h Range):  Temp:  [98.4 °F (36.9 °C)-99.1 °F (37.3 °C)] 98.9 °F (37.2 °C)  Pulse:  [54-85] 71  Resp:  [16-20] 16  SpO2:  [95 %-99 %] 96 %  BP:  (104-155)/(55-76) 117/55     Weight: 121.2 kg (267 lb 3.2 oz)  Body mass index is 39.46 kg/m².    SpO2: 96 %         Intake/Output Summary (Last 24 hours) at 11/30/2023 0719  Last data filed at 11/29/2023 2253  Gross per 24 hour   Intake 505 ml   Output 1250 ml   Net -745 ml       Lines/Drains/Airways       Drain  Duration             Female External Urinary Catheter 11/27/23 2000 2 days              Peripheral Intravenous Line  Duration                  Peripheral IV - Single Lumen 11/29/23 22 G Anterior;Left Hand 1 day                    Physical Exam  Vitals and nursing note reviewed.   Constitutional:       Appearance: Normal appearance.   Cardiovascular:      Rate and Rhythm: Normal rate and regular rhythm.      Pulses: Normal pulses.      Heart sounds: Normal heart sounds.   Pulmonary:      Effort: Pulmonary effort is normal.      Breath sounds: Wheezing present.   Abdominal:      General: Abdomen is flat.   Musculoskeletal:         General: Normal range of motion.      Cervical back: Normal range of motion and neck supple.   Skin:     General: Skin is warm and dry.      Capillary Refill: Capillary refill takes less than 2 seconds.   Neurological:      General: No focal deficit present.      Mental Status: She is alert and oriented to person, place, and time. Mental status is at baseline.   Psychiatric:         Mood and Affect: Mood normal.   Significant Labs:   Recent Lab Results         11/30/23  0528   11/29/23  1118        Albumin 2.4                  ALT 27         Anion Gap 10         Ascending aorta   2.67       Ao peak maegan   1.98       Ao VTI   49.30       AST 13         AV valve area   1.88       AMAURI by Velocity Ratio   1.81       AV mean gradient   10       AV index (prosthetic)   0.62       AV peak gradient   16       AV Velocity Ratio   0.60       BILIRUBIN TOTAL 0.2  Comment: For infants and newborns, interpretation of results should be based  on gestational age, weight and in agreement  with clinical  observations.    Premature Infant recommended reference ranges:  Up to 24 hours.............<8.0 mg/dL  Up to 48 hours............<12.0 mg/dL  3-5 days..................<15.0 mg/dL  6-29 days.................<15.0 mg/dL           BSA   2.4       BUN 35         Calcium 9.1         Chloride 108         CO2 22         Creatinine 1.3         Left Ventricle Relative Wall Thickness   0.55       E/A ratio   0.89       E/E' ratio   23.83       eGFR 41         E wave deceleration time   285.76       FS   7       Glucose 112         Hematocrit 27.7         Hemoglobin 9.0         IVRT   114.18       IVSd   1.49       LA WIDTH   4.1       Left Atrium Major Axis   5.56       Left Atrium Minor Axis   5.45       LA size   4.40       LA volume   84.41          111.65       LA vol index   36.1       LA Volume Index (Mod)   47.7       LVOT area   3.0       LV LATERAL E/E' RATIO   23.83       LV SEPTAL E/E' RATIO   23.83       LV EDV BP   108.69       LV Diastolic Volume Index   46.45       LVIDd   4.82       LVIDs   4.49       LV mass   277.00       LV Mass Index   118       Left Ventricular Outflow Tract Mean Gradient   4.12       Left Ventricular Outflow Tract Mean Velocity   0.98       LVOT diameter   1.96       LVOT peak quentin   1.19       LVOT stroke volume   92.88       LVOT peak VTI   30.80       LV ESV BP   91.96       LV Systolic Volume Index   39.3       Magnesium  2.2         MCH 25.2         MCHC 32.5         MCV 78         Mean e'   0.06       MPV 11.3         MV valve area p 1/2 method   2.65       MV Peak A Quentin   1.61       MV Peak E Quentin   1.43       MV stenosis pressure 1/2 time   82.87       Platelet Count 275         Potassium 4.8         PROTEIN TOTAL 7.0         Pulmonary Valve Mean Velocity   0.67       PV mean gradient   2       PV peak gradient   3       PV PEAK VELOCITY   0.86       Posterior Wall   1.32       RA Major Axis   5.69       RBC 3.57         RDW 15.9         RVDD   2.89       RVOT peak  maegan   0.92       RVOT peak VTI   21.9       Sinus   2.23       Sodium 140         STJ   2.42       TDI SEPTAL   0.06       TDI LATERAL   0.06       WBC 15.21         ZLVIDD   -6.92       ZLVIDS   -2.02               Significant Imaging: CT scan: CT ABDOMEN PELVIS WITH CONTRAST: No results found for this visit on 11/27/23. and CT ABDOMEN PELVIS WITHOUT CONTRAST: No results found for this visit on 11/27/23., Echocardiogram: 2D echo with color flow doppler:   Results for orders placed or performed during the hospital encounter of 12/05/17   2D Echo w/ Color Flow Doppler   Result Value Ref Range    EF + QEF 60 55 - 65    Est. PA Systolic Pressure 37.57     Tricuspid Valve Regurgitation MILD     Narrative    Date of Procedure: 12/05/2017        TEST DESCRIPTION   Technical Quality: This is a technically adequate study.     Aorta: The aortic root is normal in size, measuring 2.5 cm at sinotubular junction and 2.9 cm at Sinuses of Valsalva. The proximal ascending aorta is normal in size, measuring 3.3 cm across.     Left Atrium: The left atrial volume index is moderately enlarged, measuring 44.72 cc/m2.     Left Ventricle: The left ventricle is normal in size, with an end-diastolic diameter of 3.6 cm, and an end-systolic diameter of 2.4 cm. LV wall thickness is normal, with the septum and the posterior wall each measuring 0.9 cm across. Relative wall   thickness was increased at 0.50, and the LV mass index was 47.9 g/m2 consistent with concentric remodeling. There are no regional wall motion abnormalities. Left ventricular systolic function appears normal. Visually estimated ejection fraction is   60-65%. The LV Doppler derived stroke volume equals 115.0 ccs.         Right Atrium: The right atrium is normal in size, measuring 4.1 cm in length and 2.5 cm in width in the apical view.     Right Ventricle: The right ventricle is normal in size measuring 3.0 cm at the base in the apical right ventricle-focused view. Global  right ventricular systolic function appears normal. Tricuspid annular plane systolic excursion (TAPSE) is 1.8 cm. The   estimated PA systolic pressure is 38 mmHg.     Aortic Valve:  There is a percutaneously replaced bioprosthesis in the aortic position. The aortic valve prosthesis is well seated. The peak velocity obtained across the aortic valve is 2.66 m/s, which translates to a peak gradient of 28 mmHg. The mean   gradient is 15 mmHg. Using a left ventricular outflow tract diameter of 2.3 cm, a left ventricular outflow tract velocity time integral of 29 cm, and a peak instantaneous transvalvular velocity time integral of 60 cm, the effective prosthetic valve area   is 1.93 cm2(p AVAi is 0.91 cm2/m2).     Mitral Valve:  The mitral valve is mildly sclerotic. There is marked mitral annular calcification.     Tricuspid Valve:  There is mild tricuspid regurgitation. Tricuspid valve is normal in structure with normal leaflet mobility.     Pulmonary Valve:  Pulmonary valve is normal in structure with normal leaflet mobility.     IVC: IVC is normal in size and collapses > 50% with a sniff, suggesting normal right atrial pressure of 3 mmHg.     Atrial Septum: The atrial septum is aneurysmal.     Intracavitary: There is no evidence of pericardial effusion, intracavity mass, thrombi, or vegetation.         CONCLUSIONS     1 - Concentric remodeling.     2 - No wall motion abnormalities.     3 - Normal left ventricular systolic function (EF 60-65%).     4 - Moderate left atrial enlargement.     5 - Normal right ventricular systolic function .     6 - The estimated PA systolic pressure is 38 mmHg.     7 - S/P transcatheter AVR, AMAURI = 1.93 cm2, AVAi = 0.91 cm2/m2, peak velocity = 2.66 m/s, mean gradient = 15 mmHg.     8 - Mild tricuspid regurgitation.     9 - Atrial septal aneurysm .     10 - S/p 23mm Jhonny S3 TF TAVR.             This document has been electronically    SIGNED BY: Leyla Mcgarry MD On: 12/05/2017 11:28     and Transthoracic echo (TTE) complete (Cupid Only):   Results for orders placed or performed during the hospital encounter of 11/27/23   Echo Saline Bubble? No   Result Value Ref Range    BSA 2.4 m2    LVOT stroke volume 92.88 cm3    LVIDd 4.82 3.5 - 6.0 cm    LV Systolic Volume 91.96 mL    LV Systolic Volume Index 39.3 mL/m2    LVIDs 4.49 (A) 2.1 - 4.0 cm    LV Diastolic Volume 108.69 mL    LV Diastolic Volume Index 46.45 mL/m2    IVS 1.49 (A) 0.6 - 1.1 cm    LVOT diameter 1.96 cm    LVOT area 3.0 cm2    FS 7 (A) 28 - 44 %    Left Ventricle Relative Wall Thickness 0.55 cm    Posterior Wall 1.32 (A) 0.6 - 1.1 cm    LV mass 277.00 g    LV Mass Index 118 g/m2    MV Peak E Quentin 1.43 m/s    TDI LATERAL 0.06 m/s    TDI SEPTAL 0.06 m/s    E/E' ratio 23.83 m/s    MV Peak A Quentin 1.61 m/s    E/A ratio 0.89     IVRT 114.18 msec    E wave deceleration time 285.76 msec    LV SEPTAL E/E' RATIO 23.83 m/s    LV LATERAL E/E' RATIO 23.83 m/s    LVOT peak quentin 1.19 m/s    Left Ventricular Outflow Tract Mean Velocity 0.98 cm/s    Left Ventricular Outflow Tract Mean Gradient 4.12 mmHg    RVDD 2.89 cm    RVOT peak VTI 21.9 cm    LA size 4.40 cm    Left Atrium Minor Axis 5.45 cm    Left Atrium Major Axis 5.56 cm    LA volume (mod) 111.65 cm3    LA Volume Index (Mod) 47.7 mL/m2    RA Major Axis 5.69 cm    AV mean gradient 10 mmHg    AV peak gradient 16 mmHg    Ao peak quentin 1.98 m/s    Ao VTI 49.30 cm    LVOT peak VTI 30.80 cm    AV valve area 1.88 cm²    AV Velocity Ratio 0.60     AV index (prosthetic) 0.62     AMAURI by Velocity Ratio 1.81 cm²    MV stenosis pressure 1/2 time 82.87 ms    MV valve area p 1/2 method 2.65 cm2    PV mean gradient 2 mmHg    PV PEAK VELOCITY 0.86 m/s    PV peak gradient 3 mmHg    Pulmonary Valve Mean Velocity 0.67 m/s    RVOT peak quentin 0.92 m/s    Sinus 2.23 cm    STJ 2.42 cm    Ascending aorta 2.67 cm    Mean e' 0.06 m/s    ZLVIDS -2.02     ZLVIDD -6.92     LA Volume Index 36.1 mL/m2    LA volume 84.41 cm3    LA  WIDTH 4.1 cm    Narrative      Left Ventricle: The left ventricle is normal in size. Moderately   increased wall thickness. Normal wall motion. There is normal systolic   function with a visually estimated ejection fraction of 55 - 60%. Grade I   diastolic dysfunction. Elevated left ventricular filling pressure.    Right Ventricle: Moderate right ventricular enlargement. Systolic   function is normal.    Left Atrium: Left atrium is moderately dilated.    Right Atrium: Right atrium is moderately dilated.    Aortic Valve: There is a bioprosthetic valve in the aortic position   that is well-seated.    Mitral Valve: There is moderate bileaflet sclerosis. Moderately   calcified subvalvular apparatus. There is mild regurgitation with a   centrally directed jet.     , and X-Ray: CXR: X-Ray Chest 1 View (CXR):   Results for orders placed or performed during the hospital encounter of 11/27/23   X-Ray Chest 1 View    Narrative    EXAMINATION:  XR CHEST 1 VIEW    CLINICAL HISTORY:  Cough, unspecified    TECHNIQUE:  Single frontal view of the chest was performed.    COMPARISON:  03/23/2021.    FINDINGS:  Pulmonary hypoinflation crowding of the bronchopulmonary vessels.  Patient also rotated to the LPO position.  This limits evaluation.    There is a diffuse prominence of the pulmonary interstitium suspicious for interstitial edema.  There is a small left pleural effusion with atelectasis versus infiltrate within the left lower lobe.  No significant right pleural effusion.  Heart size is enlarged.  Trachea midline.    Bony thorax intact with demineralization.      Impression    1. Limited evaluation secondary to technique.  2. Findings suspicious for congestive heart failure.  3. Small left pleural effusion with atelectasis versus infiltrate of the left lower lobe.  Correlate clinically with possible fever and/or elevated white count.  As deemed clinically necessary, further evaluation may be obtained with dedicated PA and  lateral views of the chest.  Close interval follow-up is recommended.    This report was flagged in Epic as abnormal.      Electronically signed by: Richard Cisse  Date:    11/27/2023  Time:    16:43    and X-Ray Chest PA and Lateral (CXR): No results found for this visit on 11/27/23. and KUB: X-Ray Abdomen AP 1 View (KUB): No results found for this visit on 11/27/23.  Assessment and Plan:         Active Diagnoses:    Diagnosis Date Noted POA    PRINCIPAL PROBLEM:  Sepsis due to Streptococcus pneumoniae with acute hypoxic respiratory failure [A40.3, R65.20, J96.01] 11/28/2023 Yes    Acute respiratory failure with hypoxia [J96.01] 11/28/2023 Yes    Acute on chronic heart failure [I50.9] 11/28/2023 Yes    Advanced care planning/counseling discussion [Z71.89] 11/28/2023 Not Applicable    ASCVD (arteriosclerotic cardiovascular disease) [I25.10] 03/11/2021 Yes    Hyperlipidemia LDL goal <70 [E78.5] 09/13/2018 Yes     Chronic    Acute kidney injury superimposed on CKD [N17.9, N18.9] 10/07/2015 Yes    Insomnia [G47.00] 09/17/2012 Yes      Problems Resolved During this Admission:       VTE Risk Mitigation (From admission, onward)           Ordered     IP VTE HIGH RISK PATIENT  Once         11/27/23 1906     Place sequential compression device  Until discontinued         11/27/23 1906                  Assessment and Plan:     Chronic diastolic heart failure stable  -repeat echo shows a LVEF 55%, elevated filling pressure (11/23)  --received one dose of lasix IV 20mg in Er, now on furosemide 40mg po qday  Start Farxica or Jardiance 10 mg qd     Aortic Stenosis s/p TAVR (12/2016)' prosthesis intact (11/23)     Leukocytosis  -IV antibiotics     Mild nonobstructive CAD:  -[facility] 2016  ECASA as tolerated     CKD  -BUN/Creat 39/1.3 with GFR 41; slighly more dry than yesterday     Hypertension  -amlodipine 10mg daily  -metoprolol succinate 100mg po BID  low dose ARB Valsartan as tolerated     Dyslipidemia  -Atorvastatin 10mg HS      Anemia  -per PCP  No evidence of bleeding     Elevated d-dimer/ unable to do CT so far due to lack of adequate iv access  -per PCP; check leg us for DVT    Transcribed by  Hossein Mathews NP for Dr PATRICIO Laboy  Cardiology  [place] [facility] (3rd Fl)  I attest that I have personally seen and examined this patient. I have reviewed and discussed the management in detail as outlined above.

## 2023-11-30 NOTE — ASSESSMENT & PLAN NOTE
Patient with tachycardia, leukocytosis and left lower lobe consolidation on CXR at admission  IV rocephin and IV azithromycin   Mucinex for supportive care     Deferring IV fluids at this time in the setting of pulmonary edema & LOWELL.  LOWELL now resolving.  Possibly due to sepsis.  Blood cultures pending/ Urinalysis pending.    11/30 Leukocytosis chronic.  Will wean off oxygen.  IV azithromycin and IV rocephin

## 2023-11-30 NOTE — PLAN OF CARE
Problem: Adult Inpatient Plan of Care  Goal: Plan of Care Review  Outcome: Ongoing, Progressing  Goal: Patient-Specific Goal (Individualized)  Outcome: Ongoing, Progressing  Goal: Absence of Hospital-Acquired Illness or Injury  Outcome: Ongoing, Progressing  Goal: Optimal Comfort and Wellbeing  Outcome: Ongoing, Progressing  Goal: Readiness for Transition of Care  Outcome: Ongoing, Progressing     Problem: Skin Injury Risk Increased  Goal: Skin Health and Integrity  Outcome: Ongoing, Progressing     Problem: Fall Injury Risk  Goal: Absence of Fall and Fall-Related Injury  Outcome: Ongoing, Progressing     Problem: Fluid and Electrolyte Imbalance (Acute Kidney Injury/Impairment)  Goal: Fluid and Electrolyte Balance  Outcome: Ongoing, Progressing     Problem: Oral Intake Inadequate (Acute Kidney Injury/Impairment)  Goal: Optimal Nutrition Intake  Outcome: Ongoing, Progressing     Problem: Renal Function Impairment (Acute Kidney Injury/Impairment)  Goal: Effective Renal Function  Outcome: Ongoing, Progressing     Problem: Gas Exchange Impaired  Goal: Optimal Gas Exchange  Outcome: Ongoing, Progressing

## 2023-11-30 NOTE — ASSESSMENT & PLAN NOTE
+ hypoxia, mild lower extremity edema   BNP elevated at 203  CXR with pulmonary edema and left pleural effusion  CIS consulted   IV lasix 20mg x 1.  Patient states not taking her lasix.  Will be cautious with diuresis in the setting or aortic valve stenosis.    Continue home metoprolol     Repeat echo EF 55% and grade I diastolic dysfunction.  Lasix 40mg qd

## 2023-11-30 NOTE — PT/OT/SLP PROGRESS
Occupational Therapy      Patient Name:  Emily Alicia   MRN:  [id number]    Patient not seen today secondary to Other (Comment) Pt receiving breathing treatment.  Will follow-up when appropriate.    11/30/2023

## 2023-11-30 NOTE — SUBJECTIVE & OBJECTIVE
Past Medical History:   Diagnosis Date    Anemia     Anxiety     Aortic valve stenosis     CHF (congestive heart failure)     Chronic kidney disease (CKD)     Diastolic heart failure     Dyslipidemia     Encounter for blood transfusion     Glaucoma (increased eye pressure)     LEFT EYE    HHD (hypertensive heart disease)     Hypertension     Osteoarthritis     Retina disorder, bilateral     Severe aortic stenosis 11/9/2016    TR (tricuspid regurgitation)     UTI (urinary tract infection)        Past Surgical History:   Procedure Laterality Date    CARDIAC CATHETERIZATION      CARDIAC VALVE SURGERY      CATARACT EXTRACTION W/ INTRAOCULAR LENS  IMPLANT, BILATERAL      CHOLECYSTECTOMY      COLONOSCOPY N/A 2/7/2021    Procedure: COLONOSCOPY;  Surgeon: Gaston Pablo MD;  Location: [facility] ENDO;  Service: Endoscopy;  Laterality: N/A;    FLEXIBLE SIGMOIDOSCOPY N/A 2/4/2021    Procedure: SIGMOIDOSCOPY, FLEXIBLE;  Surgeon: Jarred Forrester MD;  Location: [facility] ENDO;  Service: Gastroenterology;  Laterality: N/A;    HYSTERECTOMY      JOINT REPLACEMENT      right knee    JOINT REPLACEMENT      left knee    KNEE SURGERY Left     SPINE SURGERY      lumbar    TRANSCATHETER AORTIC VALVE REPLACEMENT         Review of patient's allergies indicates:  No Known Allergies    No current facility-administered medications on file prior to encounter.     Current Outpatient Medications on File Prior to Encounter   Medication Sig    allopurinoL (ZYLOPRIM) 100 MG tablet TAKE 1 TABLET EVERY DAY (Patient taking differently: Take 100 mg by mouth every evening.)    amLODIPine (NORVASC) 10 MG tablet TAKE 1 TABLET EVERY DAY (Patient taking differently: Take 10 mg by mouth once daily.)    ascorbic acid, vitamin C, (VITAMIN C) 250 MG tablet Take 1 tablet (250 mg total) by mouth once daily.    aspirin (ASPIRIN LOW DOSE) 81 MG EC tablet Take 1 tablet (81 mg total) by mouth nightly.    atorvastatin (LIPITOR) 10 MG tablet TAKE 1 TABLET EVERY  EVENING (Patient taking differently: Take 10 mg by mouth every evening.)    collagenase (SANTYL) ointment Apply topically once daily.    furosemide (LASIX) 40 MG tablet Take 1 tablet (40 mg total) by mouth once daily.    gabapentin (NEURONTIN) 300 MG capsule TAKE 1 CAPSULE TWICE DAILY (Patient taking differently: Take 300 mg by mouth 2 (two) times daily.)    HYDROcodone-acetaminophen (NORCO)  mg per tablet Take 1 tablet by mouth every 6 (six) hours as needed for Pain.    losartan (COZAAR) 100 MG tablet Take 100 mg by mouth once daily.    metoprolol succinate (TOPROL-XL) 100 MG 24 hr tablet Take 100 mg by mouth 2 (two) times daily.    naproxen (NAPROSYN) 500 MG tablet Take 500 mg by mouth 2 (two) times daily with meals.    potassium chloride SA (K-DUR,KLOR-CON) 20 MEQ tablet Take 20 mEq by mouth once daily.    travoprost (TRAVATAN Z) 0.004 % ophthalmic solution Place 1 drop into both eyes every evening.    traZODone (DESYREL) 50 MG tablet TAKE 1 TABLET EVERY NIGHT AS NEEDED FOR INSOMNIA    vitamin D (VITAMIN D3) 1000 units Tab Take 1,000 Units by mouth once daily.    oxybutynin (DITROPAN-XL) 5 MG TR24 TAKE 1 TABLET EVERY DAY     Family History       Problem Relation (Age of Onset)    Diabetes Mother    Heart disease Father    Stroke Mother          Tobacco Use    Smoking status: Former     Current packs/day: 0.00     Types: Cigarettes     Quit date: 1982     Years since quittin.2    Smokeless tobacco: Never   Substance and Sexual Activity    Alcohol use: No    Drug use: No    Sexual activity: Never     Partners: Male     Review of Systems   Constitutional:  Positive for fatigue. Negative for chills and fever.   HENT:  Negative for congestion and drooling.    Eyes:  Negative for visual disturbance.   Respiratory:  Positive for cough and shortness of breath.    Cardiovascular:  Positive for leg swelling. Negative for chest pain.   Gastrointestinal:  Negative for abdominal distention, abdominal pain,  "nausea and vomiting.   Genitourinary:  Negative for difficulty urinating and dysuria.   Musculoskeletal:  Positive for arthralgias, back pain and gait problem.   Skin:  Positive for wound (on her back).   Neurological:  Positive for weakness (generalized). Negative for dizziness and headaches.   Psychiatric/Behavioral:  Negative for agitation and decreased concentration.      Objective:     Vital Signs (Most Recent):  Temp: 97.5 °F (36.4 °C) (11/30/23 1127)  Pulse: 80 (11/30/23 1329)  Resp: 16 (11/30/23 1329)  BP: (!) 154/71 (11/30/23 1127)  SpO2: 97 % (11/30/23 1329) Vital Signs (24h Range):  Temp:  [97.5 °F (36.4 °C)-98.9 °F (37.2 °C)] 97.5 °F (36.4 °C)  Pulse:  [54-86] 80  Resp:  [16-20] 16  SpO2:  [95 %-100 %] 97 %  BP: (104-155)/(55-76) 154/71     Weight: 121.2 kg (267 lb 3.2 oz)  Body mass index is 39.46 kg/m².     Physical Exam  Constitutional:       General: She is not in acute distress.  HENT:      Head: Normocephalic and atraumatic.   Eyes:      General:         Right eye: No discharge.         Left eye: No discharge.   Cardiovascular:      Rate and Rhythm: Normal rate and regular rhythm.   Pulmonary:      Effort: Pulmonary effort is normal.      Comments: Improvement in aeration of breath sounds   Abdominal:      General: There is no distension.      Tenderness: There is no abdominal tenderness.   Musculoskeletal:         General: No swelling or tenderness.      Cervical back: Neck supple. No tenderness.      Right lower leg: Edema (+2) present.      Left lower leg: Edema (+2) present.      Comments: Decreased ROM left arm.   Skin:     General: Skin is warm and dry.      Comments: Stage 1 sacral ulcer    Neurological:      General: No focal deficit present.      Mental Status: She is alert and oriented to person, place, and time.   Psychiatric:         Mood and Affect: Mood normal.         Behavior: Behavior normal.                Significant Labs: A1C: No results for input(s): "HGBA1C" in the last 4320 " "hours.  ABGs: No results for input(s): "PH", "PCO2", "HCO3", "POCSATURATED", "BE", "TOTALHB", "COHB", "METHB", "O2HB", "POCFIO2", "PO2" in the last 48 hours.  Bilirubin:   Recent Labs   Lab 11/27/23  1449 11/28/23  1033 11/29/23  0536 11/30/23  0528   BILITOT 0.4 0.4 0.2 0.2       Blood Culture:   No results for input(s): "LABBLOO" in the last 48 hours.    BMP:   Recent Labs   Lab 11/30/23  0528   *      K 4.8      CO2 22*   BUN 35*   CREATININE 1.3   CALCIUM 9.1   MG 2.2       CBC:   Recent Labs   Lab 11/29/23 0536 11/30/23  0528   WBC 15.88* 15.21*   HGB 8.3* 9.0*   HCT 25.7* 27.7*    275       CMP:   Recent Labs   Lab 11/29/23  0536 11/30/23  0528    140   K 4.5 4.8    108   CO2 21* 22*   * 112*   BUN 39* 35*   CREATININE 1.3 1.3   CALCIUM 8.8 9.1   PROT 6.3 7.0   ALBUMIN 2.1* 2.4*   BILITOT 0.2 0.2   ALKPHOS 169* 171*   AST 12 13   ALT 30 27   ANIONGAP 9 10       Cardiac Markers:   No results for input(s): "CKMB", "MYOGLOBIN", "BNP", "TROPISTAT" in the last 48 hours.    Coagulation: No results for input(s): "PT", "INR", "APTT" in the last 48 hours.  Lactic Acid:   No results for input(s): "LACTATE" in the last 48 hours.    Lipase: No results for input(s): "LIPASE" in the last 48 hours.  Lipid Panel: No results for input(s): "CHOL", "HDL", "LDLCALC", "TRIG", "CHOLHDL" in the last 48 hours.  Magnesium:   Recent Labs   Lab 11/29/23  0536 11/30/23  0528   MG 1.7 2.2       POCT Glucose: No results for input(s): "POCTGLUCOSE" in the last 48 hours.  Prealbumin: No results for input(s): "PREALBUMIN" in the last 48 hours.  Respiratory Culture: No results for input(s): "GSRESP", "RESPIRATORYC" in the last 48 hours.  Troponin:   No results for input(s): "TROPONINI", "TROPONINIHS" in the last 48 hours.    TSH: No results for input(s): "TSH" in the last 4320 hours.  Urine Culture: No results for input(s): "LABURIN" in the last 48 hours.  Urine Studies: No results for input(s): " ""COLORU", "APPEARANCEUA", "PHUR", "SPECGRAV", "PROTEINUA", "GLUCUA", "KETONESU", "BILIRUBINUA", "OCCULTUA", "NITRITE", "UROBILINOGEN", "LEUKOCYTESUR", "RBCUA", "WBCUA", "BACTERIA", "SQUAMEPITHEL", "HYALINECASTS" in the last 48 hours.    Invalid input(s): "WRIGHTSUR"    Significant Imaging: I have reviewed all pertinent imaging results/findings within the past 24 hours.  "

## 2023-11-30 NOTE — PLAN OF CARE
Problem: Gas Exchange Impaired  Goal: Optimal Gas Exchange  Outcome: Ongoing, Progressing     Problem: Fall Injury Risk  Goal: Absence of Fall and Fall-Related Injury  Outcome: Ongoing, Progressing     Problem: Skin Injury Risk Increased  Goal: Skin Health and Integrity  Outcome: Ongoing, Progressing

## 2023-11-30 NOTE — ASSESSMENT & PLAN NOTE
Patient with Hypoxic Respiratory failure which is Acute.  she is not on home oxygen. Supplemental oxygen was provided and noted-      .   Signs/symptoms of respiratory failure include- lethargy. Contributing diagnoses includes - CHF and Pneumonia Labs and images were reviewed. Patient Has not had a recent ABG. Will treat underlying causes and adjust management of respiratory failure as follows- Pulmonary edema with possible developing pneumonia     11/30 Wean oxygen today and plan for discharge tomorrow.

## 2023-11-30 NOTE — PLAN OF CARE
11/30/23 1029   Rounds   Attendance Provider;Nurse ;   Discharge Plan A Home Health   Why the patient remains in the hospital Requires continued medical care   Transition of Care Barriers None         CM spoke with patient this morning, and she is is now NOT wanting SNF care. She would like to return home with home health. CM did educate patient that home health would only go 1-2 times a week, and patient states she is ok with that.   Patient inquired about sitter care at home. She does not have medicaid therefore she would not qualify for waiver services. She states she does not qualify for medicaid because her income is too much.  CM offered to give resources for sitter care, but patient states she can not afford it.   Patient currently gets meals on wheels through [facility]. CM encouraged patient to call [facility] [employer] [facility] to see if she would qualify for sitter care through them. She verbalized understanding.   CM to remain available.

## 2023-11-30 NOTE — PT/OT/SLP PROGRESS
"Physical Therapy Treatment    Patient Name:  Emily Alicia   MRN:  [id number]    Recommendations:     Discharge Recommendations: Moderate Intensity Therapy  Discharge Equipment Recommendations: none  Barriers to discharge: Decreased caregiver support    Assessment:     Emily Alicia is a 83 y.o. female admitted with a medical diagnosis of Sepsis due to Streptococcus pneumoniae with acute hypoxic respiratory failure.  She presents with the following impairments/functional limitations: weakness, impaired endurance, impaired self care skills, impaired functional mobility, impaired balance, decreased upper extremity function, decreased lower extremity function, decreased safety awareness, edema, impaired skin. Patient tolerated B LE ROM ex , bed mobility/repositioning and static sit at edge of the bed with dependent assistance. No sign of respiratory distress.    Rehab Prognosis: Fair; patient would benefit from acute skilled PT services to address these deficits and reach maximum level of function.    Recent Surgery: * No surgery found *      Plan:     During this hospitalization, patient to be seen 5 x/week to address the identified rehab impairments via therapeutic activities, therapeutic exercises and progress toward the following goals:    Plan of Care Expires:  12/06/23    Subjective     Chief Complaint: none  Patient/Family Comments/goals: "to return home"  Pain/Comfort:  Pain Rating 1: 0/10      Objective:     Communicated with patient prior to session.  Patient found  half sidelying with a pillow  with PureWick, telemetry, oxygen upon PT entry to room.     General Precautions: Standard, fall  Orthopedic Precautions: N/A  Braces: N/A  Respiratory Status: Nasal cannula, flow 2 L/min     Functional Mobility:  Bed Mobility:     Rolling Left:  dependence  Rolling Right: dependence  Scooting: dependence  Supine to Sit: dependence  Sit to Supine: dependence  Balance: Static sit at edge of the bed x ~2 minutes tolerance with " constant max/dependentA to support  balance      AM-PAC 6 CLICK MOBILITY  Turning over in bed (including adjusting bedclothes, sheets and blankets)?: 2  Sitting down on and standing up from a chair with arms (e.g., wheelchair, bedside commode, etc.): 1  Moving from lying on back to sitting on the side of the bed?: 2  Moving to and from a bed to a chair (including a wheelchair)?: 1  Need to walk in hospital room?: 1  Climbing 3-5 steps with a railing?: 1  Basic Mobility Total Score: 8       Treatment & Education:  Provided PROM - AAROM ex on B LE x 1o reps on each possible plane at supine, rolling to sides x 5 with dependent assistance using bed rail; Static Sit balance and tolerance ex at edge of the bed, bed repositioning    Patient left  right half side lying with pillows  with all lines intact, call button in reach, and nurse notified..    GOALS:   Multidisciplinary Problems       Physical Therapy Goals          Problem: Physical Therapy    Goal Priority Disciplines Outcome Goal Variances Interventions   Physical Therapy Goal     PT, PT/OT      Description:   Patient will increase functional independence with mobility by performin. Able to perform and tolerate ROM ex on B LE x 10 reps on each plane at supine position to prevent joint stiffness/contractures, ease mobility/body repositioning and prevent to further decline in functions.  2. Increase rolling to sides with max assistance and cues  3. Supine to sit with Maximum Assistance and cues  4. Able to perform and tolerate static sit at edge of the bed x   ~5 minutes with maximal  assistance and cues.                            Time Tracking:     PT Received On: 23  PT Start Time: 940     PT Stop Time: 958  PT Total Time (min): 18 min     Billable Minutes: Therapeutic Activity 18    Treatment Type: Treatment  PT/PTA: PT           2023

## 2023-11-30 NOTE — NURSING
Patient positioned on Right side checked for fecal impaction. Small amount of hard stool digitally removed from rectum. Stool remains noted out of reach to be manual removed. Adult brief changed. Wound care to sacrum. Replaced mepilex. Patient tolerated well. Repositioned to Left side with pillow. Call bell within reach of patient.

## 2023-12-01 VITALS
HEART RATE: 78 BPM | BODY MASS INDEX: 39.58 KG/M2 | HEIGHT: 69 IN | SYSTOLIC BLOOD PRESSURE: 156 MMHG | WEIGHT: 267.19 LBS | OXYGEN SATURATION: 96 % | TEMPERATURE: 97 F | DIASTOLIC BLOOD PRESSURE: 74 MMHG | RESPIRATION RATE: 16 BRPM

## 2023-12-01 PROCEDURE — 99239 PR HOSPITAL DISCHARGE DAY,>30 MIN: ICD-10-PCS | Mod: ,,, | Performed by: PHYSICIAN ASSISTANT

## 2023-12-01 PROCEDURE — 25000003 PHARM REV CODE 250: Performed by: PHYSICIAN ASSISTANT

## 2023-12-01 PROCEDURE — 99239 HOSP IP/OBS DSCHRG MGMT >30: CPT | Mod: ,,, | Performed by: PHYSICIAN ASSISTANT

## 2023-12-01 PROCEDURE — 94640 AIRWAY INHALATION TREATMENT: CPT

## 2023-12-01 PROCEDURE — 1111F PR DISCHARGE MEDS RECONCILED W/ CURRENT OUTPATIENT MED LIST: ICD-10-PCS | Mod: CPTII,,, | Performed by: PHYSICIAN ASSISTANT

## 2023-12-01 PROCEDURE — 1111F DSCHRG MED/CURRENT MED MERGE: CPT | Mod: CPTII,,, | Performed by: PHYSICIAN ASSISTANT

## 2023-12-01 PROCEDURE — 25000003 PHARM REV CODE 250: Performed by: INTERNAL MEDICINE

## 2023-12-01 PROCEDURE — 25000003 PHARM REV CODE 250

## 2023-12-01 PROCEDURE — 25000242 PHARM REV CODE 250 ALT 637 W/ HCPCS: Performed by: PHYSICIAN ASSISTANT

## 2023-12-01 PROCEDURE — 63700000 PHARM REV CODE 250 ALT 637 W/O HCPCS: Performed by: PHYSICIAN ASSISTANT

## 2023-12-01 PROCEDURE — 97530 THERAPEUTIC ACTIVITIES: CPT

## 2023-12-01 PROCEDURE — 94799 UNLISTED PULMONARY SVC/PX: CPT | Mod: XB

## 2023-12-01 PROCEDURE — 25000003 PHARM REV CODE 250: Performed by: NURSE PRACTITIONER

## 2023-12-01 PROCEDURE — 63600175 PHARM REV CODE 636 W HCPCS: Performed by: PHYSICIAN ASSISTANT

## 2023-12-01 PROCEDURE — 27000221 HC OXYGEN, UP TO 24 HOURS

## 2023-12-01 RX ORDER — AMLODIPINE BESYLATE 5 MG/1
5 TABLET ORAL DAILY
Qty: 30 TABLET | Refills: 0 | Status: SHIPPED | OUTPATIENT
Start: 2023-12-02 | End: 2024-12-01

## 2023-12-01 RX ORDER — FUROSEMIDE 20 MG/1
40 TABLET ORAL DAILY
Qty: 30 TABLET | Refills: 0 | Status: SHIPPED | OUTPATIENT
Start: 2023-12-01 | End: 2024-11-30

## 2023-12-01 RX ORDER — VALSARTAN 40 MG/1
40 TABLET ORAL 2 TIMES DAILY
Qty: 60 TABLET | Refills: 0 | Status: SHIPPED | OUTPATIENT
Start: 2023-12-01 | End: 2024-11-30

## 2023-12-01 RX ORDER — DOCUSATE SODIUM 100 MG/1
100 CAPSULE, LIQUID FILLED ORAL DAILY
Qty: 30 CAPSULE | Refills: 0 | Status: SHIPPED | OUTPATIENT
Start: 2023-12-02

## 2023-12-01 RX ORDER — HYDROCODONE BITARTRATE AND ACETAMINOPHEN 10; 325 MG/1; MG/1
1 TABLET ORAL
Qty: 18 TABLET | Refills: 0
Start: 2023-12-01

## 2023-12-01 RX ORDER — TALC
3 POWDER (GRAM) TOPICAL NIGHTLY PRN
Qty: 15 TABLET | Refills: 0 | Status: SHIPPED | OUTPATIENT
Start: 2023-12-01

## 2023-12-01 RX ORDER — AZITHROMYCIN 250 MG/1
250 TABLET, FILM COATED ORAL ONCE
Status: COMPLETED | OUTPATIENT
Start: 2023-12-01 | End: 2023-12-01

## 2023-12-01 RX ORDER — DAPAGLIFLOZIN 10 MG/1
10 TABLET, FILM COATED ORAL DAILY
Qty: 30 TABLET | Refills: 0 | Status: SHIPPED | OUTPATIENT
Start: 2023-12-02

## 2023-12-01 RX ORDER — GUAIFENESIN 600 MG/1
600 TABLET, EXTENDED RELEASE ORAL 2 TIMES DAILY PRN
Qty: 10 TABLET | Refills: 0 | Status: SHIPPED | OUTPATIENT
Start: 2023-12-01 | End: 2023-12-06

## 2023-12-01 RX ADMIN — DOCUSATE SODIUM 100 MG: 100 CAPSULE, LIQUID FILLED ORAL at 09:12

## 2023-12-01 RX ADMIN — VALSARTAN 40 MG: 40 TABLET, FILM COATED ORAL at 08:12

## 2023-12-01 RX ADMIN — DAPAGLIFLOZIN 10 MG: 10 TABLET, FILM COATED ORAL at 09:12

## 2023-12-01 RX ADMIN — COLLAGENASE SANTYL: 250 OINTMENT TOPICAL at 09:12

## 2023-12-01 RX ADMIN — GABAPENTIN 200 MG: 100 CAPSULE ORAL at 08:12

## 2023-12-01 RX ADMIN — Medication 1000 UNITS: at 08:12

## 2023-12-01 RX ADMIN — IPRATROPIUM BROMIDE AND ALBUTEROL SULFATE 3 ML: 2.5; .5 SOLUTION RESPIRATORY (INHALATION) at 07:12

## 2023-12-01 RX ADMIN — GUAIFENESIN 600 MG: 600 TABLET, EXTENDED RELEASE ORAL at 08:12

## 2023-12-01 RX ADMIN — CEFTRIAXONE SODIUM 1 G: 1 INJECTION, POWDER, FOR SOLUTION INTRAMUSCULAR; INTRAVENOUS at 11:12

## 2023-12-01 RX ADMIN — FUROSEMIDE 40 MG: 40 TABLET ORAL at 08:12

## 2023-12-01 RX ADMIN — AMLODIPINE BESYLATE 5 MG: 5 TABLET ORAL at 08:12

## 2023-12-01 RX ADMIN — METOPROLOL SUCCINATE 100 MG: 50 TABLET, EXTENDED RELEASE ORAL at 08:12

## 2023-12-01 RX ADMIN — AZITHROMYCIN MONOHYDRATE 250 MG: 250 TABLET ORAL at 12:12

## 2023-12-01 NOTE — SUBJECTIVE & OBJECTIVE
Past Medical History:   Diagnosis Date    Anemia     Anxiety     Aortic valve stenosis     CHF (congestive heart failure)     Chronic kidney disease (CKD)     Diastolic heart failure     Dyslipidemia     Encounter for blood transfusion     Glaucoma (increased eye pressure)     LEFT EYE    HHD (hypertensive heart disease)     Hypertension     Osteoarthritis     Retina disorder, bilateral     Severe aortic stenosis 11/9/2016    TR (tricuspid regurgitation)     UTI (urinary tract infection)        Past Surgical History:   Procedure Laterality Date    CARDIAC CATHETERIZATION      CARDIAC VALVE SURGERY      CATARACT EXTRACTION W/ INTRAOCULAR LENS  IMPLANT, BILATERAL      CHOLECYSTECTOMY      COLONOSCOPY N/A 2/7/2021    Procedure: COLONOSCOPY;  Surgeon: Gaston Pablo MD;  Location: [facility] ENDO;  Service: Endoscopy;  Laterality: N/A;    FLEXIBLE SIGMOIDOSCOPY N/A 2/4/2021    Procedure: SIGMOIDOSCOPY, FLEXIBLE;  Surgeon: Jarred Forrester MD;  Location: [facility] ENDO;  Service: Gastroenterology;  Laterality: N/A;    HYSTERECTOMY      JOINT REPLACEMENT      right knee    JOINT REPLACEMENT      left knee    KNEE SURGERY Left     SPINE SURGERY      lumbar    TRANSCATHETER AORTIC VALVE REPLACEMENT         Review of patient's allergies indicates:  No Known Allergies    No current facility-administered medications on file prior to encounter.     Current Outpatient Medications on File Prior to Encounter   Medication Sig    allopurinoL (ZYLOPRIM) 100 MG tablet TAKE 1 TABLET EVERY DAY (Patient taking differently: Take 100 mg by mouth every evening.)    amLODIPine (NORVASC) 10 MG tablet TAKE 1 TABLET EVERY DAY (Patient taking differently: Take 10 mg by mouth once daily.)    ascorbic acid, vitamin C, (VITAMIN C) 250 MG tablet Take 1 tablet (250 mg total) by mouth once daily.    aspirin (ASPIRIN LOW DOSE) 81 MG EC tablet Take 1 tablet (81 mg total) by mouth nightly.    atorvastatin (LIPITOR) 10 MG tablet TAKE 1 TABLET EVERY  EVENING (Patient taking differently: Take 10 mg by mouth every evening.)    collagenase (SANTYL) ointment Apply topically once daily.    furosemide (LASIX) 40 MG tablet Take 1 tablet (40 mg total) by mouth once daily.    gabapentin (NEURONTIN) 300 MG capsule TAKE 1 CAPSULE TWICE DAILY (Patient taking differently: Take 300 mg by mouth 2 (two) times daily.)    HYDROcodone-acetaminophen (NORCO)  mg per tablet Take 1 tablet by mouth every 6 (six) hours as needed for Pain.    losartan (COZAAR) 100 MG tablet Take 100 mg by mouth once daily.    metoprolol succinate (TOPROL-XL) 100 MG 24 hr tablet Take 100 mg by mouth 2 (two) times daily.    naproxen (NAPROSYN) 500 MG tablet Take 500 mg by mouth 2 (two) times daily with meals.    potassium chloride SA (K-DUR,KLOR-CON) 20 MEQ tablet Take 20 mEq by mouth once daily.    travoprost (TRAVATAN Z) 0.004 % ophthalmic solution Place 1 drop into both eyes every evening.    traZODone (DESYREL) 50 MG tablet TAKE 1 TABLET EVERY NIGHT AS NEEDED FOR INSOMNIA    vitamin D (VITAMIN D3) 1000 units Tab Take 1,000 Units by mouth once daily.    oxybutynin (DITROPAN-XL) 5 MG TR24 TAKE 1 TABLET EVERY DAY     Family History       Problem Relation (Age of Onset)    Diabetes Mother    Heart disease Father    Stroke Mother          Tobacco Use    Smoking status: Former     Current packs/day: 0.00     Types: Cigarettes     Quit date: 1982     Years since quittin.2    Smokeless tobacco: Never   Substance and Sexual Activity    Alcohol use: No    Drug use: No    Sexual activity: Never     Partners: Male     Review of Systems   Constitutional:  Positive for fatigue (improving). Negative for chills and fever.   HENT:  Negative for congestion and drooling.    Eyes:  Negative for visual disturbance.   Respiratory:  Positive for cough. Negative for shortness of breath.    Cardiovascular:  Positive for leg swelling. Negative for chest pain.   Gastrointestinal:  Negative for abdominal  distention, abdominal pain, nausea and vomiting.   Genitourinary:  Negative for difficulty urinating and dysuria.   Musculoskeletal:  Positive for arthralgias, back pain and gait problem.   Skin:  Positive for wound (on her back).   Neurological:  Positive for weakness (generalized). Negative for dizziness and headaches.   Psychiatric/Behavioral:  Negative for agitation and decreased concentration.      Objective:     Vital Signs (Most Recent):  Temp: 97.6 °F (36.4 °C) (12/01/23 0751)  Pulse: 73 (12/01/23 1001)  Resp: 16 (12/01/23 0751)  BP: (!) 154/72 (12/01/23 0859)  SpO2: (!) 92 % (12/01/23 0753) Vital Signs (24h Range):  Temp:  [97.5 °F (36.4 °C)-99.2 °F (37.3 °C)] 97.6 °F (36.4 °C)  Pulse:  [62-88] 73  Resp:  [16-20] 16  SpO2:  [92 %-98 %] 92 %  BP: (123-160)/(59-73) 154/72     Weight: 121.2 kg (267 lb 3.2 oz)  Body mass index is 39.46 kg/m².     Physical Exam  Constitutional:       General: She is not in acute distress.  HENT:      Head: Normocephalic and atraumatic.   Eyes:      General:         Right eye: No discharge.         Left eye: No discharge.   Cardiovascular:      Rate and Rhythm: Normal rate and regular rhythm.   Pulmonary:      Effort: Pulmonary effort is normal.      Comments: Improvement in aeration of breath sounds   Abdominal:      General: There is no distension.      Tenderness: There is no abdominal tenderness.   Musculoskeletal:         General: No swelling or tenderness.      Cervical back: Neck supple. No tenderness.      Right lower leg: Edema (+2) present.      Left lower leg: Edema (+2) present.      Comments: Decreased ROM left arm.   Skin:     General: Skin is warm and dry.      Comments: Stage 1 sacral ulcer    Neurological:      General: No focal deficit present.      Mental Status: She is alert and oriented to person, place, and time.   Psychiatric:         Mood and Affect: Mood normal.         Behavior: Behavior normal.                Significant Labs: A1C: No results for  "input(s): "HGBA1C" in the last 4320 hours.  ABGs: No results for input(s): "PH", "PCO2", "HCO3", "POCSATURATED", "BE", "TOTALHB", "COHB", "METHB", "O2HB", "POCFIO2", "PO2" in the last 48 hours.  Bilirubin:   Recent Labs   Lab 11/27/23  1449 11/28/23  1033 11/29/23  0536 11/30/23  0528   BILITOT 0.4 0.4 0.2 0.2       Blood Culture:   No results for input(s): "LABBLOO" in the last 48 hours.    BMP:   Recent Labs   Lab 11/30/23 0528   *      K 4.8      CO2 22*   BUN 35*   CREATININE 1.3   CALCIUM 9.1   MG 2.2       CBC:   Recent Labs   Lab 11/30/23 0528   WBC 15.21*   HGB 9.0*   HCT 27.7*          CMP:   Recent Labs   Lab 11/30/23 0528      K 4.8      CO2 22*   *   BUN 35*   CREATININE 1.3   CALCIUM 9.1   PROT 7.0   ALBUMIN 2.4*   BILITOT 0.2   ALKPHOS 171*   AST 13   ALT 27   ANIONGAP 10       Cardiac Markers:   No results for input(s): "CKMB", "MYOGLOBIN", "BNP", "TROPISTAT" in the last 48 hours.    Coagulation: No results for input(s): "PT", "INR", "APTT" in the last 48 hours.  Lactic Acid:   No results for input(s): "LACTATE" in the last 48 hours.    Lipase: No results for input(s): "LIPASE" in the last 48 hours.  Lipid Panel: No results for input(s): "CHOL", "HDL", "LDLCALC", "TRIG", "CHOLHDL" in the last 48 hours.  Magnesium:   Recent Labs   Lab 11/30/23  0528   MG 2.2       POCT Glucose: No results for input(s): "POCTGLUCOSE" in the last 48 hours.  Prealbumin: No results for input(s): "PREALBUMIN" in the last 48 hours.  Respiratory Culture: No results for input(s): "GSRESP", "RESPIRATORYC" in the last 48 hours.  Troponin:   No results for input(s): "TROPONINI", "TROPONINIHS" in the last 48 hours.    TSH: No results for input(s): "TSH" in the last 4320 hours.  Urine Culture: No results for input(s): "LABURIN" in the last 48 hours.  Urine Studies: No results for input(s): "COLORU", "APPEARANCEUA", "PHUR", "SPECGRAV", "PROTEINUA", "GLUCUA", "KETONESU", "BILIRUBINUA", " ""OCCULTUA", "NITRITE", "UROBILINOGEN", "LEUKOCYTESUR", "RBCUA", "WBCUA", "BACTERIA", "SQUAMEPITHEL", "HYALINECASTS" in the last 48 hours.    Invalid input(s): "WRIGHTSUR"    Significant Imaging: I have reviewed all pertinent imaging results/findings within the past 24 hours.  "

## 2023-12-01 NOTE — PLAN OF CARE
12/01/23 1219   Medicare Message   Important Message from Medicare regarding Discharge Appeal Rights Given to patient/caregiver;Explained to patient/caregiver;Signed/date by patient/caregiver   Date IMM was signed 12/01/23   Time IMM was signed [id number]

## 2023-12-01 NOTE — PROGRESS NOTES
[facility] ([facility])  Cardiology  Progress Note    Patient Name: Emily Alicia  MRN: [id number]  Admission Date: 11/27/2023  Hospital Length of Stay: 4 days  Code Status: Full Code   Attending Physician: Viviana Lindsay MD   Primary Care Physician: Caroline Do NP  Expected Discharge Date:   Principal Problem:Sepsis due to Streptococcus pneumoniae with acute hypoxic respiratory failure    Subjective:     Chief Complaint:  Cough x 3 days and SOB      HPI: Emily Alicia is a 83 y.o. female with a medical history of Chronic diastolic heart failure, HHD, Aortic stenosis s/p TAVR, dyslipidemia, and CKD presented to the ER with complaints of a cough for 3 days with associated SOB and BLE edema. EKG showed ST with LBBB. CXR with findings suspicious for CHF and small left pleural effusion with atelectasis vs infiltrate of the LLL. Labs significant for Leukocytosis, anemia, and BNP - 203. Troponin normal. Viral studies negative. [facility] asked to evaluate for CHF.      Patient last seen clinic Feb. 2019.      11/29/23--I/O (-) 300mL. She has been transitioned to oral diuretic  Appears euvolemic, still with wheezing  Tx of PNA ongoing   Noted d-dimer of 2.0  Renal function at baseline with GFR 41.  Note drop in H/H overnight to 8.3/25.7    12/1/2023: No labs today. CTA of chest negative for PE and showed small left pleural effusion and patchy consolidative changes in the lung bases, left > right - atelectasis, aspiration or pneumonia.     ROS  Constitutional: Negative.   HENT: Negative.     Eyes: Negative.    Cardiovascular:  Positive for dyspnea on exertion. Negative for chest pain.   Respiratory:  Positive for cough and shortness of breath.    Endocrine: Negative.    Skin: Negative.    Musculoskeletal: Negative.    Gastrointestinal: Negative.    Neurological: Negative.      Objective:     Vital Signs (Most Recent):  Temp: 97.6 °F (36.4 °C) (12/01/23 0751)  Pulse: 77 (12/01/23 0751)  Resp: 16 (12/01/23 0751)  BP: (!)  154/72 (12/01/23 0751)  SpO2: (!) 92 % (12/01/23 0753) Vital Signs (24h Range):  Temp:  [97.5 °F (36.4 °C)-99.2 °F (37.3 °C)] 97.6 °F (36.4 °C)  Pulse:  [62-88] 77  Resp:  [16-20] 16  SpO2:  [92 %-98 %] 92 %  BP: (123-160)/(59-73) 154/72     Weight: 121.2 kg (267 lb 3.2 oz)  Body mass index is 39.46 kg/m².    SpO2: (!) 92 %         Intake/Output Summary (Last 24 hours) at 12/1/2023 0808  Last data filed at 12/1/2023 0456  Gross per 24 hour   Intake 1154.47 ml   Output 3100 ml   Net -1945.53 ml         Lines/Drains/Airways       Drain  Duration             Female External Urinary Catheter 11/27/23 2000 3 days              Peripheral Intravenous Line  Duration                  Peripheral IV - Single Lumen 11/29/23 22 G Anterior;Left Hand 2 days         Peripheral IV - Single Lumen 11/30/23 1215 20 G Right Antecubital <1 day                    Physical Exam  Vitals and nursing note reviewed.   Constitutional:       Appearance: Normal appearance.   Cardiovascular:      Rate and Rhythm: Normal rate and regular rhythm.      Pulses: Normal pulses.      Heart sounds: Normal heart sounds.   Pulmonary:      Effort: Pulmonary effort is normal.      Breath sounds: Wheezing present.   Abdominal:      General: Abdomen is flat.   Musculoskeletal:         General: Normal range of motion.      Cervical back: Normal range of motion and neck supple.   Skin:     General: Skin is warm and dry.      Capillary Refill: Capillary refill takes less than 2 seconds.   Neurological:      General: No focal deficit present.      Mental Status: She is alert and oriented to person, place, and time. Mental status is at baseline.   Psychiatric:         Mood and Affect: Mood normal.   Significant Labs:   Recent Lab Results       None            Significant Imaging: CT scan: CT ABDOMEN PELVIS WITH CONTRAST: No results found for this visit on 11/27/23. and CT ABDOMEN PELVIS WITHOUT CONTRAST: No results found for this visit on 11/27/23., Echocardiogram:  2D echo with color flow doppler:   Results for orders placed or performed during the hospital encounter of 12/05/17   2D Echo w/ Color Flow Doppler   Result Value Ref Range    EF + QEF 60 55 - 65    Est. PA Systolic Pressure 37.57     Tricuspid Valve Regurgitation MILD     Narrative    Date of Procedure: 12/05/2017        TEST DESCRIPTION   Technical Quality: This is a technically adequate study.     Aorta: The aortic root is normal in size, measuring 2.5 cm at sinotubular junction and 2.9 cm at Sinuses of Valsalva. The proximal ascending aorta is normal in size, measuring 3.3 cm across.     Left Atrium: The left atrial volume index is moderately enlarged, measuring 44.72 cc/m2.     Left Ventricle: The left ventricle is normal in size, with an end-diastolic diameter of 3.6 cm, and an end-systolic diameter of 2.4 cm. LV wall thickness is normal, with the septum and the posterior wall each measuring 0.9 cm across. Relative wall   thickness was increased at 0.50, and the LV mass index was 47.9 g/m2 consistent with concentric remodeling. There are no regional wall motion abnormalities. Left ventricular systolic function appears normal. Visually estimated ejection fraction is   60-65%. The LV Doppler derived stroke volume equals 115.0 ccs.         Right Atrium: The right atrium is normal in size, measuring 4.1 cm in length and 2.5 cm in width in the apical view.     Right Ventricle: The right ventricle is normal in size measuring 3.0 cm at the base in the apical right ventricle-focused view. Global right ventricular systolic function appears normal. Tricuspid annular plane systolic excursion (TAPSE) is 1.8 cm. The   estimated PA systolic pressure is 38 mmHg.     Aortic Valve:  There is a percutaneously replaced bioprosthesis in the aortic position. The aortic valve prosthesis is well seated. The peak velocity obtained across the aortic valve is 2.66 m/s, which translates to a peak gradient of 28 mmHg. The mean   gradient  is 15 mmHg. Using a left ventricular outflow tract diameter of 2.3 cm, a left ventricular outflow tract velocity time integral of 29 cm, and a peak instantaneous transvalvular velocity time integral of 60 cm, the effective prosthetic valve area   is 1.93 cm2(p AVAi is 0.91 cm2/m2).     Mitral Valve:  The mitral valve is mildly sclerotic. There is marked mitral annular calcification.     Tricuspid Valve:  There is mild tricuspid regurgitation. Tricuspid valve is normal in structure with normal leaflet mobility.     Pulmonary Valve:  Pulmonary valve is normal in structure with normal leaflet mobility.     IVC: IVC is normal in size and collapses > 50% with a sniff, suggesting normal right atrial pressure of 3 mmHg.     Atrial Septum: The atrial septum is aneurysmal.     Intracavitary: There is no evidence of pericardial effusion, intracavity mass, thrombi, or vegetation.         CONCLUSIONS     1 - Concentric remodeling.     2 - No wall motion abnormalities.     3 - Normal left ventricular systolic function (EF 60-65%).     4 - Moderate left atrial enlargement.     5 - Normal right ventricular systolic function .     6 - The estimated PA systolic pressure is 38 mmHg.     7 - S/P transcatheter AVR, AMAURI = 1.93 cm2, AVAi = 0.91 cm2/m2, peak velocity = 2.66 m/s, mean gradient = 15 mmHg.     8 - Mild tricuspid regurgitation.     9 - Atrial septal aneurysm .     10 - S/p 23mm Jhonny S3 TF TAVR.             This document has been electronically    SIGNED BY: Leyla Mcgarry MD On: 12/05/2017 11:28    and Transthoracic echo (TTE) complete (Cupid Only):   Results for orders placed or performed during the hospital encounter of 11/27/23   Echo Saline Bubble? No   Result Value Ref Range    BSA 2.4 m2    LVOT stroke volume 92.88 cm3    LVIDd 4.82 3.5 - 6.0 cm    LV Systolic Volume 91.96 mL    LV Systolic Volume Index 39.3 mL/m2    LVIDs 4.49 (A) 2.1 - 4.0 cm    LV Diastolic Volume 108.69 mL    LV Diastolic Volume Index 46.45  mL/m2    IVS 1.49 (A) 0.6 - 1.1 cm    LVOT diameter 1.96 cm    LVOT area 3.0 cm2    FS 7 (A) 28 - 44 %    Left Ventricle Relative Wall Thickness 0.55 cm    Posterior Wall 1.32 (A) 0.6 - 1.1 cm    LV mass 277.00 g    LV Mass Index 118 g/m2    MV Peak E Quentin 1.43 m/s    TDI LATERAL 0.06 m/s    TDI SEPTAL 0.06 m/s    E/E' ratio 23.83 m/s    MV Peak A Quentin 1.61 m/s    E/A ratio 0.89     IVRT 114.18 msec    E wave deceleration time 285.76 msec    LV SEPTAL E/E' RATIO 23.83 m/s    LV LATERAL E/E' RATIO 23.83 m/s    LVOT peak quentin 1.19 m/s    Left Ventricular Outflow Tract Mean Velocity 0.98 cm/s    Left Ventricular Outflow Tract Mean Gradient 4.12 mmHg    RVDD 2.89 cm    RVOT peak VTI 21.9 cm    LA size 4.40 cm    Left Atrium Minor Axis 5.45 cm    Left Atrium Major Axis 5.56 cm    LA volume (mod) 111.65 cm3    LA Volume Index (Mod) 47.7 mL/m2    RA Major Axis 5.69 cm    AV mean gradient 10 mmHg    AV peak gradient 16 mmHg    Ao peak quentin 1.98 m/s    Ao VTI 49.30 cm    LVOT peak VTI 30.80 cm    AV valve area 1.88 cm²    AV Velocity Ratio 0.60     AV index (prosthetic) 0.62     AMAURI by Velocity Ratio 1.81 cm²    MV stenosis pressure 1/2 time 82.87 ms    MV valve area p 1/2 method 2.65 cm2    PV mean gradient 2 mmHg    PV PEAK VELOCITY 0.86 m/s    PV peak gradient 3 mmHg    Pulmonary Valve Mean Velocity 0.67 m/s    RVOT peak quentin 0.92 m/s    Sinus 2.23 cm    STJ 2.42 cm    Ascending aorta 2.67 cm    Mean e' 0.06 m/s    ZLVIDS -2.02     ZLVIDD -6.92     LA Volume Index 36.1 mL/m2    LA volume 84.41 cm3    LA WIDTH 4.1 cm    Narrative      Left Ventricle: The left ventricle is normal in size. Moderately   increased wall thickness. Normal wall motion. There is normal systolic   function with a visually estimated ejection fraction of 55 - 60%. Grade I   diastolic dysfunction. Elevated left ventricular filling pressure.    Right Ventricle: Moderate right ventricular enlargement. Systolic   function is normal.    Left Atrium: Left  atrium is moderately dilated.    Right Atrium: Right atrium is moderately dilated.    Aortic Valve: There is a bioprosthetic valve in the aortic position   that is well-seated.    Mitral Valve: There is moderate bileaflet sclerosis. Moderately   calcified subvalvular apparatus. There is mild regurgitation with a   centrally directed jet.     , and X-Ray: CXR: X-Ray Chest 1 View (CXR):   Results for orders placed or performed during the hospital encounter of 11/27/23   X-Ray Chest 1 View    Narrative    EXAMINATION:  XR CHEST 1 VIEW    CLINICAL HISTORY:  Cough, unspecified    TECHNIQUE:  Single frontal view of the chest was performed.    COMPARISON:  03/23/2021.    FINDINGS:  Pulmonary hypoinflation crowding of the bronchopulmonary vessels.  Patient also rotated to the LPO position.  This limits evaluation.    There is a diffuse prominence of the pulmonary interstitium suspicious for interstitial edema.  There is a small left pleural effusion with atelectasis versus infiltrate within the left lower lobe.  No significant right pleural effusion.  Heart size is enlarged.  Trachea midline.    Bony thorax intact with demineralization.      Impression    1. Limited evaluation secondary to technique.  2. Findings suspicious for congestive heart failure.  3. Small left pleural effusion with atelectasis versus infiltrate of the left lower lobe.  Correlate clinically with possible fever and/or elevated white count.  As deemed clinically necessary, further evaluation may be obtained with dedicated PA and lateral views of the chest.  Close interval follow-up is recommended.    This report was flagged in Epic as abnormal.      Electronically signed by: Richard Cisse  Date:    11/27/2023  Time:    16:43    and X-Ray Chest PA and Lateral (CXR): No results found for this visit on 11/27/23. and KUB: X-Ray Abdomen AP 1 View (KUB): No results found for this visit on 11/27/23.  Assessment and Plan:         Active Diagnoses:     Diagnosis Date Noted POA    PRINCIPAL PROBLEM:  Sepsis due to Streptococcus pneumoniae with acute hypoxic respiratory failure [A40.3, R65.20, J96.01] 11/28/2023 Yes    Acute respiratory failure with hypoxia [J96.01] 11/28/2023 Yes    Acute on chronic heart failure [I50.9] 11/28/2023 Yes    Advanced care planning/counseling discussion [Z71.89] 11/28/2023 Not Applicable    ASCVD (arteriosclerotic cardiovascular disease) [I25.10] 03/11/2021 Yes    Hyperlipidemia LDL goal <70 [E78.5] 09/13/2018 Yes     Chronic    Acute kidney injury superimposed on CKD [N17.9, N18.9] 10/07/2015 Yes    Insomnia [G47.00] 09/17/2012 Yes      Problems Resolved During this Admission:       VTE Risk Mitigation (From admission, onward)           Ordered     IP VTE HIGH RISK PATIENT  Once         11/27/23 1906     Place sequential compression device  Until discontinued         11/27/23 1906                  Current Facility-Administered Medications   Medication    albuterol-ipratropium 2.5 mg-0.5 mg/3 mL nebulizer solution 3 mL    allopurinoL tablet 100 mg    amLODIPine tablet 5 mg    atorvastatin tablet 10 mg    azithromycin (ZITHROMAX) 500 mg in dextrose 5 % (D5W) 250 mL IVPB (Vial-Mate)    cefTRIAXone (ROCEPHIN) 1 g in dextrose 5 % in water (D5W) 100 mL IVPB (MB+)    collagenase ointment    dapagliflozin propanediol (Farxiga) tablet 10 mg    docusate sodium capsule 100 mg    furosemide tablet 40 mg    gabapentin capsule 200 mg    guaiFENesin 12 hr tablet 600 mg    HYDROcodone-acetaminophen 5-325 mg per tablet 1 tablet    melatonin tablet 6 mg    metoprolol succinate (TOPROL-XL) 24 hr tablet 100 mg    QUEtiapine tablet 100 mg    sodium chloride 0.9% flush 10 mL    travoprost 0.004 % ophthalmic solution 1 drop    valsartan tablet 40 mg    vitamin D 1000 units tablet 1,000 Units         Assessment and Plan:     Chronic diastolic heart failure stable  -repeat echo shows a LVEF 55%, elevated filling pressure (11/23)  --received one dose of lasix  IV 20mg in Er, now on furosemide 40mg po qday  On Farxiga 10mg daily     Aortic Stenosis s/p TAVR (12/2016)' prosthesis intact (11/23)     Leukocytosis/pneumonia likely  -IV antibiotics     Mild nonobstructive CAD:  -[facility] 2016  ECASA as tolerated     CKD  -BUN/Creat 39/1.3 with GFR 41; slighly more dry than yesterday     Hypertension  -amlodipine 10mg daily  -metoprolol succinate 100mg po BID  low dose ARB Valsartan as tolerated     Dyslipidemia  -Atorvastatin 10mg HS     Anemia  -per PCP  No evidence of bleeding     Elevated d-dimer  -CTA negative for PE    Transcribed by  Stella Oconnell NP for Dr PATRICIO Laboy  Cardiology  [facility] (3rd Fl)  I attest that I have personally seen and examined this patient. I have reviewed and discussed the management in detail as outlined above.

## 2023-12-01 NOTE — ASSESSMENT & PLAN NOTE
Patient with Hypoxic Respiratory failure which is Acute.  she is not on home oxygen. Supplemental oxygen was provided and noted-      .   Signs/symptoms of respiratory failure include- lethargy. Contributing diagnoses includes - CHF and Pneumonia Labs and images were reviewed. Patient Has not had a recent ABG. Will treat underlying causes and adjust management of respiratory failure as follows- Pulmonary edema with possible developing pneumonia     11/30 Wean oxygen today and plan for discharge tomorrow.      12/1 Room air 92% and greater.

## 2023-12-01 NOTE — PROGRESS NOTES
[facility] ([facility])  [facility] Medicine  Progress Note    Patient Name: Emily Alicia  MRN: [id number]  Patient Class: IP- Inpatient   Admission Date: 11/27/2023  Length of Stay: 4 days  Attending Physician: Viviana Lindsay MD  Primary Care Provider: Caroline Do NP        Subjective:     Principal Problem:Sepsis due to Streptococcus pneumoniae with acute hypoxic respiratory failure        HPI:  Patient is an 83 year old female with medical history of HTN, Tricuspid regurgitation, aortic valve stenosis/TAVR, diastolic HF,  who presented to the ED with SOB.  Patient has had a productive cough for several days now.  She has not been around any other sick contacts.  She has had lower extremity edema but denies CP.  No fever, chills, nausea & vomiting.  She has not had any recent medication changes.  She has been bed bound for three years now.      Admitted for hypoxia secondary to pneumonia and pleural effusion.                Overview/Hospital Course:  Pt HD stable still on 2 L N.C. with oxygen saturation 97%.  Will wean oxygen as tolerated.  D dimer elevated.  CTA chest pending.  Continue IV abx for pneumonia and echo pending for pulmonary edema.      PT HD stable on 2 L N.C with oxygen saturation 97%.  Will wean oxygen today.  Echo with EF 55% and grade I diastolic dysfunction.  D dimer elevated but no PE on CTA and no DVT on Venous u/s.  Patient will need heme/onc referral for chronic leukocytosis.  Plan to discharge within the next 24 hours.  Patient would like to go home with home health.      12/1 PT HD stable on room air.  Plan to discharge today with home health.  F/u with cardiology and PCP outpatient.  Discussed referral to heme/onc due to chronic leukocytosis but she states she is in her 80's and not interested in work up.  She knows that underlying malignancy could be a possibility.      Past Medical History:   Diagnosis Date    Anemia     Anxiety     Aortic valve stenosis     CHF (congestive  heart failure)     Chronic kidney disease (CKD)     Diastolic heart failure     Dyslipidemia     Encounter for blood transfusion     Glaucoma (increased eye pressure)     LEFT EYE    HHD (hypertensive heart disease)     Hypertension     Osteoarthritis     Retina disorder, bilateral     Severe aortic stenosis 11/9/2016    TR (tricuspid regurgitation)     UTI (urinary tract infection)        Past Surgical History:   Procedure Laterality Date    CARDIAC CATHETERIZATION      CARDIAC VALVE SURGERY      CATARACT EXTRACTION W/ INTRAOCULAR LENS  IMPLANT, BILATERAL      CHOLECYSTECTOMY      COLONOSCOPY N/A 2/7/2021    Procedure: COLONOSCOPY;  Surgeon: Gaston Pablo MD;  Location: [facility];  Service: Endoscopy;  Laterality: N/A;    FLEXIBLE SIGMOIDOSCOPY N/A 2/4/2021    Procedure: SIGMOIDOSCOPY, FLEXIBLE;  Surgeon: Jarred Forrester MD;  Location: [facility];  Service: Gastroenterology;  Laterality: N/A;    HYSTERECTOMY      JOINT REPLACEMENT      right knee    JOINT REPLACEMENT      left knee    KNEE SURGERY Left     SPINE SURGERY      lumbar    TRANSCATHETER AORTIC VALVE REPLACEMENT         Review of patient's allergies indicates:  No Known Allergies    No current facility-administered medications on file prior to encounter.     Current Outpatient Medications on File Prior to Encounter   Medication Sig    allopurinoL (ZYLOPRIM) 100 MG tablet TAKE 1 TABLET EVERY DAY (Patient taking differently: Take 100 mg by mouth every evening.)    amLODIPine (NORVASC) 10 MG tablet TAKE 1 TABLET EVERY DAY (Patient taking differently: Take 10 mg by mouth once daily.)    ascorbic acid, vitamin C, (VITAMIN C) 250 MG tablet Take 1 tablet (250 mg total) by mouth once daily.    aspirin (ASPIRIN LOW DOSE) 81 MG EC tablet Take 1 tablet (81 mg total) by mouth nightly.    atorvastatin (LIPITOR) 10 MG tablet TAKE 1 TABLET EVERY EVENING (Patient taking differently: Take 10 mg by mouth every evening.)    collagenase (SANTYL) ointment Apply  topically once daily.    furosemide (LASIX) 40 MG tablet Take 1 tablet (40 mg total) by mouth once daily.    gabapentin (NEURONTIN) 300 MG capsule TAKE 1 CAPSULE TWICE DAILY (Patient taking differently: Take 300 mg by mouth 2 (two) times daily.)    HYDROcodone-acetaminophen (NORCO)  mg per tablet Take 1 tablet by mouth every 6 (six) hours as needed for Pain.    losartan (COZAAR) 100 MG tablet Take 100 mg by mouth once daily.    metoprolol succinate (TOPROL-XL) 100 MG 24 hr tablet Take 100 mg by mouth 2 (two) times daily.    naproxen (NAPROSYN) 500 MG tablet Take 500 mg by mouth 2 (two) times daily with meals.    potassium chloride SA (K-DUR,KLOR-CON) 20 MEQ tablet Take 20 mEq by mouth once daily.    travoprost (TRAVATAN Z) 0.004 % ophthalmic solution Place 1 drop into both eyes every evening.    traZODone (DESYREL) 50 MG tablet TAKE 1 TABLET EVERY NIGHT AS NEEDED FOR INSOMNIA    vitamin D (VITAMIN D3) 1000 units Tab Take 1,000 Units by mouth once daily.    oxybutynin (DITROPAN-XL) 5 MG TR24 TAKE 1 TABLET EVERY DAY     Family History       Problem Relation (Age of Onset)    Diabetes Mother    Heart disease Father    Stroke Mother          Tobacco Use    Smoking status: Former     Current packs/day: 0.00     Types: Cigarettes     Quit date: 1982     Years since quittin.2    Smokeless tobacco: Never   Substance and Sexual Activity    Alcohol use: No    Drug use: No    Sexual activity: Never     Partners: Male     Review of Systems   Constitutional:  Positive for fatigue (improving). Negative for chills and fever.   HENT:  Negative for congestion and drooling.    Eyes:  Negative for visual disturbance.   Respiratory:  Positive for cough. Negative for shortness of breath.    Cardiovascular:  Positive for leg swelling. Negative for chest pain.   Gastrointestinal:  Negative for abdominal distention, abdominal pain, nausea and vomiting.   Genitourinary:  Negative for difficulty urinating and dysuria.  "  Musculoskeletal:  Positive for arthralgias, back pain and gait problem.   Skin:  Positive for wound (on her back).   Neurological:  Positive for weakness (generalized). Negative for dizziness and headaches.   Psychiatric/Behavioral:  Negative for agitation and decreased concentration.      Objective:     Vital Signs (Most Recent):  Temp: 97.6 °F (36.4 °C) (12/01/23 0751)  Pulse: 73 (12/01/23 1001)  Resp: 16 (12/01/23 0751)  BP: (!) 154/72 (12/01/23 0859)  SpO2: (!) 92 % (12/01/23 0753) Vital Signs (24h Range):  Temp:  [97.5 °F (36.4 °C)-99.2 °F (37.3 °C)] 97.6 °F (36.4 °C)  Pulse:  [62-88] 73  Resp:  [16-20] 16  SpO2:  [92 %-98 %] 92 %  BP: (123-160)/(59-73) 154/72     Weight: 121.2 kg (267 lb 3.2 oz)  Body mass index is 39.46 kg/m².     Physical Exam  Constitutional:       General: She is not in acute distress.  HENT:      Head: Normocephalic and atraumatic.   Eyes:      General:         Right eye: No discharge.         Left eye: No discharge.   Cardiovascular:      Rate and Rhythm: Normal rate and regular rhythm.   Pulmonary:      Effort: Pulmonary effort is normal.      Comments: Improvement in aeration of breath sounds   Abdominal:      General: There is no distension.      Tenderness: There is no abdominal tenderness.   Musculoskeletal:         General: No swelling or tenderness.      Cervical back: Neck supple. No tenderness.      Right lower leg: Edema (+2) present.      Left lower leg: Edema (+2) present.      Comments: Decreased ROM left arm.   Skin:     General: Skin is warm and dry.      Comments: Stage 1 sacral ulcer    Neurological:      General: No focal deficit present.      Mental Status: She is alert and oriented to person, place, and time.   Psychiatric:         Mood and Affect: Mood normal.         Behavior: Behavior normal.                Significant Labs: A1C: No results for input(s): "HGBA1C" in the last 4320 hours.  ABGs: No results for input(s): "PH", "PCO2", "HCO3", "POCSATURATED", "BE", " ""TOTALHB", "COHB", "METHB", "O2HB", "POCFIO2", "PO2" in the last 48 hours.  Bilirubin:   Recent Labs   Lab 11/27/23  1449 11/28/23  1033 11/29/23  0536 11/30/23  0528   BILITOT 0.4 0.4 0.2 0.2       Blood Culture:   No results for input(s): "LABBLOO" in the last 48 hours.    BMP:   Recent Labs   Lab 11/30/23  0528   *      K 4.8      CO2 22*   BUN 35*   CREATININE 1.3   CALCIUM 9.1   MG 2.2       CBC:   Recent Labs   Lab 11/30/23 0528   WBC 15.21*   HGB 9.0*   HCT 27.7*          CMP:   Recent Labs   Lab 11/30/23  0528      K 4.8      CO2 22*   *   BUN 35*   CREATININE 1.3   CALCIUM 9.1   PROT 7.0   ALBUMIN 2.4*   BILITOT 0.2   ALKPHOS 171*   AST 13   ALT 27   ANIONGAP 10       Cardiac Markers:   No results for input(s): "CKMB", "MYOGLOBIN", "BNP", "TROPISTAT" in the last 48 hours.    Coagulation: No results for input(s): "PT", "INR", "APTT" in the last 48 hours.  Lactic Acid:   No results for input(s): "LACTATE" in the last 48 hours.    Lipase: No results for input(s): "LIPASE" in the last 48 hours.  Lipid Panel: No results for input(s): "CHOL", "HDL", "LDLCALC", "TRIG", "CHOLHDL" in the last 48 hours.  Magnesium:   Recent Labs   Lab 11/30/23  0528   MG 2.2       POCT Glucose: No results for input(s): "POCTGLUCOSE" in the last 48 hours.  Prealbumin: No results for input(s): "PREALBUMIN" in the last 48 hours.  Respiratory Culture: No results for input(s): "GSRESP", "RESPIRATORYC" in the last 48 hours.  Troponin:   No results for input(s): "TROPONINI", "TROPONINIHS" in the last 48 hours.    TSH: No results for input(s): "TSH" in the last 4320 hours.  Urine Culture: No results for input(s): "LABURIN" in the last 48 hours.  Urine Studies: No results for input(s): "COLORU", "APPEARANCEUA", "PHUR", "SPECGRAV", "PROTEINUA", "GLUCUA", "KETONESU", "BILIRUBINUA", "OCCULTUA", "NITRITE", "UROBILINOGEN", "LEUKOCYTESUR", "RBCUA", "WBCUA", "BACTERIA", "SQUAMEPITHEL", "HYALINECASTS" in " "the last 48 hours.    Invalid input(s): "LIONELSUR"    Significant Imaging: I have reviewed all pertinent imaging results/findings within the past 24 hours.    Assessment/Plan:      * Sepsis due to Streptococcus pneumoniae with acute hypoxic respiratory failure  Patient with tachycardia, leukocytosis and left lower lobe consolidation on CXR at admission  IV rocephin and IV azithromycin   Mucinex for supportive care     Deferring IV fluids at this time in the setting of pulmonary edema & LOWELL.  LOWELL now resolving.  Possibly due to sepsis.  Blood cultures pending/ Urinalysis pending.    11/30 Leukocytosis chronic.  Will wean off oxygen.  IV azithromycin and IV rocephin     12/1 Patient received 5 days of IV rocephin and completed azithromycin.  Supportive care at discharge         Advanced care planning/counseling discussion  Patient currently a full code at this time.        Acute on chronic heart failure  + hypoxia, mild lower extremity edema   BNP elevated at 203  CXR with pulmonary edema and left pleural effusion  CIS consulted   IV lasix 20mg x 1.  Patient states not taking her lasix.  Will be cautious with diuresis in the setting or aortic valve stenosis.    Continue home metoprolol     Repeat echo EF 55% and grade I diastolic dysfunction.  Lasix 40mg qd           Acute respiratory failure with hypoxia  Patient with Hypoxic Respiratory failure which is Acute.  she is not on home oxygen. Supplemental oxygen was provided and noted-      .   Signs/symptoms of respiratory failure include- lethargy. Contributing diagnoses includes - CHF and Pneumonia Labs and images were reviewed. Patient Has not had a recent ABG. Will treat underlying causes and adjust management of respiratory failure as follows- Pulmonary edema with possible developing pneumonia     11/30 Wean oxygen today and plan for discharge tomorrow.      12/1 Room air 92% and greater.      ASCVD (arteriosclerotic cardiovascular disease)  Continue aspirin and " statin       Hyperlipidemia LDL goal <70  Continue home statin       Acute kidney injury superimposed on CKD  Patient with acute kidney injury/acute renal failure likely due to  sepsis  LOWELL is currently improving. Baseline creatinine  0.9  - Labs reviewed- Renal function/electrolytes with Estimated Creatinine Clearance: 46.5 mL/min (based on SCr of 1.3 mg/dL). according to latest data. Monitor urine output and serial BMP and adjust therapy as needed. Avoid nephrotoxins and renally dose meds for GFR listed above.    Creatinine 1.1-1.3 Likely new baseline.      Insomnia  Continue home trazodone         VTE Risk Mitigation (From admission, onward)           Ordered     IP VTE HIGH RISK PATIENT  Once         11/27/23 1906     Place sequential compression device  Until discontinued         11/27/23 1906                    Discharge Planning   LINA:      Code Status: Full Code   Is the patient medically ready for discharge?:     Reason for patient still in hospital (select all that apply): Other (specify) discharge today  Discharge Plan A: Home Health   Discharge Delays: None known at this time              Anjali Mesa PA-C  Department of Hospital Medicine   [place] [facility] (3rd Fl)

## 2023-12-01 NOTE — PLAN OF CARE
12/01/23 1117   Post-Acute Status   Post-Acute Authorization Home Health   Post-Acute Placement Status Patient declined/refused   Home Health Status Referrals Sent   Coverage [employer] Hospital Resources/Appts/Education Provided Appointments scheduled and added to [facility]   Patient choice form signed by patient/caregiver List from System Post-Acute Care   Discharge Delays None known at this time   Discharge Plan   Discharge Plan A Home Health         Patient will discharge home today. Home Health order sent to [facility] for review.  Awaiting response.     All follow up appointment's have been made.    Follow-up Information       Caroline Do, NP. Go on 12/5/2023.    Specialty: Family Medicine  Why: at 10:30 AM  Contact information:  [address]  [place] [address]  [phone]               Joe Laboy MD. Go on 12/8/2023.    Specialty: Cardiology  Why: at 9:30 AM  Contact information:  [address]  [place] [address]  [phone]                           Patient will travel by way of [facility] when discharged.

## 2023-12-01 NOTE — PLAN OF CARE
"   12/01/23 1144   Post-Acute Status   Post-Acute Authorization Home Health   Home Health Status Set-up Complete/Auth obtained   Coverage [employer]   Hospital Resources/Appts/Education Provided Appointments scheduled and added to S   Patient choice form signed by patient/caregiver List from [facility]Ac[facility]   Discharge Delays None known at this time   Discharge Plan   Discharge Plan A Home Health   Discharge Plan B Home Health         [facility] accepted patient.     Per HH:   "RN SOC Tuesday 12/5/2023.  PT/OT evals week of 12/4  We do not have an aide or MSW.    We will move sooner if availability comes up."  "

## 2023-12-01 NOTE — DISCHARGE SUMMARY
[facility] ([facility])  [facility]  Discharge Summary      Patient Name: Emily Alicia  MRN: [id number]  [facility]: [id number]  Patient Class: IP- Inpatient  Admission Date: 11/27/2023  Hospital Length of Stay: 4 days  Discharge Date and Time:  12/01/2023 10:51 AM  Attending Physician: Viviana Lindsay MD   Discharging Provider: Anjali Mesa PA-C  Primary Care Provider: Caroline Do NP    Primary Care Team: Networked reference to record PCT     HPI:   Patient is an 83 year old female with medical history of HTN, Tricuspid regurgitation, aortic valve stenosis/TAVR, diastolic HF,  who presented to the ED with SOB.  Patient has had a productive cough for several days now.  She has not been around any other sick contacts.  She has had lower extremity edema but denies CP.  No fever, chills, nausea & vomiting.  She has not had any recent medication changes.  She has been bed bound for three years now.      Admitted for hypoxia secondary to pneumonia and pleural effusion.                * No surgery found *      Hospital Course:   Pt HD stable still on 2 L N.C. with oxygen saturation 97%.  Will wean oxygen as tolerated.  D dimer elevated.  CTA chest pending.  Continue IV abx for pneumonia and echo pending for pulmonary edema.      PT HD stable on 2 L N.C with oxygen saturation 97%.  Will wean oxygen today.  Echo with EF 55% and grade I diastolic dysfunction.  D dimer elevated but no PE on CTA and no DVT on Venous u/s.  Patient will need heme/onc referral for chronic leukocytosis.  Plan to discharge within the next 24 hours.  Patient would like to go home with home health.      12/1 PT HD stable on room air.  Plan to discharge today with home health.  F/u with cardiology and PCP outpatient.  Discussed referral to heme/onc due to chronic leukocytosis but she states she is in her 80's and not interested in work up.  She knows that underlying malignancy could be a possibility.           Goals of Care Treatment  Preferences:  Code Status: Full Code      Consults:   Consults (From admission, onward)          Status Ordering Provider     Inpatient consult to Cardiology-CIS  Once        Provider:  Joe Laboy MD    Completed SIENNA VILLANUEVA            Pulmonary  Acute respiratory failure with hypoxia  Patient with Hypoxic Respiratory failure which is Acute.  she is not on home oxygen. Supplemental oxygen was provided and noted-      .   Signs/symptoms of respiratory failure include- lethargy. Contributing diagnoses includes - CHF and Pneumonia Labs and images were reviewed. Patient Has not had a recent ABG. Will treat underlying causes and adjust management of respiratory failure as follows- Pulmonary edema with possible developing pneumonia     11/30 Wean oxygen today and plan for discharge tomorrow.      12/1 Room air 92% and greater.      Cardiac/Vascular  Acute on chronic heart failure  + hypoxia, mild lower extremity edema   BNP elevated at 203  CXR with pulmonary edema and left pleural effusion  CIS consulted   IV lasix 20mg x 1.  Patient states not taking her lasix.  Will be cautious with diuresis in the setting or aortic valve stenosis.    Continue home metoprolol     Repeat echo EF 55% and grade I diastolic dysfunction.  Lasix 40mg qd           ASCVD (arteriosclerotic cardiovascular disease)  Continue statin   Patient had h/o GI bleed 2021 and states she has not been taking aspirin    Hyperlipidemia LDL goal <70  Continue home statin       Renal/  Acute kidney injury superimposed on CKD  Patient with acute kidney injury/acute renal failure likely due to  sepsis  LOWELL is currently improving. Baseline creatinine  0.9  - Labs reviewed- Renal function/electrolytes with Estimated Creatinine Clearance: 46.5 mL/min (based on SCr of 1.3 mg/dL). according to latest data. Monitor urine output and serial BMP and adjust therapy as needed. Avoid nephrotoxins and renally dose meds for GFR listed above.    Creatinine  1.1-1.3 Likely new baseline.      ID  * Sepsis due to Streptococcus pneumoniae with acute hypoxic respiratory failure  Patient with tachycardia, leukocytosis and left lower lobe consolidation on CXR at admission  IV rocephin and IV azithromycin   Mucinex for supportive care     Deferring IV fluids at this time in the setting of pulmonary edema & LOWELL.  LOWELL now resolving.  Possibly due to sepsis.  Blood cultures pending/ Urinalysis pending.    11/30 Leukocytosis chronic.  Will wean off oxygen.  IV azithromycin and IV rocephin     12/1 Patient received 5 days of IV rocephin and completed azithromycin.  Supportive care at discharge         Other  Advanced care planning/counseling discussion  Patient currently a full code at this time.        Insomnia  Continue home trazodone         Final Active Diagnoses:    Diagnosis Date Noted POA    PRINCIPAL PROBLEM:  Sepsis due to Streptococcus pneumoniae with acute hypoxic respiratory failure [A40.3, R65.20, J96.01] 11/28/2023 Yes    Acute respiratory failure with hypoxia [J96.01] 11/28/2023 Yes    Acute on chronic heart failure [I50.9] 11/28/2023 Yes    Advanced care planning/counseling discussion [Z71.89] 11/28/2023 Not Applicable    ASCVD (arteriosclerotic cardiovascular disease) [I25.10] 03/11/2021 Yes    Hyperlipidemia LDL goal <70 [E78.5] 09/13/2018 Yes     Chronic    Acute kidney injury superimposed on CKD [N17.9, N18.9] 10/07/2015 Yes    Insomnia [G47.00] 09/17/2012 Yes      Problems Resolved During this Admission:       Discharged Condition: stable    Disposition: Home-Health Care [facility]    Follow Up:   Follow-up Information       Caroline Do NP Follow up in 1 week(s).    Specialty: Family Medicine  Contact information:  [address] [place] [address]  [phone]               Joe Laboy MD Follow up in 1 week(s).    Specialty: Cardiology  Contact information:  [address] [place] [address] [phone]                           Patient  Instructions:      Ambulatory referral/consult to Home Health   Standing Status: Future   Referral Priority: Routine Referral Type: Home Health   Referral Reason: Specialty Services Required   Requested Specialty: Home Health Services   Number of Visits Requested: 1       Significant Diagnostic Studies: see A&P     Pending Diagnostic Studies:       None           Medications:  Reconciled Home Medications:      Medication List        START taking these medications      dapagliflozin propanediol 10 mg tablet  Commonly known as: Farxiga  Take 1 tablet (10 mg total) by mouth once daily.  Start taking on: December 2, 2023     docusate sodium 100 MG capsule  Commonly known as: COLACE  Take 1 capsule (100 mg total) by mouth once daily.  Start taking on: December 2, 2023     guaiFENesin 600 mg 12 hr tablet  Commonly known as: MUCINEX  Take 1 tablet (600 mg total) by mouth 2 (two) times daily as needed for Congestion.     melatonin 3 mg tablet  Commonly known as: MELATIN  Take 1 tablet (3 mg total) by mouth nightly as needed for Insomnia.     valsartan 40 MG tablet  Commonly known as: DIOVAN  Take 1 tablet (40 mg total) by mouth 2 (two) times daily.            CHANGE how you take these medications      allopurinoL 100 MG tablet  Commonly known as: ZYLOPRIM  TAKE 1 TABLET EVERY DAY  What changed: when to take this     amLODIPine 5 MG tablet  Commonly known as: NORVASC  Take 1 tablet (5 mg total) by mouth once daily.  Start taking on: December 2, 2023  What changed:   medication strength  how much to take  when to take this     furosemide 20 MG tablet  Commonly known as: LASIX  Take 2 tablets (40 mg total) by mouth once daily.  What changed: medication strength     HYDROcodone-acetaminophen  mg per tablet  Commonly known as: NORCO  Take 1 tablet by mouth every 24 hours as needed for Pain.  What changed: when to take this     oxybutynin 5 MG Tr24  Commonly known as: DITROPAN-XL  TAKE 1 TABLET EVERY DAY  What changed: when  to take this            CONTINUE taking these medications      atorvastatin 10 MG tablet  Commonly known as: LIPITOR  TAKE 1 TABLET EVERY EVENING     gabapentin 300 MG capsule  Commonly known as: NEURONTIN  TAKE 1 CAPSULE TWICE DAILY     metoprolol succinate 100 MG 24 hr tablet  Commonly known as: TOPROL-XL  Take 100 mg by mouth 2 (two) times daily.     SantyL ointment  Generic drug: collagenase  Apply topically once daily.     travoprost 0.004 % ophthalmic solution  Commonly known as: TRAVATAN Z  Place 1 drop into both eyes every evening.     vitamin D 1000 units Tab  Commonly known as: VITAMIN D3  Take 1,000 Units by mouth once daily.            STOP taking these medications      ascorbic acid (vitamin C) 250 MG tablet  Commonly known as: VITAMIN C     aspirin 81 MG EC tablet  Commonly known as: [place] LOW DOSE ASPIRIN     losartan 100 MG tablet  Commonly known as: COZAAR     naproxen 500 MG tablet  Commonly known as: NAPROSYN     potassium chloride SA 20 MEQ tablet  Commonly known as: K-DURKLOR-CON     traZODone 50 MG tablet  Commonly known as: DESYREL              Indwelling Lines/Drains at time of discharge:   Lines/Drains/Airways       Drain  Duration             Female External Urinary Catheter 11/27/23 2000 3 days                    Time spent on the discharge of patient: 35 minutes         Anjali Mesa PA-C  Department of Hospital Medicine  [place] - Med Surg (3rd Fl)

## 2023-12-01 NOTE — PHYSICIAN QUERY
PT Name: Emily Alicia  MR #: [id number]     DOCUMENTATION CLARIFICATION      CDS/: Leydi Wasserman RN CDS             Contact information: [email or web address]  This form is a permanent document in the medical record.    Query Date: December 1, 2023    By submitting this query, we are merely seeking further clarification of documentation to reflect the severity of illness of your patient. Please utilize your independent clinical judgment when addressing the question(s) below.     The Medical Record contains the following:   Indicators   Supporting Clinical Findings Location in Medical Record     X Documentation of Sepsis, Septic Shock Principal Problem:Sepsis due to Streptococcus pneumoniae    H&P, RIVER Encarnacion PA-C/Dr. Duval, 11/28     X Blood Culture No cultures this encounter for dates: 11/27-12/01   Labs, 11/27-12/01     X Respiratory Culture No cultures this encounter for dates: 11/27-12/01   Labs, 11/27-12/01    Urine Culture      Other Culture       X Acute/Chronic Illness Acute illness: sepsis, acute on chronic heart failure, acute resp failure with hypoxia, ASCVD, HLD, LOWELL, insomnia   H&P, RIVER Encarnacion PA-C/Dr. Duval, 11/28     X Medication/Treatment IV rocephin and IV azithromycin , IV fluids  Mucinex for supportive care     H&P, RIVER Encarnacion PA-C/Dr. Duval, 11/28    Other        Provider, due to conflicting clinical picture, please clarify the Sepsis Organism:    [   ] Sepsis due unspecified organism     [ x  ] Sepsis due to strept pneumoniae      [   ] Sepsis due to other organism (specify)___________________     [   ] Other specification (please specify): ____________________           Please document in your progress notes daily for the duration of treatment until resolved, and include in your discharge summary.  Form No. [id number]

## 2023-12-01 NOTE — ASSESSMENT & PLAN NOTE
Patient with acute kidney injury/acute renal failure likely due to  sepsis  LOWELL is currently improving. Baseline creatinine  0.9  - Labs reviewed- Renal function/electrolytes with Estimated Creatinine Clearance: 46.5 mL/min (based on SCr of 1.3 mg/dL). according to latest data. Monitor urine output and serial BMP and adjust therapy as needed. Avoid nephrotoxins and renally dose meds for GFR listed above.    Creatinine 1.1-1.3 Likely new baseline.

## 2023-12-01 NOTE — PT/OT/SLP PROGRESS
Physical Therapy Treatment    Patient Name:  Emily Alicia   MRN:  [id number]    Recommendations:     Discharge Recommendations: Moderate Intensity Therapy  Discharge Equipment Recommendations: none  Barriers to discharge: Decreased caregiver support    Assessment:     Emily Alicia is a 83 y.o. female admitted with a medical diagnosis of Sepsis due to Streptococcus pneumoniae with acute hypoxic respiratory failure.  She presents with the following impairments/functional limitations: weakness, impaired endurance, impaired self care skills, impaired functional mobility, impaired balance, decreased upper extremity function, decreased lower extremity function, decreased safety awareness, impaired skin, edema, decreased ROM . Patient tolerated B LE AAROM ex  and bed mobility. No sign of respiratory distress.    Rehab Prognosis: Fair; patient would benefit from acute skilled PT services to address these deficits and reach maximum level of function.    Recent Surgery: * No surgery found *      Plan:     During this hospitalization, patient to be seen 5 x/week to address the identified rehab impairments via therapeutic activities, therapeutic exercises and progress toward the following goals:    Plan of Care Expires:  12/06/23    Subjective     Chief Complaint: none  Patient/Family Comments/goals: none  Pain/Comfort:  Pain Rating 1: 0/10      Objective:     Communicated with patient prior to session.  Patient found HOB elevated with PureWick, telemetry, oxygen upon PT entry to room.     General Precautions: Standard, fall  Orthopedic Precautions: N/A  Braces: N/A  Respiratory Status: Nasal cannula, flow 2 L/min     Functional Mobility:  Bed Mobility:     Rolling Left:  dependence  Rolling Right: dependence  Scooting: dependence  Supine to Sit: dependence  Sit to Supine: dependence      AM-PAC 6 CLICK MOBILITY  Turning over in bed (including adjusting bedclothes, sheets and blankets)?: 2  Sitting down on and standing up from a  chair with arms (e.g., wheelchair, bedside commode, etc.): 1  Moving from lying on back to sitting on the side of the bed?: 2  Moving to and from a bed to a chair (including a wheelchair)?: 1  Need to walk in hospital room?: 1  Climbing 3-5 steps with a railing?: 1  Basic Mobility Total Score: 8       Treatment & Education:  Provided AAROM ex on B LE x 10 reps on each(ankle DF/PF/Rotation, hip flexion/abduction, rolling to sides using bed rail and supine <> sit trng  at edge of the bed with dependent A    Patient left  half right side lying with pillow for support  with all lines intact, call button in reach, and nurse notified..    GOALS:   Multidisciplinary Problems       Physical Therapy Goals          Problem: Physical Therapy    Goal Priority Disciplines Outcome Goal Variances Interventions   Physical Therapy Goal     PT, PT/OT      Description:   Patient will increase functional independence with mobility by performin. Able to perform and tolerate ROM ex on B LE x 10 reps on each plane at supine position to prevent joint stiffness/contractures, ease mobility/body repositioning and prevent to further decline in functions.  2. Increase rolling to sides with max assistance and cues  3. Supine to sit with Maximum Assistance and cues  4. Able to perform and tolerate static sit at edge of the bed x   ~5 minutes with maximal  assistance and cues.                            Time Tracking:     PT Received On: 23  PT Start Time: 905     PT Stop Time: 925  PT Total Time (min): 20 min     Billable Minutes: Therapeutic Activity 20    Treatment Type: Treatment  PT/PTA: PT           2023

## 2023-12-01 NOTE — ASSESSMENT & PLAN NOTE
Patient with tachycardia, leukocytosis and left lower lobe consolidation on CXR at admission  IV rocephin and IV azithromycin   Mucinex for supportive care     Deferring IV fluids at this time in the setting of pulmonary edema & LOWELL.  LOWELL now resolving.  Possibly due to sepsis.  Blood cultures pending/ Urinalysis pending.    11/30 Leukocytosis chronic.  Will wean off oxygen.  IV azithromycin and IV rocephin     12/1 Patient received 5 days of IV rocephin and completed azithromycin.  Supportive care at discharge

## 2023-12-01 NOTE — ASSESSMENT & PLAN NOTE
Patient with acute kidney injury/acute renal failure likely due to  sepsis  LOEWLL is currently improving. Baseline creatinine  0.9  - Labs reviewed- Renal function/electrolytes with Estimated Creatinine Clearance: 46.5 mL/min (based on SCr of 1.3 mg/dL). according to latest data. Monitor urine output and serial BMP and adjust therapy as needed. Avoid nephrotoxins and renally dose meds for GFR listed above.    Creatinine 1.1-1.3 Likely new baseline.

## 2023-12-01 NOTE — PLAN OF CARE
Problem: Gas Exchange Impaired  Goal: Optimal Gas Exchange  Outcome: Ongoing, Progressing     Problem: Renal Function Impairment (Acute Kidney Injury/Impairment)  Goal: Effective Renal Function  Outcome: Ongoing, Progressing     Problem: Fall Injury Risk  Goal: Absence of Fall and Fall-Related Injury  Outcome: Ongoing, Progressing     Problem: Skin Injury Risk Increased  Goal: Skin Health and Integrity  Outcome: Ongoing, Progressing

## 2023-12-01 NOTE — PLAN OF CARE
[facility] - Med Surg (3rd Fl)  Discharge Final Note    Primary Care Provider: Caroline Do NP    Expected Discharge Date: 12/1/2023    Final Discharge Note (most recent)       Final Note - 12/01/23 1145          Final Note    Assessment Type Final Discharge Note (P)      Anticipated Discharge Disposition Home-Health Care Svc (P)      Hospital Resources/Appts/Education Provided Appointments scheduled and added to AVS (P)         Post-Acute Status    Post-Acute Authorization Home Health (P)      Home Health Status Set-up Complete/Auth obtained (P)      Discharge Delays None known at this time (P)                      Important Message from Medicare             Contact Info       Caroline Do NP   Specialty: Family Medicine   Relationship: PCP - General    [address]  [place] [address]   Phone: [phone]       Next Steps: Go on 12/5/2023    Instructions: at 10:30 AM    Joe Laboy MD   Specialty: Cardiology    [address] [place] [address]   Phone: [phone]       Next Steps: Go on 12/8/2023    Instructions: at 9:30 AM          Patient will discharge home today with home health through [facility]. Follow up appointments scheduled above.

## 2023-12-02 LAB
BACTERIA BLD CULT: NORMAL
BACTERIA BLD CULT: NORMAL

## 2023-12-05 ENCOUNTER — LAB VISIT (OUTPATIENT)
Dept: LAB | Facility: HOSPITAL | Age: 83
End: 2023-12-05
Attending: NURSE PRACTITIONER
Payer: MEDICARE

## 2023-12-05 DIAGNOSIS — I50.9 HEART FAILURE, UNSPECIFIED: ICD-10-CM

## 2023-12-05 DIAGNOSIS — N17.9 ACUTE KIDNEY FAILURE, UNSPECIFIED: ICD-10-CM

## 2023-12-05 DIAGNOSIS — I50.9 HEART FAILURE, UNSPECIFIED: Primary | ICD-10-CM

## 2023-12-05 LAB
ALBUMIN SERPL BCP-MCNC: 2.8 G/DL (ref 3.5–5.2)
ALP SERPL-CCNC: 223 U/L (ref 55–135)
ALT SERPL W/O P-5'-P-CCNC: 37 U/L (ref 10–44)
ANION GAP SERPL CALC-SCNC: 10 MMOL/L (ref 8–16)
AST SERPL-CCNC: 45 U/L (ref 10–40)
BASOPHILS # BLD AUTO: 0.13 K/UL (ref 0–0.2)
BASOPHILS NFR BLD: 0.8 % (ref 0–1.9)
BILIRUB SERPL-MCNC: 0.4 MG/DL (ref 0.1–1)
BUN SERPL-MCNC: 30 MG/DL (ref 8–23)
CALCIUM SERPL-MCNC: 9.2 MG/DL (ref 8.7–10.5)
CHLORIDE SERPL-SCNC: 102 MMOL/L (ref 95–110)
CO2 SERPL-SCNC: 30 MMOL/L (ref 23–29)
CREAT SERPL-MCNC: 1.4 MG/DL (ref 0.5–1.4)
DIFFERENTIAL METHOD: ABNORMAL
EOSINOPHIL # BLD AUTO: 0.6 K/UL (ref 0–0.5)
EOSINOPHIL NFR BLD: 3.8 % (ref 0–8)
ERYTHROCYTE [DISTWIDTH] IN BLOOD BY AUTOMATED COUNT: 15.7 % (ref 11.5–14.5)
EST. GFR  (NO RACE VARIABLE): 38 ML/MIN/1.73 M^2
GLUCOSE SERPL-MCNC: 70 MG/DL (ref 70–110)
HCT VFR BLD AUTO: 30.8 % (ref 37–48.5)
HGB BLD-MCNC: 10.2 G/DL (ref 12–16)
IMM GRANULOCYTES # BLD AUTO: 0.46 K/UL (ref 0–0.04)
IMM GRANULOCYTES NFR BLD AUTO: 2.8 % (ref 0–0.5)
LYMPHOCYTES # BLD AUTO: 4.3 K/UL (ref 1–4.8)
LYMPHOCYTES NFR BLD: 25.9 % (ref 18–48)
MAGNESIUM SERPL-MCNC: 1.9 MG/DL (ref 1.6–2.6)
MCH RBC QN AUTO: 25.1 PG (ref 27–31)
MCHC RBC AUTO-ENTMCNC: 33.1 G/DL (ref 32–36)
MCV RBC AUTO: 76 FL (ref 82–98)
MONOCYTES # BLD AUTO: 1.1 K/UL (ref 0.3–1)
MONOCYTES NFR BLD: 6.6 % (ref 4–15)
NEUTROPHILS # BLD AUTO: 10 K/UL (ref 1.8–7.7)
NEUTROPHILS NFR BLD: 60.1 % (ref 38–73)
NRBC BLD-RTO: 0 /100 WBC
PLATELET # BLD AUTO: 291 K/UL (ref 150–450)
PMV BLD AUTO: 10.3 FL (ref 9.2–12.9)
POTASSIUM SERPL-SCNC: 4.7 MMOL/L (ref 3.5–5.1)
PROT SERPL-MCNC: 7.3 G/DL (ref 6–8.4)
RBC # BLD AUTO: 4.07 M/UL (ref 4–5.4)
SODIUM SERPL-SCNC: 142 MMOL/L (ref 136–145)
WBC # BLD AUTO: 16.59 K/UL (ref 3.9–12.7)

## 2023-12-05 PROCEDURE — 36415 COLL VENOUS BLD VENIPUNCTURE: CPT | Performed by: NURSE PRACTITIONER

## 2023-12-05 PROCEDURE — 80053 COMPREHEN METABOLIC PANEL: CPT | Performed by: NURSE PRACTITIONER

## 2023-12-05 PROCEDURE — 83735 ASSAY OF MAGNESIUM: CPT | Performed by: NURSE PRACTITIONER

## 2023-12-05 PROCEDURE — 85025 COMPLETE CBC W/AUTO DIFF WBC: CPT | Performed by: NURSE PRACTITIONER

## 2023-12-05 NOTE — PHYSICIAN QUERY
PT Name: Emily Alicia  MR #: [id number]     DOCUMENTATION CLARIFICATION     CDS/: Leydi Wasserman RN CDS              Contact information:[email or web address]  This form is a permanent document in the medical record.     Query Date: December 5, 2023    By submitting this query, we are merely seeking further clarification of documentation.  Please utilize your independent clinical judgment when addressing the question(s) below.  The Medical Record contains the following   Indicators   Supporting Clinical Findings Location in Medical Record     X Documentation of Respiratory Failure, ARDS Acute respiratory failure with hypoxia   Hypoxic Respiratory failure which is Acute.  she is not on home oxygen    H&P, RIVER Mesa PA-C/Dr. Duval, 11/28     X Subjective Respiratory Signs/Symptoms WONG, cough, SOB   Cardiology, NEEMA Sen, FRANSISCO/Dr. Laboy, 11/29     X Objective Respiratory Signs/Symptoms Lethargy, wheezing     H&P, RIVER Mesa PA-C/Dr. Duval, 11/28     X RR     O2 sat     O2 use 24 hr range  Resp:  [16-26] 24  SpO2:  [86 %-100 %] 97 %  O2 use 2 L NC    24 hr range  Resp:  [16-22] 16  SpO2:  [97 %-100 %] 97 %  O2 use 2 L NC    24 hr range  Resp:  [16-20] 16  SpO2:  [95 %-100 %] 97 %  O2 use 1-2 L NC   MARK&P, RIVER Mesa PA-C/Dr. Duval, 11/28          , RIVER Mesa PA-C/Dr. Duval, 11/29          , RIVER Mesa PA-C/Dr. Mondragon, 11/30    Blood Gas (ABG or VBG)       X Hypoxia/Hypercapnia Admitted for hypoxia secondary to pneumonia and pleural effusion    JEANETTE, FLOYD John/Dr. Jewell, 12/01    BiPAP/Intubation/Mechanical Ventilation      Home O2, Oxygen Dependence       X Acute/Chronic Illness Acute illness: sepsis, acute on chronic heart failure, acute resp failure wih hypoxia, ASCVD, HLD, LOWELL, insomnia,    H&P, RIVER Mesa PA-C/Dr. Duval, 11/28       X Treatment Supplemental oxygen    Duo neb treatments q 6 hrs while awake    IV abx for contributing DX   MARK&P, RIVER Mesa PA-C/Dr. Duval, 11/28      Other          The clinical guidelines noted are only a system guideline. It does not replace the providers clinical judgment.    [facility] Approved Diagnostic Criteria      Acute Respiratory Failure    Hypoxic: ABG pO2<60 mmHg or O2 sat of <91% on RA   AND/OR   Hypercapnic: ABG pCO2>50 mmHg with pH <7.35   AND   Respiratory symptoms documented (Subjective: SOB; Objective: Tachypnea, respiratory distress, increased work of breathing, unable to speak in complete sentences, labored breathing, use of accessory muscles, RR>26, cyanosis, dyspnea, wheezing, stridor, lethargy)      Chronic Respiratory Failure   Hypoxic: Continuous home oxygen    AND/OR   Hypercapnic: Normal pH with high CO2 (ex. COPD)      Acute on Chronic Respiratory Failure   Hypoxic: ABG pO2>10mmHg below baseline OR ABG pO2<60 mmHg OR SpO2<91% on usual home O2  OR O2>2L/min over baseline home O2   AND/OR   Hypercapnic: ABG pCO2>50 mmHg OR pCO2>10mmHg over baseline and pH <7.35   AND   Respiratory symptoms documented      Acute Respiratory Distress Syndrome (ARDS) - an acute, diffuse, inflammatory form of lung injury. Suspect with progressive dyspnea, hypoxemic respiratory failure, and bilateral alveolar infiltrates on chest imaging within 6 to 72 hours of an inciting event.   Acute Respiratory Distress - Generally describes less severe respiratory symptoms (tachypnea, in respiratory distress, increased work of breathing, unable to speak in complete sentences, labored breathing, use of accessory muscles, RR> 24, cyanosis, dyspnea, wheezing, stridor, lethargy) without sufficient measurements (pO2, SpO2, pH, and pCO2) to meet criteria for respiratory failure)   Acute Respiratory Insufficiency - Generally describes less severe respiratory symptoms and measurements (pO2, SpO2, pH, and pCO2) not meeting criteria for respiratory failure         Due to the conflicting clinical picture, please clinically validate the diagnosis of Acute respiratory failure  with hypoxia .    If validated, please provide additional clinical support for the diagnosis.     [x    ] Acute Respiratory Failure with hypoxia is not confirmed and/or it has been ruled out, other diagnosis ruled in.    [   ]   Hypoxemia only    [   ]   Other (please specify): _______________________.     [    ] Acute Respiratory Failure with hypoxia is not confirmed and/or it has been ruled out,   [    ] Acute Respiratory Failure with Hypoxia diagnosis is confirmed and additional clinical support/decision-making indicators for the diagnosis include (please specify):_______________________________________________     [    ] Other clarification (please specify): ___________________         Please document in your progress notes daily for the duration of treatment until resolved and include in your discharge summary.     Reference:    MARGARITA Garcia MD. (2020, March 13). Acute respiratory distress syndrome: Clinical features, diagnosis, and complications in adults ([id number] MING Banda MD & [id number] DI Chang MD, Eds.). Retrieved November 13, 2020, from https://[email or web address]-respiratory-distress-syndrome-clinical-features-diagnosis-and-complications-in-adults?search=ards&source=search_result&selectedTitle=1~150&usage_type=default&display_rank=1  Form No. [id number]

## 2023-12-06 DIAGNOSIS — Z78.0 MENOPAUSE: ICD-10-CM

## 2023-12-27 ENCOUNTER — TELEPHONE (OUTPATIENT)
Dept: INTERNAL MEDICINE | Facility: CLINIC | Age: 83
End: 2023-12-27
Payer: MEDICARE

## 2023-12-27 NOTE — TELEPHONE ENCOUNTER
----- Message from Trish Go sent at 12/27/2023 12:32 PM CST -----  Pt would like a call back trying to get antibiotic for wounds on butt    Can be reached at [phone]

## 2024-01-09 ENCOUNTER — HOSPITAL ENCOUNTER (EMERGENCY)
Facility: HOSPITAL | Age: 84
Discharge: HOME | End: 2024-01-09
Attending: STUDENT IN AN ORGANIZED HEALTH CARE EDUCATION/TRAINING PROGRAM
Payer: MEDICARE

## 2024-01-09 VITALS
HEIGHT: 69 IN | TEMPERATURE: 99 F | HEART RATE: 82 BPM | WEIGHT: 269 LBS | OXYGEN SATURATION: 99 % | SYSTOLIC BLOOD PRESSURE: 155 MMHG | RESPIRATION RATE: 18 BRPM | DIASTOLIC BLOOD PRESSURE: 83 MMHG | BODY MASS INDEX: 39.84 KG/M2

## 2024-01-09 DIAGNOSIS — L98.429 SKIN ULCER OF SACRUM, UNSPECIFIED ULCER STAGE: ICD-10-CM

## 2024-01-09 DIAGNOSIS — I10 HYPERTENSION: Primary | ICD-10-CM

## 2024-01-09 DIAGNOSIS — G91.2 NORMAL PRESSURE HYDROCEPHALUS: ICD-10-CM

## 2024-01-09 LAB
ALBUMIN SERPL BCP-MCNC: 3.1 G/DL (ref 3.5–5.2)
ALP SERPL-CCNC: 165 U/L (ref 55–135)
ALT SERPL W/O P-5'-P-CCNC: 17 U/L (ref 10–44)
ANION GAP SERPL CALC-SCNC: 10 MMOL/L (ref 8–16)
AST SERPL-CCNC: 19 U/L (ref 10–40)
BASOPHILS # BLD AUTO: 0.07 K/UL (ref 0–0.2)
BASOPHILS NFR BLD: 0.5 % (ref 0–1.9)
BILIRUB SERPL-MCNC: 0.4 MG/DL (ref 0.1–1)
BNP SERPL-MCNC: 97 PG/ML (ref 0–99)
BUN SERPL-MCNC: 29 MG/DL (ref 8–23)
CALCIUM SERPL-MCNC: 10.1 MG/DL (ref 8.7–10.5)
CHLORIDE SERPL-SCNC: 106 MMOL/L (ref 95–110)
CO2 SERPL-SCNC: 24 MMOL/L (ref 23–29)
CREAT SERPL-MCNC: 1 MG/DL (ref 0.5–1.4)
DIFFERENTIAL METHOD BLD: ABNORMAL
EOSINOPHIL # BLD AUTO: 0.3 K/UL (ref 0–0.5)
EOSINOPHIL NFR BLD: 2.4 % (ref 0–8)
ERYTHROCYTE [DISTWIDTH] IN BLOOD BY AUTOMATED COUNT: 16 % (ref 11.5–14.5)
EST. GFR  (NO RACE VARIABLE): 56 ML/MIN/1.73 M^2
GLUCOSE SERPL-MCNC: 103 MG/DL (ref 70–110)
HCT VFR BLD AUTO: 33.8 % (ref 37–48.5)
HGB BLD-MCNC: 11.1 G/DL (ref 12–16)
IMM GRANULOCYTES # BLD AUTO: 0.08 K/UL (ref 0–0.04)
IMM GRANULOCYTES NFR BLD AUTO: 0.6 % (ref 0–0.5)
LYMPHOCYTES # BLD AUTO: 3.7 K/UL (ref 1–4.8)
LYMPHOCYTES NFR BLD: 27.3 % (ref 18–48)
MCH RBC QN AUTO: 25.1 PG (ref 27–31)
MCHC RBC AUTO-ENTMCNC: 32.8 G/DL (ref 32–36)
MCV RBC AUTO: 77 FL (ref 82–98)
MONOCYTES # BLD AUTO: 1 K/UL (ref 0.3–1)
MONOCYTES NFR BLD: 7.6 % (ref 4–15)
NEUTROPHILS # BLD AUTO: 8.3 K/UL (ref 1.8–7.7)
NEUTROPHILS NFR BLD: 61.6 % (ref 38–73)
NRBC BLD-RTO: 0 /100 WBC
PLATELET # BLD AUTO: 211 K/UL (ref 150–450)
PMV BLD AUTO: 10.5 FL (ref 9.2–12.9)
POTASSIUM SERPL-SCNC: 4.6 MMOL/L (ref 3.5–5.1)
PROT SERPL-MCNC: 7.5 G/DL (ref 6–8.4)
RBC # BLD AUTO: 4.42 M/UL (ref 4–5.4)
SODIUM SERPL-SCNC: 140 MMOL/L (ref 136–145)
TROPONIN I SERPL DL<=0.01 NG/ML-MCNC: <0.006 NG/ML (ref 0–0.03)
WBC # BLD AUTO: 13.49 K/UL (ref 3.9–12.7)

## 2024-01-09 PROCEDURE — 36415 COLL VENOUS BLD VENIPUNCTURE: CPT | Performed by: STUDENT IN AN ORGANIZED HEALTH CARE EDUCATION/TRAINING PROGRAM

## 2024-01-09 PROCEDURE — 80053 COMPREHEN METABOLIC PANEL: CPT | Performed by: STUDENT IN AN ORGANIZED HEALTH CARE EDUCATION/TRAINING PROGRAM

## 2024-01-09 PROCEDURE — 93005 ELECTROCARDIOGRAM TRACING: CPT

## 2024-01-09 PROCEDURE — 96374 THER/PROPH/DIAG INJ IV PUSH: CPT

## 2024-01-09 PROCEDURE — 99285 EMERGENCY DEPT VISIT HI MDM: CPT | Mod: 25

## 2024-01-09 PROCEDURE — 84484 ASSAY OF TROPONIN QUANT: CPT | Performed by: STUDENT IN AN ORGANIZED HEALTH CARE EDUCATION/TRAINING PROGRAM

## 2024-01-09 PROCEDURE — 83880 ASSAY OF NATRIURETIC PEPTIDE: CPT | Performed by: STUDENT IN AN ORGANIZED HEALTH CARE EDUCATION/TRAINING PROGRAM

## 2024-01-09 PROCEDURE — 63600175 PHARM REV CODE 636 W HCPCS: Performed by: STUDENT IN AN ORGANIZED HEALTH CARE EDUCATION/TRAINING PROGRAM

## 2024-01-09 PROCEDURE — 85025 COMPLETE CBC W/AUTO DIFF WBC: CPT | Performed by: STUDENT IN AN ORGANIZED HEALTH CARE EDUCATION/TRAINING PROGRAM

## 2024-01-09 PROCEDURE — 93010 ELECTROCARDIOGRAM REPORT: CPT | Mod: ,,, | Performed by: INTERNAL MEDICINE

## 2024-01-09 RX ORDER — HYDRALAZINE HYDROCHLORIDE 20 MG/ML
10 INJECTION INTRAMUSCULAR; INTRAVENOUS
Status: COMPLETED | OUTPATIENT
Start: 2024-01-09 | End: 2024-01-09

## 2024-01-09 RX ADMIN — HYDRALAZINE HYDROCHLORIDE 10 MG: 20 INJECTION, SOLUTION INTRAMUSCULAR; INTRAVENOUS at 01:01

## 2024-01-09 NOTE — ED NOTES
Patient with urine soiled diaper. Patients perineal area cleansed with wet wipes and patient rediapered.

## 2024-01-09 NOTE — ED PROVIDER NOTES
Encounter Date: 1/9/2024    This document was partially completed using speech recognition software and may contain misspellings, grammatical errors, and/or unexpected word substitutions.       History     Chief Complaint   Patient presents with    Hypertension     83 year old female with a PMHx of anxiety, CHF, CKD, HTN, HLD, left eye blindness presents to the ED via EMS for hypertension. EMS reports home health assessed the patient at home and noticed her BP was high in the 190s systolic. She gave her morning meds and waited 1 hour but BP was still high so called EMS. Patient states her son's boyfriend put her medications out for her last night to take but the patient forgot them. She denies headache, numbness, weakness, slurred speech, chest pain, shortness of breath. She currently feels fine. She is bedbound.        Review of patient's allergies indicates:  No Known Allergies  Past Medical History:   Diagnosis Date    Anemia     Anxiety     Aortic valve stenosis     CHF (congestive heart failure)     Chronic kidney disease (CKD)     Diastolic heart failure     Dyslipidemia     Encounter for blood transfusion     Glaucoma (increased eye pressure)     LEFT EYE    HHD (hypertensive heart disease)     Hypertension     Osteoarthritis     Retina disorder, bilateral     Severe aortic stenosis 11/9/2016    TR (tricuspid regurgitation)     UTI (urinary tract infection)      Past Surgical History:   Procedure Laterality Date    CARDIAC CATHETERIZATION      CARDIAC VALVE SURGERY      CATARACT EXTRACTION W/ INTRAOCULAR LENS  IMPLANT, BILATERAL      CHOLECYSTECTOMY      COLONOSCOPY N/A 2/7/2021    Procedure: COLONOSCOPY;  Surgeon: Gaston Pablo MD;  Location: [facility];  Service: Endoscopy;  Laterality: N/A;    FLEXIBLE SIGMOIDOSCOPY N/A 2/4/2021    Procedure: SIGMOIDOSCOPY, FLEXIBLE;  Surgeon: Jarred Forrester MD;  Location: [facility];  Service: Gastroenterology;  Laterality: N/A;    HYSTERECTOMY      JOINT  REPLACEMENT      right knee    JOINT REPLACEMENT      left knee    KNEE SURGERY Left     SPINE SURGERY      lumbar    TRANSCATHETER AORTIC VALVE REPLACEMENT       Family History   Problem Relation Age of Onset    Stroke Mother     Diabetes Mother     Heart disease Father      Social History     Tobacco Use    Smoking status: Former     Current packs/day: 0.00     Types: Cigarettes     Quit date: 1982     Years since quittin.3    Smokeless tobacco: Never   Substance Use Topics    Alcohol use: No    Drug use: No     Review of Systems   Constitutional:  Negative for chills and fever.   HENT:  Negative for congestion, rhinorrhea and sneezing.    Eyes:  Negative for discharge and redness.   Respiratory:  Negative for cough and shortness of breath.    Cardiovascular:  Negative for chest pain and palpitations.   Gastrointestinal:  Negative for abdominal pain, diarrhea, nausea and vomiting.   Genitourinary:  Negative for dysuria, frequency, vaginal bleeding and vaginal discharge.   Musculoskeletal:  Negative for back pain and neck pain.   Skin:  Negative for rash and wound.   Neurological:  Negative for weakness, numbness and headaches.       Physical Exam     Initial Vitals [24 1053]   BP Pulse Resp Temp SpO2   (!) 238/109 94 20 98.2 °F (36.8 °C) 97 %      MAP       --         Physical Exam    Nursing note and vitals reviewed.  Constitutional: She appears well-developed. She is not diaphoretic. No distress.   HENT:   Head: Normocephalic and atraumatic.   Right Ear: External ear normal.   Left Ear: External ear normal.   Eyes: Right eye exhibits no discharge. Left eye exhibits no discharge. No scleral icterus.   Neck: Neck supple.   Cardiovascular:  Normal rate and regular rhythm.           Pulmonary/Chest: Breath sounds normal. No stridor. No respiratory distress. She has no wheezes. She has no rhonchi. She has no rales.   Abdominal: Abdomen is soft. There is no abdominal tenderness. There is no guarding.    Genitourinary:    Genitourinary Comments: Sacral ulcer noted (see picture)     Musculoskeletal:         General: No edema.      Cervical back: Neck supple.     Neurological: She is alert and oriented to person, place, and time. No cranial nerve deficit or sensory deficit. GCS eye subscore is 4. GCS verbal subscore is 5. GCS motor subscore is 6.   3/5 strength bilaterally in upper extremity (chronic per patient)  2/5 strength bilaterally in lower extremity (chronic per patient)   Skin: Skin is warm and dry. Capillary refill takes less than 2 seconds.   Psychiatric: She has a normal mood and affect.           ED Course   Procedures  Labs Reviewed   CBC W/ AUTO DIFFERENTIAL - Abnormal; Notable for the following components:       Result Value    WBC 13.49 (*)     Hemoglobin 11.1 (*)     Hematocrit 33.8 (*)     MCV 77 (*)     MCH 25.1 (*)     RDW 16.0 (*)     Immature Granulocytes 0.6 (*)     Gran # (ANC) 8.3 (*)     Immature Grans (Abs) 0.08 (*)     All other components within normal limits   COMPREHENSIVE METABOLIC PANEL - Abnormal; Notable for the following components:    BUN 29 (*)     Albumin 3.1 (*)     Alkaline Phosphatase 165 (*)     eGFR 56 (*)     All other components within normal limits   B-TYPE NATRIURETIC PEPTIDE   TROPONIN I     EKG Readings: (Independently Interpreted)   Previous EKG: Compared with most recent EKG Previous EKG Date: 11/27/2023.   NSR at 73 bpm. LAD. LBBB. No STEMI.     ECG Results              EKG 12-lead (Final result)  Result time 01/09/24 12:45:35      Final result by Interface, Lab [facility] (01/09/24 12:45:35)                   Narrative:    Test Reason : I10    Vent. Rate : 073 BPM     Atrial Rate : 073 BPM     P-R Int : 144 ms          QRS Dur : 126 ms      QT Int : 438 ms       P-R-T Axes : 049 -48 093 degrees     QTc Int : 482 ms    Normal sinus rhythm  Left axis deviation  Left bundle branch block  Abnormal ECG  When compared with ECG of 27-NOV-2023 15:10,  No significant  change was found  Confirmed by Surinder Arredondo MD ([id number]) on 1/9/2024 12:45:22 PM    Referred By: [email or web address]   SELF           Confirmed By:Surinder Arredondo MD                                  Imaging Results              CT Head Without Contrast (Final result)  Result time 01/09/24 12:09:53      Final result by Audrey Murphy MD (01/09/24 12:09:53)                   Impression:      Prominence of the ventricular system greater than expected for the degree of sulcation which could suggest normal pressure hydrocephalus.    Mild chronic microvascular ischemic disease.  No evidence for an acute infarction.      Electronically signed by: Audrey Murphy MD  Date:    01/09/2024  Time:    12:09               Narrative:    EXAMINATION:  CT HEAD WITHOUT CONTRAST    CLINICAL HISTORY:  Memory loss;    TECHNIQUE:  Low dose axial images were obtained through the head.  Coronal and sagittal reformations were also performed. Contrast was not administered.    COMPARISON:  12/13/2020    FINDINGS:  There is prominence of the ventricular system greater than expected for the degree of sulcation which could suggest normal pressure hydrocephalus.  No focal parenchymal hypoattenuation is seen to suggest an acute infarction and no intracranial hemorrhage is seen.  No extra-axial fluid collections.  No mass effect or midline shift.  The visualized paranasal sinuses including the mastoid air cells are clear.                                       X-Ray Chest AP Portable (Final result)  Result time 01/09/24 11:57:39      Final result by Audrey Murphy MD (01/09/24 11:57:39)                   Impression:      No acute abnormality.      Electronically signed by: Audrey Murphy MD  Date:    01/09/2024  Time:    11:57               Narrative:    EXAMINATION:  XR CHEST AP PORTABLE    CLINICAL HISTORY:  Essential (primary) hypertension    TECHNIQUE:  Single frontal view of the chest was performed.    COMPARISON:  11/27/2023    FINDINGS:  The  lungs are clear with normal appearance of pulmonary vasculature. No pleural effusion. No evident pneumothorax.    The cardiac silhouette is normal in size. The hilar and mediastinal contours are unremarkable.    Bones are intact.                                       Medications   hydrALAZINE injection 10 mg (10 mg Intravenous Given 1/9/24 1333)     Medical Decision Making  Ddx: HTN emergency, stroke, cardiac dysrhythmia, ACS, medical noncompliance, LOWELL, pulm edema    83 year old female with high blood pressure. Currently asymptomatic. Missed her meds last night. Given IV hydralazine with great improvement of her BP to 120/56. CT head with possible normal pressure hydrocephalus? Will refer to neuro (referral placed). Chronic leukocytosis noted. No fever. Attempted to call Lubna Monroe (Daughter) [phone] (Mobile)  x2 but no voicemail possible.  Patient updated of plan and advised to try and be more on top of her BP meds. She understands.    Amount and/or Complexity of Data Reviewed  Labs: ordered.  Radiology: ordered.    Risk  Prescription drug management.                                      Clinical Impression:  Final diagnoses:  [I10] Hypertension (Primary)  [G91.2] Normal pressure hydrocephalus          ED Disposition Condition    Discharge Stable          ED Prescriptions    None       Follow-up Information       Follow up With Specialties Details Why Contact Info    Maurizio Sandoval MD Neurology Schedule an appointment as soon as possible for a visit today  [address]  [facility] [address]  [phone]      Caroline Do NP Family Medicine Schedule an appointment as soon as possible for a visit in 3 days  [address] [facility] [address]  [phone]               Denver Peralta DO  01/09/24 [id number]

## 2024-01-09 NOTE — ED TRIAGE NOTES
To ED per HELENA with reports of high blood pressure this morning. Patients home health nurse was at her home for a visit this morning and administered her anti hypertensive medication which patient had not taken. [facility] health nurse reported that she waited 1 hour, but the blood pressure did not come down.

## 2024-01-24 ENCOUNTER — DOCUMENT SCAN (OUTPATIENT)
Dept: HOME HEALTH SERVICES | Facility: HOSPITAL | Age: 84
End: 2024-01-24
Payer: MEDICARE

## 2024-01-24 ENCOUNTER — EXTERNAL HOME HEALTH (OUTPATIENT)
Dept: HOME HEALTH SERVICES | Facility: HOSPITAL | Age: 84
End: 2024-01-24
Payer: MEDICARE

## 2024-01-25 ENCOUNTER — DOCUMENT SCAN (OUTPATIENT)
Dept: HOME HEALTH SERVICES | Facility: HOSPITAL | Age: 84
End: 2024-01-25
Payer: MEDICARE

## 2024-01-29 NOTE — ADDENDUM NOTE
Addended by: SAVANNA BELLO on: 1/29/2024 03:13 PM     Modules accepted: Orders, Level of Service

## 2024-02-03 PROCEDURE — G0179 MD RECERTIFICATION HHA PT: HCPCS | Mod: ,,, | Performed by: NURSE PRACTITIONER

## 2024-02-20 ENCOUNTER — TELEPHONE (OUTPATIENT)
Dept: INTERNAL MEDICINE | Facility: CLINIC | Age: 84
End: 2024-02-20
Payer: MEDICARE

## 2024-02-20 NOTE — TELEPHONE ENCOUNTER
----- Message from Jaja Harp MA sent at 2024  2:11 PM CST -----  Emily Alicia  MRN: [id number]  : 1940  PCP: Caroline Do  Home Phone      [phone]  Work Phone      Not on file.  Mobile          [phone]      MESSAGE:     Patient is calling requesting a refill on Gabapentin.     Last office Visit was 2020.  Patient states she is bed bound.    Please Advise:  [phone]    Send meds to [facility] ([place])

## 2024-02-22 RX ORDER — GABAPENTIN 300 MG/1
300 CAPSULE ORAL 2 TIMES DAILY
Qty: 180 CAPSULE | Refills: 0 | Status: SHIPPED | OUTPATIENT
Start: 2024-02-22

## 2024-02-26 ENCOUNTER — EXTERNAL HOME HEALTH (OUTPATIENT)
Dept: HOME HEALTH SERVICES | Facility: HOSPITAL | Age: 84
End: 2024-02-26
Payer: MEDICARE

## 2024-03-04 PROBLEM — J96.01 SEPSIS DUE TO STREPTOCOCCUS PNEUMONIAE WITH ACUTE HYPOXIC RESPIRATORY FAILURE: Status: RESOLVED | Noted: 2023-11-28 | Resolved: 2024-03-04

## 2024-03-04 PROBLEM — J96.01 ACUTE RESPIRATORY FAILURE WITH HYPOXIA: Status: RESOLVED | Noted: 2023-11-28 | Resolved: 2024-03-04

## 2024-03-04 PROBLEM — R65.20 SEPSIS DUE TO STREPTOCOCCUS PNEUMONIAE WITH ACUTE HYPOXIC RESPIRATORY FAILURE: Status: RESOLVED | Noted: 2023-11-28 | Resolved: 2024-03-04

## 2024-03-04 PROBLEM — A40.3 SEPSIS DUE TO STREPTOCOCCUS PNEUMONIAE WITH ACUTE HYPOXIC RESPIRATORY FAILURE: Status: RESOLVED | Noted: 2023-11-28 | Resolved: 2024-03-04

## 2024-03-26 ENCOUNTER — TELEPHONE (OUTPATIENT)
Dept: INTERNAL MEDICINE | Facility: CLINIC | Age: 84
End: 2024-03-26
Payer: MEDICARE

## 2024-03-26 NOTE — TELEPHONE ENCOUNTER
----- Message from Maile Guzman MA sent at 3/25/2024  2:04 PM CDT -----    ----- Message -----  From: Jaja Harp MA  Sent: 3/25/2024   1:22 PM CDT  To: Hi Harman    Emily Alicia  MRN: [id number]  : 1940  PCP: Caroline Do.  Home Phone      [phone]  Work Phone      Not on file.  Mobile          [phone]      MESSAGE:     Patient left voicemail requesting a mattress.     Please Advise:[phone]

## 2024-04-03 ENCOUNTER — DOCUMENT SCAN (OUTPATIENT)
Dept: HOME HEALTH SERVICES | Facility: HOSPITAL | Age: 84
End: 2024-04-03
Payer: MEDICARE

## 2024-04-03 ENCOUNTER — TELEPHONE (OUTPATIENT)
Dept: INTERNAL MEDICINE | Facility: CLINIC | Age: 84
End: 2024-04-03

## 2024-04-03 DIAGNOSIS — M48.062 SPINAL STENOSIS OF LUMBAR REGION WITH NEUROGENIC CLAUDICATION: Primary | ICD-10-CM

## 2024-04-03 PROCEDURE — G0179 MD RECERTIFICATION HHA PT: HCPCS | Mod: ,,, | Performed by: NURSE PRACTITIONER

## 2024-04-03 NOTE — TELEPHONE ENCOUNTER
----- Message from Maile Guzman MA sent at 4/3/2024  2:02 PM CDT -----    ----- Message -----  From: Jaja Harp MA  Sent: 4/3/2024   1:59 PM CDT  To: Hi Harman    Emily Alicia  MRN: [id number]  : 1940  PCP: Caroline Do.  Home Phone      [phone]  Work Phone      Not on file.  Mobile          [phone]      MESSAGE:     Patient needs new mattress for her hospital bed.     Please print order and fax to [facility]

## 2024-04-05 ENCOUNTER — DOCUMENT SCAN (OUTPATIENT)
Dept: HOME HEALTH SERVICES | Facility: HOSPITAL | Age: 84
End: 2024-04-05
Payer: MEDICARE

## 2024-04-18 ENCOUNTER — TELEPHONE (OUTPATIENT)
Dept: INTERNAL MEDICINE | Facility: CLINIC | Age: 84
End: 2024-04-18
Payer: MEDICARE

## 2024-04-18 NOTE — TELEPHONE ENCOUNTER
Pt informed she will need an updated visit with us since it has been 4 years since last office visit for this to be approved with insurance.

## 2024-04-18 NOTE — TELEPHONE ENCOUNTER
----- Message from Kylie Batista sent at 2024  9:41 AM CDT -----  Contact: pt  Emily Alicia  MRN: [id number]  : 1940  PCP: Caroline Do  Home Phone      [phone]  Work Phone      Not on file.  Mobile          [phone]      MESSAGE:     Pt is needing a refill of Ceraquil she states she used this at night to sleep. I did not see it on her meds list. She wants this sent to [facility] in [place].         Emily  [phone]

## 2024-04-18 NOTE — TELEPHONE ENCOUNTER
----- Message from Kylie Batista sent at 2024 11:27 AM CDT -----  Contact: Pt  Emily Alicia  MRN: [id number]  : 1940  PCP: Caroline Do  Home Phone      [phone]  Work Phone      Not on file.  Mobile          [phone]      MESSAGE:     Pt states she hasn't heard anything in regards to [facility] and her hospital mattress bed. Pt states she is bed ridden and would need an ambulance for transportation if she comes in for an appt.       Please advise   [phone]

## 2024-04-24 ENCOUNTER — EXTERNAL HOME HEALTH (OUTPATIENT)
Dept: HOME HEALTH SERVICES | Facility: HOSPITAL | Age: 84
End: 2024-04-24
Payer: MEDICARE

## 2024-05-17 ENCOUNTER — LAB VISIT (OUTPATIENT)
Dept: LAB | Facility: HOSPITAL | Age: 84
End: 2024-05-17
Attending: NURSE PRACTITIONER
Payer: MEDICARE

## 2024-05-17 DIAGNOSIS — R94.6 ABNORMAL RESULTS OF THYROID FUNCTION STUDIES: ICD-10-CM

## 2024-05-17 DIAGNOSIS — M17.0 PRIMARY OSTEOARTHRITIS OF BOTH KNEES: ICD-10-CM

## 2024-05-17 DIAGNOSIS — E11.9 TYPE 2 DIABETES MELLITUS: ICD-10-CM

## 2024-05-17 DIAGNOSIS — E66.9 OBESITY, UNSPECIFIED: ICD-10-CM

## 2024-05-17 DIAGNOSIS — I50.9 ACUTE HEART FAILURE: ICD-10-CM

## 2024-05-17 DIAGNOSIS — R73.09 ABNORMAL GLUCOSE: ICD-10-CM

## 2024-05-17 DIAGNOSIS — I50.32 CHRONIC DIASTOLIC CONGESTIVE HEART FAILURE: ICD-10-CM

## 2024-05-17 DIAGNOSIS — N18.9 CHRONIC KIDNEY DISEASE: ICD-10-CM

## 2024-05-17 DIAGNOSIS — N18.9 CHRONIC KIDNEY DISEASE: Primary | ICD-10-CM

## 2024-05-17 LAB
ALBUMIN SERPL BCP-MCNC: 2.7 G/DL (ref 3.5–5.2)
ALP SERPL-CCNC: 105 U/L (ref 55–135)
ALT SERPL W/O P-5'-P-CCNC: 7 U/L (ref 10–44)
ANION GAP SERPL CALC-SCNC: 11 MMOL/L (ref 8–16)
AST SERPL-CCNC: 10 U/L (ref 10–40)
BASOPHILS # BLD AUTO: 0.06 K/UL (ref 0–0.2)
BASOPHILS NFR BLD: 0.4 % (ref 0–1.9)
BILIRUB SERPL-MCNC: 0.3 MG/DL (ref 0.1–1)
BNP SERPL-MCNC: 71 PG/ML (ref 0–99)
BUN SERPL-MCNC: 25 MG/DL (ref 8–23)
CALCIUM SERPL-MCNC: 9 MG/DL (ref 8.7–10.5)
CHLORIDE SERPL-SCNC: 109 MMOL/L (ref 95–110)
CHOLEST SERPL-MCNC: 130 MG/DL (ref 120–199)
CHOLEST/HDLC SERPL: 3.3 {RATIO} (ref 2–5)
CO2 SERPL-SCNC: 23 MMOL/L (ref 23–29)
CREAT SERPL-MCNC: 0.9 MG/DL (ref 0.5–1.4)
DIFFERENTIAL METHOD BLD: ABNORMAL
EOSINOPHIL # BLD AUTO: 0.3 K/UL (ref 0–0.5)
EOSINOPHIL NFR BLD: 2.3 % (ref 0–8)
ERYTHROCYTE [DISTWIDTH] IN BLOOD BY AUTOMATED COUNT: 15.9 % (ref 11.5–14.5)
EST. GFR  (NO RACE VARIABLE): >60 ML/MIN/1.73 M^2
GLUCOSE SERPL-MCNC: 66 MG/DL (ref 70–110)
HCT VFR BLD AUTO: 29.7 % (ref 37–48.5)
HDLC SERPL-MCNC: 39 MG/DL (ref 40–75)
HDLC SERPL: 30 % (ref 20–50)
HGB BLD-MCNC: 9.7 G/DL (ref 12–16)
IMM GRANULOCYTES # BLD AUTO: 0.13 K/UL (ref 0–0.04)
IMM GRANULOCYTES NFR BLD AUTO: 0.9 % (ref 0–0.5)
LDLC SERPL CALC-MCNC: 75.4 MG/DL (ref 63–159)
LYMPHOCYTES # BLD AUTO: 4 K/UL (ref 1–4.8)
LYMPHOCYTES NFR BLD: 26.5 % (ref 18–48)
MCH RBC QN AUTO: 24.8 PG (ref 27–31)
MCHC RBC AUTO-ENTMCNC: 32.7 G/DL (ref 32–36)
MCV RBC AUTO: 76 FL (ref 82–98)
MONOCYTES # BLD AUTO: 1.1 K/UL (ref 0.3–1)
MONOCYTES NFR BLD: 7.6 % (ref 4–15)
NEUTROPHILS # BLD AUTO: 9.4 K/UL (ref 1.8–7.7)
NEUTROPHILS NFR BLD: 62.3 % (ref 38–73)
NONHDLC SERPL-MCNC: 91 MG/DL
NRBC BLD-RTO: 0 /100 WBC
PLATELET # BLD AUTO: 221 K/UL (ref 150–450)
PMV BLD AUTO: 11.3 FL (ref 9.2–12.9)
POTASSIUM SERPL-SCNC: 5 MMOL/L (ref 3.5–5.1)
PROT SERPL-MCNC: 6.6 G/DL (ref 6–8.4)
RBC # BLD AUTO: 3.91 M/UL (ref 4–5.4)
SODIUM SERPL-SCNC: 143 MMOL/L (ref 136–145)
T4 FREE SERPL-MCNC: 0.92 NG/DL (ref 0.71–1.51)
TRIGL SERPL-MCNC: 78 MG/DL (ref 30–150)
TSH SERPL DL<=0.005 MIU/L-ACNC: 1.28 UIU/ML (ref 0.4–4)
WBC # BLD AUTO: 15.03 K/UL (ref 3.9–12.7)

## 2024-05-17 PROCEDURE — 84443 ASSAY THYROID STIM HORMONE: CPT | Mod: HCNC

## 2024-05-17 PROCEDURE — 80053 COMPREHEN METABOLIC PANEL: CPT | Mod: HCNC

## 2024-05-17 PROCEDURE — 85025 COMPLETE CBC W/AUTO DIFF WBC: CPT | Mod: HCNC

## 2024-05-17 PROCEDURE — 80061 LIPID PANEL: CPT | Mod: HCNC

## 2024-05-17 PROCEDURE — 83880 ASSAY OF NATRIURETIC PEPTIDE: CPT | Mod: HCNC

## 2024-05-17 PROCEDURE — 84439 ASSAY OF FREE THYROXINE: CPT | Mod: HCNC

## 2024-06-25 ENCOUNTER — EXTERNAL HOME HEALTH (OUTPATIENT)
Dept: HOME HEALTH SERVICES | Facility: HOSPITAL | Age: 84
End: 2024-06-25
Payer: MEDICARE

## 2024-07-08 RX ORDER — DAPAGLIFLOZIN 10 MG/1
10 TABLET, FILM COATED ORAL DAILY
Qty: 30 TABLET | Refills: 0 | Status: CANCELLED | OUTPATIENT
Start: 2024-07-08

## 2024-07-09 NOTE — TELEPHONE ENCOUNTER
Refill Routing Note   Medication(s) are not appropriate for processing by [facility] for the following reason(s):        Non-participating provider    [facility] action(s):  Route               Appointments  past 12m or future 3m with PCP    Date Provider   Last Visit   Visit date not found Angel Pemberton MD   Next Visit   Visit date not found Angel Pemberton MD   ED visits in past 90 days: 0        Note composed:6:04 AM 07/09/2024

## 2024-08-09 ENCOUNTER — LAB VISIT (OUTPATIENT)
Dept: LAB | Facility: HOSPITAL | Age: 84
End: 2024-08-09
Attending: NURSE PRACTITIONER
Payer: MEDICARE

## 2024-08-09 DIAGNOSIS — E11.9 TYPE 2 DIABETES MELLITUS: ICD-10-CM

## 2024-08-09 LAB
ESTIMATED AVG GLUCOSE: 108 MG/DL (ref 68–131)
HBA1C MFR BLD: 5.4 % (ref 4–5.6)

## 2024-08-09 PROCEDURE — 83036 HEMOGLOBIN GLYCOSYLATED A1C: CPT

## 2024-08-15 RX ORDER — DAPAGLIFLOZIN 10 MG/1
10 TABLET, FILM COATED ORAL DAILY
Qty: 30 TABLET | Refills: 0 | Status: CANCELLED | OUTPATIENT
Start: 2024-07-08

## 2024-08-28 ENCOUNTER — EXTERNAL HOME HEALTH (OUTPATIENT)
Dept: HOME HEALTH SERVICES | Facility: HOSPITAL | Age: 84
End: 2024-08-28
Payer: MEDICARE

## 2024-10-17 ENCOUNTER — EXTERNAL HOME HEALTH (OUTPATIENT)
Dept: HOME HEALTH SERVICES | Facility: HOSPITAL | Age: 84
End: 2024-10-17
Payer: MEDICARE

## 2025-01-01 ENCOUNTER — LAB REQUISITION (OUTPATIENT)
Dept: LAB | Facility: HOSPITAL | Age: 85
End: 2025-01-01
Payer: MEDICARE

## 2025-01-01 ENCOUNTER — E-CONSULT (OUTPATIENT)
Dept: OTOLARYNGOLOGY | Facility: CLINIC | Age: 85
End: 2025-01-01
Payer: MEDICARE

## 2025-01-01 ENCOUNTER — HOSPITAL ENCOUNTER (INPATIENT)
Facility: HOSPITAL | Age: 85
LOS: 15 days | DRG: 872 | End: 2025-07-17
Admitting: FAMILY MEDICINE
Payer: MEDICARE

## 2025-01-01 VITALS
WEIGHT: 239.19 LBS | DIASTOLIC BLOOD PRESSURE: 60 MMHG | SYSTOLIC BLOOD PRESSURE: 122 MMHG | BODY MASS INDEX: 35.43 KG/M2 | HEIGHT: 69 IN | OXYGEN SATURATION: 91 % | TEMPERATURE: 97 F | RESPIRATION RATE: 20 BRPM

## 2025-01-01 DIAGNOSIS — I13.0 HYPERTENSIVE HEART AND CHRONIC KIDNEY DISEASE WITH HEART FAILURE AND STAGE 1 THROUGH STAGE 4 CHRONIC KIDNEY DISEASE, OR UNSPECIFIED CHRONIC KIDNEY DISEASE: ICD-10-CM

## 2025-01-01 DIAGNOSIS — L89.620 PRESSURE ULCER OF LEFT HEEL, UNSTAGEABLE: Primary | ICD-10-CM

## 2025-01-01 DIAGNOSIS — A41.9 SEPSIS WITHOUT ACUTE ORGAN DYSFUNCTION, DUE TO UNSPECIFIED ORGANISM: ICD-10-CM

## 2025-01-01 DIAGNOSIS — A41.9 SEPSIS: ICD-10-CM

## 2025-01-01 DIAGNOSIS — L89.894 PRESSURE ULCER OF OTHER SITE, STAGE 4: ICD-10-CM

## 2025-01-01 DIAGNOSIS — L89.153 PRESSURE INJURY OF SACRAL REGION, STAGE 3: ICD-10-CM

## 2025-01-01 DIAGNOSIS — M86.172 OTHER ACUTE OSTEOMYELITIS, LEFT ANKLE AND FOOT: ICD-10-CM

## 2025-01-01 DIAGNOSIS — N30.00 ACUTE CYSTITIS WITHOUT HEMATURIA: ICD-10-CM

## 2025-01-01 DIAGNOSIS — R13.19 ESOPHAGEAL DYSPHAGIA: Primary | ICD-10-CM

## 2025-01-01 LAB
ABO + RH BLD: NORMAL
ABSOLUTE EOSINOPHIL (OHS): 0.02 K/UL
ABSOLUTE EOSINOPHIL (OHS): 0.06 K/UL
ABSOLUTE EOSINOPHIL (OHS): 0.09 K/UL
ABSOLUTE EOSINOPHIL (OHS): 0.11 K/UL
ABSOLUTE EOSINOPHIL (OHS): 0.18 K/UL
ABSOLUTE EOSINOPHIL (OHS): 0.31 K/UL
ABSOLUTE EOSINOPHIL (OHS): 0.41 K/UL
ABSOLUTE EOSINOPHIL (OHS): 0.42 K/UL
ABSOLUTE EOSINOPHIL (OHS): 0.43 K/UL
ABSOLUTE EOSINOPHIL (OHS): 0.46 K/UL
ABSOLUTE EOSINOPHIL (OHS): 0.46 K/UL
ABSOLUTE EOSINOPHIL (OHS): 0.47 K/UL
ABSOLUTE EOSINOPHIL (OHS): 0.48 K/UL
ABSOLUTE MONOCYTE (OHS): 1.02 K/UL (ref 0.3–1)
ABSOLUTE MONOCYTE (OHS): 1.16 K/UL (ref 0.3–1)
ABSOLUTE MONOCYTE (OHS): 1.33 K/UL (ref 0.3–1)
ABSOLUTE MONOCYTE (OHS): 1.36 K/UL (ref 0.3–1)
ABSOLUTE MONOCYTE (OHS): 1.4 K/UL (ref 0.3–1)
ABSOLUTE MONOCYTE (OHS): 1.4 K/UL (ref 0.3–1)
ABSOLUTE MONOCYTE (OHS): 1.45 K/UL (ref 0.3–1)
ABSOLUTE MONOCYTE (OHS): 1.48 K/UL (ref 0.3–1)
ABSOLUTE MONOCYTE (OHS): 1.49 K/UL (ref 0.3–1)
ABSOLUTE MONOCYTE (OHS): 1.57 K/UL (ref 0.3–1)
ABSOLUTE MONOCYTE (OHS): 1.68 K/UL (ref 0.3–1)
ABSOLUTE MONOCYTE (OHS): 1.78 K/UL (ref 0.3–1)
ABSOLUTE MONOCYTE (OHS): 2.11 K/UL (ref 0.3–1)
ABSOLUTE NEUTROPHIL COUNT (OHS): 11.22 K/UL (ref 1.8–7.7)
ABSOLUTE NEUTROPHIL COUNT (OHS): 11.5 K/UL (ref 1.8–7.7)
ABSOLUTE NEUTROPHIL COUNT (OHS): 12.25 K/UL (ref 1.8–7.7)
ABSOLUTE NEUTROPHIL COUNT (OHS): 12.52 K/UL (ref 1.8–7.7)
ABSOLUTE NEUTROPHIL COUNT (OHS): 13.1 K/UL (ref 1.8–7.7)
ABSOLUTE NEUTROPHIL COUNT (OHS): 13.53 K/UL (ref 1.8–7.7)
ABSOLUTE NEUTROPHIL COUNT (OHS): 14.32 K/UL (ref 1.8–7.7)
ABSOLUTE NEUTROPHIL COUNT (OHS): 17.45 K/UL (ref 1.8–7.7)
ABSOLUTE NEUTROPHIL COUNT (OHS): 20.9 K/UL (ref 1.8–7.7)
ABSOLUTE NEUTROPHIL COUNT (OHS): 24.94 K/UL (ref 1.8–7.7)
ABSOLUTE NEUTROPHIL COUNT (OHS): 25.7 K/UL (ref 1.8–7.7)
ABSOLUTE NEUTROPHIL COUNT (OHS): 30.55 K/UL (ref 1.8–7.7)
ABSOLUTE NEUTROPHIL COUNT (OHS): 8.37 K/UL (ref 1.8–7.7)
ABSOLUTE NEUTROPHIL MANUAL (OHS): 11.3 K/UL
ABSOLUTE NEUTROPHIL MANUAL (OHS): 11.6 K/UL
ABSOLUTE NEUTROPHIL MANUAL (OHS): 14 K/UL
ABSOLUTE NEUTROPHIL MANUAL (OHS): 22.8 K/UL
ABSOLUTE NEUTROPHIL MANUAL (OHS): 43.3 K/UL
ALBUMIN SERPL BCP-MCNC: 1.4 G/DL (ref 3.5–5.2)
ALBUMIN SERPL BCP-MCNC: 1.6 G/DL (ref 3.5–5.2)
ALBUMIN SERPL BCP-MCNC: 1.7 G/DL (ref 3.5–5.2)
ALBUMIN SERPL BCP-MCNC: 1.8 G/DL (ref 3.5–5.2)
ALBUMIN SERPL BCP-MCNC: 1.8 G/DL (ref 3.5–5.2)
ALBUMIN SERPL BCP-MCNC: 1.9 G/DL (ref 3.5–5.2)
ALBUMIN SERPL BCP-MCNC: 2.5 G/DL (ref 3.5–5.2)
ALP SERPL-CCNC: 102 UNIT/L (ref 40–150)
ALP SERPL-CCNC: 102 UNIT/L (ref 40–150)
ALP SERPL-CCNC: 110 UNIT/L (ref 40–150)
ALP SERPL-CCNC: 122 UNIT/L (ref 40–150)
ALP SERPL-CCNC: 125 UNIT/L (ref 40–150)
ALP SERPL-CCNC: 126 UNIT/L (ref 40–150)
ALP SERPL-CCNC: 131 UNIT/L (ref 40–150)
ALP SERPL-CCNC: 136 UNIT/L (ref 40–150)
ALP SERPL-CCNC: 155 UNIT/L (ref 40–150)
ALP SERPL-CCNC: 177 UNIT/L (ref 40–150)
ALP SERPL-CCNC: 88 UNIT/L (ref 40–150)
ALT SERPL W/O P-5'-P-CCNC: 10 UNIT/L (ref 10–44)
ALT SERPL W/O P-5'-P-CCNC: 11 UNIT/L (ref 10–44)
ALT SERPL W/O P-5'-P-CCNC: 11 UNIT/L (ref 10–44)
ALT SERPL W/O P-5'-P-CCNC: 12 UNIT/L (ref 10–44)
ALT SERPL W/O P-5'-P-CCNC: 13 UNIT/L (ref 10–44)
ALT SERPL W/O P-5'-P-CCNC: 24 UNIT/L (ref 10–44)
ALT SERPL W/O P-5'-P-CCNC: 7 UNIT/L (ref 10–44)
ALT SERPL W/O P-5'-P-CCNC: 8 UNIT/L (ref 10–44)
ALT SERPL W/O P-5'-P-CCNC: 9 UNIT/L (ref 10–44)
ANION GAP (OHS): 10 MMOL/L (ref 8–16)
ANION GAP (OHS): 11 MMOL/L (ref 8–16)
ANION GAP (OHS): 12 MMOL/L (ref 8–16)
ANION GAP (OHS): 12 MMOL/L (ref 8–16)
ANION GAP (OHS): 7 MMOL/L (ref 8–16)
ANION GAP (OHS): 7 MMOL/L (ref 8–16)
ANION GAP (OHS): 8 MMOL/L (ref 8–16)
ANION GAP (OHS): 8 MMOL/L (ref 8–16)
ANION GAP (OHS): 9 MMOL/L (ref 8–16)
ANISOCYTOSIS BLD QL SMEAR: SLIGHT
ANISOCYTOSIS BLD QL SMEAR: SLIGHT
AST SERPL-CCNC: 10 UNIT/L (ref 11–45)
AST SERPL-CCNC: 11 UNIT/L (ref 11–45)
AST SERPL-CCNC: 11 UNIT/L (ref 11–45)
AST SERPL-CCNC: 13 UNIT/L (ref 11–45)
AST SERPL-CCNC: 13 UNIT/L (ref 11–45)
AST SERPL-CCNC: 14 UNIT/L (ref 11–45)
AST SERPL-CCNC: 15 UNIT/L (ref 11–45)
AST SERPL-CCNC: 16 UNIT/L (ref 11–45)
AST SERPL-CCNC: 26 UNIT/L (ref 11–45)
BACTERIA #/AREA URNS HPF: ABNORMAL /HPF
BACTERIA BLD CULT: NORMAL
BACTERIA UR CULT: ABNORMAL
BASOPHILS # BLD AUTO: 0.09 K/UL
BASOPHILS # BLD AUTO: 0.09 K/UL
BASOPHILS # BLD AUTO: 0.11 K/UL
BASOPHILS # BLD AUTO: 0.11 K/UL
BASOPHILS # BLD AUTO: 0.13 K/UL
BASOPHILS # BLD AUTO: 0.13 K/UL
BASOPHILS # BLD AUTO: 0.14 K/UL
BASOPHILS # BLD AUTO: 0.15 K/UL
BASOPHILS # BLD AUTO: 0.16 K/UL
BASOPHILS # BLD AUTO: 0.17 K/UL
BASOPHILS # BLD AUTO: 0.2 K/UL
BASOPHILS NFR BLD AUTO: 0.3 %
BASOPHILS NFR BLD AUTO: 0.3 %
BASOPHILS NFR BLD AUTO: 0.4 %
BASOPHILS NFR BLD AUTO: 0.4 %
BASOPHILS NFR BLD AUTO: 0.6 %
BASOPHILS NFR BLD AUTO: 0.6 %
BASOPHILS NFR BLD AUTO: 0.7 %
BASOPHILS NFR BLD AUTO: 0.8 %
BASOPHILS NFR BLD AUTO: 0.9 %
BILIRUB SERPL-MCNC: 0.2 MG/DL (ref 0.1–1)
BILIRUB SERPL-MCNC: 0.3 MG/DL (ref 0.1–1)
BILIRUB SERPL-MCNC: 0.4 MG/DL (ref 0.1–1)
BILIRUB SERPL-MCNC: 0.4 MG/DL (ref 0.1–1)
BILIRUB SERPL-MCNC: 0.5 MG/DL (ref 0.1–1)
BILIRUB SERPL-MCNC: 0.6 MG/DL (ref 0.1–1)
BILIRUB SERPL-MCNC: 0.7 MG/DL (ref 0.1–1)
BILIRUB SERPL-MCNC: 0.9 MG/DL (ref 0.1–1)
BILIRUB SERPL-MCNC: 0.9 MG/DL (ref 0.1–1)
BILIRUB UR QL STRIP.AUTO: NEGATIVE
BLD PROD TYP BPU: NORMAL
BLOOD UNIT EXPIRATION DATE: NORMAL
BLOOD UNIT TYPE CODE: 600
BNP SERPL-MCNC: 385 PG/ML (ref 0–99)
BUN SERPL-MCNC: 29 MG/DL (ref 8–23)
BUN SERPL-MCNC: 29 MG/DL (ref 8–23)
BUN SERPL-MCNC: 32 MG/DL (ref 8–23)
BUN SERPL-MCNC: 43 MG/DL (ref 8–23)
BUN SERPL-MCNC: 45 MG/DL (ref 8–23)
BUN SERPL-MCNC: 47 MG/DL (ref 8–23)
BUN SERPL-MCNC: 48 MG/DL (ref 8–23)
BUN SERPL-MCNC: 49 MG/DL (ref 8–23)
BUN SERPL-MCNC: 50 MG/DL (ref 8–23)
BUN SERPL-MCNC: 51 MG/DL (ref 8–23)
BUN SERPL-MCNC: 52 MG/DL (ref 8–23)
BUN SERPL-MCNC: 52 MG/DL (ref 8–23)
BUN SERPL-MCNC: 53 MG/DL (ref 8–23)
BUN SERPL-MCNC: 53 MG/DL (ref 8–23)
BUN SERPL-MCNC: 59 MG/DL (ref 8–23)
BUN SERPL-MCNC: 65 MG/DL (ref 8–23)
BUN SERPL-MCNC: 67 MG/DL (ref 8–23)
BUN SERPL-MCNC: 68 MG/DL (ref 8–23)
BUN SERPL-MCNC: 69 MG/DL (ref 8–23)
CALCIUM SERPL-MCNC: 7.4 MG/DL (ref 8.7–10.5)
CALCIUM SERPL-MCNC: 7.5 MG/DL (ref 8.7–10.5)
CALCIUM SERPL-MCNC: 7.6 MG/DL (ref 8.7–10.5)
CALCIUM SERPL-MCNC: 7.6 MG/DL (ref 8.7–10.5)
CALCIUM SERPL-MCNC: 7.7 MG/DL (ref 8.7–10.5)
CALCIUM SERPL-MCNC: 7.8 MG/DL (ref 8.7–10.5)
CALCIUM SERPL-MCNC: 7.8 MG/DL (ref 8.7–10.5)
CALCIUM SERPL-MCNC: 7.9 MG/DL (ref 8.7–10.5)
CALCIUM SERPL-MCNC: 7.9 MG/DL (ref 8.7–10.5)
CALCIUM SERPL-MCNC: 8 MG/DL (ref 8.7–10.5)
CALCIUM SERPL-MCNC: 8.1 MG/DL (ref 8.7–10.5)
CALCIUM SERPL-MCNC: 8.3 MG/DL (ref 8.7–10.5)
CALCIUM SERPL-MCNC: 8.4 MG/DL (ref 8.7–10.5)
CALCIUM SERPL-MCNC: 8.4 MG/DL (ref 8.7–10.5)
CALCIUM SERPL-MCNC: 8.5 MG/DL (ref 8.7–10.5)
CALCIUM SERPL-MCNC: 8.6 MG/DL (ref 8.7–10.5)
CALCIUM SERPL-MCNC: 8.6 MG/DL (ref 8.7–10.5)
CHLORIDE SERPL-SCNC: 104 MMOL/L (ref 95–110)
CHLORIDE SERPL-SCNC: 105 MMOL/L (ref 95–110)
CHLORIDE SERPL-SCNC: 105 MMOL/L (ref 95–110)
CHLORIDE SERPL-SCNC: 106 MMOL/L (ref 95–110)
CHLORIDE SERPL-SCNC: 107 MMOL/L (ref 95–110)
CHLORIDE SERPL-SCNC: 108 MMOL/L (ref 95–110)
CHLORIDE SERPL-SCNC: 109 MMOL/L (ref 95–110)
CHLORIDE SERPL-SCNC: 109 MMOL/L (ref 95–110)
CHLORIDE SERPL-SCNC: 112 MMOL/L (ref 95–110)
CLARITY UR: ABNORMAL
CO2 SERPL-SCNC: 19 MMOL/L (ref 23–29)
CO2 SERPL-SCNC: 19 MMOL/L (ref 23–29)
CO2 SERPL-SCNC: 20 MMOL/L (ref 23–29)
CO2 SERPL-SCNC: 20 MMOL/L (ref 23–29)
CO2 SERPL-SCNC: 21 MMOL/L (ref 23–29)
CO2 SERPL-SCNC: 22 MMOL/L (ref 23–29)
CO2 SERPL-SCNC: 23 MMOL/L (ref 23–29)
CO2 SERPL-SCNC: 24 MMOL/L (ref 23–29)
COLOR UR AUTO: YELLOW
CREAT SERPL-MCNC: 1.1 MG/DL (ref 0.5–1.4)
CREAT SERPL-MCNC: 1.3 MG/DL (ref 0.5–1.4)
CREAT SERPL-MCNC: 1.4 MG/DL (ref 0.5–1.4)
CREAT SERPL-MCNC: 1.5 MG/DL (ref 0.5–1.4)
CREAT SERPL-MCNC: 1.6 MG/DL (ref 0.5–1.4)
CREAT SERPL-MCNC: 1.6 MG/DL (ref 0.5–1.4)
CREAT SERPL-MCNC: 1.7 MG/DL (ref 0.5–1.4)
CREAT SERPL-MCNC: 1.7 MG/DL (ref 0.5–1.4)
CREAT SERPL-MCNC: 1.8 MG/DL (ref 0.5–1.4)
CREAT SERPL-MCNC: 1.9 MG/DL (ref 0.5–1.4)
CREAT SERPL-MCNC: 2 MG/DL (ref 0.5–1.4)
CREAT SERPL-MCNC: 2 MG/DL (ref 0.5–1.4)
CREAT SERPL-MCNC: 2.1 MG/DL (ref 0.5–1.4)
CREAT UR-MCNC: 102.4 MG/DL (ref 15–325)
CROSSMATCH INTERPRETATION: NORMAL
CRP SERPL-MCNC: 60.9 MG/L
DIRECT COOMBS (DAT): NORMAL
DISPENSE STATUS: NORMAL
EOSINOPHIL NFR BLD MANUAL: 2 % (ref 0–8)
EOSINOPHIL NFR BLD MANUAL: 4 % (ref 0–8)
ERYTHROCYTE [DISTWIDTH] IN BLOOD BY AUTOMATED COUNT: 15.5 % (ref 11.5–14.5)
ERYTHROCYTE [DISTWIDTH] IN BLOOD BY AUTOMATED COUNT: 15.5 % (ref 11.5–14.5)
ERYTHROCYTE [DISTWIDTH] IN BLOOD BY AUTOMATED COUNT: 15.6 % (ref 11.5–14.5)
ERYTHROCYTE [DISTWIDTH] IN BLOOD BY AUTOMATED COUNT: 15.6 % (ref 11.5–14.5)
ERYTHROCYTE [DISTWIDTH] IN BLOOD BY AUTOMATED COUNT: 15.7 % (ref 11.5–14.5)
ERYTHROCYTE [DISTWIDTH] IN BLOOD BY AUTOMATED COUNT: 15.7 % (ref 11.5–14.5)
ERYTHROCYTE [DISTWIDTH] IN BLOOD BY AUTOMATED COUNT: 15.8 % (ref 11.5–14.5)
ERYTHROCYTE [DISTWIDTH] IN BLOOD BY AUTOMATED COUNT: 15.9 % (ref 11.5–14.5)
ERYTHROCYTE [DISTWIDTH] IN BLOOD BY AUTOMATED COUNT: 15.9 % (ref 11.5–14.5)
ERYTHROCYTE [DISTWIDTH] IN BLOOD BY AUTOMATED COUNT: 16.5 % (ref 11.5–14.5)
ERYTHROCYTE [DISTWIDTH] IN BLOOD BY AUTOMATED COUNT: 17.2 % (ref 11.5–14.5)
ERYTHROCYTE [DISTWIDTH] IN BLOOD BY AUTOMATED COUNT: 17.8 % (ref 11.5–14.5)
ERYTHROCYTE [DISTWIDTH] IN BLOOD BY AUTOMATED COUNT: 18.4 % (ref 11.5–14.5)
ERYTHROCYTE [DISTWIDTH] IN BLOOD BY AUTOMATED COUNT: 18.6 % (ref 11.5–14.5)
ERYTHROCYTE [DISTWIDTH] IN BLOOD BY AUTOMATED COUNT: 20.8 % (ref 11.5–14.5)
ERYTHROCYTE [DISTWIDTH] IN BLOOD BY AUTOMATED COUNT: 21.1 % (ref 11.5–14.5)
ERYTHROCYTE [DISTWIDTH] IN BLOOD BY AUTOMATED COUNT: 22 % (ref 11.5–14.5)
ERYTHROCYTE [DISTWIDTH] IN BLOOD BY AUTOMATED COUNT: 22.4 % (ref 11.5–14.5)
ERYTHROCYTE [SEDIMENTATION RATE] IN BLOOD BY PHOTOMETRIC METHOD: >120 MM/HR
FERRITIN SERPL-MCNC: 238 NG/ML (ref 20–300)
FOLATE SERPL-MCNC: 4.4 NG/ML (ref 4–24)
GFR SERPLBLD CREATININE-BSD FMLA CKD-EPI: 23 ML/MIN/1.73/M2
GFR SERPLBLD CREATININE-BSD FMLA CKD-EPI: 24 ML/MIN/1.73/M2
GFR SERPLBLD CREATININE-BSD FMLA CKD-EPI: 24 ML/MIN/1.73/M2
GFR SERPLBLD CREATININE-BSD FMLA CKD-EPI: 26 ML/MIN/1.73/M2
GFR SERPLBLD CREATININE-BSD FMLA CKD-EPI: 28 ML/MIN/1.73/M2
GFR SERPLBLD CREATININE-BSD FMLA CKD-EPI: 30 ML/MIN/1.73/M2
GFR SERPLBLD CREATININE-BSD FMLA CKD-EPI: 30 ML/MIN/1.73/M2
GFR SERPLBLD CREATININE-BSD FMLA CKD-EPI: 32 ML/MIN/1.73/M2
GFR SERPLBLD CREATININE-BSD FMLA CKD-EPI: 32 ML/MIN/1.73/M2
GFR SERPLBLD CREATININE-BSD FMLA CKD-EPI: 34 ML/MIN/1.73/M2
GFR SERPLBLD CREATININE-BSD FMLA CKD-EPI: 37 ML/MIN/1.73/M2
GFR SERPLBLD CREATININE-BSD FMLA CKD-EPI: 41 ML/MIN/1.73/M2
GFR SERPLBLD CREATININE-BSD FMLA CKD-EPI: 50 ML/MIN/1.73/M2
GLUCOSE SERPL-MCNC: 101 MG/DL (ref 70–110)
GLUCOSE SERPL-MCNC: 102 MG/DL (ref 70–110)
GLUCOSE SERPL-MCNC: 103 MG/DL (ref 70–110)
GLUCOSE SERPL-MCNC: 104 MG/DL (ref 70–110)
GLUCOSE SERPL-MCNC: 105 MG/DL (ref 70–110)
GLUCOSE SERPL-MCNC: 106 MG/DL (ref 70–110)
GLUCOSE SERPL-MCNC: 109 MG/DL (ref 70–110)
GLUCOSE SERPL-MCNC: 111 MG/DL (ref 70–110)
GLUCOSE SERPL-MCNC: 112 MG/DL (ref 70–110)
GLUCOSE SERPL-MCNC: 113 MG/DL (ref 70–110)
GLUCOSE SERPL-MCNC: 143 MG/DL (ref 70–110)
GLUCOSE SERPL-MCNC: 83 MG/DL (ref 70–110)
GLUCOSE SERPL-MCNC: 88 MG/DL (ref 70–110)
GLUCOSE SERPL-MCNC: 88 MG/DL (ref 70–110)
GLUCOSE SERPL-MCNC: 93 MG/DL (ref 70–110)
GLUCOSE SERPL-MCNC: 95 MG/DL (ref 70–110)
GLUCOSE SERPL-MCNC: 96 MG/DL (ref 70–110)
GLUCOSE SERPL-MCNC: 99 MG/DL (ref 70–110)
GLUCOSE SERPL-MCNC: 99 MG/DL (ref 70–110)
GLUCOSE UR QL STRIP: NEGATIVE
HAPTOGLOB SERPL-MCNC: 398 MG/DL (ref 30–250)
HCT VFR BLD AUTO: 20.3 % (ref 37–48.5)
HCT VFR BLD AUTO: 20.6 % (ref 37–48.5)
HCT VFR BLD AUTO: 21.1 % (ref 37–48.5)
HCT VFR BLD AUTO: 22.7 % (ref 37–48.5)
HCT VFR BLD AUTO: 23 % (ref 37–48.5)
HCT VFR BLD AUTO: 23.2 % (ref 37–48.5)
HCT VFR BLD AUTO: 23.2 % (ref 37–48.5)
HCT VFR BLD AUTO: 24.1 % (ref 37–48.5)
HCT VFR BLD AUTO: 24.2 % (ref 37–48.5)
HCT VFR BLD AUTO: 26.5 % (ref 37–48.5)
HCT VFR BLD AUTO: 27 % (ref 37–48.5)
HCT VFR BLD AUTO: 27.6 % (ref 37–48.5)
HCT VFR BLD AUTO: 27.7 % (ref 37–48.5)
HCT VFR BLD AUTO: 30.6 % (ref 37–48.5)
HCT VFR BLD AUTO: 31.5 % (ref 37–48.5)
HCT VFR BLD AUTO: 34.8 % (ref 37–48.5)
HGB BLD-MCNC: 10.2 GM/DL (ref 12–16)
HGB BLD-MCNC: 10.7 GM/DL (ref 12–16)
HGB BLD-MCNC: 11.6 GM/DL (ref 12–16)
HGB BLD-MCNC: 6.8 GM/DL (ref 12–16)
HGB BLD-MCNC: 6.8 GM/DL (ref 12–16)
HGB BLD-MCNC: 7.2 GM/DL (ref 12–16)
HGB BLD-MCNC: 7.6 GM/DL (ref 12–16)
HGB BLD-MCNC: 7.6 GM/DL (ref 12–16)
HGB BLD-MCNC: 7.7 GM/DL (ref 12–16)
HGB BLD-MCNC: 7.8 GM/DL (ref 12–16)
HGB BLD-MCNC: 8.1 GM/DL (ref 12–16)
HGB BLD-MCNC: 8.2 GM/DL (ref 12–16)
HGB BLD-MCNC: 8.6 GM/DL (ref 12–16)
HGB BLD-MCNC: 9.1 GM/DL (ref 12–16)
HGB BLD-MCNC: 9.2 GM/DL (ref 12–16)
HGB BLD-MCNC: 9.4 GM/DL (ref 12–16)
HGB UR QL STRIP: ABNORMAL
HOLD SPECIMEN: NORMAL
HYPOCHROMIA BLD QL SMEAR: NORMAL
IMM GRANULOCYTES # BLD AUTO: 0.2 K/UL (ref 0–0.04)
IMM GRANULOCYTES # BLD AUTO: 0.29 K/UL (ref 0–0.04)
IMM GRANULOCYTES # BLD AUTO: 0.41 K/UL (ref 0–0.04)
IMM GRANULOCYTES # BLD AUTO: 0.59 K/UL (ref 0–0.04)
IMM GRANULOCYTES # BLD AUTO: 0.64 K/UL (ref 0–0.04)
IMM GRANULOCYTES # BLD AUTO: 0.65 K/UL (ref 0–0.04)
IMM GRANULOCYTES # BLD AUTO: 0.66 K/UL (ref 0–0.04)
IMM GRANULOCYTES # BLD AUTO: 0.73 K/UL (ref 0–0.04)
IMM GRANULOCYTES # BLD AUTO: 0.82 K/UL (ref 0–0.04)
IMM GRANULOCYTES # BLD AUTO: 0.82 K/UL (ref 0–0.04)
IMM GRANULOCYTES # BLD AUTO: 0.85 K/UL (ref 0–0.04)
IMM GRANULOCYTES # BLD AUTO: 0.91 K/UL (ref 0–0.04)
IMM GRANULOCYTES # BLD AUTO: 0.94 K/UL (ref 0–0.04)
IMM GRANULOCYTES NFR BLD AUTO: 1.4 % (ref 0–0.5)
IMM GRANULOCYTES NFR BLD AUTO: 1.4 % (ref 0–0.5)
IMM GRANULOCYTES NFR BLD AUTO: 2.1 % (ref 0–0.5)
IMM GRANULOCYTES NFR BLD AUTO: 2.6 % (ref 0–0.5)
IMM GRANULOCYTES NFR BLD AUTO: 2.6 % (ref 0–0.5)
IMM GRANULOCYTES NFR BLD AUTO: 2.7 % (ref 0–0.5)
IMM GRANULOCYTES NFR BLD AUTO: 3 % (ref 0–0.5)
IMM GRANULOCYTES NFR BLD AUTO: 3.2 % (ref 0–0.5)
IMM GRANULOCYTES NFR BLD AUTO: 3.2 % (ref 0–0.5)
IMM GRANULOCYTES NFR BLD AUTO: 3.3 % (ref 0–0.5)
IMM GRANULOCYTES NFR BLD AUTO: 3.3 % (ref 0–0.5)
IMM GRANULOCYTES NFR BLD AUTO: 3.4 % (ref 0–0.5)
IMM GRANULOCYTES NFR BLD AUTO: 3.9 % (ref 0–0.5)
INDIRECT COOMBS: NORMAL
INDIRECT COOMBS: NORMAL
IRON SATN MFR SERPL: 23 % (ref 20–50)
IRON SERPL-MCNC: 30 UG/DL (ref 30–160)
KETONES UR QL STRIP: NEGATIVE
LACTATE SERPL-SCNC: 0.9 MMOL/L (ref 0.5–2.2)
LACTATE SERPL-SCNC: 1.3 MMOL/L (ref 0.5–2.2)
LACTATE SERPL-SCNC: 1.8 MMOL/L (ref 0.5–2.2)
LACTATE SERPL-SCNC: 1.9 MMOL/L (ref 0.5–2.2)
LARGE/GIANT PLATELETS (OHS): PRESENT
LDH SERPL-CCNC: 404 U/L (ref 110–260)
LEUKOCYTE ESTERASE UR QL STRIP: NEGATIVE
LYMPHOCYTES # BLD AUTO: 2.47 K/UL (ref 1–4.8)
LYMPHOCYTES # BLD AUTO: 2.7 K/UL (ref 1–4.8)
LYMPHOCYTES # BLD AUTO: 3.18 K/UL (ref 1–4.8)
LYMPHOCYTES # BLD AUTO: 3.58 K/UL (ref 1–4.8)
LYMPHOCYTES # BLD AUTO: 3.88 K/UL (ref 1–4.8)
LYMPHOCYTES # BLD AUTO: 4.14 K/UL (ref 1–4.8)
LYMPHOCYTES # BLD AUTO: 4.15 K/UL (ref 1–4.8)
LYMPHOCYTES # BLD AUTO: 4.32 K/UL (ref 1–4.8)
LYMPHOCYTES # BLD AUTO: 4.34 K/UL (ref 1–4.8)
LYMPHOCYTES # BLD AUTO: 4.75 K/UL (ref 1–4.8)
LYMPHOCYTES # BLD AUTO: 4.81 K/UL (ref 1–4.8)
LYMPHOCYTES # BLD AUTO: 5.03 K/UL (ref 1–4.8)
LYMPHOCYTES # BLD AUTO: 5.18 K/UL (ref 1–4.8)
LYMPHOCYTES NFR BLD MANUAL: 29 % (ref 18–48)
LYMPHOCYTES NFR BLD MANUAL: 31 % (ref 18–48)
LYMPHOCYTES NFR BLD MANUAL: 35 % (ref 18–48)
LYMPHOCYTES NFR BLD MANUAL: 6 % (ref 18–48)
LYMPHOCYTES NFR BLD MANUAL: 9 % (ref 18–48)
MAGNESIUM SERPL-MCNC: 1.5 MG/DL (ref 1.6–2.6)
MAGNESIUM SERPL-MCNC: 1.7 MG/DL (ref 1.6–2.6)
MAGNESIUM SERPL-MCNC: 1.8 MG/DL (ref 1.6–2.6)
MAGNESIUM SERPL-MCNC: 1.9 MG/DL (ref 1.6–2.6)
MAGNESIUM SERPL-MCNC: 1.9 MG/DL (ref 1.6–2.6)
MCH RBC QN AUTO: 23.2 PG (ref 27–31)
MCH RBC QN AUTO: 23.2 PG (ref 27–31)
MCH RBC QN AUTO: 23.3 PG (ref 27–31)
MCH RBC QN AUTO: 23.4 PG (ref 27–31)
MCH RBC QN AUTO: 23.5 PG (ref 27–31)
MCH RBC QN AUTO: 23.5 PG (ref 27–31)
MCH RBC QN AUTO: 23.6 PG (ref 27–31)
MCH RBC QN AUTO: 23.6 PG (ref 27–31)
MCH RBC QN AUTO: 23.7 PG (ref 27–31)
MCH RBC QN AUTO: 23.8 PG (ref 27–31)
MCH RBC QN AUTO: 23.9 PG (ref 27–31)
MCH RBC QN AUTO: 24.2 PG (ref 27–31)
MCH RBC QN AUTO: 24.5 PG (ref 27–31)
MCH RBC QN AUTO: 24.5 PG (ref 27–31)
MCH RBC QN AUTO: 25.4 PG (ref 27–31)
MCH RBC QN AUTO: 25.6 PG (ref 27–31)
MCH RBC QN AUTO: 25.6 PG (ref 27–31)
MCH RBC QN AUTO: 26.1 PG (ref 27–31)
MCHC RBC AUTO-ENTMCNC: 32.5 G/DL (ref 32–36)
MCHC RBC AUTO-ENTMCNC: 33 G/DL (ref 32–36)
MCHC RBC AUTO-ENTMCNC: 33 G/DL (ref 32–36)
MCHC RBC AUTO-ENTMCNC: 33.2 G/DL (ref 32–36)
MCHC RBC AUTO-ENTMCNC: 33.3 G/DL (ref 32–36)
MCHC RBC AUTO-ENTMCNC: 33.5 G/DL (ref 32–36)
MCHC RBC AUTO-ENTMCNC: 33.6 G/DL (ref 32–36)
MCHC RBC AUTO-ENTMCNC: 33.6 G/DL (ref 32–36)
MCHC RBC AUTO-ENTMCNC: 33.7 G/DL (ref 32–36)
MCHC RBC AUTO-ENTMCNC: 33.9 G/DL (ref 32–36)
MCHC RBC AUTO-ENTMCNC: 33.9 G/DL (ref 32–36)
MCHC RBC AUTO-ENTMCNC: 34 G/DL (ref 32–36)
MCHC RBC AUTO-ENTMCNC: 34.1 G/DL (ref 32–36)
MCV RBC AUTO: 69 FL (ref 82–98)
MCV RBC AUTO: 70 FL (ref 82–98)
MCV RBC AUTO: 71 FL (ref 82–98)
MCV RBC AUTO: 72 FL (ref 82–98)
MCV RBC AUTO: 73 FL (ref 82–98)
MCV RBC AUTO: 75 FL (ref 82–98)
MCV RBC AUTO: 76 FL (ref 82–98)
MCV RBC AUTO: 77 FL (ref 82–98)
MCV RBC AUTO: 77 FL (ref 82–98)
METAMYELOCYTES NFR BLD MANUAL: 1 %
METAMYELOCYTES NFR BLD MANUAL: 1 %
METAMYELOCYTES NFR BLD MANUAL: 2 %
MICROSCOPIC COMMENT: ABNORMAL
MONOCYTES NFR BLD MANUAL: 2 % (ref 4–15)
MONOCYTES NFR BLD MANUAL: 2 % (ref 4–15)
MONOCYTES NFR BLD MANUAL: 5 % (ref 4–15)
MONOCYTES NFR BLD MANUAL: 5 % (ref 4–15)
MONOCYTES NFR BLD MANUAL: 6 % (ref 4–15)
MYELOCYTES NFR BLD MANUAL: 2 %
MYELOCYTES NFR BLD MANUAL: 3 %
NEUTROPHILS NFR BLD MANUAL: 50 % (ref 38–73)
NEUTROPHILS NFR BLD MANUAL: 58 % (ref 38–73)
NEUTROPHILS NFR BLD MANUAL: 60 % (ref 38–73)
NEUTROPHILS NFR BLD MANUAL: 85 % (ref 38–73)
NEUTROPHILS NFR BLD MANUAL: 85 % (ref 38–73)
NEUTS BAND NFR BLD MANUAL: 1 %
NEUTS BAND NFR BLD MANUAL: 7 %
NEUTS BAND NFR BLD MANUAL: 9 %
NITRITE UR QL STRIP: NEGATIVE
NUCLEATED RBC (/100WBC) (OHS): 0 /100 WBC
OB PNL STL: NEGATIVE
OB PNL STL: NEGATIVE
OHS QRS DURATION: 108 MS
OHS QTC CALCULATION: 480 MS
PATHOLOGIST REVIEW - CBC/DIFF (OHS): NORMAL
PH UR STRIP: 5 [PH]
PLATELET # BLD AUTO: 145 K/UL (ref 150–450)
PLATELET # BLD AUTO: 150 K/UL (ref 150–450)
PLATELET # BLD AUTO: 156 K/UL (ref 150–450)
PLATELET # BLD AUTO: 157 K/UL (ref 150–450)
PLATELET # BLD AUTO: 162 K/UL (ref 150–450)
PLATELET # BLD AUTO: 191 K/UL (ref 150–450)
PLATELET # BLD AUTO: 198 K/UL (ref 150–450)
PLATELET # BLD AUTO: 201 K/UL (ref 150–450)
PLATELET # BLD AUTO: 207 K/UL (ref 150–450)
PLATELET # BLD AUTO: 215 K/UL (ref 150–450)
PLATELET # BLD AUTO: 235 K/UL (ref 150–450)
PLATELET # BLD AUTO: 238 K/UL (ref 150–450)
PLATELET # BLD AUTO: 241 K/UL (ref 150–450)
PLATELET # BLD AUTO: 248 K/UL (ref 150–450)
PLATELET # BLD AUTO: 256 K/UL (ref 150–450)
PLATELET # BLD AUTO: 257 K/UL (ref 150–450)
PLATELET # BLD AUTO: 264 K/UL (ref 150–450)
PLATELET # BLD AUTO: 284 K/UL (ref 150–450)
PLATELET BLD QL SMEAR: ABNORMAL
PLATELET BLD QL SMEAR: ABNORMAL
PLATELET BLD QL SMEAR: NORMAL
PMV BLD AUTO: 10.3 FL (ref 9.2–12.9)
PMV BLD AUTO: 10.5 FL (ref 9.2–12.9)
PMV BLD AUTO: 10.6 FL (ref 9.2–12.9)
PMV BLD AUTO: 10.7 FL (ref 9.2–12.9)
PMV BLD AUTO: 10.9 FL (ref 9.2–12.9)
PMV BLD AUTO: 11 FL (ref 9.2–12.9)
PMV BLD AUTO: 11.1 FL (ref 9.2–12.9)
PMV BLD AUTO: 11.1 FL (ref 9.2–12.9)
POCT GLUCOSE: 106 MG/DL (ref 70–110)
POCT GLUCOSE: 116 MG/DL (ref 70–110)
POCT GLUCOSE: 119 MG/DL (ref 70–110)
POCT GLUCOSE: 123 MG/DL (ref 70–110)
POCT GLUCOSE: 135 MG/DL (ref 70–110)
POCT GLUCOSE: 136 MG/DL (ref 70–110)
POTASSIUM SERPL-SCNC: 4.6 MMOL/L (ref 3.5–5.1)
POTASSIUM SERPL-SCNC: 4.6 MMOL/L (ref 3.5–5.1)
POTASSIUM SERPL-SCNC: 4.8 MMOL/L (ref 3.5–5.1)
POTASSIUM SERPL-SCNC: 4.9 MMOL/L (ref 3.5–5.1)
POTASSIUM SERPL-SCNC: 5 MMOL/L (ref 3.5–5.1)
POTASSIUM SERPL-SCNC: 5 MMOL/L (ref 3.5–5.1)
POTASSIUM SERPL-SCNC: 5.1 MMOL/L (ref 3.5–5.1)
POTASSIUM SERPL-SCNC: 5.2 MMOL/L (ref 3.5–5.1)
POTASSIUM SERPL-SCNC: 5.2 MMOL/L (ref 3.5–5.1)
POTASSIUM SERPL-SCNC: 5.3 MMOL/L (ref 3.5–5.1)
POTASSIUM SERPL-SCNC: 5.4 MMOL/L (ref 3.5–5.1)
POTASSIUM SERPL-SCNC: 5.7 MMOL/L (ref 3.5–5.1)
POTASSIUM SERPL-SCNC: 5.9 MMOL/L (ref 3.5–5.1)
POTASSIUM SERPL-SCNC: 5.9 MMOL/L (ref 3.5–5.1)
POTASSIUM SERPL-SCNC: 6.1 MMOL/L (ref 3.5–5.1)
POTASSIUM SERPL-SCNC: 6.2 MMOL/L (ref 3.5–5.1)
POTASSIUM SERPL-SCNC: 6.2 MMOL/L (ref 3.5–5.1)
PROT SERPL-MCNC: 5.2 GM/DL (ref 6–8.4)
PROT SERPL-MCNC: 5.4 GM/DL (ref 6–8.4)
PROT SERPL-MCNC: 5.8 GM/DL (ref 6–8.4)
PROT SERPL-MCNC: 6.2 GM/DL (ref 6–8.4)
PROT SERPL-MCNC: 6.5 GM/DL (ref 6–8.4)
PROT SERPL-MCNC: 6.5 GM/DL (ref 6–8.4)
PROT SERPL-MCNC: 6.6 GM/DL (ref 6–8.4)
PROT SERPL-MCNC: 6.8 GM/DL (ref 6–8.4)
PROT SERPL-MCNC: 7.3 GM/DL (ref 6–8.4)
PROT UR QL STRIP: NEGATIVE
RBC # BLD AUTO: 2.84 M/UL (ref 4–5.4)
RBC # BLD AUTO: 2.88 M/UL (ref 4–5.4)
RBC # BLD AUTO: 3.06 M/UL (ref 4–5.4)
RBC # BLD AUTO: 3.18 M/UL (ref 4–5.4)
RBC # BLD AUTO: 3.21 M/UL (ref 4–5.4)
RBC # BLD AUTO: 3.23 M/UL (ref 4–5.4)
RBC # BLD AUTO: 3.28 M/UL (ref 4–5.4)
RBC # BLD AUTO: 3.3 M/UL (ref 4–5.4)
RBC # BLD AUTO: 3.31 M/UL (ref 4–5.4)
RBC # BLD AUTO: 3.34 M/UL (ref 4–5.4)
RBC # BLD AUTO: 3.48 M/UL (ref 4–5.4)
RBC # BLD AUTO: 3.6 M/UL (ref 4–5.4)
RBC # BLD AUTO: 3.71 M/UL (ref 4–5.4)
RBC # BLD AUTO: 3.76 M/UL (ref 4–5.4)
RBC # BLD AUTO: 3.88 M/UL (ref 4–5.4)
RBC # BLD AUTO: 3.99 M/UL (ref 4–5.4)
RBC # BLD AUTO: 4.18 M/UL (ref 4–5.4)
RBC # BLD AUTO: 4.56 M/UL (ref 4–5.4)
RBC #/AREA URNS HPF: 3 /HPF (ref 0–4)
RELATIVE EOSINOPHIL (OHS): 0.1 %
RELATIVE EOSINOPHIL (OHS): 0.3 %
RELATIVE EOSINOPHIL (OHS): 0.9 %
RELATIVE EOSINOPHIL (OHS): 1.4 %
RELATIVE EOSINOPHIL (OHS): 1.6 %
RELATIVE EOSINOPHIL (OHS): 2.1 %
RELATIVE EOSINOPHIL (OHS): 2.2 %
RELATIVE EOSINOPHIL (OHS): 2.2 %
RELATIVE EOSINOPHIL (OHS): 2.5 %
RELATIVE LYMPHOCYTE (OHS): 12.9 % (ref 18–48)
RELATIVE LYMPHOCYTE (OHS): 19.6 % (ref 18–48)
RELATIVE LYMPHOCYTE (OHS): 20 % (ref 18–48)
RELATIVE LYMPHOCYTE (OHS): 20.2 % (ref 18–48)
RELATIVE LYMPHOCYTE (OHS): 21.7 % (ref 18–48)
RELATIVE LYMPHOCYTE (OHS): 23.1 % (ref 18–48)
RELATIVE LYMPHOCYTE (OHS): 24.1 % (ref 18–48)
RELATIVE LYMPHOCYTE (OHS): 25 % (ref 18–48)
RELATIVE LYMPHOCYTE (OHS): 25.6 % (ref 18–48)
RELATIVE LYMPHOCYTE (OHS): 25.8 % (ref 18–48)
RELATIVE LYMPHOCYTE (OHS): 8.7 % (ref 18–48)
RELATIVE LYMPHOCYTE (OHS): 9 % (ref 18–48)
RELATIVE LYMPHOCYTE (OHS): 9.5 % (ref 18–48)
RELATIVE MONOCYTE (OHS): 10.4 % (ref 4–15)
RELATIVE MONOCYTE (OHS): 3.2 % (ref 4–15)
RELATIVE MONOCYTE (OHS): 4.5 % (ref 4–15)
RELATIVE MONOCYTE (OHS): 4.8 % (ref 4–15)
RELATIVE MONOCYTE (OHS): 5.8 % (ref 4–15)
RELATIVE MONOCYTE (OHS): 5.9 % (ref 4–15)
RELATIVE MONOCYTE (OHS): 6.5 % (ref 4–15)
RELATIVE MONOCYTE (OHS): 6.6 % (ref 4–15)
RELATIVE MONOCYTE (OHS): 7.3 % (ref 4–15)
RELATIVE MONOCYTE (OHS): 7.5 % (ref 4–15)
RELATIVE MONOCYTE (OHS): 7.5 % (ref 4–15)
RELATIVE MONOCYTE (OHS): 8.4 % (ref 4–15)
RELATIVE MONOCYTE (OHS): 9.8 % (ref 4–15)
RELATIVE NEUTROPHIL (OHS): 59.8 % (ref 38–73)
RELATIVE NEUTROPHIL (OHS): 60.1 % (ref 38–73)
RELATIVE NEUTROPHIL (OHS): 61.3 % (ref 38–73)
RELATIVE NEUTROPHIL (OHS): 62.2 % (ref 38–73)
RELATIVE NEUTROPHIL (OHS): 65.3 % (ref 38–73)
RELATIVE NEUTROPHIL (OHS): 65.7 % (ref 38–73)
RELATIVE NEUTROPHIL (OHS): 66.3 % (ref 38–73)
RELATIVE NEUTROPHIL (OHS): 67.9 % (ref 38–73)
RELATIVE NEUTROPHIL (OHS): 68.3 % (ref 38–73)
RELATIVE NEUTROPHIL (OHS): 79.6 % (ref 38–73)
RELATIVE NEUTROPHIL (OHS): 80.8 % (ref 38–73)
RELATIVE NEUTROPHIL (OHS): 83.1 % (ref 38–73)
RELATIVE NEUTROPHIL (OHS): 83.2 % (ref 38–73)
RETICS/RBC NFR AUTO: 1.5 % (ref 0.5–2.5)
RETICS/RBC NFR AUTO: 1.9 % (ref 0.5–2.5)
RH BLD: NORMAL
RH BLD: NORMAL
SMUDGE CELLS BLD QL SMEAR: PRESENT
SODIUM SERPL-SCNC: 135 MMOL/L (ref 136–145)
SODIUM SERPL-SCNC: 136 MMOL/L (ref 136–145)
SODIUM SERPL-SCNC: 137 MMOL/L (ref 136–145)
SODIUM SERPL-SCNC: 138 MMOL/L (ref 136–145)
SODIUM SERPL-SCNC: 138 MMOL/L (ref 136–145)
SODIUM SERPL-SCNC: 139 MMOL/L (ref 136–145)
SODIUM SERPL-SCNC: 140 MMOL/L (ref 136–145)
SODIUM SERPL-SCNC: 141 MMOL/L (ref 136–145)
SODIUM SERPL-SCNC: 146 MMOL/L (ref 136–145)
SODIUM UR-SCNC: 73 MMOL/L (ref 20–250)
SP GR UR STRIP: 1.01
SPECIMEN OUTDATE: NORMAL
SPECIMEN OUTDATE: NORMAL
SQUAMOUS #/AREA URNS HPF: 3 /HPF
TARGETS BLD QL SMEAR: ABNORMAL
TARGETS BLD QL SMEAR: NORMAL
TIBC SERPL-MCNC: 133 UG/DL (ref 250–450)
TOXIC GRANULES BLD QL SMEAR: PRESENT
TRANSFERRIN SERPL-MCNC: 90 MG/DL (ref 200–375)
UNIT NUMBER: NORMAL
UROBILINOGEN UR STRIP-ACNC: NEGATIVE EU/DL
VIT B12 SERPL-MCNC: 939 PG/ML (ref 210–950)
WBC # BLD AUTO: 14 K/UL (ref 3.9–12.7)
WBC # BLD AUTO: 18.67 K/UL (ref 3.9–12.7)
WBC # BLD AUTO: 18.76 K/UL (ref 3.9–12.7)
WBC # BLD AUTO: 19.08 K/UL (ref 3.9–12.7)
WBC # BLD AUTO: 19.18 K/UL (ref 3.9–12.7)
WBC # BLD AUTO: 19.71 K/UL (ref 3.9–12.7)
WBC # BLD AUTO: 19.78 K/UL (ref 3.9–12.7)
WBC # BLD AUTO: 19.98 K/UL (ref 3.9–12.7)
WBC # BLD AUTO: 20.1 K/UL (ref 3.9–12.7)
WBC # BLD AUTO: 21.6 K/UL (ref 3.9–12.7)
WBC # BLD AUTO: 22.94 K/UL (ref 3.9–12.7)
WBC # BLD AUTO: 25.7 K/UL (ref 3.9–12.7)
WBC # BLD AUTO: 25.88 K/UL (ref 3.9–12.7)
WBC # BLD AUTO: 26.77 K/UL (ref 3.9–12.7)
WBC # BLD AUTO: 30.02 K/UL (ref 3.9–12.7)
WBC # BLD AUTO: 32.29 K/UL (ref 3.9–12.7)
WBC # BLD AUTO: 36.71 K/UL (ref 3.9–12.7)
WBC # BLD AUTO: 47.11 K/UL (ref 3.9–12.7)
WBC #/AREA URNS HPF: >100 /HPF (ref 0–5)
WBC TOXIC VACUOLES BLD QL SMEAR: PRESENT

## 2025-01-01 PROCEDURE — 86900 BLOOD TYPING SEROLOGIC ABO: CPT | Performed by: INTERNAL MEDICINE

## 2025-01-01 PROCEDURE — 80048 BASIC METABOLIC PNL TOTAL CA: CPT | Performed by: INTERNAL MEDICINE

## 2025-01-01 PROCEDURE — 63600175 PHARM REV CODE 636 W HCPCS: Performed by: PHYSICIAN ASSISTANT

## 2025-01-01 PROCEDURE — 25000003 PHARM REV CODE 250: Performed by: STUDENT IN AN ORGANIZED HEALTH CARE EDUCATION/TRAINING PROGRAM

## 2025-01-01 PROCEDURE — 25000003 PHARM REV CODE 250: Performed by: FAMILY MEDICINE

## 2025-01-01 PROCEDURE — 99900035 HC TECH TIME PER 15 MIN (STAT)

## 2025-01-01 PROCEDURE — 83735 ASSAY OF MAGNESIUM: CPT | Performed by: PHYSICIAN ASSISTANT

## 2025-01-01 PROCEDURE — 25000003 PHARM REV CODE 250: Performed by: INTERNAL MEDICINE

## 2025-01-01 PROCEDURE — 36415 COLL VENOUS BLD VENIPUNCTURE: CPT | Performed by: PHYSICIAN ASSISTANT

## 2025-01-01 PROCEDURE — 99233 SBSQ HOSP IP/OBS HIGH 50: CPT | Mod: ,,, | Performed by: PHYSICIAN ASSISTANT

## 2025-01-01 PROCEDURE — 83605 ASSAY OF LACTIC ACID: CPT | Performed by: NURSE PRACTITIONER

## 2025-01-01 PROCEDURE — 94761 N-INVAS EAR/PLS OXIMETRY MLT: CPT

## 2025-01-01 PROCEDURE — 36415 COLL VENOUS BLD VENIPUNCTURE: CPT | Performed by: INTERNAL MEDICINE

## 2025-01-01 PROCEDURE — 84466 ASSAY OF TRANSFERRIN: CPT | Performed by: INTERNAL MEDICINE

## 2025-01-01 PROCEDURE — 63600175 PHARM REV CODE 636 W HCPCS: Performed by: FAMILY MEDICINE

## 2025-01-01 PROCEDURE — 85060 BLOOD SMEAR INTERPRETATION: CPT | Mod: ,,, | Performed by: PATHOLOGY

## 2025-01-01 PROCEDURE — 86880 COOMBS TEST DIRECT: CPT | Performed by: INTERNAL MEDICINE

## 2025-01-01 PROCEDURE — 80048 BASIC METABOLIC PNL TOTAL CA: CPT | Performed by: PHYSICIAN ASSISTANT

## 2025-01-01 PROCEDURE — 21400001 HC TELEMETRY ROOM

## 2025-01-01 PROCEDURE — 02HV33Z INSERTION OF INFUSION DEVICE INTO SUPERIOR VENA CAVA, PERCUTANEOUS APPROACH: ICD-10-PCS | Performed by: SURGERY

## 2025-01-01 PROCEDURE — 36556 INSERT NON-TUNNEL CV CATH: CPT | Mod: RT,,, | Performed by: SURGERY

## 2025-01-01 PROCEDURE — 82607 VITAMIN B-12: CPT | Performed by: INTERNAL MEDICINE

## 2025-01-01 PROCEDURE — 99900031 HC PATIENT EDUCATION (STAT)

## 2025-01-01 PROCEDURE — 99238 HOSP IP/OBS DSCHRG MGMT 30/<: CPT | Mod: ,,, | Performed by: PHYSICIAN ASSISTANT

## 2025-01-01 PROCEDURE — 85025 COMPLETE CBC W/AUTO DIFF WBC: CPT | Performed by: STUDENT IN AN ORGANIZED HEALTH CARE EDUCATION/TRAINING PROGRAM

## 2025-01-01 PROCEDURE — 83880 ASSAY OF NATRIURETIC PEPTIDE: CPT | Performed by: FAMILY MEDICINE

## 2025-01-01 PROCEDURE — 96375 TX/PRO/DX INJ NEW DRUG ADDON: CPT

## 2025-01-01 PROCEDURE — 87186 SC STD MICRODIL/AGAR DIL: CPT | Performed by: INTERNAL MEDICINE

## 2025-01-01 PROCEDURE — 25000003 PHARM REV CODE 250: Performed by: PHYSICIAN ASSISTANT

## 2025-01-01 PROCEDURE — 80053 COMPREHEN METABOLIC PANEL: CPT | Performed by: PHYSICIAN ASSISTANT

## 2025-01-01 PROCEDURE — 80053 COMPREHEN METABOLIC PANEL: CPT | Performed by: INTERNAL MEDICINE

## 2025-01-01 PROCEDURE — 85025 COMPLETE CBC W/AUTO DIFF WBC: CPT | Performed by: INTERNAL MEDICINE

## 2025-01-01 PROCEDURE — 87040 BLOOD CULTURE FOR BACTERIA: CPT | Performed by: STUDENT IN AN ORGANIZED HEALTH CARE EDUCATION/TRAINING PROGRAM

## 2025-01-01 PROCEDURE — 93005 ELECTROCARDIOGRAM TRACING: CPT

## 2025-01-01 PROCEDURE — 86922 COMPATIBILITY TEST ANTIGLOB: CPT | Performed by: PHYSICIAN ASSISTANT

## 2025-01-01 PROCEDURE — 85027 COMPLETE CBC AUTOMATED: CPT | Performed by: INTERNAL MEDICINE

## 2025-01-01 PROCEDURE — 63600175 PHARM REV CODE 636 W HCPCS: Performed by: STUDENT IN AN ORGANIZED HEALTH CARE EDUCATION/TRAINING PROGRAM

## 2025-01-01 PROCEDURE — 82310 ASSAY OF CALCIUM: CPT | Performed by: INTERNAL MEDICINE

## 2025-01-01 PROCEDURE — 76937 US GUIDE VASCULAR ACCESS: CPT | Mod: 26,,, | Performed by: SURGERY

## 2025-01-01 PROCEDURE — 99232 SBSQ HOSP IP/OBS MODERATE 35: CPT | Mod: ,,, | Performed by: INTERNAL MEDICINE

## 2025-01-01 PROCEDURE — 85007 BL SMEAR W/DIFF WBC COUNT: CPT | Performed by: PHYSICIAN ASSISTANT

## 2025-01-01 PROCEDURE — 85045 AUTOMATED RETICULOCYTE COUNT: CPT | Performed by: FAMILY MEDICINE

## 2025-01-01 PROCEDURE — 63600175 PHARM REV CODE 636 W HCPCS: Performed by: INTERNAL MEDICINE

## 2025-01-01 PROCEDURE — P9016 RBC LEUKOCYTES REDUCED: HCPCS | Performed by: PHYSICIAN ASSISTANT

## 2025-01-01 PROCEDURE — 83615 LACTATE (LD) (LDH) ENZYME: CPT | Performed by: INTERNAL MEDICINE

## 2025-01-01 PROCEDURE — 63600175 PHARM REV CODE 636 W HCPCS: Performed by: NURSE PRACTITIONER

## 2025-01-01 PROCEDURE — 80053 COMPREHEN METABOLIC PANEL: CPT | Performed by: NURSE PRACTITIONER

## 2025-01-01 PROCEDURE — 85025 COMPLETE CBC W/AUTO DIFF WBC: CPT | Performed by: FAMILY MEDICINE

## 2025-01-01 PROCEDURE — 86900 BLOOD TYPING SEROLOGIC ABO: CPT | Performed by: FAMILY MEDICINE

## 2025-01-01 PROCEDURE — 25000003 PHARM REV CODE 250: Performed by: NURSE PRACTITIONER

## 2025-01-01 PROCEDURE — 11000001 HC ACUTE MED/SURG PRIVATE ROOM

## 2025-01-01 PROCEDURE — 36430 TRANSFUSION BLD/BLD COMPNT: CPT

## 2025-01-01 PROCEDURE — 85025 COMPLETE CBC W/AUTO DIFF WBC: CPT | Performed by: PHYSICIAN ASSISTANT

## 2025-01-01 PROCEDURE — 1158F ADVNC CARE PLAN TLK DOCD: CPT | Mod: CPTII,,, | Performed by: PHYSICIAN ASSISTANT

## 2025-01-01 PROCEDURE — 92526 ORAL FUNCTION THERAPY: CPT

## 2025-01-01 PROCEDURE — 94760 N-INVAS EAR/PLS OXIMETRY 1: CPT

## 2025-01-01 PROCEDURE — 36415 COLL VENOUS BLD VENIPUNCTURE: CPT | Performed by: FAMILY MEDICINE

## 2025-01-01 PROCEDURE — 99233 SBSQ HOSP IP/OBS HIGH 50: CPT | Mod: ,,, | Performed by: INTERNAL MEDICINE

## 2025-01-01 PROCEDURE — 87040 BLOOD CULTURE FOR BACTERIA: CPT | Performed by: INTERNAL MEDICINE

## 2025-01-01 PROCEDURE — 86140 C-REACTIVE PROTEIN: CPT

## 2025-01-01 PROCEDURE — 99285 EMERGENCY DEPT VISIT HI MDM: CPT | Mod: 25

## 2025-01-01 PROCEDURE — 82040 ASSAY OF SERUM ALBUMIN: CPT | Performed by: PHYSICIAN ASSISTANT

## 2025-01-01 PROCEDURE — 84300 ASSAY OF URINE SODIUM: CPT | Performed by: PHYSICIAN ASSISTANT

## 2025-01-01 PROCEDURE — 85652 RBC SED RATE AUTOMATED: CPT

## 2025-01-01 PROCEDURE — 82272 OCCULT BLD FECES 1-3 TESTS: CPT | Performed by: FAMILY MEDICINE

## 2025-01-01 PROCEDURE — 85045 AUTOMATED RETICULOCYTE COUNT: CPT | Performed by: INTERNAL MEDICINE

## 2025-01-01 PROCEDURE — 99232 SBSQ HOSP IP/OBS MODERATE 35: CPT | Mod: ,,, | Performed by: PHYSICIAN ASSISTANT

## 2025-01-01 PROCEDURE — 93010 ELECTROCARDIOGRAM REPORT: CPT | Mod: ,,, | Performed by: INTERNAL MEDICINE

## 2025-01-01 PROCEDURE — 82272 OCCULT BLD FECES 1-3 TESTS: CPT | Performed by: INTERNAL MEDICINE

## 2025-01-01 PROCEDURE — G0378 HOSPITAL OBSERVATION PER HR: HCPCS

## 2025-01-01 PROCEDURE — 30233N1 TRANSFUSION OF NONAUTOLOGOUS RED BLOOD CELLS INTO PERIPHERAL VEIN, PERCUTANEOUS APPROACH: ICD-10-PCS | Performed by: FAMILY MEDICINE

## 2025-01-01 PROCEDURE — 99223 1ST HOSP IP/OBS HIGH 75: CPT | Mod: ,,, | Performed by: FAMILY MEDICINE

## 2025-01-01 PROCEDURE — 87040 BLOOD CULTURE FOR BACTERIA: CPT | Mod: 91 | Performed by: NURSE PRACTITIONER

## 2025-01-01 PROCEDURE — 99239 HOSP IP/OBS DSCHRG MGMT >30: CPT | Mod: ,,, | Performed by: FAMILY MEDICINE

## 2025-01-01 PROCEDURE — 82040 ASSAY OF SERUM ALBUMIN: CPT | Performed by: FAMILY MEDICINE

## 2025-01-01 PROCEDURE — 82310 ASSAY OF CALCIUM: CPT | Performed by: PHYSICIAN ASSISTANT

## 2025-01-01 PROCEDURE — 85025 COMPLETE CBC W/AUTO DIFF WBC: CPT

## 2025-01-01 PROCEDURE — 83010 ASSAY OF HAPTOGLOBIN QUANT: CPT | Performed by: INTERNAL MEDICINE

## 2025-01-01 PROCEDURE — 96365 THER/PROPH/DIAG IV INF INIT: CPT

## 2025-01-01 PROCEDURE — 83605 ASSAY OF LACTIC ACID: CPT | Performed by: PHYSICIAN ASSISTANT

## 2025-01-01 PROCEDURE — 80053 COMPREHEN METABOLIC PANEL: CPT | Performed by: FAMILY MEDICINE

## 2025-01-01 PROCEDURE — 85007 BL SMEAR W/DIFF WBC COUNT: CPT | Performed by: INTERNAL MEDICINE

## 2025-01-01 PROCEDURE — 36415 COLL VENOUS BLD VENIPUNCTURE: CPT | Performed by: STUDENT IN AN ORGANIZED HEALTH CARE EDUCATION/TRAINING PROGRAM

## 2025-01-01 PROCEDURE — 99233 SBSQ HOSP IP/OBS HIGH 50: CPT | Mod: ,,, | Performed by: FAMILY MEDICINE

## 2025-01-01 PROCEDURE — 51798 US URINE CAPACITY MEASURE: CPT

## 2025-01-01 PROCEDURE — 86922 COMPATIBILITY TEST ANTIGLOB: CPT | Performed by: FAMILY MEDICINE

## 2025-01-01 PROCEDURE — 82570 ASSAY OF URINE CREATININE: CPT | Performed by: PHYSICIAN ASSISTANT

## 2025-01-01 PROCEDURE — 63700000 PHARM REV CODE 250 ALT 637 W/O HCPCS: Performed by: FAMILY MEDICINE

## 2025-01-01 PROCEDURE — 80053 COMPREHEN METABOLIC PANEL: CPT

## 2025-01-01 PROCEDURE — 82746 ASSAY OF FOLIC ACID SERUM: CPT | Performed by: INTERNAL MEDICINE

## 2025-01-01 PROCEDURE — 85025 COMPLETE CBC W/AUTO DIFF WBC: CPT | Performed by: NURSE PRACTITIONER

## 2025-01-01 PROCEDURE — 1111F DSCHRG MED/CURRENT MED MERGE: CPT | Mod: CPTII,,, | Performed by: PHYSICIAN ASSISTANT

## 2025-01-01 PROCEDURE — 51701 INSERT BLADDER CATHETER: CPT

## 2025-01-01 PROCEDURE — 81000 URINALYSIS NONAUTO W/SCOPE: CPT | Performed by: INTERNAL MEDICINE

## 2025-01-01 PROCEDURE — 81003 URINALYSIS AUTO W/O SCOPE: CPT | Performed by: NURSE PRACTITIONER

## 2025-01-01 PROCEDURE — P9016 RBC LEUKOCYTES REDUCED: HCPCS | Performed by: FAMILY MEDICINE

## 2025-01-01 PROCEDURE — 82728 ASSAY OF FERRITIN: CPT | Performed by: INTERNAL MEDICINE

## 2025-01-01 PROCEDURE — 99233 SBSQ HOSP IP/OBS HIGH 50: CPT | Mod: 95,,, | Performed by: PHYSICIAN ASSISTANT

## 2025-01-01 PROCEDURE — 96376 TX/PRO/DX INJ SAME DRUG ADON: CPT

## 2025-01-01 PROCEDURE — 92610 EVALUATE SWALLOWING FUNCTION: CPT

## 2025-01-01 PROCEDURE — 83605 ASSAY OF LACTIC ACID: CPT | Performed by: STUDENT IN AN ORGANIZED HEALTH CARE EDUCATION/TRAINING PROGRAM

## 2025-01-01 RX ORDER — SODIUM CHLORIDE 0.9 % (FLUSH) 0.9 %
10 SYRINGE (ML) INJECTION
Status: DISCONTINUED | OUTPATIENT
Start: 2025-01-01 | End: 2025-01-01 | Stop reason: HOSPADM

## 2025-01-01 RX ORDER — CHOLECALCIFEROL (VITAMIN D3) 25 MCG
1000 TABLET ORAL DAILY
Status: DISCONTINUED | OUTPATIENT
Start: 2025-01-01 | End: 2025-01-01

## 2025-01-01 RX ORDER — HYDRALAZINE HYDROCHLORIDE 20 MG/ML
10 INJECTION INTRAMUSCULAR; INTRAVENOUS EVERY 6 HOURS PRN
Status: DISCONTINUED | OUTPATIENT
Start: 2025-01-01 | End: 2025-01-01 | Stop reason: HOSPADM

## 2025-01-01 RX ORDER — OXYCODONE HYDROCHLORIDE 5 MG/1
5 TABLET ORAL EVERY 4 HOURS PRN
Status: DISCONTINUED | OUTPATIENT
Start: 2025-01-01 | End: 2025-01-01

## 2025-01-01 RX ORDER — ONDANSETRON 8 MG/1
8 TABLET, ORALLY DISINTEGRATING ORAL EVERY 8 HOURS PRN
Status: DISCONTINUED | OUTPATIENT
Start: 2025-01-01 | End: 2025-01-01

## 2025-01-01 RX ORDER — ACETAMINOPHEN 325 MG/1
650 TABLET ORAL EVERY 8 HOURS PRN
Status: DISCONTINUED | OUTPATIENT
Start: 2025-01-01 | End: 2025-01-01

## 2025-01-01 RX ORDER — OXYCODONE HYDROCHLORIDE 5 MG/1
5 TABLET ORAL EVERY 4 HOURS PRN
Refills: 0 | Status: CANCELLED | OUTPATIENT
Start: 2025-01-01

## 2025-01-01 RX ORDER — FUROSEMIDE 10 MG/ML
20 INJECTION INTRAMUSCULAR; INTRAVENOUS ONCE
Status: COMPLETED | OUTPATIENT
Start: 2025-01-01 | End: 2025-01-01

## 2025-01-01 RX ORDER — CEPHALEXIN 500 MG/1
500 CAPSULE ORAL EVERY 8 HOURS
Status: DISCONTINUED | OUTPATIENT
Start: 2025-01-01 | End: 2025-01-01

## 2025-01-01 RX ORDER — SODIUM CHLORIDE 9 MG/ML
INJECTION, SOLUTION INTRAVENOUS ONCE
Status: COMPLETED | OUTPATIENT
Start: 2025-01-01 | End: 2025-01-01

## 2025-01-01 RX ORDER — VALSARTAN 40 MG/1
40 TABLET ORAL 2 TIMES DAILY
Status: DISCONTINUED | OUTPATIENT
Start: 2025-01-01 | End: 2025-01-01

## 2025-01-01 RX ORDER — MAGNESIUM SULFATE HEPTAHYDRATE 40 MG/ML
2 INJECTION, SOLUTION INTRAVENOUS ONCE
Status: COMPLETED | OUTPATIENT
Start: 2025-01-01 | End: 2025-01-01

## 2025-01-01 RX ORDER — CLINDAMYCIN HYDROCHLORIDE 150 MG/1
300 CAPSULE ORAL EVERY 6 HOURS
Status: DISCONTINUED | OUTPATIENT
Start: 2025-01-01 | End: 2025-01-01

## 2025-01-01 RX ORDER — MICONAZOLE NITRATE 2 G/100G
POWDER TOPICAL 2 TIMES DAILY
Status: DISCONTINUED | OUTPATIENT
Start: 2025-01-01 | End: 2025-01-01 | Stop reason: HOSPADM

## 2025-01-01 RX ORDER — FLUCONAZOLE 100 MG/1
100 TABLET ORAL DAILY
Status: DISCONTINUED | OUTPATIENT
Start: 2025-01-01 | End: 2025-01-01

## 2025-01-01 RX ORDER — LORAZEPAM 2 MG/ML
0.5 INJECTION INTRAMUSCULAR EVERY 4 HOURS PRN
Status: DISCONTINUED | OUTPATIENT
Start: 2025-01-01 | End: 2025-01-01 | Stop reason: HOSPADM

## 2025-01-01 RX ORDER — LANOLIN ALCOHOL/MO/W.PET/CERES
1 CREAM (GRAM) TOPICAL DAILY
Status: DISCONTINUED | OUTPATIENT
Start: 2025-01-01 | End: 2025-01-01

## 2025-01-01 RX ORDER — GABAPENTIN 300 MG/1
300 CAPSULE ORAL 2 TIMES DAILY
Status: DISCONTINUED | OUTPATIENT
Start: 2025-01-01 | End: 2025-01-01

## 2025-01-01 RX ORDER — MORPHINE SULFATE 2 MG/ML
2 INJECTION, SOLUTION INTRAMUSCULAR; INTRAVENOUS
Refills: 0 | Status: COMPLETED | OUTPATIENT
Start: 2025-01-01 | End: 2025-01-01

## 2025-01-01 RX ORDER — ALLOPURINOL 100 MG/1
100 TABLET ORAL DAILY
Status: DISCONTINUED | OUTPATIENT
Start: 2025-01-01 | End: 2025-01-01

## 2025-01-01 RX ORDER — CLINDAMYCIN HYDROCHLORIDE 150 MG/1
300 CAPSULE ORAL EVERY 6 HOURS
Status: COMPLETED | OUTPATIENT
Start: 2025-01-01 | End: 2025-01-01

## 2025-01-01 RX ORDER — HYDROCODONE BITARTRATE AND ACETAMINOPHEN 500; 5 MG/1; MG/1
TABLET ORAL
Status: DISCONTINUED | OUTPATIENT
Start: 2025-01-01 | End: 2025-01-01

## 2025-01-01 RX ORDER — MORPHINE SULFATE 2 MG/ML
2 INJECTION, SOLUTION INTRAMUSCULAR; INTRAVENOUS EVERY 6 HOURS PRN
Status: DISCONTINUED | OUTPATIENT
Start: 2025-01-01 | End: 2025-01-01

## 2025-01-01 RX ORDER — CEFTRIAXONE 1 G/1
1 INJECTION, POWDER, FOR SOLUTION INTRAMUSCULAR; INTRAVENOUS
Status: DISCONTINUED | OUTPATIENT
Start: 2025-01-01 | End: 2025-01-01 | Stop reason: HOSPADM

## 2025-01-01 RX ORDER — CLINDAMYCIN PHOSPHATE 900 MG/50ML
900 INJECTION, SOLUTION INTRAVENOUS
Status: DISCONTINUED | OUTPATIENT
Start: 2025-01-01 | End: 2025-01-01

## 2025-01-01 RX ORDER — CEFTRIAXONE 1 G/1
1 INJECTION, POWDER, FOR SOLUTION INTRAMUSCULAR; INTRAVENOUS
Status: DISCONTINUED | OUTPATIENT
Start: 2025-01-01 | End: 2025-01-01

## 2025-01-01 RX ORDER — CLINDAMYCIN PHOSPHATE 600 MG/50ML
600 INJECTION, SOLUTION INTRAVENOUS
Status: COMPLETED | OUTPATIENT
Start: 2025-01-01 | End: 2025-01-01

## 2025-01-01 RX ORDER — MORPHINE SULFATE 2 MG/ML
1 INJECTION, SOLUTION INTRAMUSCULAR; INTRAVENOUS EVERY 6 HOURS PRN
Status: DISCONTINUED | OUTPATIENT
Start: 2025-01-01 | End: 2025-01-01 | Stop reason: HOSPADM

## 2025-01-01 RX ORDER — METOPROLOL SUCCINATE 50 MG/1
100 TABLET, EXTENDED RELEASE ORAL 2 TIMES DAILY
Status: DISCONTINUED | OUTPATIENT
Start: 2025-01-01 | End: 2025-01-01

## 2025-01-01 RX ORDER — ATORVASTATIN CALCIUM 10 MG/1
10 TABLET, FILM COATED ORAL NIGHTLY
Status: DISCONTINUED | OUTPATIENT
Start: 2025-01-01 | End: 2025-01-01

## 2025-01-01 RX ORDER — PANTOPRAZOLE SODIUM 40 MG/1
40 TABLET, DELAYED RELEASE ORAL DAILY
Status: DISCONTINUED | OUTPATIENT
Start: 2025-01-01 | End: 2025-01-01

## 2025-01-01 RX ORDER — PANTOPRAZOLE SODIUM 40 MG/10ML
40 INJECTION, POWDER, LYOPHILIZED, FOR SOLUTION INTRAVENOUS 2 TIMES DAILY
Status: DISCONTINUED | OUTPATIENT
Start: 2025-01-01 | End: 2025-01-01

## 2025-01-01 RX ORDER — TALC
6 POWDER (GRAM) TOPICAL NIGHTLY PRN
Status: DISCONTINUED | OUTPATIENT
Start: 2025-01-01 | End: 2025-01-01

## 2025-01-01 RX ORDER — POLYETHYLENE GLYCOL 3350 17 G/17G
17 POWDER, FOR SOLUTION ORAL DAILY
Status: DISCONTINUED | OUTPATIENT
Start: 2025-01-01 | End: 2025-01-01

## 2025-01-01 RX ORDER — PANTOPRAZOLE SODIUM 40 MG/10ML
40 INJECTION, POWDER, LYOPHILIZED, FOR SOLUTION INTRAVENOUS DAILY
Status: DISCONTINUED | OUTPATIENT
Start: 2025-01-01 | End: 2025-01-01

## 2025-01-01 RX ORDER — SODIUM FERRIC GLUCONATE COMPLEX IN SUCROSE 12.5 MG/ML
125 INJECTION INTRAVENOUS ONCE
Status: COMPLETED | OUTPATIENT
Start: 2025-01-01 | End: 2025-01-01

## 2025-01-01 RX ORDER — SODIUM CHLORIDE 9 MG/ML
INJECTION, SOLUTION INTRAVENOUS CONTINUOUS
Status: DISCONTINUED | OUTPATIENT
Start: 2025-01-01 | End: 2025-01-01

## 2025-01-01 RX ADMIN — SODIUM CHLORIDE 1000 ML: 9 INJECTION, SOLUTION INTRAVENOUS at 07:07

## 2025-01-01 RX ADMIN — CEFTRIAXONE SODIUM 1 G: 1 INJECTION, POWDER, FOR SOLUTION INTRAMUSCULAR; INTRAVENOUS at 12:07

## 2025-01-01 RX ADMIN — ATORVASTATIN CALCIUM 10 MG: 10 TABLET, FILM COATED ORAL at 09:07

## 2025-01-01 RX ADMIN — MICONAZOLE NITRATE: 20 POWDER TOPICAL at 09:07

## 2025-01-01 RX ADMIN — Medication 1000 UNITS: at 09:07

## 2025-01-01 RX ADMIN — FERROUS SULFATE TAB 325 MG (65 MG ELEMENTAL FE) 1 EACH: 325 (65 FE) TAB at 08:07

## 2025-01-01 RX ADMIN — COLLAGENASE SANTYL: 250 OINTMENT TOPICAL at 02:07

## 2025-01-01 RX ADMIN — PIPERACILLIN SODIUM AND TAZOBACTAM SODIUM 4.5 G: 4; .5 INJECTION, POWDER, LYOPHILIZED, FOR SOLUTION INTRAVENOUS at 04:07

## 2025-01-01 RX ADMIN — CEPHALEXIN 500 MG: 500 CAPSULE ORAL at 02:07

## 2025-01-01 RX ADMIN — OXYCODONE HYDROCHLORIDE 5 MG: 5 TABLET ORAL at 02:07

## 2025-01-01 RX ADMIN — Medication 1000 UNITS: at 11:07

## 2025-01-01 RX ADMIN — ALLOPURINOL 100 MG: 100 TABLET ORAL at 08:07

## 2025-01-01 RX ADMIN — CLINDAMYCIN HYDROCHLORIDE 300 MG: 150 CAPSULE ORAL at 05:07

## 2025-01-01 RX ADMIN — MICONAZOLE NITRATE: 20 POWDER TOPICAL at 08:07

## 2025-01-01 RX ADMIN — GABAPENTIN 300 MG: 300 CAPSULE ORAL at 08:07

## 2025-01-01 RX ADMIN — COLLAGENASE SANTYL: 250 OINTMENT TOPICAL at 10:07

## 2025-01-01 RX ADMIN — CLINDAMYCIN HYDROCHLORIDE 300 MG: 150 CAPSULE ORAL at 12:07

## 2025-01-01 RX ADMIN — CLINDAMYCIN PHOSPHATE 900 MG: 900 INJECTION, SOLUTION INTRAVENOUS at 11:07

## 2025-01-01 RX ADMIN — PANTOPRAZOLE SODIUM 40 MG: 40 TABLET, DELAYED RELEASE ORAL at 09:07

## 2025-01-01 RX ADMIN — CEPHALEXIN 500 MG: 500 CAPSULE ORAL at 05:07

## 2025-01-01 RX ADMIN — Medication 1000 UNITS: at 08:07

## 2025-01-01 RX ADMIN — MICONAZOLE NITRATE: 20 POWDER TOPICAL at 10:07

## 2025-01-01 RX ADMIN — CLINDAMYCIN PHOSPHATE 600 MG: 600 INJECTION, SOLUTION INTRAVENOUS at 07:07

## 2025-01-01 RX ADMIN — OXYCODONE HYDROCHLORIDE 5 MG: 5 TABLET ORAL at 03:07

## 2025-01-01 RX ADMIN — SODIUM ZIRCONIUM CYCLOSILICATE 10 G: 10 POWDER, FOR SUSPENSION ORAL at 08:07

## 2025-01-01 RX ADMIN — METOPROLOL SUCCINATE 100 MG: 50 TABLET, EXTENDED RELEASE ORAL at 08:07

## 2025-01-01 RX ADMIN — ATORVASTATIN CALCIUM 10 MG: 10 TABLET, FILM COATED ORAL at 08:07

## 2025-01-01 RX ADMIN — FUROSEMIDE 20 MG: 10 INJECTION, SOLUTION INTRAMUSCULAR; INTRAVENOUS at 08:07

## 2025-01-01 RX ADMIN — METOPROLOL SUCCINATE 100 MG: 50 TABLET, EXTENDED RELEASE ORAL at 09:07

## 2025-01-01 RX ADMIN — SODIUM CHLORIDE: 9 INJECTION, SOLUTION INTRAVENOUS at 01:07

## 2025-01-01 RX ADMIN — COLLAGENASE SANTYL: 250 OINTMENT TOPICAL at 09:07

## 2025-01-01 RX ADMIN — OXYCODONE HYDROCHLORIDE 5 MG: 5 TABLET ORAL at 06:07

## 2025-01-01 RX ADMIN — OXYCODONE HYDROCHLORIDE 5 MG: 5 TABLET ORAL at 08:07

## 2025-01-01 RX ADMIN — GABAPENTIN 300 MG: 300 CAPSULE ORAL at 09:07

## 2025-01-01 RX ADMIN — COLLAGENASE SANTYL: 250 OINTMENT TOPICAL at 08:07

## 2025-01-01 RX ADMIN — CLINDAMYCIN PHOSPHATE 900 MG: 900 INJECTION, SOLUTION INTRAVENOUS at 08:07

## 2025-01-01 RX ADMIN — CEPHALEXIN 500 MG: 500 CAPSULE ORAL at 06:07

## 2025-01-01 RX ADMIN — CLINDAMYCIN PHOSPHATE 900 MG: 900 INJECTION, SOLUTION INTRAVENOUS at 07:07

## 2025-01-01 RX ADMIN — COLLAGENASE SANTYL: 250 OINTMENT TOPICAL at 11:07

## 2025-01-01 RX ADMIN — CLINDAMYCIN HYDROCHLORIDE 300 MG: 150 CAPSULE ORAL at 11:07

## 2025-01-01 RX ADMIN — FERROUS SULFATE TAB 325 MG (65 MG ELEMENTAL FE) 1 EACH: 325 (65 FE) TAB at 09:07

## 2025-01-01 RX ADMIN — OXYCODONE HYDROCHLORIDE 5 MG: 5 TABLET ORAL at 07:07

## 2025-01-01 RX ADMIN — VALSARTAN 40 MG: 40 TABLET, FILM COATED ORAL at 09:07

## 2025-01-01 RX ADMIN — VALSARTAN 40 MG: 40 TABLET, FILM COATED ORAL at 08:07

## 2025-01-01 RX ADMIN — VANCOMYCIN HYDROCHLORIDE 1250 MG: 1.25 INJECTION, POWDER, LYOPHILIZED, FOR SOLUTION INTRAVENOUS at 04:07

## 2025-01-01 RX ADMIN — SODIUM FERRIC GLUCONATE COMPLEX IN SUCROSE 125 MG: 12.5 INJECTION INTRAVENOUS at 10:07

## 2025-01-01 RX ADMIN — VALSARTAN 40 MG: 40 TABLET, FILM COATED ORAL at 11:07

## 2025-01-01 RX ADMIN — POLYETHYLENE GLYCOL 3350 17 G: 17 POWDER, FOR SOLUTION ORAL at 08:07

## 2025-01-01 RX ADMIN — ALLOPURINOL 100 MG: 100 TABLET ORAL at 09:07

## 2025-01-01 RX ADMIN — HYDRALAZINE HYDROCHLORIDE 10 MG: 20 INJECTION INTRAMUSCULAR; INTRAVENOUS at 12:07

## 2025-01-01 RX ADMIN — CEPHALEXIN 500 MG: 500 CAPSULE ORAL at 10:07

## 2025-01-01 RX ADMIN — MORPHINE SULFATE 1 MG: 2 INJECTION, SOLUTION INTRAMUSCULAR; INTRAVENOUS at 10:07

## 2025-01-01 RX ADMIN — ACETAMINOPHEN 650 MG: 325 TABLET ORAL at 04:07

## 2025-01-01 RX ADMIN — CLINDAMYCIN PHOSPHATE 900 MG: 900 INJECTION, SOLUTION INTRAVENOUS at 03:07

## 2025-01-01 RX ADMIN — CLINDAMYCIN PHOSPHATE 900 MG: 900 INJECTION, SOLUTION INTRAVENOUS at 12:07

## 2025-01-01 RX ADMIN — PANTOPRAZOLE SODIUM 40 MG: 40 TABLET, DELAYED RELEASE ORAL at 08:07

## 2025-01-01 RX ADMIN — FUROSEMIDE 20 MG: 10 INJECTION, SOLUTION INTRAMUSCULAR; INTRAVENOUS at 09:07

## 2025-01-01 RX ADMIN — Medication 1000 UNITS: at 10:07

## 2025-01-01 RX ADMIN — OXYCODONE HYDROCHLORIDE 5 MG: 5 TABLET ORAL at 04:07

## 2025-01-01 RX ADMIN — GABAPENTIN 300 MG: 300 CAPSULE ORAL at 10:07

## 2025-01-01 RX ADMIN — CLINDAMYCIN HYDROCHLORIDE 300 MG: 150 CAPSULE ORAL at 06:07

## 2025-01-01 RX ADMIN — METOPROLOL SUCCINATE 100 MG: 50 TABLET, EXTENDED RELEASE ORAL at 11:07

## 2025-01-01 RX ADMIN — PANTOPRAZOLE SODIUM 40 MG: 40 INJECTION, POWDER, FOR SOLUTION INTRAVENOUS at 08:07

## 2025-01-01 RX ADMIN — CEPHALEXIN 500 MG: 500 CAPSULE ORAL at 03:07

## 2025-01-01 RX ADMIN — MAGNESIUM SULFATE HEPTAHYDRATE 2 G: 40 INJECTION, SOLUTION INTRAVENOUS at 11:07

## 2025-01-01 RX ADMIN — OXYCODONE HYDROCHLORIDE 5 MG: 5 TABLET ORAL at 09:07

## 2025-01-01 RX ADMIN — CEFTRIAXONE SODIUM 1 G: 1 INJECTION, POWDER, FOR SOLUTION INTRAMUSCULAR; INTRAVENOUS at 11:07

## 2025-01-01 RX ADMIN — COLLAGENASE SANTYL: 250 OINTMENT TOPICAL at 04:07

## 2025-01-01 RX ADMIN — MICONAZOLE NITRATE: 20 POWDER TOPICAL at 11:07

## 2025-01-01 RX ADMIN — FERROUS SULFATE TAB 325 MG (65 MG ELEMENTAL FE) 1 EACH: 325 (65 FE) TAB at 10:07

## 2025-01-01 RX ADMIN — MORPHINE SULFATE 1 MG: 2 INJECTION, SOLUTION INTRAMUSCULAR; INTRAVENOUS at 03:07

## 2025-01-01 RX ADMIN — SODIUM CHLORIDE: 9 INJECTION, SOLUTION INTRAVENOUS at 09:07

## 2025-01-01 RX ADMIN — OXYCODONE HYDROCHLORIDE 5 MG: 5 TABLET ORAL at 01:07

## 2025-01-01 RX ADMIN — POLYETHYLENE GLYCOL 3350 17 G: 17 POWDER, FOR SOLUTION ORAL at 10:07

## 2025-01-01 RX ADMIN — SODIUM POLYSTYRENE SULFONATE 30 G: 15 SUSPENSION ORAL; RECTAL at 10:07

## 2025-01-01 RX ADMIN — OXYCODONE HYDROCHLORIDE 5 MG: 5 TABLET ORAL at 11:07

## 2025-01-01 RX ADMIN — FLUCONAZOLE 100 MG: 100 TABLET ORAL at 09:07

## 2025-01-01 RX ADMIN — CEFTRIAXONE SODIUM 1 G: 1 INJECTION, POWDER, FOR SOLUTION INTRAMUSCULAR; INTRAVENOUS at 09:07

## 2025-01-01 RX ADMIN — VANCOMYCIN HYDROCHLORIDE 1250 MG: 1.25 INJECTION, POWDER, LYOPHILIZED, FOR SOLUTION INTRAVENOUS at 06:07

## 2025-01-01 RX ADMIN — OXYCODONE HYDROCHLORIDE 5 MG: 5 TABLET ORAL at 12:07

## 2025-01-01 RX ADMIN — MORPHINE SULFATE 2 MG: 2 INJECTION, SOLUTION INTRAMUSCULAR; INTRAVENOUS at 08:07

## 2025-01-01 RX ADMIN — PIPERACILLIN SODIUM AND TAZOBACTAM SODIUM 4.5 G: 4; .5 INJECTION, POWDER, LYOPHILIZED, FOR SOLUTION INTRAVENOUS at 08:07

## 2025-01-01 RX ADMIN — MAGNESIUM SULFATE HEPTAHYDRATE 2 G: 40 INJECTION, SOLUTION INTRAVENOUS at 09:07

## 2025-01-01 RX ADMIN — ACETAMINOPHEN 650 MG: 325 TABLET ORAL at 09:07

## 2025-01-01 RX ADMIN — OXYCODONE HYDROCHLORIDE 5 MG: 5 TABLET ORAL at 05:07

## 2025-01-01 RX ADMIN — MORPHINE SULFATE 2 MG: 2 INJECTION, SOLUTION INTRAMUSCULAR; INTRAVENOUS at 10:07

## 2025-01-01 RX ADMIN — FLUCONAZOLE 100 MG: 100 TABLET ORAL at 11:07

## 2025-01-01 RX ADMIN — GABAPENTIN 300 MG: 300 CAPSULE ORAL at 11:07

## 2025-01-01 RX ADMIN — PANTOPRAZOLE SODIUM 40 MG: 40 INJECTION, POWDER, FOR SOLUTION INTRAVENOUS at 09:07

## 2025-01-01 RX ADMIN — ACETAMINOPHEN 650 MG: 325 TABLET ORAL at 05:07

## 2025-01-01 RX ADMIN — SODIUM CHLORIDE 125 MG: 9 INJECTION, SOLUTION INTRAVENOUS at 08:07

## 2025-01-01 RX ADMIN — CEPHALEXIN 500 MG: 500 CAPSULE ORAL at 01:07

## 2025-01-01 RX ADMIN — ACETAMINOPHEN 325 MG: 325 SUPPOSITORY RECTAL at 02:07

## 2025-01-01 RX ADMIN — POLYETHYLENE GLYCOL 3350 17 G: 17 POWDER, FOR SOLUTION ORAL at 09:07

## 2025-01-01 RX ADMIN — CEPHALEXIN 500 MG: 500 CAPSULE ORAL at 09:07

## 2025-03-07 ENCOUNTER — LAB VISIT (OUTPATIENT)
Dept: LAB | Facility: HOSPITAL | Age: 85
End: 2025-03-07
Payer: MEDICARE

## 2025-03-07 DIAGNOSIS — I50.9 CHF (CONGESTIVE HEART FAILURE): ICD-10-CM

## 2025-03-07 DIAGNOSIS — I10 HTN (HYPERTENSION): Primary | ICD-10-CM

## 2025-03-07 LAB
ALBUMIN SERPL BCP-MCNC: 3 G/DL (ref 3.5–5.2)
ALP SERPL-CCNC: 105 U/L (ref 40–150)
ALT SERPL W/O P-5'-P-CCNC: 14 U/L (ref 10–44)
ANION GAP SERPL CALC-SCNC: 9 MMOL/L (ref 8–16)
AST SERPL-CCNC: 14 U/L (ref 10–40)
BASOPHILS # BLD AUTO: 0.07 K/UL (ref 0–0.2)
BASOPHILS NFR BLD: 0.5 % (ref 0–1.9)
BILIRUB SERPL-MCNC: 0.3 MG/DL (ref 0.1–1)
BUN SERPL-MCNC: 28 MG/DL (ref 8–23)
CALCIUM SERPL-MCNC: 9.1 MG/DL (ref 8.7–10.5)
CHLORIDE SERPL-SCNC: 109 MMOL/L (ref 95–110)
CO2 SERPL-SCNC: 25 MMOL/L (ref 23–29)
CREAT SERPL-MCNC: 1 MG/DL (ref 0.5–1.4)
DIFFERENTIAL METHOD BLD: ABNORMAL
EOSINOPHIL # BLD AUTO: 0.4 K/UL (ref 0–0.5)
EOSINOPHIL NFR BLD: 2.8 % (ref 0–8)
ERYTHROCYTE [DISTWIDTH] IN BLOOD BY AUTOMATED COUNT: 17.2 % (ref 11.5–14.5)
ERYTHROCYTE [SEDIMENTATION RATE] IN BLOOD BY WESTERGREN METHOD: 99 MM/HR (ref 0–20)
EST. GFR  (NO RACE VARIABLE): 56 ML/MIN/1.73 M^2
GLUCOSE SERPL-MCNC: 76 MG/DL (ref 70–110)
HCT VFR BLD AUTO: 34 % (ref 37–48.5)
HGB BLD-MCNC: 11 G/DL (ref 12–16)
IMM GRANULOCYTES # BLD AUTO: 0.14 K/UL (ref 0–0.04)
IMM GRANULOCYTES NFR BLD AUTO: 1 % (ref 0–0.5)
LYMPHOCYTES # BLD AUTO: 4.6 K/UL (ref 1–4.8)
LYMPHOCYTES NFR BLD: 33.5 % (ref 18–48)
MCH RBC QN AUTO: 24 PG (ref 27–31)
MCHC RBC AUTO-ENTMCNC: 32.4 G/DL (ref 32–36)
MCV RBC AUTO: 74 FL (ref 82–98)
MONOCYTES # BLD AUTO: 1.1 K/UL (ref 0.3–1)
MONOCYTES NFR BLD: 7.8 % (ref 4–15)
NEUTROPHILS # BLD AUTO: 7.4 K/UL (ref 1.8–7.7)
NEUTROPHILS NFR BLD: 54.4 % (ref 38–73)
NRBC BLD-RTO: 0 /100 WBC
PLATELET # BLD AUTO: 193 K/UL (ref 150–450)
PMV BLD AUTO: 10.9 FL (ref 9.2–12.9)
POTASSIUM SERPL-SCNC: 4.7 MMOL/L (ref 3.5–5.1)
PREALB SERPL-MCNC: 13 MG/DL (ref 20–43)
PROT SERPL-MCNC: 7.3 G/DL (ref 6–8.4)
RBC # BLD AUTO: 4.58 M/UL (ref 4–5.4)
SODIUM SERPL-SCNC: 143 MMOL/L (ref 136–145)
WBC # BLD AUTO: 13.66 K/UL (ref 3.9–12.7)

## 2025-03-07 PROCEDURE — 84134 ASSAY OF PREALBUMIN: CPT

## 2025-03-07 PROCEDURE — 85025 COMPLETE CBC W/AUTO DIFF WBC: CPT

## 2025-03-07 PROCEDURE — 86140 C-REACTIVE PROTEIN: CPT

## 2025-03-07 PROCEDURE — 85651 RBC SED RATE NONAUTOMATED: CPT

## 2025-03-07 PROCEDURE — 80053 COMPREHEN METABOLIC PANEL: CPT

## 2025-03-08 LAB — CRP SERPL-MCNC: 14.5 MG/L (ref 0–8.2)

## 2025-06-09 ENCOUNTER — LAB REQUISITION (OUTPATIENT)
Dept: LAB | Facility: HOSPITAL | Age: 85
End: 2025-06-09
Payer: MEDICARE

## 2025-06-09 DIAGNOSIS — L89.610 PRESSURE ULCER OF RIGHT HEEL, UNSTAGEABLE: ICD-10-CM

## 2025-06-09 LAB
ABSOLUTE EOSINOPHIL (OHS): 0.32 K/UL
ABSOLUTE MONOCYTE (OHS): 1.17 K/UL (ref 0.3–1)
ABSOLUTE NEUTROPHIL COUNT (OHS): 7.28 K/UL (ref 1.8–7.7)
ALBUMIN SERPL BCP-MCNC: 2.2 G/DL (ref 3.5–5.2)
ALP SERPL-CCNC: 107 UNIT/L (ref 40–150)
ALT SERPL W/O P-5'-P-CCNC: 7 UNIT/L (ref 10–44)
ANION GAP (OHS): 11 MMOL/L (ref 8–16)
AST SERPL-CCNC: 11 UNIT/L (ref 11–45)
BASOPHILS # BLD AUTO: 0.12 K/UL
BASOPHILS NFR BLD AUTO: 0.8 %
BILIRUB SERPL-MCNC: 0.4 MG/DL (ref 0.1–1)
BUN SERPL-MCNC: 14 MG/DL (ref 8–23)
CALCIUM SERPL-MCNC: 8.2 MG/DL (ref 8.7–10.5)
CHLORIDE SERPL-SCNC: 106 MMOL/L (ref 95–110)
CO2 SERPL-SCNC: 24 MMOL/L (ref 23–29)
CREAT SERPL-MCNC: 0.8 MG/DL (ref 0.5–1.4)
CRP SERPL-MCNC: 90.9 MG/L
ERYTHROCYTE [DISTWIDTH] IN BLOOD BY AUTOMATED COUNT: 15.5 % (ref 11.5–14.5)
GFR SERPLBLD CREATININE-BSD FMLA CKD-EPI: >60 ML/MIN/1.73/M2
GLUCOSE SERPL-MCNC: 89 MG/DL (ref 70–110)
HCT VFR BLD AUTO: 28.1 % (ref 37–48.5)
HGB BLD-MCNC: 9.2 GM/DL (ref 12–16)
IMM GRANULOCYTES # BLD AUTO: 0.16 K/UL (ref 0–0.04)
IMM GRANULOCYTES NFR BLD AUTO: 1.1 % (ref 0–0.5)
LYMPHOCYTES # BLD AUTO: 5.83 K/UL (ref 1–4.8)
MCH RBC QN AUTO: 23.8 PG (ref 27–31)
MCHC RBC AUTO-ENTMCNC: 32.7 G/DL (ref 32–36)
MCV RBC AUTO: 73 FL (ref 82–98)
NUCLEATED RBC (/100WBC) (OHS): 0 /100 WBC
PLATELET # BLD AUTO: 267 K/UL (ref 150–450)
PMV BLD AUTO: 11.2 FL (ref 9.2–12.9)
POTASSIUM SERPL-SCNC: 4.2 MMOL/L (ref 3.5–5.1)
PREALB SERPL-MCNC: 8 MG/DL (ref 20–43)
PROT SERPL-MCNC: 6.4 GM/DL (ref 6–8.4)
RBC # BLD AUTO: 3.86 M/UL (ref 4–5.4)
RELATIVE EOSINOPHIL (OHS): 2.2 %
RELATIVE LYMPHOCYTE (OHS): 39.2 % (ref 18–48)
RELATIVE MONOCYTE (OHS): 7.9 % (ref 4–15)
RELATIVE NEUTROPHIL (OHS): 48.8 % (ref 38–73)
SODIUM SERPL-SCNC: 141 MMOL/L (ref 136–145)
WBC # BLD AUTO: 14.88 K/UL (ref 3.9–12.7)

## 2025-06-09 PROCEDURE — 86140 C-REACTIVE PROTEIN: CPT

## 2025-06-09 PROCEDURE — 84075 ASSAY ALKALINE PHOSPHATASE: CPT

## 2025-06-09 PROCEDURE — 85025 COMPLETE CBC W/AUTO DIFF WBC: CPT

## 2025-06-09 PROCEDURE — 84134 ASSAY OF PREALBUMIN: CPT

## 2025-06-09 PROCEDURE — 85652 RBC SED RATE AUTOMATED: CPT

## 2025-06-10 LAB — ERYTHROCYTE [SEDIMENTATION RATE] IN BLOOD BY PHOTOMETRIC METHOD: 108 MM/HR

## 2025-07-01 NOTE — ED PROVIDER NOTES
Encounter Date: 7/1/2025       History     Chief Complaint   Patient presents with    Abscess     Patient to ER via [facility] from home with complaint of pain to her buttocks for a unknown amount of time, they are also reporting that patient has a abscess noted to her buttocks that appear infected       Chief complaint: Pain in buttocks   84-year-old female with a history of anxiety CHF CKD diastolic heart failure hypertensive heart disease hypertension arthritis tricuspid regurg brought in by EMS for pain her buttocks and upper extremities.  Patient is bed-bound and has some breakdown to the buttocks and bilateral upper extremities.  No falls no recent injuries.      Review of patient's allergies indicates:  No Known Allergies  Past Medical History:   Diagnosis Date    Anemia     Anxiety     Aortic valve stenosis     CHF (congestive heart failure)     Chronic kidney disease (CKD)     Diastolic heart failure     Dyslipidemia     Encounter for blood transfusion     Glaucoma (increased eye pressure)     LEFT EYE    HHD (hypertensive heart disease)     Hypertension     Osteoarthritis     Retina disorder, bilateral     Severe aortic stenosis 11/9/2016    TR (tricuspid regurgitation)     UTI (urinary tract infection)      Past Surgical History:   Procedure Laterality Date    CARDIAC CATHETERIZATION      CARDIAC VALVE SURGERY      CATARACT EXTRACTION W/ INTRAOCULAR LENS  IMPLANT, BILATERAL      CHOLECYSTECTOMY      COLONOSCOPY N/A 2/7/2021    Procedure: COLONOSCOPY;  Surgeon: Gaston Pablo MD;  Location: [facility];  Service: Endoscopy;  Laterality: N/A;    FLEXIBLE SIGMOIDOSCOPY N/A 2/4/2021    Procedure: SIGMOIDOSCOPY, FLEXIBLE;  Surgeon: Jarred Forrester MD;  Location: [facility];  Service: Gastroenterology;  Laterality: N/A;    HYSTERECTOMY      JOINT REPLACEMENT      right knee    JOINT REPLACEMENT      left knee    KNEE SURGERY Left     SPINE SURGERY      lumbar    TRANSCATHETER AORTIC VALVE  REPLACEMENT       Family History   Problem Relation Name Age of Onset    Stroke Mother      Diabetes Mother      Heart disease Father       Social History[1]  Review of Systems   Constitutional:  Negative for fatigue and fever.   Respiratory:  Negative for shortness of breath.    Cardiovascular:  Negative for chest pain.   Gastrointestinal:  Negative for abdominal pain.   Musculoskeletal:  Positive for myalgias.   Skin:  Positive for color change and wound.   Neurological:  Positive for weakness.       Physical Exam     Initial Vitals   BP Pulse Resp Temp SpO2   -- -- -- -- --      MAP       --         Physical Exam    Nursing note and vitals reviewed.  Constitutional: She appears well-developed.   HENT:   Head: Normocephalic and atraumatic.   Eyes: EOM are normal. Pupils are equal, round, and reactive to light.   Neck: Neck supple.   Normal range of motion.  Cardiovascular:  Normal rate and regular rhythm.           Pulmonary/Chest: Breath sounds normal. She has no rhonchi. She has no rales.   Musculoskeletal:      Cervical back: Normal range of motion and neck supple.     Neurological: She is alert and oriented to person, place, and time.   Skin: Skin is warm and dry. No rash noted.   Erythema to bilateral upper extremities as well as buttocks and posterior thighs, stage 2 sacral decubitus, satge 2 decubitus to r post heel   Psychiatric: She has a normal mood and affect. Thought content normal.         ED Course   Procedures  Labs Reviewed   CULTURE, BLOOD   CULTURE, BLOOD   COMPREHENSIVE METABOLIC PANEL   CBC W/ AUTO DIFFERENTIAL    Narrative:     The following orders were created for panel order Complete Blood Count (CBC).  Procedure                               Abnormality         Status                     ---------                               -----------         ------                     CBC with Differential[[id number]]                                                        Please view results for these  tests on the individual orders.   LACTIC ACID, PLASMA   URINALYSIS, REFLEX TO URINE CULTURE   CBC WITH DIFFERENTIAL          Imaging Results    None          Medications - No data to display  Medical Decision Making  Amount and/or Complexity of Data Reviewed  Labs: ordered.    Risk  Prescription drug management.                                      Clinical Impression:  Final diagnoses:  [A41.9] Sepsis                       [1]   Social History  Tobacco Use    Smoking status: Former     Current packs/day: 0.00     Types: Cigarettes     Quit date: 1982     Years since quittin.8    Smokeless tobacco: Never   Substance Use Topics    Alcohol use: No    Drug use: No

## 2025-07-02 PROBLEM — A41.9 SEPSIS: Status: ACTIVE | Noted: 2025-01-01

## 2025-07-02 NOTE — HPI
Patient is an 84-year-old black female that presented to the emergency room with significant pain from her skin pressure injuries of her sacrum, buttocks.  Upon presentation she had a working diagnosis of sepsis so she was given IV fluids, IV antibiotics.  Workup unremarkable.  Physical exam reveals significant skin pressure injuries to the buttocks, sacrum, foot.  She is being admitted for pain control, IV antibiotics, wound care consultation.

## 2025-07-02 NOTE — H&P
[facility] ([address] [place] [address] Medicine  History & Physical    Patient Name: Emily Alicia  MRN: [id number]  Patient Class: OP- Observation  Admission Date: 7/1/2025  Attending Physician: Braeden Anderson MD   Primary Care Provider: Angel Pemberton MD         Patient information was obtained from patient, past medical records, ER records, and primary team.     Subjective:     Principal Problem:Sepsis    Chief Complaint:   Chief Complaint   Patient presents with    Abscess     Patient to ER via [facility] from home with complaint of pain to her buttocks for a unknown amount of time, they are also reporting that patient has a abscess noted to her buttocks that appear infected          HPI: Patient is an 84-year-old black female that presented to the emergency room with significant pain from her skin pressure injuries of her sacrum, buttocks.  Upon presentation she had a working diagnosis of sepsis so she was given IV fluids, IV antibiotics.  Workup unremarkable.  Physical exam reveals significant skin pressure injuries to the buttocks, sacrum, foot.  She is being admitted for pain control, IV antibiotics, wound care consultation.    Past Medical History:   Diagnosis Date    Anemia     Anxiety     Aortic valve stenosis     CHF (congestive heart failure)     Chronic kidney disease (CKD)     Diastolic heart failure     Dyslipidemia     Encounter for blood transfusion     Glaucoma (increased eye pressure)     LEFT EYE    HHD (hypertensive heart disease)     Hypertension     Osteoarthritis     Retina disorder, bilateral     Severe aortic stenosis 11/9/2016    TR (tricuspid regurgitation)     UTI (urinary tract infection)        Past Surgical History:   Procedure Laterality Date    CARDIAC CATHETERIZATION      CARDIAC VALVE SURGERY      CATARACT EXTRACTION W/ INTRAOCULAR LENS  IMPLANT, BILATERAL      CHOLECYSTECTOMY      COLONOSCOPY N/A 2/7/2021    Procedure: COLONOSCOPY;  Surgeon: Gaston HENDERSON  MD Bev;  Location: [facility] ENDO;  Service: Endoscopy;  Laterality: N/A;    FLEXIBLE SIGMOIDOSCOPY N/A 2/4/2021    Procedure: SIGMOIDOSCOPY, FLEXIBLE;  Surgeon: Jarred Forrester MD;  Location: [facility] ENDO;  Service: Gastroenterology;  Laterality: N/A;    HYSTERECTOMY      JOINT REPLACEMENT      right knee    JOINT REPLACEMENT      left knee    KNEE SURGERY Left     SPINE SURGERY      lumbar    TRANSCATHETER AORTIC VALVE REPLACEMENT         Review of patient's allergies indicates:  No Known Allergies    No current facility-administered medications on file prior to encounter.     Current Outpatient Medications on File Prior to Encounter   Medication Sig    allopurinoL (ZYLOPRIM) 100 MG tablet TAKE 1 TABLET EVERY DAY (Patient taking differently: Take 100 mg by mouth every evening.)    amLODIPine (NORVASC) 5 MG tablet Take 1 tablet (5 mg total) by mouth once daily.    atorvastatin (LIPITOR) 10 MG tablet TAKE 1 TABLET EVERY EVENING (Patient taking differently: Take 10 mg by mouth every evening.)    collagenase (SANTYL) ointment Apply topically once daily.    dapagliflozin propanediol (FARXIGA) 10 mg tablet Take 1 tablet (10 mg total) by mouth once daily.    docusate sodium (COLACE) 100 MG capsule Take 1 capsule (100 mg total) by mouth once daily.    furosemide (LASIX) 20 MG tablet Take 2 tablets (40 mg total) by mouth once daily.    gabapentin (NEURONTIN) 300 MG capsule Take 1 capsule (300 mg total) by mouth 2 (two) times daily.    HYDROcodone-acetaminophen (NORCO)  mg per tablet Take 1 tablet by mouth every 24 hours as needed for Pain.    melatonin (MELATIN) 3 mg tablet Take 1 tablet (3 mg total) by mouth nightly as needed for Insomnia.    metoprolol succinate (TOPROL-XL) 100 MG 24 hr tablet Take 100 mg by mouth 2 (two) times daily.    oxybutynin (DITROPAN-XL) 5 MG TR24 TAKE 1 TABLET EVERY DAY    travoprost (TRAVATAN Z) 0.004 % ophthalmic solution Place 1 drop into both eyes every evening.    valsartan  (DIOVAN) 40 MG tablet Take 1 tablet (40 mg total) by mouth 2 (two) times daily.    vitamin D (VITAMIN D3) 1000 units Tab Take 1,000 Units by mouth once daily.     Family History       Problem Relation (Age of Onset)    Diabetes Mother    Heart disease Father    Stroke Mother          Tobacco Use    Smoking status: Former     Current packs/day: 0.00     Types: Cigarettes     Quit date: 1982     Years since quittin.8    Smokeless tobacco: Never   Substance and Sexual Activity    Alcohol use: No    Drug use: No    Sexual activity: Never     Partners: Male     Review of Systems   Constitutional:  Negative for activity change, chills, fatigue, fever and unexpected weight change.   HENT:  Negative for sore throat and trouble swallowing.    Respiratory:  Negative for cough, chest tightness and shortness of breath.    Cardiovascular:  Negative for chest pain and leg swelling.   Gastrointestinal:  Negative for abdominal pain.   Endocrine: Negative for cold intolerance and heat intolerance.   Genitourinary:  Negative for difficulty urinating.   Musculoskeletal:  Positive for arthralgias, back pain and gait problem. Negative for joint swelling.   Skin:  Positive for rash and wound.   Neurological:  Negative for numbness.   Hematological:  Negative for adenopathy.   Psychiatric/Behavioral:  Negative for decreased concentration.      Objective:     Vital Signs (Most Recent):  Temp: 98.5 °F (36.9 °C) (25 112)  Pulse: (!) 112 (25)  Resp: 18 (25)  BP: (!) 143/65 (25)  SpO2: 97 % (25) Vital Signs (24h Range):  Temp:  [97.7 °F (36.5 °C)-98.8 °F (37.1 °C)] 98.5 °F (36.9 °C)  Pulse:  [105-125] 112  Resp:  [12-20] 18  SpO2:  [96 %-99 %] 97 %  BP: (129-190)/(63-90) 143/65     Weight: 108.5 kg (239 lb 3.2 oz)  Body mass index is 35.32 kg/m².     Physical Exam  Constitutional:       Comments: Lying in bed.  Bedridden.  Unable to ambulate.  She answers questions fairly well.  "  HENT:      Right Ear: Tympanic membrane normal.      Left Ear: Tympanic membrane normal.      Mouth/Throat:      Mouth: Mucous membranes are moist.   Eyes:      Extraocular Movements: Extraocular movements intact.      Conjunctiva/sclera: Conjunctivae normal.      Pupils: Pupils are equal, round, and reactive to light.   Cardiovascular:      Rate and Rhythm: Normal rate and regular rhythm.      Heart sounds: Murmur heard.   Pulmonary:      Effort: Pulmonary effort is normal.      Breath sounds: Normal breath sounds.   Abdominal:      General: Abdomen is flat.      Palpations: Abdomen is soft.   Musculoskeletal:      Cervical back: Normal range of motion and neck supple. No rigidity.      Right lower leg: No edema.      Left lower leg: No edema.   Skin:     Findings: Erythema and rash present.      Comments: See pictures.  Significant pressure injuries and ulcers of the sacrum, buttocks.  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   Neurological:      Mental Status: She is alert and oriented to person, place, and time. Mental status is at baseline.      Cranial Nerves: No cranial nerve deficit.      Motor: Weakness present.      Coordination: Coordination abnormal.      Gait: Gait abnormal.      Comments: Bed ridden              CRANIAL NERVES     CN III, IV, VI   Pupils are equal, round, and reactive to light.       Significant Labs: All pertinent labs within the past 24 hours have been reviewed.  CBC:   Recent Labs   Lab 07/01/25  1807 07/02/25  0436   WBC 20.10* 21.60*   HGB 9.1* 8.6*   HCT 27.0* 26.5*    257     CMP:   Recent Labs   Lab 07/01/25  1807      K 4.8      CO2 22*   GLU 88   BUN 29*   CREATININE 1.1   CALCIUM 8.4*   PROT 6.8   ALBUMIN 1.9*   BILITOT 0.4   ALKPHOS 136   AST 16   ALT 11   ANIONGAP 11     Lactic Acid:   Recent Labs   Lab 07/01/25  1909   LACTATE 1.9     POCT Glucose: No results for input(s): "POCTGLUCOSE" in the last 48 hours.  Troponin: No results for input(s): " ""TROPONINI", "TROPONINIHS" in the last 48 hours.  Urine Culture: No results for input(s): "LABURIN" in the last 48 hours.  Urine Studies:   Recent Labs   Lab 07/02/25  0042   COLORU Yellow   APPEARANCEUA Hazy*   PHUR 5.0   SPECGRAV 1.015   PROTEINUA Negative   GLUCUA Negative   BILIRUBINUA Negative   OCCULTUA Trace*   NITRITE Negative   UROBILINOGEN Negative   LEUKOCYTESUR Negative       Significant Imaging: I have reviewed all pertinent imaging results/findings within the past 24 hours.  I have reviewed and interpreted all pertinent imaging results/findings within the past 24 hours.  Assessment/Plan:     Assessment & Plan  Sepsis  Patient has sepsis without organ dysfunction secondary to Skin and Soft Tissue Infection: Cellulitis. A review of systems was completed. Patient's sepsis is improving    Current Antibiotics    clindamycin in D5W 900 mg/50 mL IVPB 900 mg, Every 8 hours (non-standard times), Intravenous    Lactate  Recent Labs   Lab 07/01/25  1909   LACTATE 1.9     Culture Data  Blood Cultures No results found for: "CULTBLD"   Urine Culture   Urine Culture, Routine   Date Value Ref Range Status   02/09/2022 No significant growth  Final      mSOFA  MSOFA Total  Min: 0   Min taken time: 07/02/25 1101  Max: 0   Max taken time: 07/02/25 1101    Plan  - Antibiotics as listed above  - Fluid resuscitation as follows:Fluid Not Needed - Patient is not hypotensive and/or lactate is less than 4.0.   - Trend lactate to resolution  - Follow up culture data  - Vasopressors were not needed  - The following services were consulted:Hospital Medicine.  - continue clindamycin for now    Essential hypertension  Patient's blood pressure range in the last 24 hours was: BP  Min: 129/67  Max: 190/90.The patient's inpatient anti-hypertensive regimen is listed below:  Current Antihypertensives  hydrALAZINE injection 10 mg, Every 6 hours PRN, Intravenous  metoprolol succinate (TOPROL-XL) 24 hr tablet 100 mg, 2 times daily, " Oral  valsartan tablet 40 mg, 2 times daily, Oral    Plan  - BP is controlled, no changes needed to their regimen  -   Aortic valve stenosis  Echocardiogram with evidence of aortic stenosis that is moderate . The patient's most recent echocardiogram result is listed below. We will manage the valvular abnormality by status post TAVR    No echocardiogram results found for the past 12 months   Hyperlipidemia LDL goal <70  Lipitor 10 mg daily    Pressure ulcer of left heel, unstageable  Consult wound care  Continue clindamycin  Oxycodone p.r.n. pain    Spinal stenosis of lumbar region with neurogenic claudication  Patient is bed ridden.  She will require frequent turning, PT, wound care.    Need to discuss with family about possible nursing home placement.  She does have home health.  She seems very difficult to care for.    Pressure injury of skin of sacral region  Consult wound care   Continue clindamycin for now.    S/P TAVR (transcatheter aortic valve replacement)  No treatment necessary continue valsartan  Hold amlodipine and Lasix for now.  Restart if pressure starts to recover    VTE Risk Mitigation (From admission, onward)           Ordered     IP VTE HIGH RISK PATIENT  Once         07/01/25 2343     Place sequential compression device  Until discontinued         07/01/25 2343                    SDOH Screening:  The patient declined to be screened for utility difficulties, food insecurity, transport difficulties, housing insecurity, and interpersonal safety, so no concerns could be identified this admission.                 On 07/02/2025, patient should be placed in hospital observation services under my care.             Braeden Anderson MD  Department of Hospital Medicine  [facility] Surg (3rd Fl)

## 2025-07-02 NOTE — ASSESSMENT & PLAN NOTE
No treatment necessary continue valsartan  Hold amlodipine and Lasix for now.  Restart if pressure starts to recover

## 2025-07-02 NOTE — ASSESSMENT & PLAN NOTE
Patient's blood pressure range in the last 24 hours was: BP  Min: 129/67  Max: 190/90.The patient's inpatient anti-hypertensive regimen is listed below:  Current Antihypertensives  hydrALAZINE injection 10 mg, Every 6 hours PRN, Intravenous  metoprolol succinate (TOPROL-XL) 24 hr tablet 100 mg, 2 times daily, Oral  valsartan tablet 40 mg, 2 times daily, Oral    Plan  - BP is controlled, no changes needed to their regimen  -

## 2025-07-02 NOTE — ASSESSMENT & PLAN NOTE
Patient is bed ridden.  She will require frequent turning, PT, wound care.    Need to discuss with family about possible nursing home placement.  She does have home health.  She seems very difficult to care for.

## 2025-07-02 NOTE — NURSING
Received patient to floor; patient in stable condition; v/s charted. Received report from pablo trejo.

## 2025-07-02 NOTE — ASSESSMENT & PLAN NOTE
"Patient has sepsis without organ dysfunction secondary to Skin and Soft Tissue Infection: Cellulitis. A review of systems was completed. Patient's sepsis is improving    Current Antibiotics    clindamycin in D5W 900 mg/50 mL IVPB 900 mg, Every 8 hours (non-standard times), Intravenous    Lactate  Recent Labs   Lab 07/01/25  1909   LACTATE 1.9     Culture Data  Blood Cultures No results found for: "CULTBLD"   Urine Culture   Urine Culture, Routine   Date Value Ref Range Status   02/09/2022 No significant growth  Final      mSOFA  MSOFA Total  Min: 0   Min taken time: 07/02/25 1101  Max: 0   Max taken time: 07/02/25 1101    Plan  - Antibiotics as listed above  - Fluid resuscitation as follows:Fluid Not Needed - Patient is not hypotensive and/or lactate is less than 4.0.   - Trend lactate to resolution  - Follow up culture data  - Vasopressors were not needed  - The following services were consulted:Hospital Medicine.  - continue clindamycin for now    "

## 2025-07-02 NOTE — PLAN OF CARE
"   07/02/25 0954   Rounds   Attendance Provider;Nurse    Discharge Plan A Home with family   Why the patient remains in the hospital Requires continued medical care     Care team at bedside, discussed plan of care with patient. Discharge planning initiated. Patient provided with Case Management contact information and encouraged to call with concerns or questions. Discharge Planning Begins on Admission Pamphlet provided.  "Connecting with you [facility] team" pamphlet provided. Will continue to follow for duration of stay.    "

## 2025-07-02 NOTE — ASSESSMENT & PLAN NOTE
Consult wound care   Continue clindamycin for now.     Rituxan Counseling:  I discussed with the patient the risks of Rituxan infusions. Side effects can include infusion reactions, severe drug rashes including mucocutaneous reactions, reactivation of latent hepatitis and other infections and rarely progressive multifocal leukoencephalopathy.  All of the patient's questions and concerns were addressed.

## 2025-07-02 NOTE — SUBJECTIVE & OBJECTIVE
Past Medical History:   Diagnosis Date    Anemia     Anxiety     Aortic valve stenosis     CHF (congestive heart failure)     Chronic kidney disease (CKD)     Diastolic heart failure     Dyslipidemia     Encounter for blood transfusion     Glaucoma (increased eye pressure)     LEFT EYE    HHD (hypertensive heart disease)     Hypertension     Osteoarthritis     Retina disorder, bilateral     Severe aortic stenosis 11/9/2016    TR (tricuspid regurgitation)     UTI (urinary tract infection)        Past Surgical History:   Procedure Laterality Date    CARDIAC CATHETERIZATION      CARDIAC VALVE SURGERY      CATARACT EXTRACTION W/ INTRAOCULAR LENS  IMPLANT, BILATERAL      CHOLECYSTECTOMY      COLONOSCOPY N/A 2/7/2021    Procedure: COLONOSCOPY;  Surgeon: Gaston Pablo MD;  Location: [facility] ENDO;  Service: Endoscopy;  Laterality: N/A;    FLEXIBLE SIGMOIDOSCOPY N/A 2/4/2021    Procedure: SIGMOIDOSCOPY, FLEXIBLE;  Surgeon: Jarred Forrester MD;  Location: [facility] ENDO;  Service: Gastroenterology;  Laterality: N/A;    HYSTERECTOMY      JOINT REPLACEMENT      right knee    JOINT REPLACEMENT      left knee    KNEE SURGERY Left     SPINE SURGERY      lumbar    TRANSCATHETER AORTIC VALVE REPLACEMENT         Review of patient's allergies indicates:  No Known Allergies    No current facility-administered medications on file prior to encounter.     Current Outpatient Medications on File Prior to Encounter   Medication Sig    allopurinoL (ZYLOPRIM) 100 MG tablet TAKE 1 TABLET EVERY DAY (Patient taking differently: Take 100 mg by mouth every evening.)    amLODIPine (NORVASC) 5 MG tablet Take 1 tablet (5 mg total) by mouth once daily.    atorvastatin (LIPITOR) 10 MG tablet TAKE 1 TABLET EVERY EVENING (Patient taking differently: Take 10 mg by mouth every evening.)    collagenase (SANTYL) ointment Apply topically once daily.    dapagliflozin propanediol (FARXIGA) 10 mg tablet Take 1 tablet (10 mg total) by mouth once daily.     docusate sodium (COLACE) 100 MG capsule Take 1 capsule (100 mg total) by mouth once daily.    furosemide (LASIX) 20 MG tablet Take 2 tablets (40 mg total) by mouth once daily.    gabapentin (NEURONTIN) 300 MG capsule Take 1 capsule (300 mg total) by mouth 2 (two) times daily.    HYDROcodone-acetaminophen (NORCO)  mg per tablet Take 1 tablet by mouth every 24 hours as needed for Pain.    melatonin (MELATIN) 3 mg tablet Take 1 tablet (3 mg total) by mouth nightly as needed for Insomnia.    metoprolol succinate (TOPROL-XL) 100 MG 24 hr tablet Take 100 mg by mouth 2 (two) times daily.    oxybutynin (DITROPAN-XL) 5 MG TR24 TAKE 1 TABLET EVERY DAY    travoprost (TRAVATAN Z) 0.004 % ophthalmic solution Place 1 drop into both eyes every evening.    valsartan (DIOVAN) 40 MG tablet Take 1 tablet (40 mg total) by mouth 2 (two) times daily.    vitamin D (VITAMIN D3) 1000 units Tab Take 1,000 Units by mouth once daily.     Family History       Problem Relation (Age of Onset)    Diabetes Mother    Heart disease Father    Stroke Mother          Tobacco Use    Smoking status: Former     Current packs/day: 0.00     Types: Cigarettes     Quit date: 1982     Years since quittin.8    Smokeless tobacco: Never   Substance and Sexual Activity    Alcohol use: No    Drug use: No    Sexual activity: Never     Partners: Male     Review of Systems   Constitutional:  Negative for activity change, chills, fatigue, fever and unexpected weight change.   HENT:  Negative for sore throat and trouble swallowing.    Respiratory:  Negative for cough, chest tightness and shortness of breath.    Cardiovascular:  Negative for chest pain and leg swelling.   Gastrointestinal:  Negative for abdominal pain.   Endocrine: Negative for cold intolerance and heat intolerance.   Genitourinary:  Negative for difficulty urinating.   Musculoskeletal:  Positive for arthralgias, back pain and gait problem. Negative for joint swelling.   Skin:  Positive  for rash and wound.   Neurological:  Negative for numbness.   Hematological:  Negative for adenopathy.   Psychiatric/Behavioral:  Negative for decreased concentration.      Objective:     Vital Signs (Most Recent):  Temp: 98.5 °F (36.9 °C) (07/02/25 1127)  Pulse: (!) 112 (07/02/25 1127)  Resp: 18 (07/02/25 1127)  BP: (!) 143/65 (07/02/25 1127)  SpO2: 97 % (07/02/25 1127) Vital Signs (24h Range):  Temp:  [97.7 °F (36.5 °C)-98.8 °F (37.1 °C)] 98.5 °F (36.9 °C)  Pulse:  [105-125] 112  Resp:  [12-20] 18  SpO2:  [96 %-99 %] 97 %  BP: (129-190)/(63-90) 143/65     Weight: 108.5 kg (239 lb 3.2 oz)  Body mass index is 35.32 kg/m².     Physical Exam  Constitutional:       Comments: Lying in bed.  Bedridden.  Unable to ambulate.  She answers questions fairly well.   HENT:      Right Ear: Tympanic membrane normal.      Left Ear: Tympanic membrane normal.      Mouth/Throat:      Mouth: Mucous membranes are moist.   Eyes:      Extraocular Movements: Extraocular movements intact.      Conjunctiva/sclera: Conjunctivae normal.      Pupils: Pupils are equal, round, and reactive to light.   Cardiovascular:      Rate and Rhythm: Normal rate and regular rhythm.      Heart sounds: Murmur heard.   Pulmonary:      Effort: Pulmonary effort is normal.      Breath sounds: Normal breath sounds.   Abdominal:      General: Abdomen is flat.      Palpations: Abdomen is soft.   Musculoskeletal:      Cervical back: Normal range of motion and neck supple. No rigidity.      Right lower leg: No edema.      Left lower leg: No edema.   Skin:     Findings: Erythema and rash present.      Comments: See pictures.  Significant pressure injuries and ulcers of the sacrum, buttocks.  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   Neurological:      Mental Status: She is alert and oriented to person, place, and time. Mental status is at baseline.      Cranial Nerves: No cranial nerve deficit.      Motor: Weakness present.      Coordination: Coordination abnormal.       "Gait: Gait abnormal.      Comments: Bed ridden              CRANIAL NERVES     CN III, IV, VI   Pupils are equal, round, and reactive to light.       Significant Labs: All pertinent labs within the past 24 hours have been reviewed.  CBC:   Recent Labs   Lab 07/01/25  1807 07/02/25  0436   WBC 20.10* 21.60*   HGB 9.1* 8.6*   HCT 27.0* 26.5*    257     CMP:   Recent Labs   Lab 07/01/25  1807      K 4.8      CO2 22*   GLU 88   BUN 29*   CREATININE 1.1   CALCIUM 8.4*   PROT 6.8   ALBUMIN 1.9*   BILITOT 0.4   ALKPHOS 136   AST 16   ALT 11   ANIONGAP 11     Lactic Acid:   Recent Labs   Lab 07/01/25  1909   LACTATE 1.9     POCT Glucose: No results for input(s): "POCTGLUCOSE" in the last 48 hours.  Troponin: No results for input(s): "TROPONINI", "TROPONINIHS" in the last 48 hours.  Urine Culture: No results for input(s): "LABURIN" in the last 48 hours.  Urine Studies:   Recent Labs   Lab 07/02/25  0042   COLORU Yellow   APPEARANCEUA Hazy*   PHUR 5.0   SPECGRAV 1.015   PROTEINUA Negative   GLUCUA Negative   BILIRUBINUA Negative   OCCULTUA Trace*   NITRITE Negative   UROBILINOGEN Negative   LEUKOCYTESUR Negative       Significant Imaging: I have reviewed all pertinent imaging results/findings within the past 24 hours.  I have reviewed and interpreted all pertinent imaging results/findings within the past 24 hours.  "

## 2025-07-02 NOTE — PLAN OF CARE
[facility] - Med Surg (3rd Fl)  Initial Discharge Assessment       Primary Care Provider: Angel Pemberton MD    Admission Diagnosis: Sepsis [A41.9]    Admission Date: 7/1/2025  Expected Discharge Date: 7/3/2025    Transition of Care Barriers: (P) None    Payor: HU[employer] MANAGED MEDICARE / Plan: HU[employer] MEDICARE HMO / Product Type: Capitation /     Extended Emergency Contact Information  Primary Emergency Contact: Lubna Monroe   [place]  Mobile Phone: [phone]  Relation: Daughter  Secondary Emergency Contact: SameerBrittany   [place]  Mobile Phone: [phone]  Relation: Daughter    Discharge Plan A: (P) Home Health  Discharge Plan B: (P) Home Health      [facility] - [place] - [address]  [facility] [facility] [address]  [facility] [address]  Phone: [phone] Fax: [phone]    [facility] - [facility] [address] [facility]  [address] [facility] [address]  Phone: [phone] Fax: [phone]    [facility] - [place] - [address] [address]  [address] [address]  [facility] [address]  Phone: [phone] Fax: [phone]      Initial Assessment (most recent)       Adult Discharge Assessment - 07/02/25 1054          Discharge Assessment    Assessment Type Discharge Planning Assessment     Confirmed/corrected address, phone number and insurance Yes     Confirmed Demographics Correct on Facesheet     Source of Information patient     Communicated LINA with patient/caregiver Date not available/Unable to determine (P)      People in Home child(marta), adult (P)      Name(s) of People in Home Humberto (pt's son) (P)      Facility Arrived From: Home (P)      Do you expect to return to your current living situation? Yes (P)      Do you have help at home or someone to help you manage your care at home? Yes (P)      Who are your caregiver(s) and their phone number(s)? Humberto (son), Estella (D-I-L) (P)      Prior to hospitilization  cognitive status: Alert/Oriented (P)      Current cognitive status: Alert/Oriented (P)      Walking or Climbing Stairs Difficulty yes (P)      Walking or Climbing Stairs ambulation difficulty, dependent;transferring difficulty, dependent;stair climbing difficulty, dependent (P)      Mobility Management Bed bound (P)      Dressing/Bathing Difficulty yes (P)      Dressing/Bathing bathing difficulty, dependent;dressing difficulty, dependent (P)      Dressing/Bathing Management Family dresses and bathes patient. (P)      Home Accessibility wheelchair accessible (P)      Equipment Currently Used at Home hospital bed;lift device (P)      Readmission within 30 days? No (P)      Patient currently being followed by outpatient case management? No (P)      Do you currently have service(s) that help you manage your care at home? Yes (P)      Name and Contact number of agency [employer] (P)      Is the pt/caregiver preference to resume services with current agency Yes (P)      Do you take prescription medications? Yes (P)      Do you have prescription coverage? Yes (P)      Do you have any problems affording any of your prescribed medications? No (P)      Is the patient taking medications as prescribed? yes (P)      Who is going to help you get home at discharge? Family (P)      How do you get to doctors appointments? family or friend will provide;agency (P)      Are you on dialysis? No (P)      Do you take coumadin? No (P)      Discharge Plan A Home Health (P)      Discharge Plan B Home Health (P)      DME Needed Upon Discharge  other (see comments);wheelchair (P)    mattress for hospital bed    Discharge Plan discussed with: Patient;Adult children (P)    Lubna, Daughter    Transition of Care Barriers None (P)                           Discharge assessment completed with patient at bedside and daughter Lubna via phone. Patient lives at home with son and son's girlfriend. Patient reports having [facility] in  the home. Patient also receives services through the [facility]. Patient and daughter confirm someone comes daily to bath/hygiene with patient. Daughter reports more assistance in the home would be great. SW discussed Community Choice Waiver and Long Term Care Medicaid. Provided information. Daughter feels patient would benefit from air mattress due to pressure sores. CM to remain available to assist with discharge needs that may arise.

## 2025-07-02 NOTE — ASSESSMENT & PLAN NOTE
Echocardiogram with evidence of aortic stenosis that is moderate . The patient's most recent echocardiogram result is listed below. We will manage the valvular abnormality by status post TAVR    No echocardiogram results found for the past 12 months

## 2025-07-03 NOTE — ASSESSMENT & PLAN NOTE
Consult wound care   Continue clindamycin for now.    Will recheck CBC.   No signs of infection per wound care/eval.  Will likely cont on clinda and follow cultures. Orders per wound care and new mattress ordered.

## 2025-07-03 NOTE — PROGRESS NOTES
[facility] ([address] [place] [address] Medicine  Progress Note    Patient Name: Emily Alicia  MRN: [id number]  Patient Class: IP- Inpatient   Admission Date: 7/1/2025  Length of Stay: 1 days  Attending Physician: Braeden Anderson MD  Primary Care Provider: Angel Pemberton MD        Subjective     Principal Problem:Sepsis        HPI:  Patient is an 84-year-old black female that presented to the emergency room with significant pain from her skin pressure injuries of her sacrum, buttocks.  Upon presentation she had a working diagnosis of sepsis so she was given IV fluids, IV antibiotics.  Workup unremarkable.  Physical exam reveals significant skin pressure injuries to the buttocks, sacrum, foot.  She is being admitted for pain control, IV antibiotics, wound care consultation.    Overview/Hospital Course:  Patient seen by wound care this morning. Recs placed. Patient without any complaints today at all. She is eating well. Afebrile. WBC ct pending.        Review of Systems   Constitutional:  Negative for activity change, chills, fatigue, fever and unexpected weight change.   HENT:  Negative for sore throat and trouble swallowing.    Respiratory:  Negative for cough, chest tightness and shortness of breath.    Cardiovascular:  Negative for chest pain and leg swelling.   Gastrointestinal:  Negative for abdominal pain.   Endocrine: Negative for cold intolerance and heat intolerance.   Genitourinary:  Negative for difficulty urinating.   Musculoskeletal:  Positive for arthralgias, back pain and gait problem. Negative for joint swelling.   Skin:  Positive for rash and wound.   Neurological:  Negative for numbness.   Hematological:  Negative for adenopathy.   Psychiatric/Behavioral:  Negative for decreased concentration.      Objective:     Vital Signs (Most Recent):  Temp: 97.8 °F (36.6 °C) (07/03/25 1135)  Pulse: 81 (07/03/25 1135)  Resp: 18 (07/03/25 1135)  BP: 136/63 (07/03/25 1135)  SpO2: 97 % (07/03/25 1135)  Vital Signs (24h Range):  Temp:  [97.8 °F (36.6 °C)-98.6 °F (37 °C)] 97.8 °F (36.6 °C)  Pulse:  [75-95] 81  Resp:  [18-20] 18  SpO2:  [96 %-98 %] 97 %  BP: (106-138)/(54-65) 136/63     Weight: 108.5 kg (239 lb 3.2 oz)  Body mass index is 35.32 kg/m².     Physical Exam  Constitutional:       Comments: Lying in bed.  Bedridden.  Unable to ambulate.  She answers questions fairly well.   HENT:      Right Ear: Tympanic membrane normal.      Left Ear: Tympanic membrane normal.      Mouth/Throat:      Mouth: Mucous membranes are moist.   Eyes:      Extraocular Movements: Extraocular movements intact.      Conjunctiva/sclera: Conjunctivae normal.      Pupils: Pupils are equal, round, and reactive to light.   Cardiovascular:      Rate and Rhythm: Normal rate and regular rhythm.      Heart sounds: Murmur heard.   Pulmonary:      Effort: Pulmonary effort is normal.      Breath sounds: Normal breath sounds.   Abdominal:      General: Abdomen is flat.      Palpations: Abdomen is soft.   Musculoskeletal:      Cervical back: Normal range of motion and neck supple. No rigidity.      Right lower leg: No edema.      Left lower leg: No edema.   Skin:     Findings: Erythema and rash present.      Comments: See pictures.  Significant pressure injuries and ulcers of the sacrum, buttocks.  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   Neurological:      Mental Status: She is alert and oriented to person, place, and time. Mental status is at baseline.      Cranial Nerves: No cranial nerve deficit.      Motor: Weakness present.      Coordination: Coordination abnormal.      Gait: Gait abnormal.      Comments: Bed ridden              CRANIAL NERVES     CN III, IV, VI   Pupils are equal, round, and reactive to light.       Significant Labs: All pertinent labs within the past 24 hours have been reviewed.  CBC:   Recent Labs   Lab 07/01/25  1807 07/02/25  0436   WBC 20.10* 21.60*   HGB 9.1* 8.6*   HCT 27.0* 26.5*    257     CMP:   Recent Labs  "  Lab 07/01/25  1807      K 4.8      CO2 22*   GLU 88   BUN 29*   CREATININE 1.1   CALCIUM 8.4*   PROT 6.8   ALBUMIN 1.9*   BILITOT 0.4   ALKPHOS 136   AST 16   ALT 11   ANIONGAP 11     Lactic Acid:   Recent Labs   Lab 07/01/25  1909   LACTATE 1.9     POCT Glucose: No results for input(s): "POCTGLUCOSE" in the last 48 hours.  Troponin: No results for input(s): "TROPONINI", "TROPONINIHS" in the last 48 hours.  Urine Culture: No results for input(s): "LABURIN" in the last 48 hours.  Urine Studies:   Recent Labs   Lab 07/02/25  0042   COLORU Yellow   APPEARANCEUA Hazy*   PHUR 5.0   SPECGRAV 1.015   PROTEINUA Negative   GLUCUA Negative   BILIRUBINUA Negative   OCCULTUA Trace*   NITRITE Negative   UROBILINOGEN Negative   LEUKOCYTESUR Negative       Significant Imaging: I have reviewed all pertinent imaging results/findings within the past 24 hours.  I have reviewed and interpreted all pertinent imaging results/findings within the past 24 hours.      Assessment & Plan  Sepsis  Patient has sepsis without organ dysfunction secondary to Skin and Soft Tissue Infection: Cellulitis. A review of systems was completed. Patient's sepsis is improving    Current Antibiotics    clindamycin in D5W 900 mg/50 mL IVPB 900 mg, Every 8 hours (non-standard times), Intravenous    Lactate  Recent Labs   Lab 07/01/25  1909   LACTATE 1.9     Culture Data  Blood Cultures   Blood Culture   Date Value Ref Range Status   07/01/2025 No Growth After 24 Hours  Preliminary   07/01/2025 No Growth After 24 Hours  Preliminary      Urine Culture   Urine Culture, Routine   Date Value Ref Range Status   02/09/2022 No significant growth  Final      mSOFA  MSOFA Total  Min: 0   Min taken time: 07/03/25 1201  Max: 0   Max taken time: 07/03/25 1201    Plan  - Antibiotics as listed above  - Fluid resuscitation as follows:Fluid Not Needed - Patient is not hypotensive and/or lactate is less than 4.0.   - Trend lactate to resolution  - Follow up culture " data  - Vasopressors were not needed  - The following services were consulted:Hospital Medicine.  - continue clindamycin for now    Essential hypertension  Patient's blood pressure range in the last 24 hours was: BP  Min: 106/54  Max: 138/60.The patient's inpatient anti-hypertensive regimen is listed below:  Current Antihypertensives  hydrALAZINE injection 10 mg, Every 6 hours PRN, Intravenous  metoprolol succinate (TOPROL-XL) 24 hr tablet 100 mg, 2 times daily, Oral  valsartan tablet 40 mg, 2 times daily, Oral    Plan  - BP is controlled, no changes needed to their regimen  -   Aortic valve stenosis  Echocardiogram with evidence of aortic stenosis that is moderate . The patient's most recent echocardiogram result is listed below. We will manage the valvular abnormality by status post TAVR    No echocardiogram results found for the past 12 months   Hyperlipidemia LDL goal <70  Lipitor 10 mg daily    Pressure ulcer of left heel, unstageable  Consult wound care  Continue clindamycin  Oxycodone p.r.n. pain    Off load heels.  Order mattress for home.  Spinal stenosis of lumbar region with neurogenic claudication  Patient is bed ridden.  She will require frequent turning, PT, wound care.    Need to discuss with family about possible nursing home placement.  She does have home health.  She seems very difficult to care for.    Ordering low air loss mattress for home.  Pressure injury of skin of sacral region  Consult wound care   Continue clindamycin for now.    Will recheck CBC.   No signs of infection per wound care/eval.  Will likely cont on clinda and follow cultures. Orders per wound care and new mattress ordered.    S/P TAVR (transcatheter aortic valve replacement)  No treatment necessary continue valsartan  Hold amlodipine and Lasix for now.  Restart if pressure starts to recover      VTE Risk Mitigation (From admission, onward)           Ordered     IP VTE HIGH RISK PATIENT  Once         07/01/25 2343     Place  sequential compression device  Until discontinued         07/01/25 2343                    Discharge Planning   LINA: 7/3/2025     Code Status: Full Code   Medical Readiness for Discharge Date:   Discharge Plan A: Home Health                        Jing Jewell MD  Department of Hospital Medicine   [facility] - Med Surg (3rd Fl)

## 2025-07-03 NOTE — ASSESSMENT & PLAN NOTE
Patient is bed ridden.  She will require frequent turning, PT, wound care.    Need to discuss with family about possible nursing home placement.  She does have home health.  She seems very difficult to care for.    Ordering low air loss mattress for home.

## 2025-07-03 NOTE — ASSESSMENT & PLAN NOTE
Consult wound care  Continue clindamycin  Oxycodone p.r.n. pain    Off load heels.  Order mattress for home.

## 2025-07-03 NOTE — CONSULTS
Consulted to see patient in hospital for recommendations on wound care. Recs as follows:    Right heel- Apply Nickel thick santyl to wound bed; cover with bordered foam dressing. Perform wound care daily and float heels    Left Heel- Cover with bordered foam dressing daily and float heels    Sacral- Apply calmoseptine to excoriations; apply silver alginate to wound bed; cover with bordered foam dressing. Perform wound care every other day and prn wet or soiled. Turn pt every 2 hours to prevent pressure.     Paint black - dry areas on toes with betadine and leave open to air daily.    Thanks for consult. Patient can follow up in outpatient wound care clinic after discharge.

## 2025-07-03 NOTE — ASSESSMENT & PLAN NOTE
Patient has sepsis without organ dysfunction secondary to Skin and Soft Tissue Infection: Cellulitis. A review of systems was completed. Patient's sepsis is improving    Current Antibiotics    clindamycin in D5W 900 mg/50 mL IVPB 900 mg, Every 8 hours (non-standard times), Intravenous    Lactate  Recent Labs   Lab 07/01/25  1909   LACTATE 1.9     Culture Data  Blood Cultures   Blood Culture   Date Value Ref Range Status   07/01/2025 No Growth After 24 Hours  Preliminary   07/01/2025 No Growth After 24 Hours  Preliminary      Urine Culture   Urine Culture, Routine   Date Value Ref Range Status   02/09/2022 No significant growth  Final      mSOFA  MSOFA Total  Min: 0   Min taken time: 07/03/25 1201  Max: 0   Max taken time: 07/03/25 1201    Plan  - Antibiotics as listed above  - Fluid resuscitation as follows:Fluid Not Needed - Patient is not hypotensive and/or lactate is less than 4.0.   - Trend lactate to resolution  - Follow up culture data  - Vasopressors were not needed  - The following services were consulted:Hospital Medicine.  - continue clindamycin for now

## 2025-07-03 NOTE — PLAN OF CARE
Problem: Skin Injury Risk Increased  Goal: Skin Health and Integrity  Outcome: Progressing     Problem: Adult Inpatient Plan of Care  Goal: Plan of Care Review  Outcome: Progressing  Goal: Patient-Specific Goal (Individualized)  Outcome: Progressing  Goal: Absence of Hospital-Acquired Illness or Injury  Outcome: Progressing  Goal: Optimal Comfort and Wellbeing  Outcome: Progressing  Goal: Readiness for Transition of Care  Outcome: Progressing     Problem: Fall Injury Risk  Goal: Absence of Fall and Fall-Related Injury  Outcome: Progressing     Problem: Wound  Goal: Optimal Coping  Outcome: Progressing  Goal: Optimal Functional Ability  Outcome: Progressing  Goal: Absence of Infection Signs and Symptoms  Outcome: Progressing  Goal: Improved Oral Intake  Outcome: Progressing  Goal: Optimal Pain Control and Function  Outcome: Progressing  Goal: Skin Health and Integrity  Outcome: Progressing  Goal: Optimal Wound Healing  Outcome: Progressing     Problem: Sepsis/Septic Shock  Goal: Optimal Coping  Outcome: Progressing  Goal: Absence of Bleeding  Outcome: Progressing  Goal: Blood Glucose Level Within Targeted Range  Outcome: Progressing  Goal: Absence of Infection Signs and Symptoms  Outcome: Progressing  Goal: Optimal Nutrition Intake  Outcome: Progressing     Problem: Skin Injury Risk Increased  Goal: Skin Health and Integrity  Outcome: Progressing     Problem: Adult Inpatient Plan of Care  Goal: Absence of Hospital-Acquired Illness or Injury  Outcome: Progressing

## 2025-07-03 NOTE — PLAN OF CARE
Reached out to Ny [employer] with [facility] to order patient a low pressure mattress. Ny informed that she has tried to reach out to family with no avail. Family has to contact [facility] at [phone] # 3 & #3 to complete payment requirements. Family has debt with [facility] that has to be paid before patient can get mattress. SW attempted to reach out the family, no answer. Left note on patient's white board for family if they came. Left message with nurse as well.

## 2025-07-03 NOTE — SUBJECTIVE & OBJECTIVE
Review of Systems   Constitutional:  Negative for activity change, chills, fatigue, fever and unexpected weight change.   HENT:  Negative for sore throat and trouble swallowing.    Respiratory:  Negative for cough, chest tightness and shortness of breath.    Cardiovascular:  Negative for chest pain and leg swelling.   Gastrointestinal:  Negative for abdominal pain.   Endocrine: Negative for cold intolerance and heat intolerance.   Genitourinary:  Negative for difficulty urinating.   Musculoskeletal:  Positive for arthralgias, back pain and gait problem. Negative for joint swelling.   Skin:  Positive for rash and wound.   Neurological:  Negative for numbness.   Hematological:  Negative for adenopathy.   Psychiatric/Behavioral:  Negative for decreased concentration.      Objective:     Vital Signs (Most Recent):  Temp: 97.8 °F (36.6 °C) (07/03/25 1135)  Pulse: 81 (07/03/25 1135)  Resp: 18 (07/03/25 1135)  BP: 136/63 (07/03/25 1135)  SpO2: 97 % (07/03/25 1135) Vital Signs (24h Range):  Temp:  [97.8 °F (36.6 °C)-98.6 °F (37 °C)] 97.8 °F (36.6 °C)  Pulse:  [75-95] 81  Resp:  [18-20] 18  SpO2:  [96 %-98 %] 97 %  BP: (106-138)/(54-65) 136/63     Weight: 108.5 kg (239 lb 3.2 oz)  Body mass index is 35.32 kg/m².     Physical Exam  Constitutional:       Comments: Lying in bed.  Bedridden.  Unable to ambulate.  She answers questions fairly well.   HENT:      Right Ear: Tympanic membrane normal.      Left Ear: Tympanic membrane normal.      Mouth/Throat:      Mouth: Mucous membranes are moist.   Eyes:      Extraocular Movements: Extraocular movements intact.      Conjunctiva/sclera: Conjunctivae normal.      Pupils: Pupils are equal, round, and reactive to light.   Cardiovascular:      Rate and Rhythm: Normal rate and regular rhythm.      Heart sounds: Murmur heard.   Pulmonary:      Effort: Pulmonary effort is normal.      Breath sounds: Normal breath sounds.   Abdominal:      General: Abdomen is flat.      Palpations:  "Abdomen is soft.   Musculoskeletal:      Cervical back: Normal range of motion and neck supple. No rigidity.      Right lower leg: No edema.      Left lower leg: No edema.   Skin:     Findings: Erythema and rash present.      Comments: See pictures.  Significant pressure injuries and ulcers of the sacrum, buttocks.  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   Neurological:      Mental Status: She is alert and oriented to person, place, and time. Mental status is at baseline.      Cranial Nerves: No cranial nerve deficit.      Motor: Weakness present.      Coordination: Coordination abnormal.      Gait: Gait abnormal.      Comments: Bed ridden              CRANIAL NERVES     CN III, IV, VI   Pupils are equal, round, and reactive to light.       Significant Labs: All pertinent labs within the past 24 hours have been reviewed.  CBC:   Recent Labs   Lab 07/01/25  1807 07/02/25  0436   WBC 20.10* 21.60*   HGB 9.1* 8.6*   HCT 27.0* 26.5*    257     CMP:   Recent Labs   Lab 07/01/25  1807      K 4.8      CO2 22*   GLU 88   BUN 29*   CREATININE 1.1   CALCIUM 8.4*   PROT 6.8   ALBUMIN 1.9*   BILITOT 0.4   ALKPHOS 136   AST 16   ALT 11   ANIONGAP 11     Lactic Acid:   Recent Labs   Lab 07/01/25  1909   LACTATE 1.9     POCT Glucose: No results for input(s): "POCTGLUCOSE" in the last 48 hours.  Troponin: No results for input(s): "TROPONINI", "TROPONINIHS" in the last 48 hours.  Urine Culture: No results for input(s): "LABURIN" in the last 48 hours.  Urine Studies:   Recent Labs   Lab 07/02/25  0042   COLORU Yellow   APPEARANCEUA Hazy*   PHUR 5.0   SPECGRAV 1.015   PROTEINUA Negative   GLUCUA Negative   BILIRUBINUA Negative   OCCULTUA Trace*   NITRITE Negative   UROBILINOGEN Negative   LEUKOCYTESUR Negative       Significant Imaging: I have reviewed all pertinent imaging results/findings within the past 24 hours.  I have reviewed and interpreted all pertinent imaging results/findings within the past 24 hours.  "

## 2025-07-03 NOTE — ASSESSMENT & PLAN NOTE
Patient's blood pressure range in the last 24 hours was: BP  Min: 106/54  Max: 138/60.The patient's inpatient anti-hypertensive regimen is listed below:  Current Antihypertensives  hydrALAZINE injection 10 mg, Every 6 hours PRN, Intravenous  metoprolol succinate (TOPROL-XL) 24 hr tablet 100 mg, 2 times daily, Oral  valsartan tablet 40 mg, 2 times daily, Oral    Plan  - BP is controlled, no changes needed to their regimen  -

## 2025-07-03 NOTE — HOSPITAL COURSE
Patient seen by wound care this morning. Recs placed. Patient without any complaints today at all. She is eating well. Afebrile. WBC ct pending.    7/4: clinically patient is doing quite well. She is tolerating PO intake. Has no complaints on rounds.  However, her WBC is not trending down. Seems she has mild chronic elevation of 14-15K but WBC 19K this mroning and 20K on admit. I still do not see any other sources of infections and wounds are not draining, no erythema, no fevers, etc. Cont clindamycin for now.  H/H 7.8/23 from 9.1/27 on admit. No signs of bleeding but with drop will monitor overnight. Also Cr 1.5 from 1.1 on admit. However, on chart review she does fluctuate around this isabella.     7/5:  Patient continues to feel well.  She has no acute complaints.  She denies chest pain, shortness of breath, dysuria, frequency, diarrhea, nausea and vomiting.  Her wounds are stable and do not show any signs of infection.  However, she remains with leukocytosis of [id number] which is not improved since admission.  Her H&H is decreasing in his knee or transfusion threshold.  Her MCV is low and she likely has iron deficiency.  I am suspecting she has reactive leukocytosis due to this process.  Iron levels ordered.  If low we will replace IV and start p.o. tomorrow in an attempt to avoid blood transfusion.  We will continue clindamycin.  I see no other signs of infection and no indication to change antibiotics.  We will actually deescalate to oral today.  Creatinine remained stable at 1.5    7/6: Clinically this patient remains without complaints and at baseline. She has no fever, dysuria, abd pain, n/v/d/c, chest pains, SOB, cough, etc.  I have kept her inpatient following labs due to minor abnormalities:  Cr 1-1.5 at baseline and while did trend up to 1.5/1.7 this admission on chart review her renal fxn fluctuating here over at least last 1 year. Therefore I do not think this is indicative of LOWELL needing further inpatient  management.  H/H down trended but has been stable last 3 days. She is not hemodynamically unstable and vitals remain normal. She has no evidence of bleeding. Stool will be sent for occult and if + can see GI outpatient. However, I am also concerned for primary bone marrow process. Will also work up hemolysis with labs today (h/o aortic stenosis s/p valve replacement).  She has chronic leukocytosis which has not been worked up to date. Plt are normal. I have no access to hematology while inpatient here so may discharge with patient needing to see hematology ASAP. She will need repeat CBC within next few days to monitor as well.   HypoCa noted; awaiting albumin. If lab unremarkable I believe she can discharge this afternoon with home health and home medical equipment and continue to follow up outpatient as we are only lab monitoring at this point in the hospital as no access to needed specialists and she is clinically stable. Dispo pending labs this morning    7/7 PT HD stable on room air.  Continued leukocytosis.  Continue IV abx for UTI.  Creatinine still bumped.  Will continue to trend. Gentle hydration and holding valsartan.  Lokelma for hyperkalemia.  Patient would like to go home with family and states her family can provide her with 24 hour care.  Patient high risk for medical decline.  She is malnourished and albumin 1.8. Consult to dietician.      7/8 PT Bp on lower side.  Pt h/h down trending.  Some dilutional component with frequent lab draws.  Discussed risk versus benefits with pt and daughters about blood transfusion and they are all in agreement.  Renal function slowly improving but pt has hypoalbuminemia.  She is at risk for medical decline.  Will set up family meeting to discuss code status and plan of care.      7/9 Renal function improving.  H/h mild increase but only received 1 out of 2 units of pRBC.  Will get repeat CBC tomorrow.     7/10 PT HD stable on room air.  Renal function back to  baseline.  H/H stable.  If labs are stable tomorrow plan to discharge home with home health and 24 hour care.  Pt will need continued work up of leukocytosis and anemia outpatient.  Defer to PCP for management.      7/11 PT HD stable on room air.  Patient more lethargic today but appropriate.  Renal function improved.  H/h stable.  Fever and leukocytosis overnight.  CXR pending.  LUE edema.  Venous u/s pending.  Long discussion about goals of care with family and patient.  She is a DNR.   giving family resources for care at home.       7/12-patient stable on room air.  H&H dropped.  Hemoglobin is now 6.8.  Renal function stable.  No more fever.  Still has a leukocytosis that is seems to be chronic.  Could this be CML?  Venous ultrasound of the upper extremity negative for blood clots.  She will need blood transfusion.  I will also give her IV iron wound care with Santyl Continuing.  Patient is still receiving vancomycin and Zosyn.  Urine culture grew out Klebsiella pneumoniae sensitive to about everything.  I will discontinue vanc and Zosyn and start Rocephin.    7/13-patient required central line placement but was unable to get it.  Finally nurse supervisor was able to find a good vein using ultrasound last night.  She received 2 units of packed red blood cells.  Now her hemoglobin is 11.6.  Patient did get IV iron as well.  I discussed the case with Hematology.  They recommend a peripheral smear to determine if this is a leukemoid reaction versus CML.  I think she has a leukemoid reaction causing her elevated white blood cell count.  She looked a little over hydrated this morning her BNP was 350 which is elevated for her.  Chest x-ray did not show any pulmonary edema.  I gave her 20 mg of Lasix IV push.      7/14 PT HD stable on room air.  Vitals wnl, Labs stable.  Will monitor. If patient remains stable for 24 hour will discharge home with 24 hour care. ST consult for dysphagia after attempted  "central line.     7/15 PT HD stable on room air.  Labs stable.  Pt having difficulty swallowing and increased secretions.  She appears mildly uncomfortable but is not in respiratory distress.  CT neck did not show any neck mass or soft tissue edema.   ST evaluated patient and concern for cranial nerve 10 injury.  Will monitor and will consider transfer if patient does not improve.      7/16: Attempted to transfer pt for ENT services to further evaluate acute dysphagia.  Additionally with renal function unable to get CT neck with contrast.  ENT recommending PEG tube and f/u outpatient for further evaluation.  However,  long discussion about goals of care with daughters and patient.    PT does not want artifical nutrition and is "tired and just wants to go home".  Her goal of care is comfort focus and would like to transition to hospice.  Pt is able to verbalize her understanding of what hospice is.  Plan to discharge to hospice but patient passed away overnight.  This is not unexpected, bed bound status, malnutrition, age and multiple comorbidities.      "

## 2025-07-04 NOTE — PROGRESS NOTES
[facility] ([facility])  [facility] Medicine  Progress Note    Patient Name: Emily Alicia  MRN: [id number]  Patient Class: IP- Inpatient   Admission Date: 7/1/2025  Length of Stay: 2 days  Attending Physician: Braeden Anderson MD  Primary Care Provider: Angel Pemberton MD        Subjective     Principal Problem:Sepsis        HPI:  Patient is an 84-year-old black female that presented to the emergency room with significant pain from her skin pressure injuries of her sacrum, buttocks.  Upon presentation she had a working diagnosis of sepsis so she was given IV fluids, IV antibiotics.  Workup unremarkable.  Physical exam reveals significant skin pressure injuries to the buttocks, sacrum, foot.  She is being admitted for pain control, IV antibiotics, wound care consultation.    Overview/Hospital Course:  Patient seen by wound care this morning. Recs placed. Patient without any complaints today at all. She is eating well. Afebrile. WBC ct pending.    7/4: clinically patient is doing quite well. She is tolerating PO intake. Has no complaints on rounds.  However, her WBC is not trending down. Seems she has mild chronic elevation of 14-15K but WBC 19K this mroning and 20K on admit. I still do not see any other sources of infections and wounds are not draining, no erythema, no fevers, etc. Cont clindamycin for now.  H/H 7.8/23 from 9.1/27 on admit. No signs of bleeding but with drop will monitor overnight. Also Cr 1.5 from 1.1 on admit. However, on chart review she does fluctuate around this isabella.         Review of Systems   Constitutional:  Negative for activity change, chills, fatigue, fever and unexpected weight change.   HENT:  Negative for sore throat and trouble swallowing.    Respiratory:  Negative for cough, chest tightness and shortness of breath.    Cardiovascular:  Negative for chest pain and leg swelling.   Gastrointestinal:  Negative for abdominal pain.   Endocrine: Negative for cold intolerance  and heat intolerance.   Genitourinary:  Negative for difficulty urinating.   Musculoskeletal:  Positive for arthralgias, back pain and gait problem. Negative for joint swelling.   Skin:  Positive for rash and wound.   Neurological:  Negative for numbness.   Hematological:  Negative for adenopathy.   Psychiatric/Behavioral:  Negative for decreased concentration.      Objective:     Vital Signs (Most Recent):  Temp: 97.6 °F (36.4 °C) (07/04/25 0812)  Pulse: 75 (07/04/25 1000)  Resp: 20 (07/04/25 0432)  BP: (!) 141/65 (07/04/25 0812)  SpO2: 96 % (07/04/25 0812) Vital Signs (24h Range):  Temp:  [97.6 °F (36.4 °C)-99.1 °F (37.3 °C)] 97.6 °F (36.4 °C)  Pulse:  [64-91] 75  Resp:  [18-20] 20  SpO2:  [96 %-98 %] 96 %  BP: (105-141)/(50-65) 141/65     Weight: 108.5 kg (239 lb 3.2 oz)  Body mass index is 35.32 kg/m².     Physical Exam  Vitals and nursing note reviewed.   Constitutional:       Comments: Lying in bed.  Bedridden.  Unable to ambulate.  She answers questions fairly well.   HENT:      Right Ear: Tympanic membrane normal.      Left Ear: Tympanic membrane normal.      Mouth/Throat:      Mouth: Mucous membranes are moist.   Eyes:      General:         Right eye: No discharge.         Left eye: No discharge.      Conjunctiva/sclera: Conjunctivae normal.   Cardiovascular:      Rate and Rhythm: Normal rate and regular rhythm.      Heart sounds: Murmur heard.   Pulmonary:      Effort: Pulmonary effort is normal.      Breath sounds: Normal breath sounds.   Abdominal:      General: Abdomen is flat.      Palpations: Abdomen is soft.   Musculoskeletal:      Cervical back: Normal range of motion and neck supple. No rigidity.      Right lower leg: No edema.      Left lower leg: No edema.   Skin:     Findings: No erythema.      Comments: See pictures.  Significant pressure injuries and ulcers of the sacrum, buttocks.  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   Neurological:      Mental Status: She is alert and oriented to person,  "place, and time. Mental status is at baseline.      Comments: Bed ridden                Significant Labs: All pertinent labs within the past 24 hours have been reviewed.  CBC:   Recent Labs   Lab 07/03/25  1308 07/04/25  0957   WBC 18.76* 19.18*   HGB 8.2* 7.8*   HCT 24.2* 23.2*    241     CMP:   Recent Labs   Lab 07/04/25  0957      K 4.6      CO2 19*      BUN 29*   CREATININE 1.5*   CALCIUM 7.5*   ANIONGAP 12     Lactic Acid:   No results for input(s): "LACTATE" in the last 48 hours.    POCT Glucose: No results for input(s): "POCTGLUCOSE" in the last 48 hours.  Troponin: No results for input(s): "TROPONINI", "TROPONINIHS" in the last 48 hours.  Urine Culture: No results for input(s): "LABURIN" in the last 48 hours.  Urine Studies:   No results for input(s): "COLORU", "APPEARANCEUA", "PHUR", "SPECGRAV", "PROTEINUA", "GLUCUA", "KETONESU", "BILIRUBINUA", "OCCULTUA", "NITRITE", "UROBILINOGEN", "LEUKOCYTESUR", "RBCUA", "WBCUA", "BACTERIA", "SQUAMEPITHEL", "HYALINECASTS" in the last 48 hours.    Invalid input(s): "WRIGHTSUR"      Significant Imaging: I have reviewed all pertinent imaging results/findings within the past 24 hours.  I have reviewed and interpreted all pertinent imaging results/findings within the past 24 hours.      Assessment & Plan  Sepsis  Patient has sepsis without organ dysfunction secondary to Skin and Soft Tissue Infection: Cellulitis. A review of systems was completed. Patient's sepsis is improving    Current Antibiotics    clindamycin in D5W 900 mg/50 mL IVPB 900 mg, Every 8 hours (non-standard times), Intravenous    Lactate  Recent Labs   Lab 07/01/25  1909   LACTATE 1.9     Culture Data  Blood Cultures   Blood Culture   Date Value Ref Range Status   07/01/2025 No Growth After 48 Hours  Preliminary   07/01/2025 No Growth After 48 Hours  Preliminary      Urine Culture   Urine Culture, Routine   Date Value Ref Range Status   02/09/2022 No significant growth  Final    "   mSOFA  MSOFA Total  Min: 0   Min taken time: 07/03/25 1801  Max: 3   Max taken time: 07/04/25 1001    Plan  - Antibiotics as listed above  - Fluid resuscitation as follows:Fluid Not Needed - Patient is not hypotensive and/or lactate is less than 4.0.   - Trend lactate to resolution  - Follow up culture data  - Vasopressors were not needed  - The following services were consulted:Hospital Medicine.  - continue clindamycin for now    Essential hypertension  Patient's blood pressure range in the last 24 hours was: BP  Min: 105/50  Max: 141/65.The patient's inpatient anti-hypertensive regimen is listed below:  Current Antihypertensives  hydrALAZINE injection 10 mg, Every 6 hours PRN, Intravenous  metoprolol succinate (TOPROL-XL) 24 hr tablet 100 mg, 2 times daily, Oral  valsartan tablet 40 mg, 2 times daily, Oral    Plan  - BP is controlled, no changes needed to their regimen  -   Aortic valve stenosis  Echocardiogram with evidence of aortic stenosis that is moderate . The patient's most recent echocardiogram result is listed below. We will manage the valvular abnormality by status post TAVR    No echocardiogram results found for the past 12 months   Hyperlipidemia LDL goal <70  Lipitor 10 mg daily    Pressure ulcer of left heel, unstageable  Consult wound care  Continue clindamycin  Oxycodone p.r.n. pain    Off load heels.  Order mattress for home.  Spinal stenosis of lumbar region with neurogenic claudication  Patient is bed ridden.  She will require frequent turning, PT, wound care.    Need to discuss with family about possible nursing home placement.  She does have home health.  She seems very difficult to care for.    Ordering low air loss mattress for home.  Pressure injury of skin of sacral region  Consult wound care   Continue clindamycin for now.    Will recheck CBC.   No signs of infection per wound care/eval.  Will likely cont on clinda and follow cultures. Orders per wound care and new mattress  ordered.    7/4: WBC remains elevated but no fevers. I am not findings any other sources of acute infection and seems to have chronic mild leukocytosis. Will cont clinda    S/P TAVR (transcatheter aortic valve replacement)  No treatment necessary continue valsartan  Hold amlodipine and Lasix for now.  Restart if pressure starts to recover    VTE Risk Mitigation (From admission, onward)           Ordered     IP VTE HIGH RISK PATIENT  Once         07/01/25 2343     Place sequential compression device  Until discontinued         07/01/25 2343                    Discharge Planning   LINA: 7/7/2025     Code Status: Full Code   Medical Readiness for Discharge Date:   Discharge Plan A: Home Health                        Viviana Lindsay MD  Department of Hospital Medicine   [place] - Med Surg (3rd Fl)

## 2025-07-04 NOTE — ASSESSMENT & PLAN NOTE
Patient has sepsis without organ dysfunction secondary to Skin and Soft Tissue Infection: Cellulitis. A review of systems was completed. Patient's sepsis is improving    Current Antibiotics    clindamycin in D5W 900 mg/50 mL IVPB 900 mg, Every 8 hours (non-standard times), Intravenous    Lactate  Recent Labs   Lab 07/01/25  1909   LACTATE 1.9     Culture Data  Blood Cultures   Blood Culture   Date Value Ref Range Status   07/01/2025 No Growth After 48 Hours  Preliminary   07/01/2025 No Growth After 48 Hours  Preliminary      Urine Culture   Urine Culture, Routine   Date Value Ref Range Status   02/09/2022 No significant growth  Final      mSOFA  MSOFA Total  Min: 0   Min taken time: 07/03/25 1801  Max: 3   Max taken time: 07/04/25 1001    Plan  - Antibiotics as listed above  - Fluid resuscitation as follows:Fluid Not Needed - Patient is not hypotensive and/or lactate is less than 4.0.   - Trend lactate to resolution  - Follow up culture data  - Vasopressors were not needed  - The following services were consulted:Hospital Medicine.  - continue clindamycin for now

## 2025-07-04 NOTE — ASSESSMENT & PLAN NOTE
Patient's blood pressure range in the last 24 hours was: BP  Min: 105/50  Max: 141/65.The patient's inpatient anti-hypertensive regimen is listed below:  Current Antihypertensives  hydrALAZINE injection 10 mg, Every 6 hours PRN, Intravenous  metoprolol succinate (TOPROL-XL) 24 hr tablet 100 mg, 2 times daily, Oral  valsartan tablet 40 mg, 2 times daily, Oral    Plan  - BP is controlled, no changes needed to their regimen  -

## 2025-07-04 NOTE — PLAN OF CARE
Problem: Skin Injury Risk Increased  Goal: Skin Health and Integrity  Outcome: Progressing     Problem: Adult Inpatient Plan of Care  Goal: Plan of Care Review  Outcome: Progressing  Goal: Patient-Specific Goal (Individualized)  Outcome: Progressing  Goal: Absence of Hospital-Acquired Illness or Injury  Outcome: Progressing  Goal: Optimal Comfort and Wellbeing  Outcome: Progressing  Goal: Readiness for Transition of Care  Outcome: Progressing     Problem: Fall Injury Risk  Goal: Absence of Fall and Fall-Related Injury  Outcome: Progressing     Problem: Wound  Goal: Optimal Coping  Outcome: Progressing  Goal: Optimal Functional Ability  Outcome: Progressing  Goal: Absence of Infection Signs and Symptoms  Outcome: Progressing  Goal: Improved Oral Intake  Outcome: Progressing  Goal: Optimal Pain Control and Function  Outcome: Progressing  Goal: Skin Health and Integrity  Outcome: Progressing  Goal: Optimal Wound Healing  Outcome: Progressing     Problem: Sepsis/Septic Shock  Goal: Optimal Coping  Outcome: Progressing  Goal: Absence of Bleeding  Outcome: Progressing  Goal: Blood Glucose Level Within Targeted Range  Outcome: Progressing  Goal: Absence of Infection Signs and Symptoms  Outcome: Progressing  Goal: Optimal Nutrition Intake  Outcome: Progressing

## 2025-07-04 NOTE — SUBJECTIVE & OBJECTIVE
Review of Systems   Constitutional:  Negative for activity change, chills, fatigue, fever and unexpected weight change.   HENT:  Negative for sore throat and trouble swallowing.    Respiratory:  Negative for cough, chest tightness and shortness of breath.    Cardiovascular:  Negative for chest pain and leg swelling.   Gastrointestinal:  Negative for abdominal pain.   Endocrine: Negative for cold intolerance and heat intolerance.   Genitourinary:  Negative for difficulty urinating.   Musculoskeletal:  Positive for arthralgias, back pain and gait problem. Negative for joint swelling.   Skin:  Positive for rash and wound.   Neurological:  Negative for numbness.   Hematological:  Negative for adenopathy.   Psychiatric/Behavioral:  Negative for decreased concentration.      Objective:     Vital Signs (Most Recent):  Temp: 97.6 °F (36.4 °C) (07/04/25 0812)  Pulse: 75 (07/04/25 1000)  Resp: 20 (07/04/25 0432)  BP: (!) 141/65 (07/04/25 0812)  SpO2: 96 % (07/04/25 0812) Vital Signs (24h Range):  Temp:  [97.6 °F (36.4 °C)-99.1 °F (37.3 °C)] 97.6 °F (36.4 °C)  Pulse:  [64-91] 75  Resp:  [18-20] 20  SpO2:  [96 %-98 %] 96 %  BP: (105-141)/(50-65) 141/65     Weight: 108.5 kg (239 lb 3.2 oz)  Body mass index is 35.32 kg/m².     Physical Exam  Vitals and nursing note reviewed.   Constitutional:       Comments: Lying in bed.  Bedridden.  Unable to ambulate.  She answers questions fairly well.   HENT:      Right Ear: Tympanic membrane normal.      Left Ear: Tympanic membrane normal.      Mouth/Throat:      Mouth: Mucous membranes are moist.   Eyes:      General:         Right eye: No discharge.         Left eye: No discharge.      Conjunctiva/sclera: Conjunctivae normal.   Cardiovascular:      Rate and Rhythm: Normal rate and regular rhythm.      Heart sounds: Murmur heard.   Pulmonary:      Effort: Pulmonary effort is normal.      Breath sounds: Normal breath sounds.   Abdominal:      General: Abdomen is flat.      Palpations:  ----- Message from Woodson Shone, DO sent at 7/24/2020  3:18 PM EDT -----  Call patient and let him know all labs are stable  A1c is excellent at 5 8%  "Abdomen is soft.   Musculoskeletal:      Cervical back: Normal range of motion and neck supple. No rigidity.      Right lower leg: No edema.      Left lower leg: No edema.   Skin:     Findings: No erythema.      Comments: See pictures.  Significant pressure injuries and ulcers of the sacrum, buttocks.  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   Neurological:      Mental Status: She is alert and oriented to person, place, and time. Mental status is at baseline.      Comments: Bed ridden                Significant Labs: All pertinent labs within the past 24 hours have been reviewed.  CBC:   Recent Labs   Lab 07/03/25  1308 07/04/25  0957   WBC 18.76* 19.18*   HGB 8.2* 7.8*   HCT 24.2* 23.2*    241     CMP:   Recent Labs   Lab 07/04/25  0957      K 4.6      CO2 19*      BUN 29*   CREATININE 1.5*   CALCIUM 7.5*   ANIONGAP 12     Lactic Acid:   No results for input(s): "LACTATE" in the last 48 hours.    POCT Glucose: No results for input(s): "POCTGLUCOSE" in the last 48 hours.  Troponin: No results for input(s): "TROPONINI", "TROPONINIHS" in the last 48 hours.  Urine Culture: No results for input(s): "LABURIN" in the last 48 hours.  Urine Studies:   No results for input(s): "COLORU", "APPEARANCEUA", "PHUR", "SPECGRAV", "PROTEINUA", "GLUCUA", "KETONESU", "BILIRUBINUA", "OCCULTUA", "NITRITE", "UROBILINOGEN", "LEUKOCYTESUR", "RBCUA", "WBCUA", "BACTERIA", "SQUAMEPITHEL", "HYALINECASTS" in the last 48 hours.    Invalid input(s): "WRIGHTSUR"      Significant Imaging: I have reviewed all pertinent imaging results/findings within the past 24 hours.  I have reviewed and interpreted all pertinent imaging results/findings within the past 24 hours.  "

## 2025-07-04 NOTE — ASSESSMENT & PLAN NOTE
Consult wound care   Continue clindamycin for now.    Will recheck CBC.   No signs of infection per wound care/eval.  Will likely cont on clinda and follow cultures. Orders per wound care and new mattress ordered.    7/4: WBC remains elevated but no fevers. I am not findings any other sources of acute infection and seems to have chronic mild leukocytosis. Will cont clinda

## 2025-07-04 NOTE — PLAN OF CARE
Problem: Skin Injury Risk Increased  Goal: Skin Health and Integrity  Outcome: Progressing     Problem: Adult Inpatient Plan of Care  Goal: Plan of Care Review  Outcome: Progressing     Problem: Adult Inpatient Plan of Care  Goal: Optimal Comfort and Wellbeing  Outcome: Progressing     Problem: Wound  Goal: Optimal Coping  Outcome: Progressing     Problem: Wound  Goal: Optimal Pain Control and Function  Outcome: Progressing     Problem: Sepsis/Septic Shock  Goal: Optimal Nutrition Intake  Outcome: Progressing

## 2025-07-05 PROBLEM — D50.9 MICROCYTIC ANEMIA: Status: ACTIVE | Noted: 2025-01-01

## 2025-07-05 NOTE — ASSESSMENT & PLAN NOTE
Consult wound care   Continue clindamycin for now.    Will recheck CBC.   No signs of infection per wound care/eval.  Will likely cont on clinda and follow cultures. Orders per wound care and new mattress ordered.    7/5: WBC remains elevated but no fevers. I am not findings any other sources of acute infection and seems to have chronic mild leukocytosis. Will cont clinda-transition to p.o. on 07/05/2025  Considering reactive leukocytosis to her iron deficiency

## 2025-07-05 NOTE — PROGRESS NOTES
[facility] ([facility])  [facility] Medicine  Progress Note    Patient Name: Emily Alicia  MRN: [id number]  Patient Class: IP- Inpatient   Admission Date: 7/1/2025  Length of Stay: 3 days  Attending Physician: Braeden Anderson MD  Primary Care Provider: Angel Pemberton MD        Subjective     Principal Problem:Sepsis        HPI:  Patient is an 84-year-old black female that presented to the emergency room with significant pain from her skin pressure injuries of her sacrum, buttocks.  Upon presentation she had a working diagnosis of sepsis so she was given IV fluids, IV antibiotics.  Workup unremarkable.  Physical exam reveals significant skin pressure injuries to the buttocks, sacrum, foot.  She is being admitted for pain control, IV antibiotics, wound care consultation.    Overview/Hospital Course:  Patient seen by wound care this morning. Recs placed. Patient without any complaints today at all. She is eating well. Afebrile. WBC ct pending.    7/4: clinically patient is doing quite well. She is tolerating PO intake. Has no complaints on rounds.  However, her WBC is not trending down. Seems she has mild chronic elevation of 14-15K but WBC 19K this mroning and 20K on admit. I still do not see any other sources of infections and wounds are not draining, no erythema, no fevers, etc. Cont clindamycin for now.  H/H 7.8/23 from 9.1/27 on admit. No signs of bleeding but with drop will monitor overnight. Also Cr 1.5 from 1.1 on admit. However, on chart review she does fluctuate around this isabella.     7/5:  Patient continues to feel well.  She has no acute complaints.  She denies chest pain, shortness of breath, dysuria, frequency, diarrhea, nausea and vomiting.  Her wounds are stable and do not show any signs of infection.  However, she remains with leukocytosis of [id number] which is not improved since admission.  Her H&H is decreasing in his knee or transfusion threshold.  Her MCV is low and she likely has  iron deficiency.  I am suspecting she has reactive leukocytosis due to this process.  Iron levels ordered.  If low we will replace IV and start p.o. tomorrow in an attempt to avoid blood transfusion.  We will continue clindamycin.  I see no other signs of infection and no indication to change antibiotics.  We will actually deescalate to oral today.  Creatinine remained stable at 1.5        Review of Systems   Constitutional:  Negative for activity change, chills, fatigue, fever and unexpected weight change.   HENT:  Negative for sore throat and trouble swallowing.    Respiratory:  Negative for cough, chest tightness and shortness of breath.    Cardiovascular:  Negative for chest pain and leg swelling.   Gastrointestinal:  Negative for abdominal pain.   Endocrine: Negative for cold intolerance and heat intolerance.   Genitourinary:  Negative for difficulty urinating.   Musculoskeletal:  Positive for arthralgias, back pain and gait problem (chronic.  Bed-bound). Negative for joint swelling.   Skin:  Positive for rash and wound.   Neurological:  Negative for numbness.   Hematological:  Negative for adenopathy.   Psychiatric/Behavioral:  Negative for decreased concentration.      Objective:     Vital Signs (Most Recent):  Temp: 99.2 °F (37.3 °C) (07/05/25 0811)  Pulse: 79 (07/05/25 0811)  Resp: 17 (07/05/25 0811)  BP: (!) 146/68 (07/05/25 0811)  SpO2: 96 % (07/05/25 0811) Vital Signs (24h Range):  Temp:  [97.7 °F (36.5 °C)-99.2 °F (37.3 °C)] 99.2 °F (37.3 °C)  Pulse:  [67-84] 79  Resp:  [17-20] 17  SpO2:  [94 %-98 %] 96 %  BP: ()/(51-68) 146/68     Weight: 108.5 kg (239 lb 3.2 oz)  Body mass index is 35.32 kg/m².     Physical Exam  Vitals and nursing note reviewed.   Constitutional:       General: She is not in acute distress.     Appearance: She is not ill-appearing.      Comments: Lying in bed.  Bedridden.  Unable to ambulate.  She answers questions fairly well.   HENT:      Right Ear: Tympanic membrane normal.  "     Left Ear: Tympanic membrane normal.      Mouth/Throat:      Mouth: Mucous membranes are moist.   Eyes:      General:         Right eye: No discharge.         Left eye: No discharge.      Conjunctiva/sclera: Conjunctivae normal.   Cardiovascular:      Rate and Rhythm: Normal rate and regular rhythm.      Heart sounds: Murmur heard.   Pulmonary:      Effort: Pulmonary effort is normal.      Breath sounds: Normal breath sounds.   Abdominal:      General: Abdomen is flat.      Palpations: Abdomen is soft.   Musculoskeletal:      Cervical back: Normal range of motion and neck supple. No rigidity.      Right lower leg: No edema.      Left lower leg: No edema.   Skin:     Findings: No erythema.      Comments: See pictures.  Significant pressure injuries and ulcers of the sacrum, buttocks.  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   Neurological:      Mental Status: She is alert and oriented to person, place, and time. Mental status is at baseline.      Comments: Bed ridden                Significant Labs: All pertinent labs within the past 24 hours have been reviewed.  CBC:   Recent Labs   Lab 07/03/25  1308 07/04/25  0957 07/05/25  0900   WBC 18.76* 19.18* 19.98*   HGB 8.2* 7.8* 7.6*   HCT 24.2* 23.2* 23.0*    241 256     CMP:   Recent Labs   Lab 07/04/25  0957 07/05/25  0900    138   K 4.6 5.3*    107   CO2 19* 21*    93   BUN 29* 32*   CREATININE 1.5* 1.5*   CALCIUM 7.5* 7.6*   ANIONGAP 12 10     Lactic Acid:   No results for input(s): "LACTATE" in the last 48 hours.    POCT Glucose: No results for input(s): "POCTGLUCOSE" in the last 48 hours.  Troponin: No results for input(s): "TROPONINI", "TROPONINIHS" in the last 48 hours.  Urine Culture: No results for input(s): "LABURIN" in the last 48 hours.  Urine Studies:   No results for input(s): "COLORU", "APPEARANCEUA", "PHUR", "SPECGRAV", "PROTEINUA", "GLUCUA", "KETONESU", "BILIRUBINUA", "OCCULTUA", "NITRITE", "UROBILINOGEN", "LEUKOCYTESUR", " ""RBCUA", "WBCUA", "BACTERIA", "SQUAMEPITHEL", "HYALINECASTS" in the last 48 hours.    Invalid input(s): "WRIGHTSUR"      Significant Imaging: I have reviewed all pertinent imaging results/findings within the past 24 hours.  I have reviewed and interpreted all pertinent imaging results/findings within the past 24 hours.      Assessment & Plan  Sepsis  Patient has sepsis without organ dysfunction secondary to Skin and Soft Tissue Infection: Cellulitis. A review of systems was completed. Patient's sepsis is improving    Current Antibiotics    clindamycin capsule 300 mg, Every 6 hours, Oral    Lactate  Recent Labs   Lab 07/01/25  1909   LACTATE 1.9     Culture Data  Blood Cultures   Blood Culture   Date Value Ref Range Status   07/01/2025 No Growth After 72 Hours  Preliminary   07/01/2025 No Growth After 72 Hours  Preliminary      Urine Culture   Urine Culture, Routine   Date Value Ref Range Status   02/09/2022 No significant growth  Final      mSOFA  MSOFA Total  Min: 1   Min taken time: 07/05/25 0901  Max: 3   Max taken time: 07/04/25 1001    Plan  - Antibiotics as listed above  - Fluid resuscitation as follows:Fluid Not Needed - Patient is not hypotensive and/or lactate is less than 4.0.   - Trend lactate to resolution  - Follow up culture data  - Vasopressors were not needed  - The following services were consulted:Hospital Medicine.  - continue clindamycin for now    7/5:  Resolved.  Transition to oral clindamycin 07/05/2025.  We will need 7-10 days total of treatment  Essential hypertension  Patient's blood pressure range in the last 24 hours was: BP  Min: 99/51  Max: 146/68.The patient's inpatient anti-hypertensive regimen is listed below:  Current Antihypertensives  hydrALAZINE injection 10 mg, Every 6 hours PRN, Intravenous  metoprolol succinate (TOPROL-XL) 24 hr tablet 100 mg, 2 times daily, Oral  valsartan tablet 40 mg, 2 times daily, Oral    Plan  - BP is controlled, no changes needed to their regimen  - "   Aortic valve stenosis  Echocardiogram with evidence of aortic stenosis that is moderate . The patient's most recent echocardiogram result is listed below. We will manage the valvular abnormality by status post TAVR    No echocardiogram results found for the past 12 months   Hyperlipidemia LDL goal <70  Lipitor 10 mg daily    Pressure ulcer of left heel, unstageable  Consult wound care  Continue clindamycin  Oxycodone p.r.n. pain    Off load heels.  Order mattress for home.  Spinal stenosis of lumbar region with neurogenic claudication  Patient is bed ridden.  She will require frequent turning, PT, wound care.    Need to discuss with family about possible nursing home placement.  She does have home health.  She seems very difficult to care for.    Ordering low air loss mattress for home.  Pressure injury of skin of sacral region  Consult wound care   Continue clindamycin for now.    Will recheck CBC.   No signs of infection per wound care/eval.  Will likely cont on clinda and follow cultures. Orders per wound care and new mattress ordered.    7/5: WBC remains elevated but no fevers. I am not findings any other sources of acute infection and seems to have chronic mild leukocytosis. Will cont clinda-transition to p.o. on 07/05/2025  Considering reactive leukocytosis to her iron deficiency    S/P TAVR (transcatheter aortic valve replacement)  No treatment necessary continue valsartan  Hold amlodipine and Lasix for now.  Restart if pressure starts to recover    Microcytic anemia  Anemia is likely due to Iron deficiency. Most recent hemoglobin and hematocrit are listed below.  Recent Labs     07/03/25  1308 07/04/25  0957 07/05/25  0900   HGB 8.2* 7.8* 7.6*   HCT 24.2* 23.2* 23.0*     Plan  - Monitor serial CBC: Daily  - Transfuse PRBC if patient becomes hemodynamically unstable, symptomatic or H/H drops below 7/21.  - Patient has not received any PRBC transfusions to date  - Patient's anemia is currently worsening.  Will adjust treatment as follows:  IV iron today  - H&H is worsening since admission.  Labs seem most consistent with iron deficiency.  Iron levels ordered and pending.  I do not see any clinical evidence of active bleeding at this time and remains hemodynamically stable.  In an attempt to avoid transfusion we will give IV iron today and follow labs  VTE Risk Mitigation (From admission, onward)           Ordered     IP VTE HIGH RISK PATIENT  Once         07/01/25 2343     Place sequential compression device  Until discontinued         07/01/25 2343                    Discharge Planning   LINA: 7/7/2025     Code Status: Full Code   Medical Readiness for Discharge Date:   Discharge Plan A: Home Health                        Viviana Lindsay MD  Department of Hospital Medicine   [place] - Med Surg (3rd Fl)

## 2025-07-05 NOTE — ASSESSMENT & PLAN NOTE
Patient's blood pressure range in the last 24 hours was: BP  Min: 99/51  Max: 146/68.The patient's inpatient anti-hypertensive regimen is listed below:  Current Antihypertensives  hydrALAZINE injection 10 mg, Every 6 hours PRN, Intravenous  metoprolol succinate (TOPROL-XL) 24 hr tablet 100 mg, 2 times daily, Oral  valsartan tablet 40 mg, 2 times daily, Oral    Plan  - BP is controlled, no changes needed to their regimen  -

## 2025-07-05 NOTE — SUBJECTIVE & OBJECTIVE
Review of Systems   Constitutional:  Negative for activity change, chills, fatigue, fever and unexpected weight change.   HENT:  Negative for sore throat and trouble swallowing.    Respiratory:  Negative for cough, chest tightness and shortness of breath.    Cardiovascular:  Negative for chest pain and leg swelling.   Gastrointestinal:  Negative for abdominal pain.   Endocrine: Negative for cold intolerance and heat intolerance.   Genitourinary:  Negative for difficulty urinating.   Musculoskeletal:  Positive for arthralgias, back pain and gait problem (chronic.  Bed-bound). Negative for joint swelling.   Skin:  Positive for rash and wound.   Neurological:  Negative for numbness.   Hematological:  Negative for adenopathy.   Psychiatric/Behavioral:  Negative for decreased concentration.      Objective:     Vital Signs (Most Recent):  Temp: 99.2 °F (37.3 °C) (07/05/25 0811)  Pulse: 79 (07/05/25 0811)  Resp: 17 (07/05/25 0811)  BP: (!) 146/68 (07/05/25 0811)  SpO2: 96 % (07/05/25 0811) Vital Signs (24h Range):  Temp:  [97.7 °F (36.5 °C)-99.2 °F (37.3 °C)] 99.2 °F (37.3 °C)  Pulse:  [67-84] 79  Resp:  [17-20] 17  SpO2:  [94 %-98 %] 96 %  BP: ()/(51-68) 146/68     Weight: 108.5 kg (239 lb 3.2 oz)  Body mass index is 35.32 kg/m².     Physical Exam  Vitals and nursing note reviewed.   Constitutional:       General: She is not in acute distress.     Appearance: She is not ill-appearing.      Comments: Lying in bed.  Bedridden.  Unable to ambulate.  She answers questions fairly well.   HENT:      Right Ear: Tympanic membrane normal.      Left Ear: Tympanic membrane normal.      Mouth/Throat:      Mouth: Mucous membranes are moist.   Eyes:      General:         Right eye: No discharge.         Left eye: No discharge.      Conjunctiva/sclera: Conjunctivae normal.   Cardiovascular:      Rate and Rhythm: Normal rate and regular rhythm.      Heart sounds: Murmur heard.   Pulmonary:      Effort: Pulmonary effort is  "normal.      Breath sounds: Normal breath sounds.   Abdominal:      General: Abdomen is flat.      Palpations: Abdomen is soft.   Musculoskeletal:      Cervical back: Normal range of motion and neck supple. No rigidity.      Right lower leg: No edema.      Left lower leg: No edema.   Skin:     Findings: No erythema.      Comments: See pictures.  Significant pressure injuries and ulcers of the sacrum, buttocks.  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   Neurological:      Mental Status: She is alert and oriented to person, place, and time. Mental status is at baseline.      Comments: Bed ridden                Significant Labs: All pertinent labs within the past 24 hours have been reviewed.  CBC:   Recent Labs   Lab 07/03/25  1308 07/04/25  0957 07/05/25  0900   WBC 18.76* 19.18* 19.98*   HGB 8.2* 7.8* 7.6*   HCT 24.2* 23.2* 23.0*    241 256     CMP:   Recent Labs   Lab 07/04/25  0957 07/05/25  0900    138   K 4.6 5.3*    107   CO2 19* 21*    93   BUN 29* 32*   CREATININE 1.5* 1.5*   CALCIUM 7.5* 7.6*   ANIONGAP 12 10     Lactic Acid:   No results for input(s): "LACTATE" in the last 48 hours.    POCT Glucose: No results for input(s): "POCTGLUCOSE" in the last 48 hours.  Troponin: No results for input(s): "TROPONINI", "TROPONINIHS" in the last 48 hours.  Urine Culture: No results for input(s): "LABURIN" in the last 48 hours.  Urine Studies:   No results for input(s): "COLORU", "APPEARANCEUA", "PHUR", "SPECGRAV", "PROTEINUA", "GLUCUA", "KETONESU", "BILIRUBINUA", "OCCULTUA", "NITRITE", "UROBILINOGEN", "LEUKOCYTESUR", "RBCUA", "WBCUA", "BACTERIA", "SQUAMEPITHEL", "HYALINECASTS" in the last 48 hours.    Invalid input(s): "WRIGHTSUR"      Significant Imaging: I have reviewed all pertinent imaging results/findings within the past 24 hours.  I have reviewed and interpreted all pertinent imaging results/findings within the past 24 hours.  "

## 2025-07-05 NOTE — ASSESSMENT & PLAN NOTE
Anemia is likely due to Iron deficiency. Most recent hemoglobin and hematocrit are listed below.  Recent Labs     07/03/25  1308 07/04/25  0957 07/05/25  0900   HGB 8.2* 7.8* 7.6*   HCT 24.2* 23.2* 23.0*     Plan  - Monitor serial CBC: Daily  - Transfuse PRBC if patient becomes hemodynamically unstable, symptomatic or H/H drops below 7/21.  - Patient has not received any PRBC transfusions to date  - Patient's anemia is currently worsening. Will adjust treatment as follows:  IV iron today  - H&H is worsening since admission.  Labs seem most consistent with iron deficiency.  Iron levels ordered and pending.  I do not see any clinical evidence of active bleeding at this time and remains hemodynamically stable.  In an attempt to avoid transfusion we will give IV iron today and follow labs

## 2025-07-05 NOTE — ASSESSMENT & PLAN NOTE
Patient has sepsis without organ dysfunction secondary to Skin and Soft Tissue Infection: Cellulitis. A review of systems was completed. Patient's sepsis is improving    Current Antibiotics    clindamycin capsule 300 mg, Every 6 hours, Oral    Lactate  Recent Labs   Lab 07/01/25  1909   LACTATE 1.9     Culture Data  Blood Cultures   Blood Culture   Date Value Ref Range Status   07/01/2025 No Growth After 72 Hours  Preliminary   07/01/2025 No Growth After 72 Hours  Preliminary      Urine Culture   Urine Culture, Routine   Date Value Ref Range Status   02/09/2022 No significant growth  Final      mSOFA  MSOFA Total  Min: 1   Min taken time: 07/05/25 0901  Max: 3   Max taken time: 07/04/25 1001    Plan  - Antibiotics as listed above  - Fluid resuscitation as follows:Fluid Not Needed - Patient is not hypotensive and/or lactate is less than 4.0.   - Trend lactate to resolution  - Follow up culture data  - Vasopressors were not needed  - The following services were consulted:Hospital Medicine.  - continue clindamycin for now    7/5:  Resolved.  Transition to oral clindamycin 07/05/2025.  We will need 7-10 days total of treatment

## 2025-07-05 NOTE — PLAN OF CARE
Problem: Skin Injury Risk Increased  Goal: Skin Health and Integrity  Outcome: Progressing     Problem: Adult Inpatient Plan of Care  Goal: Plan of Care Review  Outcome: Progressing  Goal: Patient-Specific Goal (Individualized)  Outcome: Progressing  Goal: Absence of Hospital-Acquired Illness or Injury  Outcome: Progressing  Goal: Optimal Comfort and Wellbeing  Outcome: Progressing  Goal: Readiness for Transition of Care  Outcome: Progressing     Problem: Fall Injury Risk  Goal: Absence of Fall and Fall-Related Injury  Outcome: Progressing     Problem: Wound  Goal: Optimal Coping  Outcome: Progressing  Goal: Optimal Functional Ability  Outcome: Progressing  Goal: Absence of Infection Signs and Symptoms  Outcome: Progressing  Goal: Improved Oral Intake  Outcome: Progressing  Goal: Optimal Pain Control and Function  Outcome: Progressing

## 2025-07-06 NOTE — PLAN OF CARE
07/06/25 1021   Post-Acute Status   Post-Acute Authorization E     GANGA received secure chat message from BRANDI Issa from [employer] requesting for CM to reach out to family to reach out to [facility] for payment for low pressure mattress.     CM attempted to reach out to [place] ([phone]) no answer and left voicemail.    CM reached out to Brittany ([phone]) and per Brittany, pt handles her own finances and with assistance from nursing staff could handle taking care of the payment herself.      CM reached back out to Maegan to inform her of above.

## 2025-07-06 NOTE — ASSESSMENT & PLAN NOTE
Patient's blood pressure range in the last 24 hours was: BP  Min: 114/67  Max: 157/71.The patient's inpatient anti-hypertensive regimen is listed below:  Current Antihypertensives  hydrALAZINE injection 10 mg, Every 6 hours PRN, Intravenous  metoprolol succinate (TOPROL-XL) 24 hr tablet 100 mg, 2 times daily, Oral  valsartan tablet 40 mg, 2 times daily, Oral    Plan  - BP is controlled, no changes needed to their regimen  -

## 2025-07-06 NOTE — ASSESSMENT & PLAN NOTE
No treatment necessary continue valsartan  Hold amlodipine and Lasix for now.  Restart if pressure starts to recover  -continue to hold at discharge

## 2025-07-06 NOTE — ASSESSMENT & PLAN NOTE
Echocardiogram with evidence of aortic stenosis that is moderate . The patient's most recent echocardiogram result is listed below. We will manage the valvular abnormality by status post TAVR    No echocardiogram results found for the past 12 months     7/6: clinically stable

## 2025-07-06 NOTE — ASSESSMENT & PLAN NOTE
Consult wound care   Continue clindamycin for now.    Will recheck CBC.   No signs of infection per wound care/eval.  Will likely cont on clinda and follow cultures. Orders per wound care and new mattress ordered.    7/6: WBC remains elevated but no fevers. I am not findings any other sources of acute infection and seems to have chronic mild leukocytosis. Will cont clinda-transition to p.o. on 07/05/2025  Considering reactive leukocytosis vs primary bone marrow process. Needs hematology outpatient.   Diff is not concerning and morphology with hypochromia and target cells (no schistocytes) most consistent with Fe deficiency vs thalassemia regarding anemia and not concerning for active bacterial infection

## 2025-07-06 NOTE — ASSESSMENT & PLAN NOTE
Anemia is likely due to Iron deficiency. Most recent hemoglobin and hematocrit are listed below.  Recent Labs     07/04/25  0957 07/05/25  0900 07/06/25  0429   HGB 7.8* 7.6* 7.7*   HCT 23.2* 23.0* 23.2*     Plan  - Monitor serial CBC: Daily  - Transfuse PRBC if patient becomes hemodynamically unstable, symptomatic or H/H drops below 7/21.  - Patient has not received any PRBC transfusions to date  - Patient's anemia is currently worsening. Will adjust treatment as follows:  IV iron today  - H&H is worsening since admission.  Labs seem most consistent with iron deficiency.  Iron levels ordered and pending.  I do not see any clinical evidence of active bleeding at this time and remains hemodynamically stable.  In an attempt to avoid transfusion we will give IV iron today and follow labs    7/6: iron, B12 and folate normal  Stool sent for occult but no clinical signs of bleeding  Hemolysis labs sent  H/H low but stable for last 3 days.  If above work up negative she is otherwise clinically stable for discharge and close hematology follow up with outpatient labs as I do not have GI or hematology at this hospital to manage while inpatient.

## 2025-07-06 NOTE — PLAN OF CARE
Problem: Skin Injury Risk Increased  Goal: Skin Health and Integrity  Outcome: Progressing     Problem: Adult Inpatient Plan of Care  Goal: Plan of Care Review  Outcome: Progressing  Goal: Patient-Specific Goal (Individualized)  Outcome: Progressing  Goal: Absence of Hospital-Acquired Illness or Injury  Outcome: Progressing  Goal: Optimal Comfort and Wellbeing  Outcome: Progressing  Goal: Readiness for Transition of Care  Outcome: Progressing     Problem: Fall Injury Risk  Goal: Absence of Fall and Fall-Related Injury  Outcome: Progressing     Problem: Wound  Goal: Optimal Coping  Outcome: Progressing  Goal: Optimal Functional Ability  Outcome: Progressing  Goal: Absence of Infection Signs and Symptoms  Outcome: Progressing  Goal: Improved Oral Intake  Outcome: Progressing

## 2025-07-06 NOTE — ASSESSMENT & PLAN NOTE
Patient has sepsis without organ dysfunction secondary to Skin and Soft Tissue Infection: Cellulitis. A review of systems was completed. Patient's sepsis is improving    Current Antibiotics    clindamycin capsule 300 mg, Every 6 hours, Oral    Lactate  Recent Labs   Lab 07/01/25  1909   LACTATE 1.9     Culture Data  Blood Cultures   Blood Culture   Date Value Ref Range Status   07/01/2025 No Growth After 96 hours  Preliminary   07/01/2025 No Growth After 96 hours  Preliminary      Urine Culture   Urine Culture, Routine   Date Value Ref Range Status   02/09/2022 No significant growth  Final      mSOFA  MSOFA Total  Min: 1   Min taken time: 07/06/25 0801  Max: 1   Max taken time: 07/06/25 0801    Plan  - Antibiotics as listed above  - Fluid resuscitation as follows:Fluid Not Needed - Patient is not hypotensive and/or lactate is less than 4.0.   - Trend lactate to resolution  - Follow up culture data  - Vasopressors were not needed  - The following services were consulted:Hospital Medicine.  - continue clindamycin for now    7/5:  Resolved.  Transition to oral clindamycin 07/05/2025.  We will need 7-10 days total of treatment    7/6: WBC remains elevated but no fevers. I am not findings any other sources of acute infection and seems to have chronic mild leukocytosis. Will cont clinda-transition to p.o. on 07/05/2025  Considering reactive leukocytosis vs primary bone marrow process. Needs hematology outpatient.   Diff is not concerning and morphology with hypochromia and target cells (no schistocytes) most consistent with Fe deficiency vs thalassemia regarding anemia and not concerning for active bacterial infection

## 2025-07-06 NOTE — PROGRESS NOTES
[facility] ([facility])  [facility] Medicine  Progress Note    Patient Name: Emily Alicia  MRN: [id number]  Patient Class: IP- Inpatient   Admission Date: 7/1/2025  Length of Stay: 4 days  Attending Physician: Braeden Anderson MD  Primary Care Provider: Angel Pemberton MD        Subjective     Principal Problem:Sepsis        HPI:  Patient is an 84-year-old black female that presented to the emergency room with significant pain from her skin pressure injuries of her sacrum, buttocks.  Upon presentation she had a working diagnosis of sepsis so she was given IV fluids, IV antibiotics.  Workup unremarkable.  Physical exam reveals significant skin pressure injuries to the buttocks, sacrum, foot.  She is being admitted for pain control, IV antibiotics, wound care consultation.    Overview/Hospital Course:  Patient seen by wound care this morning. Recs placed. Patient without any complaints today at all. She is eating well. Afebrile. WBC ct pending.    7/4: clinically patient is doing quite well. She is tolerating PO intake. Has no complaints on rounds.  However, her WBC is not trending down. Seems she has mild chronic elevation of 14-15K but WBC 19K this mroning and 20K on admit. I still do not see any other sources of infections and wounds are not draining, no erythema, no fevers, etc. Cont clindamycin for now.  H/H 7.8/23 from 9.1/27 on admit. No signs of bleeding but with drop will monitor overnight. Also Cr 1.5 from 1.1 on admit. However, on chart review she does fluctuate around this isabella.     7/5:  Patient continues to feel well.  She has no acute complaints.  She denies chest pain, shortness of breath, dysuria, frequency, diarrhea, nausea and vomiting.  Her wounds are stable and do not show any signs of infection.  However, she remains with leukocytosis of [id number] which is not improved since admission.  Her H&H is decreasing in his knee or transfusion threshold.  Her MCV is low and she likely has  iron deficiency.  I am suspecting she has reactive leukocytosis due to this process.  Iron levels ordered.  If low we will replace IV and start p.o. tomorrow in an attempt to avoid blood transfusion.  We will continue clindamycin.  I see no other signs of infection and no indication to change antibiotics.  We will actually deescalate to oral today.  Creatinine remained stable at 1.5    7/6: Clinically this patient remains without complaints and at baseline. She has no fever, dysuria, abd pain, n/v/d/c, chest pains, SOB, cough, etc.  I have kept her inpatient following labs due to minor abnormalities:  Cr 1-1.5 at baseline and while did trend up to 1.5/1.7 this admission on chart review her renal fxn fluctuating here over at least last 1 year. Therefore I do not think this is indicative of LOWELL needing further inpatient management.  H/H down trended but has been stable last 3 days. She is not hemodynamically unstable and vitals remain normal. She has no evidence of bleeding. Stool will be sent for occult and if + can see GI outpatient. However, I am also concerned for primary bone marrow process. Will also work up hemolysis with labs today (h/o aortic stenosis s/p valve replacement).  She has chronic leukocytosis which has not been worked up to date. Plt are normal. I have no access to hematology while inpatient here so may discharge with patient needing to see hematology ASAP. She will need repeat CBC within next few days to monitor as well.   HypoCa noted; awaiting albumin. If lab unremarkable I believe she can discharge this afternoon with home health and home medical equipment and continue to follow up outpatient as we are only lab monitoring at this point in the hospital as no access to needed specialists and she is clinically stable. Dispo pending labs this morning.         Review of Systems   Constitutional:  Negative for activity change, chills, fatigue, fever and unexpected weight change.   HENT:  Negative  for sore throat and trouble swallowing.    Respiratory:  Negative for cough, chest tightness and shortness of breath.    Cardiovascular:  Negative for chest pain and leg swelling.   Gastrointestinal:  Negative for abdominal pain.   Endocrine: Negative for cold intolerance and heat intolerance.   Genitourinary:  Negative for difficulty urinating.   Musculoskeletal:  Positive for arthralgias, back pain and gait problem (chronic.  Bed-bound). Negative for joint swelling.   Skin:  Positive for wound. Negative for rash.   Neurological:  Negative for numbness.   Hematological:  Negative for adenopathy.   Psychiatric/Behavioral:  Negative for decreased concentration.      Objective:     Vital Signs (Most Recent):  Temp: 97.8 °F (36.6 °C) (07/06/25 0756)  Pulse: 86 (07/06/25 0756)  Resp: 20 (07/06/25 0756)  BP: 121/67 (07/06/25 0756)  SpO2: 97 % (07/06/25 0756) Vital Signs (24h Range):  Temp:  [97.4 °F (36.3 °C)-99.4 °F (37.4 °C)] 97.8 °F (36.6 °C)  Pulse:  [69-90] 86  Resp:  [17-20] 20  SpO2:  [96 %-97 %] 97 %  BP: (114-157)/(55-71) 121/67     Weight: 108.5 kg (239 lb 3.2 oz)  Body mass index is 35.32 kg/m².     Physical Exam  Vitals and nursing note reviewed.   Constitutional:       General: She is not in acute distress.     Appearance: She is not ill-appearing.      Comments: Lying in bed.  Bedridden.  Unable to ambulate.  She answers questions fairly well.   HENT:      Right Ear: Tympanic membrane normal.      Left Ear: Tympanic membrane normal.      Mouth/Throat:      Mouth: Mucous membranes are moist.   Eyes:      General:         Right eye: No discharge.         Left eye: No discharge.      Conjunctiva/sclera: Conjunctivae normal.   Cardiovascular:      Rate and Rhythm: Normal rate and regular rhythm.      Heart sounds: Murmur heard.   Pulmonary:      Effort: Pulmonary effort is normal.      Breath sounds: Normal breath sounds.   Abdominal:      General: Abdomen is flat.      Palpations: Abdomen is soft.  "  Musculoskeletal:      Cervical back: Normal range of motion and neck supple. No rigidity.      Right lower leg: No edema.      Left lower leg: No edema.   Skin:     Findings: No erythema.      Comments: See pictures.  Significant pressure injuries and ulcers of the sacrum, buttocks.  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   Neurological:      Mental Status: She is alert and oriented to person, place, and time. Mental status is at baseline.      Comments: Bed ridden                Significant Labs: All pertinent labs within the past 24 hours have been reviewed.  CBC:   Recent Labs   Lab 07/04/25 0957 07/05/25  0900 07/06/25  0429   WBC 19.18* 19.98* 22.94*   HGB 7.8* 7.6* 7.7*   HCT 23.2* 23.0* 23.2*    256 248     CMP:   Recent Labs   Lab 07/04/25 0957 07/05/25  0900 07/06/25  0429    138 135*   K 4.6 5.3* 5.3*    107 105   CO2 19* 21* 21*    93 103   BUN 29* 32* 43*   CREATININE 1.5* 1.5* 1.7*   CALCIUM 7.5* 7.6* 7.5*   ANIONGAP 12 10 9     Lactic Acid:   No results for input(s): "LACTATE" in the last 48 hours.    POCT Glucose: No results for input(s): "POCTGLUCOSE" in the last 48 hours.  Troponin: No results for input(s): "TROPONINI", "TROPONINIHS" in the last 48 hours.  Urine Culture: No results for input(s): "LABURIN" in the last 48 hours.  Urine Studies:   No results for input(s): "COLORU", "APPEARANCEUA", "PHUR", "SPECGRAV", "PROTEINUA", "GLUCUA", "KETONESU", "BILIRUBINUA", "OCCULTUA", "NITRITE", "UROBILINOGEN", "LEUKOCYTESUR", "RBCUA", "WBCUA", "BACTERIA", "SQUAMEPITHEL", "HYALINECASTS" in the last 48 hours.    Invalid input(s): "WRIGHTSUR"      Significant Imaging: I have reviewed all pertinent imaging results/findings within the past 24 hours.  I have reviewed and interpreted all pertinent imaging results/findings within the past 24 hours.      Assessment & Plan  Sepsis  Patient has sepsis without organ dysfunction secondary to Skin and Soft Tissue Infection: Cellulitis. A " review of systems was completed. Patient's sepsis is improving    Current Antibiotics    clindamycin capsule 300 mg, Every 6 hours, Oral    Lactate  Recent Labs   Lab 07/01/25  1909   LACTATE 1.9     Culture Data  Blood Cultures   Blood Culture   Date Value Ref Range Status   07/01/2025 No Growth After 96 hours  Preliminary   07/01/2025 No Growth After 96 hours  Preliminary      Urine Culture   Urine Culture, Routine   Date Value Ref Range Status   02/09/2022 No significant growth  Final      mSOFA  MSOFA Total  Min: 1   Min taken time: 07/06/25 0801  Max: 1   Max taken time: 07/06/25 0801    Plan  - Antibiotics as listed above  - Fluid resuscitation as follows:Fluid Not Needed - Patient is not hypotensive and/or lactate is less than 4.0.   - Trend lactate to resolution  - Follow up culture data  - Vasopressors were not needed  - The following services were consulted:Hospital Medicine.  - continue clindamycin for now    7/5:  Resolved.  Transition to oral clindamycin 07/05/2025.  We will need 7-10 days total of treatment    7/6: WBC remains elevated but no fevers. I am not findings any other sources of acute infection and seems to have chronic mild leukocytosis. Will cont clinda-transition to p.o. on 07/05/2025  Considering reactive leukocytosis vs primary bone marrow process. Needs hematology outpatient.   Diff is not concerning and morphology with hypochromia and target cells (no schistocytes) most consistent with Fe deficiency vs thalassemia regarding anemia and not concerning for active bacterial infection  Essential hypertension  Patient's blood pressure range in the last 24 hours was: BP  Min: 114/67  Max: 157/71.The patient's inpatient anti-hypertensive regimen is listed below:  Current Antihypertensives  hydrALAZINE injection 10 mg, Every 6 hours PRN, Intravenous  metoprolol succinate (TOPROL-XL) 24 hr tablet 100 mg, 2 times daily, Oral  valsartan tablet 40 mg, 2 times daily, Oral    Plan  - BP is  controlled, no changes needed to their regimen  -   Aortic valve stenosis  Echocardiogram with evidence of aortic stenosis that is moderate . The patient's most recent echocardiogram result is listed below. We will manage the valvular abnormality by status post TAVR    No echocardiogram results found for the past 12 months     7/6: clinically stable  Hyperlipidemia LDL goal <70  Lipitor 10 mg daily    Pressure ulcer of left heel, unstageable  Consult wound care  Continue clindamycin  Oxycodone p.r.n. pain    Off load heels.  Order mattress for home.  Spinal stenosis of lumbar region with neurogenic claudication  Patient is bed ridden.  She will require frequent turning, PT, wound care.    Need to discuss with family about possible nursing home placement.  She does have home health.  She seems very difficult to care for.    Ordering low air loss mattress for home.  Pressure injury of skin of sacral region  Consult wound care   Continue clindamycin for now.    Will recheck CBC.   No signs of infection per wound care/eval.  Will likely cont on clinda and follow cultures. Orders per wound care and new mattress ordered.    7/6: WBC remains elevated but no fevers. I am not findings any other sources of acute infection and seems to have chronic mild leukocytosis. Will cont clinda-transition to p.o. on 07/05/2025  Considering reactive leukocytosis vs primary bone marrow process. Needs hematology outpatient.   Diff is not concerning and morphology with hypochromia and target cells (no schistocytes) most consistent with Fe deficiency vs thalassemia regarding anemia and not concerning for active bacterial infection    S/P TAVR (transcatheter aortic valve replacement)  No treatment necessary continue valsartan  Hold amlodipine and Lasix for now.  Restart if pressure starts to recover  -continue to hold at discharge    Microcytic anemia  Anemia is likely due to Iron deficiency. Most recent hemoglobin and hematocrit are listed  below.  Recent Labs     07/04/25  0957 07/05/25  0900 07/06/25  0429   HGB 7.8* 7.6* 7.7*   HCT 23.2* 23.0* 23.2*     Plan  - Monitor serial CBC: Daily  - Transfuse PRBC if patient becomes hemodynamically unstable, symptomatic or H/H drops below 7/21.  - Patient has not received any PRBC transfusions to date  - Patient's anemia is currently worsening. Will adjust treatment as follows:  IV iron today  - H&H is worsening since admission.  Labs seem most consistent with iron deficiency.  Iron levels ordered and pending.  I do not see any clinical evidence of active bleeding at this time and remains hemodynamically stable.  In an attempt to avoid transfusion we will give IV iron today and follow labs    7/6: iron, B12 and folate normal  Stool sent for occult but no clinical signs of bleeding  Hemolysis labs sent  H/H low but stable for last 3 days.  If above work up negative she is otherwise clinically stable for discharge and close hematology follow up with outpatient labs as I do not have GI or hematology at this hospital to manage while inpatient.   VTE Risk Mitigation (From admission, onward)           Ordered     IP VTE HIGH RISK PATIENT  Once         07/01/25 2343     Place sequential compression device  Until discontinued         07/01/25 2343                    Discharge Planning   LINA: 7/7/2025     Code Status: Full Code   Medical Readiness for Discharge Date:   Discharge Plan A: Home Health                        Viviana Lindsay MD  Department of Hospital Medicine   [place] - [facility] Surg (3rd Fl)

## 2025-07-06 NOTE — SUBJECTIVE & OBJECTIVE
Review of Systems   Constitutional:  Negative for activity change, chills, fatigue, fever and unexpected weight change.   HENT:  Negative for sore throat and trouble swallowing.    Respiratory:  Negative for cough, chest tightness and shortness of breath.    Cardiovascular:  Negative for chest pain and leg swelling.   Gastrointestinal:  Negative for abdominal pain.   Endocrine: Negative for cold intolerance and heat intolerance.   Genitourinary:  Negative for difficulty urinating.   Musculoskeletal:  Positive for arthralgias, back pain and gait problem (chronic.  Bed-bound). Negative for joint swelling.   Skin:  Positive for wound. Negative for rash.   Neurological:  Negative for numbness.   Hematological:  Negative for adenopathy.   Psychiatric/Behavioral:  Negative for decreased concentration.      Objective:     Vital Signs (Most Recent):  Temp: 97.8 °F (36.6 °C) (07/06/25 0756)  Pulse: 86 (07/06/25 0756)  Resp: 20 (07/06/25 0756)  BP: 121/67 (07/06/25 0756)  SpO2: 97 % (07/06/25 0756) Vital Signs (24h Range):  Temp:  [97.4 °F (36.3 °C)-99.4 °F (37.4 °C)] 97.8 °F (36.6 °C)  Pulse:  [69-90] 86  Resp:  [17-20] 20  SpO2:  [96 %-97 %] 97 %  BP: (114-157)/(55-71) 121/67     Weight: 108.5 kg (239 lb 3.2 oz)  Body mass index is 35.32 kg/m².     Physical Exam  Vitals and nursing note reviewed.   Constitutional:       General: She is not in acute distress.     Appearance: She is not ill-appearing.      Comments: Lying in bed.  Bedridden.  Unable to ambulate.  She answers questions fairly well.   HENT:      Right Ear: Tympanic membrane normal.      Left Ear: Tympanic membrane normal.      Mouth/Throat:      Mouth: Mucous membranes are moist.   Eyes:      General:         Right eye: No discharge.         Left eye: No discharge.      Conjunctiva/sclera: Conjunctivae normal.   Cardiovascular:      Rate and Rhythm: Normal rate and regular rhythm.      Heart sounds: Murmur heard.   Pulmonary:      Effort: Pulmonary effort  "is normal.      Breath sounds: Normal breath sounds.   Abdominal:      General: Abdomen is flat.      Palpations: Abdomen is soft.   Musculoskeletal:      Cervical back: Normal range of motion and neck supple. No rigidity.      Right lower leg: No edema.      Left lower leg: No edema.   Skin:     Findings: No erythema.      Comments: See pictures.  Significant pressure injuries and ulcers of the sacrum, buttocks.  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   Neurological:      Mental Status: She is alert and oriented to person, place, and time. Mental status is at baseline.      Comments: Bed ridden                Significant Labs: All pertinent labs within the past 24 hours have been reviewed.  CBC:   Recent Labs   Lab 07/04/25  0957 07/05/25  0900 07/06/25  0429   WBC 19.18* 19.98* 22.94*   HGB 7.8* 7.6* 7.7*   HCT 23.2* 23.0* 23.2*    256 248     CMP:   Recent Labs   Lab 07/04/25  0957 07/05/25  0900 07/06/25  0429    138 135*   K 4.6 5.3* 5.3*    107 105   CO2 19* 21* 21*    93 103   BUN 29* 32* 43*   CREATININE 1.5* 1.5* 1.7*   CALCIUM 7.5* 7.6* 7.5*   ANIONGAP 12 10 9     Lactic Acid:   No results for input(s): "LACTATE" in the last 48 hours.    POCT Glucose: No results for input(s): "POCTGLUCOSE" in the last 48 hours.  Troponin: No results for input(s): "TROPONINI", "TROPONINIHS" in the last 48 hours.  Urine Culture: No results for input(s): "LABURIN" in the last 48 hours.  Urine Studies:   No results for input(s): "COLORU", "APPEARANCEUA", "PHUR", "SPECGRAV", "PROTEINUA", "GLUCUA", "KETONESU", "BILIRUBINUA", "OCCULTUA", "NITRITE", "UROBILINOGEN", "LEUKOCYTESUR", "RBCUA", "WBCUA", "BACTERIA", "SQUAMEPITHEL", "HYALINECASTS" in the last 48 hours.    Invalid input(s): "WRIGHTSUR"      Significant Imaging: I have reviewed all pertinent imaging results/findings within the past 24 hours.  I have reviewed and interpreted all pertinent imaging results/findings within the past 24 hours.  "

## 2025-07-07 PROBLEM — E87.5 HYPERKALEMIA: Status: ACTIVE | Noted: 2025-01-01

## 2025-07-07 PROBLEM — N39.0 UTI (URINARY TRACT INFECTION): Status: ACTIVE | Noted: 2025-01-01

## 2025-07-07 PROBLEM — N17.9 AKI (ACUTE KIDNEY INJURY): Status: ACTIVE | Noted: 2025-01-01

## 2025-07-07 NOTE — ASSESSMENT & PLAN NOTE
LOWELL is likely due to unsure etiology at this time. Baseline creatinine is 1.1. Most recent creatinine and eGFR are listed below.  Recent Labs     07/06/25  0429 07/06/25  0857 07/07/25  0641   CREATININE 1.7* 1.7* 2.0*   EGFRNORACEVR 30* 30* 24*      Plan  - LOWELL is worsening. Will adjust treatment as follows: d/c valsartan, IV fluids, address hypoalbuminemia, urine studies pending .    - Avoid nephrotoxins and renally dose meds for GFR listed above  - Monitor urine output, serial BMP, and adjust therapy as needed

## 2025-07-07 NOTE — ASSESSMENT & PLAN NOTE
Hyperkalemia is likely due to LOWELL.The patients most recent potassium results are listed below.  Recent Labs     07/06/25  0429 07/06/25  0857 07/07/25  0641   K 5.3* 5.9* 6.1*     Plan  - Monitor for arrhythmias with EKG and/or continuous telemetry.   - Treat the hyperkalemia with Potassium Binders.   - Monitor potassium: Every 12 hours  - The patient's hyperkalemia is improving

## 2025-07-07 NOTE — NURSING
Upon assessment, patient noted to be more lethargic than her normal; does respond when name called and does answer questions appropriately. V/s stable. Patient noted with slight expiratory wheezing. Patient's sp02 is 95% on room air. Patient has not voided since beginning of shift. Bladder scan performed; approximately 275ml per bladder scan. Patient also noted with elevated wbc count; elevated potassium level, dr. Lindsay notified of patient's current condition and new order for ns at 100 ml per hour for 5 hours.  Order noted to bladder scan patient if does not void; perform in and out cath if > 300ml of urine.

## 2025-07-07 NOTE — PLAN OF CARE
07/07/25 0903   Rounds   Attendance Provider;Nurse ;   Discharge Plan A Home with family;Home Health   Why the patient remains in the hospital Requires continued medical care   Transition of Care Barriers None     Care team reviewed plan of care and discharge plan with patient / family.  CM will continue to follow till discharge.

## 2025-07-07 NOTE — PROGRESS NOTES
formerly [facility] ([address] [place] [address] Medicine  Progress Note    Patient Name: Emily Alicia  MRN: [id number]  Patient Class: IP- Inpatient   Admission Date: 7/1/2025  Length of Stay: 5 days  Attending Physician: Braeden Anderson MD  Primary Care Provider: Angel Pemberton MD        Subjective     Principal Problem:Sepsis        HPI:  Patient is an 84-year-old black female that presented to the emergency room with significant pain from her skin pressure injuries of her sacrum, buttocks.  Upon presentation she had a working diagnosis of sepsis so she was given IV fluids, IV antibiotics.  Workup unremarkable.  Physical exam reveals significant skin pressure injuries to the buttocks, sacrum, foot.  She is being admitted for pain control, IV antibiotics, wound care consultation.    Overview/Hospital Course:  Patient seen by wound care this morning. Recs placed. Patient without any complaints today at all. She is eating well. Afebrile. WBC ct pending.    7/4: clinically patient is doing quite well. She is tolerating PO intake. Has no complaints on rounds.  However, her WBC is not trending down. Seems she has mild chronic elevation of 14-15K but WBC 19K this mroning and 20K on admit. I still do not see any other sources of infections and wounds are not draining, no erythema, no fevers, etc. Cont clindamycin for now.  H/H 7.8/23 from 9.1/27 on admit. No signs of bleeding but with drop will monitor overnight. Also Cr 1.5 from 1.1 on admit. However, on chart review she does fluctuate around this isabella.     7/5:  Patient continues to feel well.  She has no acute complaints.  She denies chest pain, shortness of breath, dysuria, frequency, diarrhea, nausea and vomiting.  Her wounds are stable and do not show any signs of infection.  However, she remains with leukocytosis of [id number] which is not improved since admission.  Her H&H is decreasing in his knee or transfusion threshold.  Her MCV is low and she likely has  iron deficiency.  I am suspecting she has reactive leukocytosis due to this process.  Iron levels ordered.  If low we will replace IV and start p.o. tomorrow in an attempt to avoid blood transfusion.  We will continue clindamycin.  I see no other signs of infection and no indication to change antibiotics.  We will actually deescalate to oral today.  Creatinine remained stable at 1.5    7/6: Clinically this patient remains without complaints and at baseline. She has no fever, dysuria, abd pain, n/v/d/c, chest pains, SOB, cough, etc.  I have kept her inpatient following labs due to minor abnormalities:  Cr 1-1.5 at baseline and while did trend up to 1.5/1.7 this admission on chart review her renal fxn fluctuating here over at least last 1 year. Therefore I do not think this is indicative of LOWELL needing further inpatient management.  H/H down trended but has been stable last 3 days. She is not hemodynamically unstable and vitals remain normal. She has no evidence of bleeding. Stool will be sent for occult and if + can see GI outpatient. However, I am also concerned for primary bone marrow process. Will also work up hemolysis with labs today (h/o aortic stenosis s/p valve replacement).  She has chronic leukocytosis which has not been worked up to date. Plt are normal. I have no access to hematology while inpatient here so may discharge with patient needing to see hematology ASAP. She will need repeat CBC within next few days to monitor as well.   HypoCa noted; awaiting albumin. If lab unremarkable I believe she can discharge this afternoon with home health and home medical equipment and continue to follow up outpatient as we are only lab monitoring at this point in the hospital as no access to needed specialists and she is clinically stable. Dispo pending labs this morning    7/7 PT HD stable on room air.  Continued leukocytosis.  Continue IV abx for UTI.  Creatinine still bumped.  Will continue to trend. Gentle  hydration and holding valsartan.  [facility] for hyperkalemia.  Patient would like to go home with family and states her family can provide her with 24 hour care.  Patient high risk for medical decline.  She is malnourished and albumin 1.8. Consult to dietician.          Review of Systems   Constitutional:  Negative for activity change, chills, fatigue, fever and unexpected weight change.   HENT:  Negative for sore throat and trouble swallowing.    Respiratory:  Negative for cough, chest tightness and shortness of breath.    Cardiovascular:  Negative for chest pain and leg swelling.   Gastrointestinal:  Negative for abdominal pain.   Endocrine: Negative for cold intolerance and heat intolerance.   Genitourinary:  Negative for difficulty urinating.   Musculoskeletal:  Positive for arthralgias, back pain and gait problem (chronic.  Bed-bound). Negative for joint swelling.   Skin:  Positive for wound. Negative for rash.   Neurological:  Negative for numbness.   Hematological:  Negative for adenopathy.   Psychiatric/Behavioral:  Negative for decreased concentration.      Objective:     Vital Signs (Most Recent):  Temp: 98.1 °F (36.7 °C) (07/07/25 1122)  Pulse: 85 (07/07/25 1122)  Resp: 16 (07/07/25 1122)  BP: 122/66 (07/07/25 1122)  SpO2: 96 % (07/07/25 1122) Vital Signs (24h Range):  Temp:  [97.7 °F (36.5 °C)-98.5 °F (36.9 °C)] 98.1 °F (36.7 °C)  Pulse:  [68-87] 85  Resp:  [16-21] 16  SpO2:  [95 %-98 %] 96 %  BP: (106-127)/(56-75) 122/66     Weight: 108.5 kg (239 lb 3.2 oz)  Body mass index is 35.32 kg/m².     Physical Exam  Vitals and nursing note reviewed.   Constitutional:       General: She is not in acute distress.     Appearance: She is not ill-appearing.      Comments: Lying in bed.  Bedridden.  Unable to ambulate.  She answers questions fairly well.   HENT:      Right Ear: Tympanic membrane normal.      Left Ear: Tympanic membrane normal.      Mouth/Throat:      Mouth: Mucous membranes are moist.   Eyes:       "General:         Right eye: No discharge.         Left eye: No discharge.      Conjunctiva/sclera: Conjunctivae normal.   Cardiovascular:      Rate and Rhythm: Normal rate and regular rhythm.      Heart sounds: Murmur heard.   Pulmonary:      Effort: Pulmonary effort is normal.      Breath sounds: crackles at lung bases  Abdominal:      General: Abdomen is flat.      Palpations: Abdomen is soft.   Musculoskeletal:      Cervical back: Normal range of motion and neck supple. No rigidity.      Right lower le+     Left lower le+  Skin:     Findings: No erythema.      Comments: See pictures.  Significant pressure injuries and ulcers of the sacrum, buttocks.  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   Neurological:      Mental Status: She is alert and oriented to person, place, and time. Mental status is at baseline.      Comments: Bed ridden                Significant Labs: All pertinent labs within the past 24 hours have been reviewed.  CBC:   Recent Labs   Lab 25  0641   WBC 22.94* 25.70*   HGB 7.7* 7.6*   HCT 23.2* 22.7*    238     CMP:   Recent Labs   Lab 25  0429 25  0857 25  0641   * 136 137   K 5.3* 5.9* 6.1*    106 106   CO2 21* 20* 21*    101 96   BUN 43* 45* 49*   CREATININE 1.7* 1.7* 2.0*   CALCIUM 7.5* 7.6* 7.8*   PROT  --  6.2 6.2   ALBUMIN  --  1.9* 1.8*   BILITOT  --  0.3 0.3   ALKPHOS  --  155* 131   AST  --  15 13   ALT  --  13 11   ANIONGAP 9 10 10     Lactic Acid:   Recent Labs   Lab 25  0641   LACTATE 0.9       POCT Glucose:   Recent Labs   Lab 25  0856 25  0930   POCTGLUCOSE 106 119*     Troponin: No results for input(s): "TROPONINI", "TROPONINIHS" in the last 48 hours.  Urine Culture: No results for input(s): "LABURIN" in the last 48 hours.  Urine Studies:   Recent Labs   Lab 25  1305   RBCUA 3   WBCUA >100*   BACTERIA Moderate*         Significant Imaging: I have reviewed all pertinent imaging " results/findings within the past 24 hours.  I have reviewed and interpreted all pertinent imaging results/findings within the past 24 hours.      Assessment & Plan  Sepsis  Patient has sepsis without organ dysfunction secondary to Skin and Soft Tissue Infection: Cellulitis. A review of systems was completed. Patient's sepsis is improving    Current Antibiotics    clindamycin capsule 300 mg, Every 6 hours, Oral  cephALEXin capsule 500 mg, Every 8 hours, Oral    Lactate  Recent Labs   Lab 07/01/25  1909 07/07/25  0641   LACTATE 1.9 0.9     Culture Data  Blood Cultures   Blood Culture   Date Value Ref Range Status   07/01/2025 No Growth After 5 Days  Final   07/01/2025 No Growth After 5 Days  Final      Urine Culture   Urine Culture, Routine   Date Value Ref Range Status   02/09/2022 No significant growth  Final      mSOFA  MSOFA Total  Min: 1   Min taken time: 07/07/25 0700  Max: 2   Max taken time: 07/07/25 1100    Plan  - Antibiotics as listed above  - Fluid resuscitation as follows:Fluid Not Needed - Patient is not hypotensive and/or lactate is less than 4.0.   - Trend lactate to resolution  - Follow up culture data  - Vasopressors were not needed  - The following services were consulted:Hospital Medicine.  - continue clindamycin for now    7/5:  Resolved.  Transition to oral clindamycin 07/05/2025.  We will need 7-10 days total of treatment    7/6: WBC remains elevated but no fevers. I am not findings any other sources of acute infection and seems to have chronic mild leukocytosis. Will cont clinda-transition to p.o. on 07/05/2025  Considering reactive leukocytosis vs primary bone marrow process. Needs hematology outpatient.   Diff is not concerning and morphology with hypochromia and target cells (no schistocytes) most consistent with Fe deficiency vs thalassemia regarding anemia and not concerning for active bacterial infection    7/7 Positive for UTI   Essential hypertension  Patient's blood pressure range in  the last 24 hours was: BP  Min: 106/56  Max: 127/59.The patient's inpatient anti-hypertensive regimen is listed below:  Current Antihypertensives  hydrALAZINE injection 10 mg, Every 6 hours PRN, Intravenous  metoprolol succinate (TOPROL-XL) 24 hr tablet 100 mg, 2 times daily, Oral    Plan  - BP is controlled, no changes needed to their regimen    7/7/25 d/c valsartan   Aortic valve stenosis  Echocardiogram with evidence of aortic stenosis that is moderate . The patient's most recent echocardiogram result is listed below. We will manage the valvular abnormality by status post TAVR    No echocardiogram results found for the past 12 months     7/6: clinically stable      Hyperlipidemia LDL goal <70  Lipitor 10 mg daily    Pressure ulcer of left heel, unstageable  Consult wound care  Continue clindamycin  Oxycodone p.r.n. pain    Off load heels.  Order mattress for home.  Spinal stenosis of lumbar region with neurogenic claudication  Patient is bed ridden.  She will require frequent turning, PT, wound care.    Need to discuss with family about possible nursing home placement.  She does have home health.  She seems very difficult to care for.    Ordering low air loss mattress for home.  Pressure injury of skin of sacral region  Consult wound care   Continue clindamycin for now.    Will recheck CBC.   No signs of infection per wound care/eval.  Will likely cont on clinda and follow cultures. Orders per wound care and new mattress ordered.    7/6: WBC remains elevated but no fevers. I am not findings any other sources of acute infection and seems to have chronic mild leukocytosis. Will cont clinda-transition to p.o. on 07/05/2025  Considering reactive leukocytosis vs primary bone marrow process. Needs hematology outpatient.   Diff is not concerning and morphology with hypochromia and target cells (no schistocytes) most consistent with Fe deficiency vs thalassemia regarding anemia and not concerning for active bacterial  infection    7/7 Appreciate wound care recommendations     S/P TAVR (transcatheter aortic valve replacement)  No treatment necessary continue valsartan  Hold amlodipine and Lasix for now.  Restart if pressure starts to recover  -continue to hold at discharge    D/c valsartan     Microcytic anemia  Anemia is likely due to Iron deficiency. Most recent hemoglobin and hematocrit are listed below.  Recent Labs     07/05/25  0900 07/06/25  0429 07/07/25  0641   HGB 7.6* 7.7* 7.6*   HCT 23.0* 23.2* 22.7*     Plan  - Monitor serial CBC: Daily  - Transfuse PRBC if patient becomes hemodynamically unstable, symptomatic or H/H drops below 7/21.  - Patient has not received any PRBC transfusions to date  - Patient's anemia is currently worsening. Will adjust treatment as follows:  IV iron today  - H&H is worsening since admission.  Labs seem most consistent with iron deficiency.  Iron levels ordered and pending.  I do not see any clinical evidence of active bleeding at this time and remains hemodynamically stable.  In an attempt to avoid transfusion we will give IV iron today and follow labs    7/6: iron, B12 and folate normal  Stool sent for occult but no clinical signs of bleeding  Hemolysis labs sent  H/H low but stable for last 3 days.  If above work up negative she is otherwise clinically stable for discharge and close hematology follow up with outpatient labs as I do not have GI or hematology at this hospital to manage while inpatient.     Anemia stable. Mildly elevated haptoglobin and LDH.   Will monitor H/H/    Hyperkalemia  Hyperkalemia is likely due to LOWELL.The patients most recent potassium results are listed below.  Recent Labs     07/06/25  0429 07/06/25  0857 07/07/25  0641   K 5.3* 5.9* 6.1*     Plan  - Monitor for arrhythmias with EKG and/or continuous telemetry.   - Treat the hyperkalemia with Potassium Binders.   - Monitor potassium: Every 12 hours  - The patient's hyperkalemia is improving          LOWELL (acute  kidney injury)  LOWELL is likely due to unsure etiology at this time. Baseline creatinine is 1.1. Most recent creatinine and eGFR are listed below.  Recent Labs     07/06/25  0429 07/06/25  0857 07/07/25  0641   CREATININE 1.7* 1.7* 2.0*   EGFRNORACEVR 30* 30* 24*      Plan  - LOWELL is worsening. Will adjust treatment as follows: d/c valsartan, IV fluids, address hypoalbuminemia, urine studies pending .    - Avoid nephrotoxins and renally dose meds for GFR listed above  - Monitor urine output, serial BMP, and adjust therapy as needed        UTI (urinary tract infection)  U/A positive >100 on microscopic  Urine culture pending   IV cipro     VTE Risk Mitigation (From admission, onward)           Ordered     IP VTE HIGH RISK PATIENT  Once         07/01/25 2343     Place sequential compression device  Until discontinued         07/01/25 2343                    Discharge Planning   LINA: 7/7/2025     Code Status: Full Code   Medical Readiness for Discharge Date:   Discharge Plan A: Home with family, Home Health                        Anjali Mesa PA-C  Department of Hospital Medicine   [place] [facility] Surg (3rd Fl)

## 2025-07-07 NOTE — NURSING
Upon assessment, patient has not voided all shift. Bladder scan performed. Approximately 339 ml of urine per scan. In and out performed per order.  Urine is thick, cloudy and light yellow in color; no odor noted. Retrieved about 300ml from in and out cath.  Dr. Lindsay notified of patient's urine and new orders noted. Will continue to monitor.

## 2025-07-07 NOTE — ASSESSMENT & PLAN NOTE
Patient's blood pressure range in the last 24 hours was: BP  Min: 106/56  Max: 127/59.The patient's inpatient anti-hypertensive regimen is listed below:  Current Antihypertensives  hydrALAZINE injection 10 mg, Every 6 hours PRN, Intravenous  metoprolol succinate (TOPROL-XL) 24 hr tablet 100 mg, 2 times daily, Oral    Plan  - BP is controlled, no changes needed to their regimen    7/7/25 d/c valsartan

## 2025-07-07 NOTE — PLAN OF CARE
Problem: Adult Inpatient Plan of Care  Goal: Plan of Care Review  Outcome: Progressing  Goal: Patient-Specific Goal (Individualized)  Outcome: Progressing  Goal: Absence of Hospital-Acquired Illness or Injury  Outcome: Progressing  Goal: Optimal Comfort and Wellbeing  Outcome: Progressing     Problem: Fall Injury Risk  Goal: Absence of Fall and Fall-Related Injury  Outcome: Progressing     Problem: Wound  Goal: Optimal Coping  Outcome: Progressing

## 2025-07-07 NOTE — ASSESSMENT & PLAN NOTE
Anemia is likely due to Iron deficiency. Most recent hemoglobin and hematocrit are listed below.  Recent Labs     07/05/25  0900 07/06/25  0429 07/07/25  0641   HGB 7.6* 7.7* 7.6*   HCT 23.0* 23.2* 22.7*     Plan  - Monitor serial CBC: Daily  - Transfuse PRBC if patient becomes hemodynamically unstable, symptomatic or H/H drops below 7/21.  - Patient has not received any PRBC transfusions to date  - Patient's anemia is currently worsening. Will adjust treatment as follows:  IV iron today  - H&H is worsening since admission.  Labs seem most consistent with iron deficiency.  Iron levels ordered and pending.  I do not see any clinical evidence of active bleeding at this time and remains hemodynamically stable.  In an attempt to avoid transfusion we will give IV iron today and follow labs    7/6: iron, B12 and folate normal  Stool sent for occult but no clinical signs of bleeding  Hemolysis labs sent  H/H low but stable for last 3 days.  If above work up negative she is otherwise clinically stable for discharge and close hematology follow up with outpatient labs as I do not have GI or hematology at this hospital to manage while inpatient.     Anemia stable. Mildly elevated haptoglobin and LDH.   Will monitor H/H/

## 2025-07-07 NOTE — ASSESSMENT & PLAN NOTE
Consult wound care   Continue clindamycin for now.    Will recheck CBC.   No signs of infection per wound care/eval.  Will likely cont on clinda and follow cultures. Orders per wound care and new mattress ordered.    7/6: WBC remains elevated but no fevers. I am not findings any other sources of acute infection and seems to have chronic mild leukocytosis. Will cont clinda-transition to p.o. on 07/05/2025  Considering reactive leukocytosis vs primary bone marrow process. Needs hematology outpatient.   Diff is not concerning and morphology with hypochromia and target cells (no schistocytes) most consistent with Fe deficiency vs thalassemia regarding anemia and not concerning for active bacterial infection    7/7 Appreciate wound care recommendations

## 2025-07-07 NOTE — ASSESSMENT & PLAN NOTE
Patient has sepsis without organ dysfunction secondary to Skin and Soft Tissue Infection: Cellulitis. A review of systems was completed. Patient's sepsis is improving    Current Antibiotics    clindamycin capsule 300 mg, Every 6 hours, Oral  cephALEXin capsule 500 mg, Every 8 hours, Oral    Lactate  Recent Labs   Lab 07/01/25  1909 07/07/25  0641   LACTATE 1.9 0.9     Culture Data  Blood Cultures   Blood Culture   Date Value Ref Range Status   07/01/2025 No Growth After 5 Days  Final   07/01/2025 No Growth After 5 Days  Final      Urine Culture   Urine Culture, Routine   Date Value Ref Range Status   02/09/2022 No significant growth  Final      mSOFA  MSOFA Total  Min: 1   Min taken time: 07/07/25 0700  Max: 2   Max taken time: 07/07/25 1100    Plan  - Antibiotics as listed above  - Fluid resuscitation as follows:Fluid Not Needed - Patient is not hypotensive and/or lactate is less than 4.0.   - Trend lactate to resolution  - Follow up culture data  - Vasopressors were not needed  - The following services were consulted:Hospital Medicine.  - continue clindamycin for now    7/5:  Resolved.  Transition to oral clindamycin 07/05/2025.  We will need 7-10 days total of treatment    7/6: WBC remains elevated but no fevers. I am not findings any other sources of acute infection and seems to have chronic mild leukocytosis. Will cont clinda-transition to p.o. on 07/05/2025  Considering reactive leukocytosis vs primary bone marrow process. Needs hematology outpatient.   Diff is not concerning and morphology with hypochromia and target cells (no schistocytes) most consistent with Fe deficiency vs thalassemia regarding anemia and not concerning for active bacterial infection    7/7 Positive for UTI

## 2025-07-07 NOTE — SUBJECTIVE & OBJECTIVE
Review of Systems   Constitutional:  Negative for activity change, chills, fatigue, fever and unexpected weight change.   HENT:  Negative for sore throat and trouble swallowing.    Respiratory:  Negative for cough, chest tightness and shortness of breath.    Cardiovascular:  Negative for chest pain and leg swelling.   Gastrointestinal:  Negative for abdominal pain.   Endocrine: Negative for cold intolerance and heat intolerance.   Genitourinary:  Negative for difficulty urinating.   Musculoskeletal:  Positive for arthralgias, back pain and gait problem (chronic.  Bed-bound). Negative for joint swelling.   Skin:  Positive for wound. Negative for rash.   Neurological:  Negative for numbness.   Hematological:  Negative for adenopathy.   Psychiatric/Behavioral:  Negative for decreased concentration.      Objective:     Vital Signs (Most Recent):  Temp: 98.1 °F (36.7 °C) (07/07/25 1122)  Pulse: 85 (07/07/25 1122)  Resp: 16 (07/07/25 1122)  BP: 122/66 (07/07/25 1122)  SpO2: 96 % (07/07/25 1122) Vital Signs (24h Range):  Temp:  [97.7 °F (36.5 °C)-98.5 °F (36.9 °C)] 98.1 °F (36.7 °C)  Pulse:  [68-87] 85  Resp:  [16-21] 16  SpO2:  [95 %-98 %] 96 %  BP: (106-127)/(56-75) 122/66     Weight: 108.5 kg (239 lb 3.2 oz)  Body mass index is 35.32 kg/m².     Physical Exam  Vitals and nursing note reviewed.   Constitutional:       General: She is not in acute distress.     Appearance: She is not ill-appearing.      Comments: Lying in bed.  Bedridden.  Unable to ambulate.  She answers questions fairly well.   HENT:      Right Ear: Tympanic membrane normal.      Left Ear: Tympanic membrane normal.      Mouth/Throat:      Mouth: Mucous membranes are moist.   Eyes:      General:         Right eye: No discharge.         Left eye: No discharge.      Conjunctiva/sclera: Conjunctivae normal.   Cardiovascular:      Rate and Rhythm: Normal rate and regular rhythm.      Heart sounds: Murmur heard.   Pulmonary:      Effort: Pulmonary effort  "is normal.      Breath sounds: Normal breath sounds.   Abdominal:      General: Abdomen is flat.      Palpations: Abdomen is soft.   Musculoskeletal:      Cervical back: Normal range of motion and neck supple. No rigidity.      Right lower leg: No edema.      Left lower leg: No edema.   Skin:     Findings: No erythema.      Comments: See pictures.  Significant pressure injuries and ulcers of the sacrum, buttocks.  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   Neurological:      Mental Status: She is alert and oriented to person, place, and time. Mental status is at baseline.      Comments: Bed ridden                Significant Labs: All pertinent labs within the past 24 hours have been reviewed.  CBC:   Recent Labs   Lab 07/06/25  0429 07/07/25  0641   WBC 22.94* 25.70*   HGB 7.7* 7.6*   HCT 23.2* 22.7*    238     CMP:   Recent Labs   Lab 07/06/25  0429 07/06/25  0857 07/07/25  0641   * 136 137   K 5.3* 5.9* 6.1*    106 106   CO2 21* 20* 21*    101 96   BUN 43* 45* 49*   CREATININE 1.7* 1.7* 2.0*   CALCIUM 7.5* 7.6* 7.8*   PROT  --  6.2 6.2   ALBUMIN  --  1.9* 1.8*   BILITOT  --  0.3 0.3   ALKPHOS  --  155* 131   AST  --  15 13   ALT  --  13 11   ANIONGAP 9 10 10     Lactic Acid:   Recent Labs   Lab 07/07/25  0641   LACTATE 0.9       POCT Glucose:   Recent Labs   Lab 07/07/25  0856 07/07/25  0930   POCTGLUCOSE 106 119*     Troponin: No results for input(s): "TROPONINI", "TROPONINIHS" in the last 48 hours.  Urine Culture: No results for input(s): "LABURIN" in the last 48 hours.  Urine Studies:   Recent Labs   Lab 07/06/25  1305   RBCUA 3   WBCUA >100*   BACTERIA Moderate*         Significant Imaging: I have reviewed all pertinent imaging results/findings within the past 24 hours.  I have reviewed and interpreted all pertinent imaging results/findings within the past 24 hours.  "

## 2025-07-07 NOTE — ASSESSMENT & PLAN NOTE
No treatment necessary continue valsartan  Hold amlodipine and Lasix for now.  Restart if pressure starts to recover  -continue to hold at discharge    D/c valsartan

## 2025-07-08 PROBLEM — Z71.89 GOALS OF CARE, COUNSELING/DISCUSSION: Status: ACTIVE | Noted: 2025-01-01

## 2025-07-08 NOTE — NURSING
Handoff report received from BRADFORD Ragsdale. Blood currently being infused, rate and volume verified with BRADFORD Ragsdale.

## 2025-07-08 NOTE — SUBJECTIVE & OBJECTIVE
Review of Systems   Constitutional:  Negative for activity change, chills, fatigue, fever and unexpected weight change.   HENT:  Negative for sore throat and trouble swallowing.    Respiratory:  Negative for cough, chest tightness and shortness of breath.    Cardiovascular:  Negative for chest pain and leg swelling.   Gastrointestinal:  Negative for abdominal pain.   Endocrine: Negative for cold intolerance and heat intolerance.   Genitourinary:  Negative for difficulty urinating.   Musculoskeletal:  Positive for arthralgias, back pain and gait problem (chronic.  Bed-bound). Negative for joint swelling.   Skin:  Positive for wound. Negative for rash.   Neurological:  Negative for numbness.   Hematological:  Negative for adenopathy.   Psychiatric/Behavioral:  Negative for decreased concentration.      Objective:     Vital Signs (Most Recent):  Temp: 97.8 °F (36.6 °C) (07/08/25 1111)  Pulse: 83 (07/08/25 1111)  Resp: 18 (07/08/25 1111)  BP: (!) 124/56 (07/08/25 1111)  SpO2: 99 % (07/08/25 1111) Vital Signs (24h Range):  Temp:  [97.8 °F (36.6 °C)-99.6 °F (37.6 °C)] 97.8 °F (36.6 °C)  Pulse:  [] 83  Resp:  [16-20] 18  SpO2:  [96 %-100 %] 99 %  BP: (106-136)/(50-75) 124/56     Weight: 108.5 kg (239 lb 3.2 oz)  Body mass index is 35.32 kg/m².     Physical Exam  Vitals and nursing note reviewed.   Constitutional:       General: She is not in acute distress.     Appearance: She is not ill-appearing.      Comments: Lying in bed.  Bedridden.  Unable to ambulate.  She answers questions fairly well.   HENT:      Right Ear: Tympanic membrane normal.      Left Ear: Tympanic membrane normal.      Mouth/Throat:      Mouth: Mucous membranes are moist.   Eyes:      General:         Right eye: No discharge.         Left eye: No discharge.      Conjunctiva/sclera: Conjunctivae normal.   Cardiovascular:      Rate and Rhythm: Normal rate and regular rhythm.      Heart sounds: Murmur heard.   Pulmonary:      Effort: Pulmonary  "effort is normal.      Breath sounds: Normal breath sounds.   Abdominal:      General: Abdomen is flat.      Palpations: Abdomen is soft.   Musculoskeletal:      Cervical back: Normal range of motion and neck supple. No rigidity.      Right lower leg: Edema (2+ to thigh) present.      Left lower leg: Edema (2+ edema to thigh) present.   Skin:     Findings: No erythema.      Comments: See pictures.  Significant pressure injuries and ulcers of the sacrum, buttocks.  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   Neurological:      Mental Status: She is alert and oriented to person, place, and time. Mental status is at baseline.      Comments: Bed ridden                Significant Labs: All pertinent labs within the past 24 hours have been reviewed.  CBC:   Recent Labs   Lab 07/07/25  0641 07/08/25  0410   WBC 25.70* 19.71*   HGB 7.6* 6.8*   HCT 22.7* 20.3*    201     CMP:   Recent Labs   Lab 07/07/25  0641 07/07/25  1240 07/07/25  1909 07/08/25  0009 07/08/25  0410      < > 136 137 137   K 6.1*   < > 6.2* 5.9* 5.7*      < > 106 108 108   CO2 21*   < > 19* 22* 21*   GLU 96   < > 143* 111* 106   BUN 49*   < > 51* 52* 53*   CREATININE 2.0*   < > 2.1* 1.8* 1.9*   CALCIUM 7.8*   < > 7.5* 7.9* 7.4*   PROT 6.2  --   --   --   --    ALBUMIN 1.8*  --   --   --   --    BILITOT 0.3  --   --   --   --    ALKPHOS 131  --   --   --   --    AST 13  --   --   --   --    ALT 11  --   --   --   --    ANIONGAP 10   < > 11 7* 8    < > = values in this interval not displayed.     Lactic Acid:   Recent Labs   Lab 07/07/25  0641 07/07/25  1240   LACTATE 0.9 1.3       POCT Glucose:   Recent Labs   Lab 07/07/25  1133 07/08/25  0923 07/08/25  1121   POCTGLUCOSE 123* 136* 116*     Troponin: No results for input(s): "TROPONINI", "TROPONINIHS" in the last 48 hours.  Urine Culture:   Recent Labs   Lab 07/07/25  0536   LABURIN >100,000 cfu/ml Gram-negative Rods*     Urine Studies:   Recent Labs   Lab 07/06/25  1305   RBCUA 3   WBCUA " >100*   BACTERIA Moderate*         Significant Imaging: I have reviewed all pertinent imaging results/findings within the past 24 hours.  I have reviewed and interpreted all pertinent imaging results/findings within the past 24 hours.

## 2025-07-08 NOTE — PLAN OF CARE
07/08/25 0843   Rounds   Attendance Provider;Nurse    Discharge Plan A Home with family;Home Health   Why the patient remains in the hospital Requires continued medical care   Transition of Care Barriers Social  (family reports unpaid balance with [employer] company and are unable to afford recommended mattress)     Care team reviewed plan of care and discharge plan with patient.  CM will continue to follow till discharge.    Current assigned nurse reports communicating with family members yesterday

## 2025-07-08 NOTE — CONSULTS
[facility] - Med Surg (3rd Fl)  Adult Nutrition  Consult Note    SUMMARY     Recommendations  1. Continue regular diet.   2. RD to d/c Arginaid TID. Will add Curt BID.   3. RD to add Boost Plus BID.   4. Please continue documenting meal intake; document intake of all ONS consumed.   5. RD to follow.    Goals  1. Patient will meet > 80% EEN/EPN prior to RD follow up.  Nutrition Goal Status: new  Communication of RD Recs: other (comment) (Consult note, POC)    Nutrition Discharge Planning   General healthy diet  Oral supplement regimen (comments): Curt BID x 2-4 weeks from start date, Boost Plus or equivalent 2-3 x daily as warranted for wound healing promotion    Assessment and Plan    Interventions  1. General healthful diet  2. Commercial beverage medical supplement therapy  3. Collaboration by nutrition professional with other providers     Malnutrition Assessment  No NFPE - RD providing remote coverage  Skin (Micronutrient): wounds unhealed  Nestor Score 10 (high risk)    Reason for Assessment    Reason For Assessment: consult, pressure injury/ wound  Diagnosis: infection/sepsis  Past Medical History:   Diagnosis Date    Anemia     Anxiety     Aortic valve stenosis     CHF (congestive heart failure)     Chronic kidney disease (CKD)     Diastolic heart failure     Dyslipidemia     Encounter for blood transfusion     Glaucoma (increased eye pressure)     LEFT EYE    HHD (hypertensive heart disease)     Hypertension     Osteoarthritis     Retina disorder, bilateral     Severe aortic stenosis 11/9/2016    TR (tricuspid regurgitation)     UTI (urinary tract infection)      General Information Comments: RD providing remote coverage. Consult received for hypoalbuminemia. Patient is a 85 yo F who presented with pain/abscess to buttock. She is bed bound and has several wounds throughout, including full thickness pressure injuries/wounds to sacral spine, L lower buttock and R heel. On regular diet with Arginaid TID.  "Arginaid not available. Patient appears to be receiving Curt per flowsheets. RD to d/c Arginaid TID and add Curt BID (frequency per  instructions). Recent meal intake betweeen % with exception of 25% breakfast this AM. She is meeting minimal estimated energy needs, and is not meeting at least minimal estimated protein needs unless she was consuming Curt TID. RD to add Boost Plus TID. No recent weight hx per chart review. Will continue to monitor.    Nutrition/Diet History    Food Allergies: NKFA  Factors Affecting Nutritional Intake: None identified at this time  Nutrition-related SDOH: Unable to assess at this time    Anthropometrics    Height: 5' 9" (175.3 cm)  Height (inches): 69 in  Weight: 108.5 kg (239 lb 3.2 oz)  Weight (lb): 239.2 lb  Weight Method: Bed Scale  Ideal Body Weight (IBW), Female: 145 lb  % Ideal Body Weight, Female (lb): 164.97 %  BMI (Calculated): 35.3  BMI Grade: 35 - 39.9 - obesity - grade II     Lab/Procedures/Meds    Pertinent Labs Reviewed: reviewed  Pertinent Labs Comments: M.5 (L)  BMP  Lab Results   Component Value Date     2025    K 5.7 (H) 2025     2025    CO2 21 (L) 2025    BUN 53 (H) 2025    CREATININE 1.9 (H) 2025    CALCIUM 7.4 (L) 2025    ANIONGAP 8 2025    EGFRNORACEVR 26 (L) 2025     Pertinent Medications Reviewed: reviewed  Scheduled Meds:   allopurinoL  100 mg Oral Daily    atorvastatin  10 mg Oral QHS    cephALEXin  500 mg Oral Q8H    collagenase   Topical (Top) Daily    ferrous sulfate  1 tablet Oral Daily    gabapentin  300 mg Oral BID    magnesium sulfate 2 g IVPB  2 g Intravenous Once    metoprolol succinate  100 mg Oral BID    miconazole NITRATE 2 %   Topical (Top) BID    pantoprazole  40 mg Intravenous Daily    polyethylene glycol  17 g Oral Daily    vitamin D  1,000 Units Oral Daily     Estimated/Assessed Needs    Weight Used For Calorie Calculations: 65.9 kg (145 lb 4.5 oz) " (IBW)  Energy Calorie Requirements (kcal): 2307  Energy Need Method: Kcal/kg (35 kcal/kg IBW)  Protein Requirements: 109 g (1 g/kg)  Weight Used For Protein Calculations: 108.5 kg (239 lb 3.2 oz)  Fluid Requirements (mL): 1 mL/kcal  Estimated Fluid Requirement Method: RDA Method (or per MD)  RDA Method (mL): 2307     Nutrition Prescription Ordered    Current Diet Order: regular  Oral Nutrition Supplement: Arginaid TID    Evaluation of Received Nutrient/Fluid Intake    Oral Fluid (mL): 354  IV Fluid (mL): 1019.7 (NS)  I/O: + 6.4 L since admit  Energy Calories Required: meeting needs  Protein Required: not meeting needs  Fluid Required: not meeting needs  Tolerance: tolerating  % Intake of Estimated Energy Needs: 75 - 100 %  % Meal Intake: 75 - 100 %    PES Statement  Increased nutrient needs related to Acute illness, Wound healing, Increased demand for protein energy as evidenced by  (sepsis, full thickness pressure injuries/wounds x 3, meeting < 75% EPN despite % meal intake)  Status: New    Nutrition Risk    Level of Risk/Frequency of Follow-up: moderate - high (2 x per week)     Monitor and Evaluation    Monitor and Evaluation: Energy intake, Food and beverage intake, Protein intake, Diet order, Weight, Electrolyte and renal panel, Skin     Nutrition Follow-Up    RD Follow-up?: Yes    Elba Stanford RDN, LDN

## 2025-07-08 NOTE — ASSESSMENT & PLAN NOTE
LOWELL is likely due to unsure etiology at this time. Baseline creatinine is 1.1. Most recent creatinine and eGFR are listed below.  Recent Labs     07/07/25  1909 07/08/25  0009 07/08/25  0410   CREATININE 2.1* 1.8* 1.9*   EGFRNORACEVR 23* 28* 26*      Plan  - LOWELL is worsening. Will adjust treatment as follows: d/c valsartan, IV fluids, address hypoalbuminemia, urine studies pending .    - Avoid nephrotoxins and renally dose meds for GFR listed above  - Monitor urine output, serial BMP, and adjust therapy as needed    Urine studies indeterminate.  Renal function slowly improving.

## 2025-07-08 NOTE — PROGRESS NOTES
[facility] ([address] [place] [address] Medicine  Progress Note    Patient Name: Emily Alicia  MRN: [id number]  Patient Class: IP- Inpatient   Admission Date: 7/1/2025  Length of Stay: 6 days  Attending Physician: Braeden Anderson MD  Primary Care Provider: Angel Pemberton MD        Subjective     Principal Problem:Sepsis        HPI:  Patient is an 84-year-old black female that presented to the emergency room with significant pain from her skin pressure injuries of her sacrum, buttocks.  Upon presentation she had a working diagnosis of sepsis so she was given IV fluids, IV antibiotics.  Workup unremarkable.  Physical exam reveals significant skin pressure injuries to the buttocks, sacrum, foot.  She is being admitted for pain control, IV antibiotics, wound care consultation.    Overview/Hospital Course:  Patient seen by wound care this morning. Recs placed. Patient without any complaints today at all. She is eating well. Afebrile. WBC ct pending.    7/4: clinically patient is doing quite well. She is tolerating PO intake. Has no complaints on rounds.  However, her WBC is not trending down. Seems she has mild chronic elevation of 14-15K but WBC 19K this mroning and 20K on admit. I still do not see any other sources of infections and wounds are not draining, no erythema, no fevers, etc. Cont clindamycin for now.  H/H 7.8/23 from 9.1/27 on admit. No signs of bleeding but with drop will monitor overnight. Also Cr 1.5 from 1.1 on admit. However, on chart review she does fluctuate around this isabella.     7/5:  Patient continues to feel well.  She has no acute complaints.  She denies chest pain, shortness of breath, dysuria, frequency, diarrhea, nausea and vomiting.  Her wounds are stable and do not show any signs of infection.  However, she remains with leukocytosis of [id number] which is not improved since admission.  Her H&H is decreasing in his knee or transfusion threshold.  Her MCV is low and she likely has  iron deficiency.  I am suspecting she has reactive leukocytosis due to this process.  Iron levels ordered.  If low we will replace IV and start p.o. tomorrow in an attempt to avoid blood transfusion.  We will continue clindamycin.  I see no other signs of infection and no indication to change antibiotics.  We will actually deescalate to oral today.  Creatinine remained stable at 1.5    7/6: Clinically this patient remains without complaints and at baseline. She has no fever, dysuria, abd pain, n/v/d/c, chest pains, SOB, cough, etc.  I have kept her inpatient following labs due to minor abnormalities:  Cr 1-1.5 at baseline and while did trend up to 1.5/1.7 this admission on chart review her renal fxn fluctuating here over at least last 1 year. Therefore I do not think this is indicative of LOWELL needing further inpatient management.  H/H down trended but has been stable last 3 days. She is not hemodynamically unstable and vitals remain normal. She has no evidence of bleeding. Stool will be sent for occult and if + can see GI outpatient. However, I am also concerned for primary bone marrow process. Will also work up hemolysis with labs today (h/o aortic stenosis s/p valve replacement).  She has chronic leukocytosis which has not been worked up to date. Plt are normal. I have no access to hematology while inpatient here so may discharge with patient needing to see hematology ASAP. She will need repeat CBC within next few days to monitor as well.   HypoCa noted; awaiting albumin. If lab unremarkable I believe she can discharge this afternoon with home health and home medical equipment and continue to follow up outpatient as we are only lab monitoring at this point in the hospital as no access to needed specialists and she is clinically stable. Dispo pending labs this morning    7/7 PT HD stable on room air.  Continued leukocytosis.  Continue IV abx for UTI.  Creatinine still bumped.  Will continue to trend. Gentle  hydration and holding valsartan.  kelma for hyperkalemia.  Patient would like to go home with family and states her family can provide her with 24 hour care.  Patient high risk for medical decline.  She is malnourished and albumin 1.8. Consult to dietician.      7/8 PT Bp on lower side.  Pt h/h down trending.  Some dilutional component with frequent lab draws.  Discussed risk versus benefits with pt and daughters about blood transfusion and they are all in agreement.  Renal function slowly improving but pt has hypoalbuminemia.  She is at risk for medical decline.  Will set up family meeting to discuss code status and plan of care.          Review of Systems   Constitutional:  Negative for activity change, chills, fatigue, fever and unexpected weight change.   HENT:  Negative for sore throat and trouble swallowing.    Respiratory:  Negative for cough, chest tightness and shortness of breath.    Cardiovascular:  Negative for chest pain and leg swelling.   Gastrointestinal:  Negative for abdominal pain.   Endocrine: Negative for cold intolerance and heat intolerance.   Genitourinary:  Negative for difficulty urinating.   Musculoskeletal:  Positive for arthralgias, back pain and gait problem (chronic.  Bed-bound). Negative for joint swelling.   Skin:  Positive for wound. Negative for rash.   Neurological:  Negative for numbness.   Hematological:  Negative for adenopathy.   Psychiatric/Behavioral:  Negative for decreased concentration.      Objective:     Vital Signs (Most Recent):  Temp: 97.8 °F (36.6 °C) (07/08/25 1111)  Pulse: 83 (07/08/25 1111)  Resp: 18 (07/08/25 1111)  BP: (!) 124/56 (07/08/25 1111)  SpO2: 99 % (07/08/25 1111) Vital Signs (24h Range):  Temp:  [97.8 °F (36.6 °C)-99.6 °F (37.6 °C)] 97.8 °F (36.6 °C)  Pulse:  [] 83  Resp:  [16-20] 18  SpO2:  [96 %-100 %] 99 %  BP: (106-136)/(50-75) 124/56     Weight: 108.5 kg (239 lb 3.2 oz)  Body mass index is 35.32 kg/m².     Physical Exam  Vitals and  nursing note reviewed.   Constitutional:       General: She is not in acute distress.     Appearance: She is not ill-appearing.      Comments: Lying in bed.  Bedridden.  Unable to ambulate.  She answers questions fairly well.   HENT:      Right Ear: Tympanic membrane normal.      Left Ear: Tympanic membrane normal.      Mouth/Throat:      Mouth: Mucous membranes are moist.   Eyes:      General:         Right eye: No discharge.         Left eye: No discharge.      Conjunctiva/sclera: Conjunctivae normal.   Cardiovascular:      Rate and Rhythm: Normal rate and regular rhythm.      Heart sounds: Murmur heard.   Pulmonary:      Effort: Pulmonary effort is normal.      Breath sounds: Normal breath sounds.   Abdominal:      General: Abdomen is flat.      Palpations: Abdomen is soft.   Musculoskeletal:      Cervical back: Normal range of motion and neck supple. No rigidity.      Right lower leg: Edema (2+ to thigh) present.      Left lower leg: Edema (2+ edema to thigh) present.   Skin:     Findings: No erythema.      Comments: See pictures.  Significant pressure injuries and ulcers of the sacrum, buttocks.  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   Neurological:      Mental Status: She is alert and oriented to person, place, and time. Mental status is at baseline.      Comments: Bed ridden                Significant Labs: All pertinent labs within the past 24 hours have been reviewed.  CBC:   Recent Labs   Lab 07/07/25  0641 07/08/25  0410   WBC 25.70* 19.71*   HGB 7.6* 6.8*   HCT 22.7* 20.3*    201     CMP:   Recent Labs   Lab 07/07/25  0641 07/07/25  1240 07/07/25  1909 07/08/25  0009 07/08/25  0410      < > 136 137 137   K 6.1*   < > 6.2* 5.9* 5.7*      < > 106 108 108   CO2 21*   < > 19* 22* 21*   GLU 96   < > 143* 111* 106   BUN 49*   < > 51* 52* 53*   CREATININE 2.0*   < > 2.1* 1.8* 1.9*   CALCIUM 7.8*   < > 7.5* 7.9* 7.4*   PROT 6.2  --   --   --   --    ALBUMIN 1.8*  --   --   --   --    BILITOT  "0.3  --   --   --   --    ALKPHOS 131  --   --   --   --    AST 13  --   --   --   --    ALT 11  --   --   --   --    ANIONGAP 10   < > 11 7* 8    < > = values in this interval not displayed.     Lactic Acid:   Recent Labs   Lab 07/07/25  0641 07/07/25  1240   LACTATE 0.9 1.3       POCT Glucose:   Recent Labs   Lab 07/07/25  1133 07/08/25  0923 07/08/25  1121   POCTGLUCOSE 123* 136* 116*     Troponin: No results for input(s): "TROPONINI", "TROPONINIHS" in the last 48 hours.  Urine Culture:   Recent Labs   Lab 07/07/25  0536   LABURIN >100,000 cfu/ml Gram-negative Rods*     Urine Studies:   Recent Labs   Lab 07/06/25  1305   RBCUA 3   WBCUA >100*   BACTERIA Moderate*         Significant Imaging: I have reviewed all pertinent imaging results/findings within the past 24 hours.  I have reviewed and interpreted all pertinent imaging results/findings within the past 24 hours.      Assessment & Plan  Sepsis  Patient has sepsis without organ dysfunction secondary to Skin and Soft Tissue Infection: Cellulitis. A review of systems was completed. Patient's sepsis is improving    Current Antibiotics    cephALEXin capsule 500 mg, Every 8 hours, Oral    Lactate  Recent Labs   Lab 07/01/25  1909 07/07/25  0641 07/07/25  1240   LACTATE 1.9 0.9 1.3     Culture Data  Blood Cultures   Blood Culture   Date Value Ref Range Status   07/07/2025 No Growth After 6 Hours  Preliminary   07/01/2025 No Growth After 5 Days  Final   07/01/2025 No Growth After 5 Days  Final      Urine Culture   Urine Culture   Date Value Ref Range Status   07/07/2025 >100,000 cfu/ml Gram-negative Rods (A)  Preliminary     Comment:     Identification and susceptibility pending     Urine Culture, Routine   Date Value Ref Range Status   02/09/2022 No significant growth  Final      mSOFA  MSOFA Total  Min: 1   Min taken time: 07/08/25 1100  Max: 2   Max taken time: 07/08/25 0004    Plan  - Antibiotics as listed above  - Fluid resuscitation as follows:Fluid Not Needed " - Patient is not hypotensive and/or lactate is less than 4.0.   - Trend lactate to resolution  - Follow up culture data  - Vasopressors were not needed  - The following services were consulted:Hospital Medicine.  - continue clindamycin for now    7/5:  Resolved.  Transition to oral clindamycin 07/05/2025.  We will need 7-10 days total of treatment    7/6: WBC remains elevated but no fevers. I am not findings any other sources of acute infection and seems to have chronic mild leukocytosis. Will cont clinda-transition to p.o. on 07/05/2025  Considering reactive leukocytosis vs primary bone marrow process. Needs hematology outpatient.   Diff is not concerning and morphology with hypochromia and target cells (no schistocytes) most consistent with Fe deficiency vs thalassemia regarding anemia and not concerning for active bacterial infection    7/7 Positive for UTI   '  7/8 Leukocytosis improving.  Gram negative rods on urine culture   Essential hypertension  Patient's blood pressure range in the last 24 hours was: BP  Min: 106/50  Max: 136/63.The patient's inpatient anti-hypertensive regimen is listed below:  Current Antihypertensives  hydrALAZINE injection 10 mg, Every 6 hours PRN, Intravenous  metoprolol succinate (TOPROL-XL) 24 hr tablet 100 mg, 2 times daily, Oral    Plan  - BP is controlled, no changes needed to their regimen    7/7/25 d/c valsartan   Aortic valve stenosis  Echocardiogram with evidence of aortic stenosis that is moderate . The patient's most recent echocardiogram result is listed below. We will manage the valvular abnormality by status post TAVR    No echocardiogram results found for the past 12 months     7/6: clinically stable      Hyperlipidemia LDL goal <70  Lipitor 10 mg daily    Pressure ulcer of left heel, unstageable  Consult wound care  Continue clindamycin  Oxycodone p.r.n. pain    Off load heels.  Order mattress for home.  Spinal stenosis of lumbar region with neurogenic  claudication  Patient is bed ridden.  She will require frequent turning, PT, wound care.    Need to discuss with family about possible nursing home placement.  She does have home health.  She seems very difficult to care for.    Ordering low air loss mattress for home.  Pressure injury of skin of sacral region  Consult wound care   Continue clindamycin for now.    Will recheck CBC.   No signs of infection per wound care/eval.  Will likely cont on clinda and follow cultures. Orders per wound care and new mattress ordered.    7/6: WBC remains elevated but no fevers. I am not findings any other sources of acute infection and seems to have chronic mild leukocytosis. Will cont clinda-transition to p.o. on 07/05/2025  Considering reactive leukocytosis vs primary bone marrow process. Needs hematology outpatient.   Diff is not concerning and morphology with hypochromia and target cells (no schistocytes) most consistent with Fe deficiency vs thalassemia regarding anemia and not concerning for active bacterial infection    7/7 Appreciate wound care recommendations     S/P TAVR (transcatheter aortic valve replacement)  No treatment necessary continue valsartan  Hold amlodipine and Lasix for now.  Restart if pressure starts to recover  -continue to hold at discharge    D/c valsartan     Microcytic anemia  Anemia is likely due to Iron deficiency. Most recent hemoglobin and hematocrit are listed below.  Recent Labs     07/06/25  0429 07/07/25  0641 07/08/25  0410   HGB 7.7* 7.6* 6.8*   HCT 23.2* 22.7* 20.3*     Plan  - Monitor serial CBC: Daily  - Transfuse PRBC if patient becomes hemodynamically unstable, symptomatic or H/H drops below 7/21.  - Patient has not received any PRBC transfusions to date  - Patient's anemia is currently worsening. Will adjust treatment as follows:  IV iron today  - H&H is worsening since admission.  Labs seem most consistent with iron deficiency.  Iron levels ordered and pending.  I do not see any  clinical evidence of active bleeding at this time and remains hemodynamically stable.  In an attempt to avoid transfusion we will give IV iron today and follow labs    7/6: iron, B12 and folate normal  Stool sent for occult but no clinical signs of bleeding  Hemolysis labs sent  H/H low but stable for last 3 days.  If above work up negative she is otherwise clinically stable for discharge and close hematology follow up with outpatient labs as I do not have GI or hematology at this hospital to manage while inpatient.     Anemia stable. Mildly elevated haptoglobin and LDH.   Will monitor H/H/    7/8 h/h was stable but decreased on am labs. Dilutional component is a possibility and due to frequent lab draws in the setting of anemia of chronic disease.  2 units of pRBC ordered.  Hyperkalemia  Hyperkalemia is likely due to LOWELL.The patients most recent potassium results are listed below.  Recent Labs     07/07/25 1909 07/08/25  0009 07/08/25  0410   K 6.2* 5.9* 5.7*     Plan  - Monitor for arrhythmias with EKG and/or continuous telemetry.   - Treat the hyperkalemia with Potassium Binders.   - Monitor potassium:daily   - The patient's hyperkalemia is improving    Another dose of lokelma         LOWELL (acute kidney injury)  LOWELL is likely due to unsure etiology at this time. Baseline creatinine is 1.1. Most recent creatinine and eGFR are listed below.  Recent Labs     07/07/25 1909 07/08/25  0009 07/08/25  0410   CREATININE 2.1* 1.8* 1.9*   EGFRNORACEVR 23* 28* 26*      Plan  - LOWELL is worsening. Will adjust treatment as follows: d/c valsartan, IV fluids, address hypoalbuminemia, urine studies pending .    - Avoid nephrotoxins and renally dose meds for GFR listed above  - Monitor urine output, serial BMP, and adjust therapy as needed    Urine studies indeterminate.  Renal function slowly improving.          UTI (urinary tract infection)  U/A positive >100 on microscopic  Urine culture gram negative rods   IV cipro     Goals  of care, counseling/discussion  Would like to have family meeting to discuss goals of care.  Reached out to daughters Lubna and Aditi for this afternoon or tomorrow.  Pt seems to answer appropriately but unable to express full understanding of medical care and condition and is only able to say she is sick.      VTE Risk Mitigation (From admission, onward)           Ordered     IP VTE HIGH RISK PATIENT  Once         07/01/25 2343     Place sequential compression device  Until discontinued         07/01/25 2343                    Discharge Planning   LINA: 7/10/2025     Code Status: Full Code   Medical Readiness for Discharge Date:   Discharge Plan A: Home with family, Home Health                        Anjali Mesa PA-C  Department of Hospital Medicine   [facility] - Med Surg (3rd Fl)

## 2025-07-08 NOTE — PLAN OF CARE
Problem: Skin Injury Risk Increased  Goal: Skin Health and Integrity  Outcome: Progressing     Problem: Adult Inpatient Plan of Care  Goal: Plan of Care Review  Outcome: Progressing     Problem: Adult Inpatient Plan of Care  Goal: Optimal Comfort and Wellbeing  Outcome: Progressing     Problem: Wound  Goal: Optimal Coping  Outcome: Progressing     Problem: Wound  Goal: Optimal Functional Ability  Outcome: Progressing     Problem: Wound  Goal: Optimal Wound Healing  Outcome: Progressing     Problem: Sepsis/Septic Shock  Goal: Optimal Nutrition Intake  Outcome: Progressing     Problem: Acute Kidney Injury/Impairment  Goal: Improved Oral Intake  Outcome: Progressing

## 2025-07-08 NOTE — PLAN OF CARE
Recommendations  1. Continue regular diet.   2. RD to d/c Arginaid TID. Will add Curt BID.   3. RD to add Boost Plus BID.   4. Please continue documenting meal intake; document intake of all ONS consumed.   5. RD to follow.    Goals  1. Patient will meet > 80% EEN/EPN prior to RD follow up.  Nutrition Goal Status: new

## 2025-07-08 NOTE — ASSESSMENT & PLAN NOTE
Would like to have family meeting to discuss goals of care.  Reached out to daughters Lubna and Aditi for this afternoon or tomorrow.  Pt seems to answer appropriately but unable to express full understanding of medical care and condition and is only able to say she is sick.

## 2025-07-08 NOTE — PLAN OF CARE
Problem: Skin Injury Risk Increased  Goal: Skin Health and Integrity  Outcome: Progressing     Problem: Adult Inpatient Plan of Care  Goal: Plan of Care Review  Outcome: Progressing     Problem: Wound  Goal: Absence of Infection Signs and Symptoms  Outcome: Progressing     Problem: Wound  Goal: Optimal Functional Ability  Outcome: Progressing     Problem: Wound  Goal: Optimal Pain Control and Function  Outcome: Progressing     Problem: Infection  Goal: Absence of Infection Signs and Symptoms  Outcome: Progressing     Problem: Acute Kidney Injury/Impairment  Goal: Effective Renal Function  Outcome: Progressing

## 2025-07-08 NOTE — ASSESSMENT & PLAN NOTE
Anemia is likely due to Iron deficiency. Most recent hemoglobin and hematocrit are listed below.  Recent Labs     07/06/25  0429 07/07/25  0641 07/08/25  0410   HGB 7.7* 7.6* 6.8*   HCT 23.2* 22.7* 20.3*     Plan  - Monitor serial CBC: Daily  - Transfuse PRBC if patient becomes hemodynamically unstable, symptomatic or H/H drops below 7/21.  - Patient has not received any PRBC transfusions to date  - Patient's anemia is currently worsening. Will adjust treatment as follows:  IV iron today  - H&H is worsening since admission.  Labs seem most consistent with iron deficiency.  Iron levels ordered and pending.  I do not see any clinical evidence of active bleeding at this time and remains hemodynamically stable.  In an attempt to avoid transfusion we will give IV iron today and follow labs    7/6: iron, B12 and folate normal  Stool sent for occult but no clinical signs of bleeding  Hemolysis labs sent  H/H low but stable for last 3 days.  If above work up negative she is otherwise clinically stable for discharge and close hematology follow up with outpatient labs as I do not have GI or hematology at this hospital to manage while inpatient.     Anemia stable. Mildly elevated haptoglobin and LDH.   Will monitor H/H/    7/8 h/h was stable but decreased on am labs. Dilutional component is a possibility and due to frequent lab draws in the setting of anemia of chronic disease.  2 units of pRBC ordered.

## 2025-07-08 NOTE — ASSESSMENT & PLAN NOTE
Patient has sepsis without organ dysfunction secondary to Skin and Soft Tissue Infection: Cellulitis. A review of systems was completed. Patient's sepsis is improving    Current Antibiotics    cephALEXin capsule 500 mg, Every 8 hours, Oral    Lactate  Recent Labs   Lab 07/01/25  1909 07/07/25  0641 07/07/25  1240   LACTATE 1.9 0.9 1.3     Culture Data  Blood Cultures   Blood Culture   Date Value Ref Range Status   07/07/2025 No Growth After 6 Hours  Preliminary   07/01/2025 No Growth After 5 Days  Final   07/01/2025 No Growth After 5 Days  Final      Urine Culture   Urine Culture   Date Value Ref Range Status   07/07/2025 >100,000 cfu/ml Gram-negative Rods (A)  Preliminary     Comment:     Identification and susceptibility pending     Urine Culture, Routine   Date Value Ref Range Status   02/09/2022 No significant growth  Final      mSOFA  MSOFA Total  Min: 1   Min taken time: 07/08/25 1100  Max: 2   Max taken time: 07/08/25 0004    Plan  - Antibiotics as listed above  - Fluid resuscitation as follows:Fluid Not Needed - Patient is not hypotensive and/or lactate is less than 4.0.   - Trend lactate to resolution  - Follow up culture data  - Vasopressors were not needed  - The following services were consulted:Hospital Medicine.  - continue clindamycin for now    7/5:  Resolved.  Transition to oral clindamycin 07/05/2025.  We will need 7-10 days total of treatment    7/6: WBC remains elevated but no fevers. I am not findings any other sources of acute infection and seems to have chronic mild leukocytosis. Will cont clinda-transition to p.o. on 07/05/2025  Considering reactive leukocytosis vs primary bone marrow process. Needs hematology outpatient.   Diff is not concerning and morphology with hypochromia and target cells (no schistocytes) most consistent with Fe deficiency vs thalassemia regarding anemia and not concerning for active bacterial infection    7/7 Positive for UTI   '  7/8 Leukocytosis improving.  Gram  negative rods on urine culture

## 2025-07-08 NOTE — ASSESSMENT & PLAN NOTE
Hyperkalemia is likely due to LOWELL.The patients most recent potassium results are listed below.  Recent Labs     07/07/25  1909 07/08/25  0009 07/08/25  0410   K 6.2* 5.9* 5.7*     Plan  - Monitor for arrhythmias with EKG and/or continuous telemetry.   - Treat the hyperkalemia with Potassium Binders.   - Monitor potassium:daily   - The patient's hyperkalemia is improving    Another dose of lokelma

## 2025-07-08 NOTE — NURSING
Potassium is 6.2 MD notified and new order noted. Kayexalate 30 g po was ordered but only pediatric/ options show when entering the order and verified with  the pharmacy.

## 2025-07-08 NOTE — NURSING
Handoff report given to BRANDI Marques. Blood currently infusing, handoff completed. Patient in stable condition, plan of care to continue.

## 2025-07-09 NOTE — PLAN OF CARE
Problem: Skin Injury Risk Increased  Goal: Skin Health and Integrity  Outcome: Progressing     Problem: Adult Inpatient Plan of Care  Goal: Plan of Care Review  Outcome: Progressing  Flowsheets (Taken 7/9/2025 4377)  Plan of Care Reviewed With: patient  Goal: Patient-Specific Goal (Individualized)  Outcome: Progressing  Goal: Absence of Hospital-Acquired Illness or Injury  Outcome: Progressing  Goal: Optimal Comfort and Wellbeing  Outcome: Progressing  Goal: Readiness for Transition of Care  Outcome: Progressing     Problem: Fall Injury Risk  Goal: Absence of Fall and Fall-Related Injury  Outcome: Progressing     Problem: Wound  Goal: Optimal Coping  Outcome: Progressing  Goal: Optimal Functional Ability  Outcome: Progressing  Goal: Absence of Infection Signs and Symptoms  Outcome: Progressing  Goal: Improved Oral Intake  Outcome: Progressing  Goal: Optimal Pain Control and Function  Outcome: Progressing  Goal: Skin Health and Integrity  Outcome: Progressing  Goal: Optimal Wound Healing  Outcome: Progressing     Problem: Sepsis/Septic Shock  Goal: Optimal Coping  Outcome: Progressing  Goal: Absence of Bleeding  Outcome: Progressing  Goal: Blood Glucose Level Within Targeted Range  Outcome: Progressing  Goal: Absence of Infection Signs and Symptoms  Outcome: Progressing  Goal: Optimal Nutrition Intake  Outcome: Progressing     Problem: Infection  Goal: Absence of Infection Signs and Symptoms  Outcome: Progressing     Problem: Acute Kidney Injury/Impairment  Goal: Fluid and Electrolyte Balance  Outcome: Progressing  Goal: Improved Oral Intake  Outcome: Progressing  Goal: Effective Renal Function  Outcome: Progressing

## 2025-07-09 NOTE — ASSESSMENT & PLAN NOTE
Anemia is likely due to Iron deficiency. Most recent hemoglobin and hematocrit are listed below.  Recent Labs     07/08/25  0410 07/08/25  1942 07/09/25  0917   HGB 6.8* 7.7* 7.2*   HCT 20.3* 23.0* 21.1*     Plan  - Monitor serial CBC: Daily  - Transfuse PRBC if patient becomes hemodynamically unstable, symptomatic or H/H drops below 7/21.  - Patient has not received any PRBC transfusions to date  - Patient's anemia is currently worsening. Will adjust treatment as follows:  IV iron today  - H&H is worsening since admission.  Labs seem most consistent with iron deficiency.  Iron levels ordered and pending.  I do not see any clinical evidence of active bleeding at this time and remains hemodynamically stable.  In an attempt to avoid transfusion we will give IV iron today and follow labs    7/6: iron, B12 and folate normal  Stool sent for occult but no clinical signs of bleeding  Hemolysis labs sent  H/H low but stable for last 3 days.  If above work up negative she is otherwise clinically stable for discharge and close hematology follow up with outpatient labs as I do not have GI or hematology at this hospital to manage while inpatient.     Anemia stable. Mildly elevated haptoglobin and LDH.   Will monitor H/H/    7/8 h/h was stable but decreased on am labs. Dilutional component is a possibility and due to frequent lab draws in the setting of anemia of chronic disease.  2 units of pRBC ordered.

## 2025-07-09 NOTE — NURSING
IV began leaking. Attempted IV restart to L hand with no success. Awaiting house supervisor for new IV site.

## 2025-07-09 NOTE — SUBJECTIVE & OBJECTIVE
Review of Systems   Constitutional:  Negative for activity change, chills, fatigue, fever and unexpected weight change.   HENT:  Negative for sore throat and trouble swallowing.    Respiratory:  Negative for cough, chest tightness and shortness of breath.    Cardiovascular:  Negative for chest pain and leg swelling.   Gastrointestinal:  Negative for abdominal pain.   Endocrine: Negative for cold intolerance and heat intolerance.   Genitourinary:  Negative for difficulty urinating.   Musculoskeletal:  Positive for arthralgias, back pain and gait problem (chronic.  Bed-bound). Negative for joint swelling.   Skin:  Positive for wound. Negative for rash.   Neurological:  Negative for numbness.   Hematological:  Negative for adenopathy.   Psychiatric/Behavioral:  Negative for decreased concentration.      Objective:     Vital Signs (Most Recent):  Temp: 98.2 °F (36.8 °C) (07/09/25 0746)  Pulse: 85 (07/09/25 1000)  Resp: 18 (07/09/25 0746)  BP: (!) 125/58 (07/09/25 0746)  SpO2: 97 % (07/09/25 0746) Vital Signs (24h Range):  Temp:  [97.6 °F (36.4 °C)-98.9 °F (37.2 °C)] 98.2 °F (36.8 °C)  Pulse:  [66-91] 85  Resp:  [16-20] 18  SpO2:  [95 %-100 %] 97 %  BP: (121-154)/(57-69) 125/58     Weight: 108.5 kg (239 lb 3.2 oz)  Body mass index is 35.32 kg/m².     Physical Exam  Vitals and nursing note reviewed.   Constitutional:       General: She is not in acute distress.     Appearance: She is not ill-appearing.      Comments: Lying in bed.  Bedridden.  Unable to ambulate.  She answers questions fairly well.   HENT:      Right Ear: Tympanic membrane normal.      Left Ear: Tympanic membrane normal.      Mouth/Throat:      Mouth: Mucous membranes are moist.   Eyes:      General:         Right eye: No discharge.         Left eye: No discharge.      Conjunctiva/sclera: Conjunctivae normal.   Cardiovascular:      Rate and Rhythm: Normal rate and regular rhythm.      Heart sounds: Murmur heard.   Pulmonary:      Effort: Pulmonary  "effort is normal.      Comments: Mild decrease breath sounds LL lobe   Abdominal:      General: Abdomen is flat.      Palpations: Abdomen is soft.   Musculoskeletal:      Cervical back: Normal range of motion and neck supple. No rigidity.      Right lower leg: Edema (2+ to thigh) present.      Left lower leg: Edema (2+ edema to thigh) present.   Skin:     Findings: No erythema.      Comments: See pictures.  Significant pressure injuries and ulcers of the sacrum, buttocks.  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   Neurological:      Mental Status: She is alert and oriented to person, place, and time. Mental status is at baseline.      Comments: Bed ridden                Significant Labs: All pertinent labs within the past 24 hours have been reviewed.  CBC:   Recent Labs   Lab 07/08/25 0410 07/08/25  1942 07/09/25  0917   WBC 19.71* 19.78* 18.67*   HGB 6.8* 7.7* 7.2*   HCT 20.3* 23.0* 21.1*    191 235     CMP:   Recent Labs   Lab 07/08/25  0009 07/08/25  0410 07/09/25  0917    137 139   K 5.9* 5.7* 5.1    108 109   CO2 22* 21* 21*   * 106 112*   BUN 52* 53* 47*   CREATININE 1.8* 1.9* 1.6*   CALCIUM 7.9* 7.4* 8.0*   PROT  --   --  6.2   ALBUMIN  --   --  1.8*   BILITOT  --   --  0.3   ALKPHOS  --   --  125   AST  --   --  14   ALT  --   --  9*   ANIONGAP 7* 8 9     Lactic Acid:   Recent Labs   Lab 07/07/25  1240   LACTATE 1.3       POCT Glucose:   Recent Labs   Lab 07/08/25  0923 07/08/25  1121 07/08/25  1322   POCTGLUCOSE 136* 116* 135*     Troponin: No results for input(s): "TROPONINI", "TROPONINIHS" in the last 48 hours.  Urine Culture:   No results for input(s): "LABURIN" in the last 48 hours.    Urine Studies:   No results for input(s): "COLORU", "APPEARANCEUA", "PHUR", "SPECGRAV", "PROTEINUA", "GLUCUA", "KETONESU", "BILIRUBINUA", "OCCULTUA", "NITRITE", "UROBILINOGEN", "LEUKOCYTESUR", "RBCUA", "WBCUA", "BACTERIA", "SQUAMEPITHEL", "HYALINECASTS" in the last 48 hours.    Invalid input(s): " ""WRIGHTSUR"        Significant Imaging: I have reviewed all pertinent imaging results/findings within the past 24 hours.  I have reviewed and interpreted all pertinent imaging results/findings within the past 24 hours.  "

## 2025-07-09 NOTE — ASSESSMENT & PLAN NOTE
Would like to have family meeting to discuss goals of care.  Reached out to daughters Lubna and Aditi for this afternoon or tomorrow.  Pt seems to answer appropriately but unable to express full understanding of medical care and condition and is only able to say she is sick.      Waiting to hear back from family to discuss goals of care with multiple comorbidities, slow to improve and continued anemia requiring blood transfusion.   My understanding is that if she is medically stable to discharge she will go home and they will provide 24 hour care.

## 2025-07-09 NOTE — ASSESSMENT & PLAN NOTE
Hyperkalemia is likely due to LOWELL.The patients most recent potassium results are listed below.  Recent Labs     07/08/25  0009 07/08/25  0410 07/09/25  0917   K 5.9* 5.7* 5.1     Plan  - Monitor for arrhythmias with EKG and/or continuous telemetry.   - Treat the hyperkalemia with Potassium Binders.   - Monitor potassium:daily   - The patient's hyperkalemia is improving    Another dose of lokelma     7/9 Resolved

## 2025-07-09 NOTE — ASSESSMENT & PLAN NOTE
LOWELL is likely due to unsure etiology at this time. Baseline creatinine is 1.1. Most recent creatinine and eGFR are listed below.  Recent Labs     07/08/25  0009 07/08/25  0410 07/09/25  0917   CREATININE 1.8* 1.9* 1.6*   EGFRNORACEVR 28* 26* 32*      Plan  - LOWELL is worsening. Will adjust treatment as follows: d/c valsartan, IV fluids, address hypoalbuminemia, urine studies pending .    - Avoid nephrotoxins and renally dose meds for GFR listed above  - Monitor urine output, serial BMP, and adjust therapy as needed    Urine studies indeterminate.  Renal function slowly improving.      7/9 LOWELL resolving

## 2025-07-09 NOTE — PROGRESS NOTES
[facility] ([address] [place] [address] Medicine  Progress Note    Patient Name: Emily Alicia  MRN: [id number]  Patient Class: IP- Inpatient   Admission Date: 7/1/2025  Length of Stay: 7 days  Attending Physician: Braeden Anderson MD  Primary Care Provider: Angel Pemberton MD        Subjective     Principal Problem:Sepsis        HPI:  Patient is an 84-year-old black female that presented to the emergency room with significant pain from her skin pressure injuries of her sacrum, buttocks.  Upon presentation she had a working diagnosis of sepsis so she was given IV fluids, IV antibiotics.  Workup unremarkable.  Physical exam reveals significant skin pressure injuries to the buttocks, sacrum, foot.  She is being admitted for pain control, IV antibiotics, wound care consultation.    Overview/Hospital Course:  Patient seen by wound care this morning. Recs placed. Patient without any complaints today at all. She is eating well. Afebrile. WBC ct pending.    7/4: clinically patient is doing quite well. She is tolerating PO intake. Has no complaints on rounds.  However, her WBC is not trending down. Seems she has mild chronic elevation of 14-15K but WBC 19K this mroning and 20K on admit. I still do not see any other sources of infections and wounds are not draining, no erythema, no fevers, etc. Cont clindamycin for now.  H/H 7.8/23 from 9.1/27 on admit. No signs of bleeding but with drop will monitor overnight. Also Cr 1.5 from 1.1 on admit. However, on chart review she does fluctuate around this isabella.     7/5:  Patient continues to feel well.  She has no acute complaints.  She denies chest pain, shortness of breath, dysuria, frequency, diarrhea, nausea and vomiting.  Her wounds are stable and do not show any signs of infection.  However, she remains with leukocytosis of [phone] which is not improved since admission.  Her H&H is decreasing in his knee or transfusion threshold.  Her MCV is low and she likely has  iron deficiency.  I am suspecting she has reactive leukocytosis due to this process.  Iron levels ordered.  If low we will replace IV and start p.o. tomorrow in an attempt to avoid blood transfusion.  We will continue clindamycin.  I see no other signs of infection and no indication to change antibiotics.  We will actually deescalate to oral today.  Creatinine remained stable at 1.5    7/6: Clinically this patient remains without complaints and at baseline. She has no fever, dysuria, abd pain, n/v/d/c, chest pains, SOB, cough, etc.  I have kept her inpatient following labs due to minor abnormalities:  Cr 1-1.5 at baseline and while did trend up to 1.5/1.7 this admission on chart review her renal fxn fluctuating here over at least last 1 year. Therefore I do not think this is indicative of LOWELL needing further inpatient management.  H/H down trended but has been stable last 3 days. She is not hemodynamically unstable and vitals remain normal. She has no evidence of bleeding. Stool will be sent for occult and if + can see GI outpatient. However, I am also concerned for primary bone marrow process. Will also work up hemolysis with labs today (h/o aortic stenosis s/p valve replacement).  She has chronic leukocytosis which has not been worked up to date. Plt are normal. I have no access to hematology while inpatient here so may discharge with patient needing to see hematology ASAP. She will need repeat CBC within next few days to monitor as well.   HypoCa noted; awaiting albumin. If lab unremarkable I believe she can discharge this afternoon with home health and home medical equipment and continue to follow up outpatient as we are only lab monitoring at this point in the hospital as no access to needed specialists and she is clinically stable. Dispo pending labs this morning    7/7 PT HD stable on room air.  Continued leukocytosis.  Continue IV abx for UTI.  Creatinine still bumped.  Will continue to trend. Gentle  hydration and holding valsartan.  [facility] for hyperkalemia.  Patient would like to go home with family and states her family can provide her with 24 hour care.  Patient high risk for medical decline.  She is malnourished and albumin 1.8. Consult to dietician.      7/8 PT Bp on lower side.  Pt h/h down trending.  Some dilutional component with frequent lab draws.  Discussed risk versus benefits with pt and daughters about blood transfusion and they are all in agreement.  Renal function slowly improving but pt has hypoalbuminemia.  She is at risk for medical decline.  Will set up family meeting to discuss code status and plan of care.      7/9 Renal function improving.  H/h mild increase but only received 1 out of 2 units of pRBC.  Will get repeat CBC tomorrow.         Review of Systems   Constitutional:  Negative for activity change, chills, fatigue, fever and unexpected weight change.   HENT:  Negative for sore throat and trouble swallowing.    Respiratory:  Negative for cough, chest tightness and shortness of breath.    Cardiovascular:  Negative for chest pain and leg swelling.   Gastrointestinal:  Negative for abdominal pain.   Endocrine: Negative for cold intolerance and heat intolerance.   Genitourinary:  Negative for difficulty urinating.   Musculoskeletal:  Positive for arthralgias, back pain and gait problem (chronic.  Bed-bound). Negative for joint swelling.   Skin:  Positive for wound. Negative for rash.   Neurological:  Negative for numbness.   Hematological:  Negative for adenopathy.   Psychiatric/Behavioral:  Negative for decreased concentration.      Objective:     Vital Signs (Most Recent):  Temp: 98.2 °F (36.8 °C) (07/09/25 0746)  Pulse: 85 (07/09/25 1000)  Resp: 18 (07/09/25 0746)  BP: (!) 125/58 (07/09/25 0746)  SpO2: 97 % (07/09/25 0746) Vital Signs (24h Range):  Temp:  [97.6 °F (36.4 °C)-98.9 °F (37.2 °C)] 98.2 °F (36.8 °C)  Pulse:  [66-91] 85  Resp:  [16-20] 18  SpO2:  [95 %-100 %] 97 %  BP:  (121-154)/(57-69) 125/58     Weight: 108.5 kg (239 lb 3.2 oz)  Body mass index is 35.32 kg/m².     Physical Exam  Vitals and nursing note reviewed.   Constitutional:       General: She is not in acute distress.     Appearance: She is not ill-appearing.      Comments: Lying in bed.  Bedridden.  Unable to ambulate.  She answers questions fairly well.   HENT:      Right Ear: Tympanic membrane normal.      Left Ear: Tympanic membrane normal.      Mouth/Throat:      Mouth: Mucous membranes are moist.   Eyes:      General:         Right eye: No discharge.         Left eye: No discharge.      Conjunctiva/sclera: Conjunctivae normal.   Cardiovascular:      Rate and Rhythm: Normal rate and regular rhythm.      Heart sounds: Murmur heard.   Pulmonary:      Effort: Pulmonary effort is normal.      Comments: Mild decrease breath sounds LL lobe   Abdominal:      General: Abdomen is flat.      Palpations: Abdomen is soft.   Musculoskeletal:      Cervical back: Normal range of motion and neck supple. No rigidity.      Right lower leg: Edema (2+ to thigh) present.      Left lower leg: Edema (2+ edema to thigh) present.   Skin:     Findings: No erythema.      Comments: See pictures.  Significant pressure injuries and ulcers of the sacrum, buttocks.  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   Neurological:      Mental Status: She is alert and oriented to person, place, and time. Mental status is at baseline.      Comments: Bed ridden                Significant Labs: All pertinent labs within the past 24 hours have been reviewed.  CBC:   Recent Labs   Lab 07/08/25  0410 07/08/25  1942 07/09/25  0917   WBC 19.71* 19.78* 18.67*   HGB 6.8* 7.7* 7.2*   HCT 20.3* 23.0* 21.1*    191 235     CMP:   Recent Labs   Lab 07/08/25  0009 07/08/25  0410 07/09/25  0917    137 139   K 5.9* 5.7* 5.1    108 109   CO2 22* 21* 21*   * 106 112*   BUN 52* 53* 47*   CREATININE 1.8* 1.9* 1.6*   CALCIUM 7.9* 7.4* 8.0*   PROT  --   --   "6.2   ALBUMIN  --   --  1.8*   BILITOT  --   --  0.3   ALKPHOS  --   --  125   AST  --   --  14   ALT  --   --  9*   ANIONGAP 7* 8 9     Lactic Acid:   Recent Labs   Lab 07/07/25  1240   LACTATE 1.3       POCT Glucose:   Recent Labs   Lab 07/08/25  0923 07/08/25  1121 07/08/25  1322   POCTGLUCOSE 136* 116* 135*     Troponin: No results for input(s): "TROPONINI", "TROPONINIHS" in the last 48 hours.  Urine Culture:   No results for input(s): "LABURIN" in the last 48 hours.    Urine Studies:   No results for input(s): "COLORU", "APPEARANCEUA", "PHUR", "SPECGRAV", "PROTEINUA", "GLUCUA", "KETONESU", "BILIRUBINUA", "OCCULTUA", "NITRITE", "UROBILINOGEN", "LEUKOCYTESUR", "RBCUA", "WBCUA", "BACTERIA", "SQUAMEPITHEL", "HYALINECASTS" in the last 48 hours.    Invalid input(s): "WRIGHTSUR"        Significant Imaging: I have reviewed all pertinent imaging results/findings within the past 24 hours.  I have reviewed and interpreted all pertinent imaging results/findings within the past 24 hours.      Assessment & Plan  Sepsis  Patient has sepsis without organ dysfunction secondary to Skin and Soft Tissue Infection: Cellulitis. A review of systems was completed. Patient's sepsis is improving    Current Antibiotics    cephALEXin capsule 500 mg, Every 8 hours, Oral    Lactate  Recent Labs   Lab 07/01/25  1909 07/07/25  0641 07/07/25  1240   LACTATE 1.9 0.9 1.3     Culture Data  Blood Cultures   Blood Culture   Date Value Ref Range Status   07/07/2025 No Growth After 36 Hours  Preliminary   07/01/2025 No Growth After 5 Days  Final   07/01/2025 No Growth After 5 Days  Final      Urine Culture   Urine Culture   Date Value Ref Range Status   07/07/2025 >100,000 cfu/ml Gram-negative Rods (A)  Preliminary     Comment:     Identification and susceptibility pending     Urine Culture, Routine   Date Value Ref Range Status   02/09/2022 No significant growth  Final      mSOFA  MSOFA Total  Min: 1   Min taken time: 07/09/25 1101  Max: 1   Max " taken time: 07/09/25 1101    Plan  - Antibiotics as listed above  - Fluid resuscitation as follows:Fluid Not Needed - Patient is not hypotensive and/or lactate is less than 4.0.   - Trend lactate to resolution  - Follow up culture data  - Vasopressors were not needed  - The following services were consulted:Hospital Medicine.  - continue clindamycin for now    7/5:  Resolved.  Transition to oral clindamycin 07/05/2025.  We will need 7-10 days total of treatment    7/6: WBC remains elevated but no fevers. I am not findings any other sources of acute infection and seems to have chronic mild leukocytosis. Will cont clinda-transition to p.o. on 07/05/2025  Considering reactive leukocytosis vs primary bone marrow process. Needs hematology outpatient.   Diff is not concerning and morphology with hypochromia and target cells (no schistocytes) most consistent with Fe deficiency vs thalassemia regarding anemia and not concerning for active bacterial infection    7/7 Positive for UTI   '  7/8 Leukocytosis improving.  Gram negative rods on urine culture   Essential hypertension  Patient's blood pressure range in the last 24 hours was: BP  Min: 121/58  Max: 154/69.The patient's inpatient anti-hypertensive regimen is listed below:  Current Antihypertensives  hydrALAZINE injection 10 mg, Every 6 hours PRN, Intravenous  metoprolol succinate (TOPROL-XL) 24 hr tablet 100 mg, 2 times daily, Oral    Plan  - BP is controlled, no changes needed to their regimen    7/7/25 d/c valsartan   Aortic valve stenosis  Echocardiogram with evidence of aortic stenosis that is moderate . The patient's most recent echocardiogram result is listed below. We will manage the valvular abnormality by status post TAVR    No echocardiogram results found for the past 12 months     7/6: clinically stable      Hyperlipidemia LDL goal <70  Lipitor 10 mg daily    Pressure ulcer of left heel, unstageable  Consult wound care  Continue clindamycin  Oxycodone p.r.n.  pain    Off load heels.  Order mattress for home.  Spinal stenosis of lumbar region with neurogenic claudication  Patient is bed ridden.  She will require frequent turning, PT, wound care.    Need to discuss with family about possible nursing home placement.  She does have home health.  She seems very difficult to care for.    Ordering low air loss mattress for home.  Pressure injury of skin of sacral region  Consult wound care   Continue clindamycin for now.    Will recheck CBC.   No signs of infection per wound care/eval.  Will likely cont on clinda and follow cultures. Orders per wound care and new mattress ordered.    7/6: WBC remains elevated but no fevers. I am not findings any other sources of acute infection and seems to have chronic mild leukocytosis. Will cont clinda-transition to p.o. on 07/05/2025  Considering reactive leukocytosis vs primary bone marrow process. Needs hematology outpatient.   Diff is not concerning and morphology with hypochromia and target cells (no schistocytes) most consistent with Fe deficiency vs thalassemia regarding anemia and not concerning for active bacterial infection    7/7 Appreciate wound care recommendations     S/P TAVR (transcatheter aortic valve replacement)  No treatment necessary continue valsartan  Hold amlodipine and Lasix for now.  Restart if pressure starts to recover  -continue to hold at discharge    D/c valsartan     Microcytic anemia  Anemia is likely due to Iron deficiency. Most recent hemoglobin and hematocrit are listed below.  Recent Labs     07/08/25  0410 07/08/25  1942 07/09/25  0917   HGB 6.8* 7.7* 7.2*   HCT 20.3* 23.0* 21.1*     Plan  - Monitor serial CBC: Daily  - Transfuse PRBC if patient becomes hemodynamically unstable, symptomatic or H/H drops below 7/21.  - Patient has not received any PRBC transfusions to date  - Patient's anemia is currently worsening. Will adjust treatment as follows:  IV iron today  - H&H is worsening since admission.   Labs seem most consistent with iron deficiency.  Iron levels ordered and pending.  I do not see any clinical evidence of active bleeding at this time and remains hemodynamically stable.  In an attempt to avoid transfusion we will give IV iron today and follow labs    7/6: iron, B12 and folate normal  Stool sent for occult but no clinical signs of bleeding  Hemolysis labs sent  H/H low but stable for last 3 days.  If above work up negative she is otherwise clinically stable for discharge and close hematology follow up with outpatient labs as I do not have GI or hematology at this hospital to manage while inpatient.     Anemia stable. Mildly elevated haptoglobin and LDH.   Will monitor H/H/    7/8 h/h was stable but decreased on am labs. Dilutional component is a possibility and due to frequent lab draws in the setting of anemia of chronic disease.  2 units of pRBC ordered.  Hyperkalemia  Hyperkalemia is likely due to LOWELL.The patients most recent potassium results are listed below.  Recent Labs     07/08/25  0009 07/08/25  0410 07/09/25  0917   K 5.9* 5.7* 5.1     Plan  - Monitor for arrhythmias with EKG and/or continuous telemetry.   - Treat the hyperkalemia with Potassium Binders.   - Monitor potassium:daily   - The patient's hyperkalemia is improving    Another dose of lokelma     7/9 Resolved       LOWELL (acute kidney injury)  LOWELL is likely due to unsure etiology at this time. Baseline creatinine is 1.1. Most recent creatinine and eGFR are listed below.  Recent Labs     07/08/25  0009 07/08/25  0410 07/09/25  0917   CREATININE 1.8* 1.9* 1.6*   EGFRNORACEVR 28* 26* 32*      Plan  - LOWELL is worsening. Will adjust treatment as follows: d/c valsartan, IV fluids, address hypoalbuminemia, urine studies pending .    - Avoid nephrotoxins and renally dose meds for GFR listed above  - Monitor urine output, serial BMP, and adjust therapy as needed    Urine studies indeterminate.  Renal function slowly improving.      7/9 LOWELL  resolving         UTI (urinary tract infection)  U/A positive >100 on microscopic  Urine culture gram negative rods   Keflex     Goals of care, counseling/discussion  Would like to have family meeting to discuss goals of care.  Reached out to daughters Lubna and Aditi for this afternoon or tomorrow.  Pt seems to answer appropriately but unable to express full understanding of medical care and condition and is only able to say she is sick.      Waiting to hear back from family to discuss goals of care with multiple comorbidities, slow to improve and continued anemia requiring blood transfusion.   My understanding is that if she is medically stable to discharge she will go home and they will provide 24 hour care.      VTE Risk Mitigation (From admission, onward)           Ordered     IP VTE HIGH RISK PATIENT  Once         07/01/25 2343     Place sequential compression device  Until discontinued         07/01/25 2343                    Discharge Planning   LINA: 7/10/2025     Code Status: Full Code   Medical Readiness for Discharge Date:   Discharge Plan A: Home Health                        Anjali Mesa PA-C  Department of Hospital Medicine   [facility] - Med Surg (3rd Fl)

## 2025-07-09 NOTE — ASSESSMENT & PLAN NOTE
Patient has sepsis without organ dysfunction secondary to Skin and Soft Tissue Infection: Cellulitis. A review of systems was completed. Patient's sepsis is improving    Current Antibiotics    cephALEXin capsule 500 mg, Every 8 hours, Oral    Lactate  Recent Labs   Lab 07/01/25  1909 07/07/25  0641 07/07/25  1240   LACTATE 1.9 0.9 1.3     Culture Data  Blood Cultures   Blood Culture   Date Value Ref Range Status   07/07/2025 No Growth After 36 Hours  Preliminary   07/01/2025 No Growth After 5 Days  Final   07/01/2025 No Growth After 5 Days  Final      Urine Culture   Urine Culture   Date Value Ref Range Status   07/07/2025 >100,000 cfu/ml Gram-negative Rods (A)  Preliminary     Comment:     Identification and susceptibility pending     Urine Culture, Routine   Date Value Ref Range Status   02/09/2022 No significant growth  Final      mSOFA  MSOFA Total  Min: 1   Min taken time: 07/09/25 1101  Max: 1   Max taken time: 07/09/25 1101    Plan  - Antibiotics as listed above  - Fluid resuscitation as follows:Fluid Not Needed - Patient is not hypotensive and/or lactate is less than 4.0.   - Trend lactate to resolution  - Follow up culture data  - Vasopressors were not needed  - The following services were consulted:Hospital Medicine.  - continue clindamycin for now    7/5:  Resolved.  Transition to oral clindamycin 07/05/2025.  We will need 7-10 days total of treatment    7/6: WBC remains elevated but no fevers. I am not findings any other sources of acute infection and seems to have chronic mild leukocytosis. Will cont clinda-transition to p.o. on 07/05/2025  Considering reactive leukocytosis vs primary bone marrow process. Needs hematology outpatient.   Diff is not concerning and morphology with hypochromia and target cells (no schistocytes) most consistent with Fe deficiency vs thalassemia regarding anemia and not concerning for active bacterial infection    7/7 Positive for UTI   '  7/8 Leukocytosis improving.  Gram  negative rods on urine culture

## 2025-07-09 NOTE — PLAN OF CARE
07/09/25 1050   Rounds   Attendance Provider;Nurse ;   Discharge Plan A Home Health   Why the patient remains in the hospital Requires continued medical care  (may transfer for HLOC)   Transition of Care Barriers None     Care team reviewed plan of care and discharge plan with patient / family.  CM will continue to follow till discharge.

## 2025-07-09 NOTE — ASSESSMENT & PLAN NOTE
Patient's blood pressure range in the last 24 hours was: BP  Min: 121/58  Max: 154/69.The patient's inpatient anti-hypertensive regimen is listed below:  Current Antihypertensives  hydrALAZINE injection 10 mg, Every 6 hours PRN, Intravenous  metoprolol succinate (TOPROL-XL) 24 hr tablet 100 mg, 2 times daily, Oral    Plan  - BP is controlled, no changes needed to their regimen    7/7/25 d/c valsartan

## 2025-07-09 NOTE — PLAN OF CARE
Problem: Skin Injury Risk Increased  Goal: Skin Health and Integrity  Outcome: Progressing     Problem: Adult Inpatient Plan of Care  Goal: Plan of Care Review  Outcome: Progressing  Goal: Patient-Specific Goal (Individualized)  Outcome: Progressing  Goal: Absence of Hospital-Acquired Illness or Injury  Outcome: Progressing  Goal: Optimal Comfort and Wellbeing  Outcome: Progressing  Goal: Readiness for Transition of Care  Outcome: Progressing     Problem: Fall Injury Risk  Goal: Absence of Fall and Fall-Related Injury  Outcome: Progressing     Problem: Wound  Goal: Optimal Coping  Outcome: Progressing  Goal: Optimal Functional Ability  Outcome: Progressing  Goal: Absence of Infection Signs and Symptoms  Outcome: Progressing  Goal: Improved Oral Intake  Outcome: Progressing  Goal: Optimal Pain Control and Function  Outcome: Progressing  Goal: Skin Health and Integrity  Outcome: Progressing  Goal: Optimal Wound Healing  Outcome: Progressing     Problem: Sepsis/Septic Shock  Goal: Optimal Coping  Outcome: Progressing  Goal: Absence of Bleeding  Outcome: Progressing  Goal: Blood Glucose Level Within Targeted Range  Outcome: Progressing  Goal: Absence of Infection Signs and Symptoms  Outcome: Progressing  Goal: Optimal Nutrition Intake  Outcome: Progressing     Problem: Infection  Goal: Absence of Infection Signs and Symptoms  Outcome: Progressing     Problem: Acute Kidney Injury/Impairment  Goal: Fluid and Electrolyte Balance  Outcome: Progressing  Goal: Improved Oral Intake  Outcome: Progressing  Goal: Effective Renal Function  Outcome: Progressing

## 2025-07-10 NOTE — ASSESSMENT & PLAN NOTE
LOWELL is likely due to unsure etiology at this time. Baseline creatinine is 1.1. Most recent creatinine and eGFR are listed below.  Recent Labs     07/08/25  0410 07/09/25  0917 07/10/25  0525   CREATININE 1.9* 1.6* 1.4   EGFRNORACEVR 26* 32* 37*      Plan  - LOWELL is worsening. Will adjust treatment as follows: d/c valsartan, IV fluids, address hypoalbuminemia, urine studies pending .    - Avoid nephrotoxins and renally dose meds for GFR listed above  - Monitor urine output, serial BMP, and adjust therapy as needed    Urine studies indeterminate.  Renal function slowly improving.      7/9 LOWELL resolving     7/10 LOWELL resolved

## 2025-07-10 NOTE — ASSESSMENT & PLAN NOTE
Anemia is likely due to Iron deficiency. Most recent hemoglobin and hematocrit are listed below.  Recent Labs     07/08/25  1942 07/09/25  0917 07/10/25  0525   HGB 7.7* 7.2* 7.7*   HCT 23.0* 21.1* 23.0*     Plan  - Monitor serial CBC: Daily  - Transfuse PRBC if patient becomes hemodynamically unstable, symptomatic or H/H drops below 7/21.  - Patient has not received any PRBC transfusions to date  - Patient's anemia is currently worsening. Will adjust treatment as follows:  IV iron today  - H&H is worsening since admission.  Labs seem most consistent with iron deficiency.  Iron levels ordered and pending.  I do not see any clinical evidence of active bleeding at this time and remains hemodynamically stable.  In an attempt to avoid transfusion we will give IV iron today and follow labs    7/6: iron, B12 and folate normal  Stool sent for occult but no clinical signs of bleeding  Hemolysis labs sent  H/H low but stable for last 3 days.  If above work up negative she is otherwise clinically stable for discharge and close hematology follow up with outpatient labs as I do not have GI or hematology at this hospital to manage while inpatient.     Anemia stable. Mildly elevated haptoglobin and LDH.   Will monitor H/H/    7/8 h/h was stable but decreased on am labs. Dilutional component is a possibility and due to frequent lab draws in the setting of anemia of chronic disease.  2 units of pRBC ordered.    7/10 will monitor and if remains stable will discharge home tomorrow

## 2025-07-10 NOTE — ASSESSMENT & PLAN NOTE
Patient's blood pressure range in the last 24 hours was: BP  Min: 124/61  Max: 154/66.The patient's inpatient anti-hypertensive regimen is listed below:  Current Antihypertensives  hydrALAZINE injection 10 mg, Every 6 hours PRN, Intravenous  metoprolol succinate (TOPROL-XL) 24 hr tablet 100 mg, 2 times daily, Oral    Plan  - BP is controlled, no changes needed to their regimen    7/7/25 d/c valsartan

## 2025-07-10 NOTE — ASSESSMENT & PLAN NOTE
Patient has sepsis without organ dysfunction secondary to Skin and Soft Tissue Infection: Cellulitis. A review of systems was completed. Patient's sepsis is improving    Current Antibiotics    cephALEXin capsule 500 mg, Every 8 hours, Oral    Lactate  Recent Labs   Lab 07/01/25  1909 07/07/25  0641 07/07/25  1240   LACTATE 1.9 0.9 1.3     Culture Data  Blood Cultures   Blood Culture   Date Value Ref Range Status   07/07/2025 No Growth After 48 Hours  Preliminary   07/01/2025 No Growth After 5 Days  Final   07/01/2025 No Growth After 5 Days  Final      Urine Culture   Urine Culture   Date Value Ref Range Status   07/07/2025 >100,000 cfu/ml Klebsiella pneumoniae (A)  Final     Urine Culture, Routine   Date Value Ref Range Status   02/09/2022 No significant growth  Final      mSOFA  MSOFA Total  Min: 1   Min taken time: 07/10/25 0700  Max: 3   Max taken time: 07/10/25 1001    Plan  - Antibiotics as listed above  - Fluid resuscitation as follows:Fluid Not Needed - Patient is not hypotensive and/or lactate is less than 4.0.   - Trend lactate to resolution  - Follow up culture data  - Vasopressors were not needed  - The following services were consulted:Hospital Medicine.  - continue clindamycin for now    7/5:  Resolved.  Transition to oral clindamycin 07/05/2025.  We will need 7-10 days total of treatment    7/6: WBC remains elevated but no fevers. I am not findings any other sources of acute infection and seems to have chronic mild leukocytosis. Will cont clinda-transition to p.o. on 07/05/2025  Considering reactive leukocytosis vs primary bone marrow process. Needs hematology outpatient.   Diff is not concerning and morphology with hypochromia and target cells (no schistocytes) most consistent with Fe deficiency vs thalassemia regarding anemia and not concerning for active bacterial infection    7/7 Positive for UTI   '  7/8 Leukocytosis improving.  Gram negative rods on urine culture     7/10 Urine culture with  klebsiella.  Continue keflex.

## 2025-07-10 NOTE — SUBJECTIVE & OBJECTIVE
Review of Systems   Constitutional:  Negative for activity change, chills, fatigue, fever and unexpected weight change.   HENT:  Negative for sore throat and trouble swallowing.    Respiratory:  Negative for cough, chest tightness and shortness of breath.    Cardiovascular:  Negative for chest pain and leg swelling.   Gastrointestinal:  Negative for abdominal pain.   Endocrine: Negative for cold intolerance and heat intolerance.   Genitourinary:  Negative for difficulty urinating.   Musculoskeletal:  Positive for arthralgias, back pain and gait problem (chronic.  Bed-bound). Negative for joint swelling.   Skin:  Positive for wound. Negative for rash.   Neurological:  Negative for numbness.   Hematological:  Negative for adenopathy.   Psychiatric/Behavioral:  Negative for decreased concentration.      Objective:     Vital Signs (Most Recent):  Temp: 98.7 °F (37.1 °C) (07/10/25 0731)  Pulse: 88 (07/10/25 1000)  Resp: 18 (07/10/25 0731)  BP: 129/84 (07/10/25 0731)  SpO2: 95 % (07/10/25 0731) Vital Signs (24h Range):  Temp:  [97.2 °F (36.2 °C)-98.9 °F (37.2 °C)] 98.7 °F (37.1 °C)  Pulse:  [64-89] 88  Resp:  [18-20] 18  SpO2:  [94 %-100 %] 95 %  BP: (124-154)/(58-84) 129/84     Weight: 108.5 kg (239 lb 3.2 oz)  Body mass index is 35.32 kg/m².     Physical Exam  Vitals and nursing note reviewed.   Constitutional:       General: She is not in acute distress.     Appearance: She is not ill-appearing.      Comments: Lying in bed.  Bedridden.  Unable to ambulate.  She answers questions fairly well.   HENT:      Right Ear: Tympanic membrane normal.      Left Ear: Tympanic membrane normal.      Mouth/Throat:      Mouth: Mucous membranes are moist.   Eyes:      General:         Right eye: No discharge.         Left eye: No discharge.      Conjunctiva/sclera: Conjunctivae normal.   Cardiovascular:      Rate and Rhythm: Normal rate and regular rhythm.      Heart sounds: Murmur heard.   Pulmonary:      Effort: Pulmonary effort  "is normal.      Comments: Mild decrease breath sounds LL lobe   Abdominal:      General: Abdomen is flat.      Palpations: Abdomen is soft.   Musculoskeletal:      Cervical back: Normal range of motion and neck supple. No rigidity.      Right lower leg: Edema (2+ to thigh) present.      Left lower leg: Edema (2+ edema to thigh) present.   Skin:     Findings: No erythema.      Comments: See pictures.  Significant pressure injuries and ulcers of the sacrum, buttocks.  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   Neurological:      Mental Status: She is alert and oriented to person, place, and time. Mental status is at baseline.      Comments: Bed ridden                Significant Labs: All pertinent labs within the past 24 hours have been reviewed.  CBC:   Recent Labs   Lab 07/08/25  1942 07/09/25  0917 07/10/25  0525   WBC 19.78* 18.67* 19.08*   HGB 7.7* 7.2* 7.7*   HCT 23.0* 21.1* 23.0*    235 198     CMP:   Recent Labs   Lab 07/09/25  0917 07/10/25  0525    139   K 5.1 5.0    108   CO2 21* 23   * 88   BUN 47* 52*   CREATININE 1.6* 1.4   CALCIUM 8.0* 7.7*   PROT 6.2 5.4*   ALBUMIN 1.8* 1.6*   BILITOT 0.3 0.4   ALKPHOS 125 102   AST 14 11   ALT 9* 8*   ANIONGAP 9 8     Lactic Acid:   No results for input(s): "LACTATE" in the last 48 hours.      POCT Glucose:   Recent Labs   Lab 07/08/25  1121 07/08/25  1322   POCTGLUCOSE 116* 135*     Troponin: No results for input(s): "TROPONINI", "TROPONINIHS" in the last 48 hours.  Urine Culture:   No results for input(s): "LABURIN" in the last 48 hours.    Urine Studies:   No results for input(s): "COLORU", "APPEARANCEUA", "PHUR", "SPECGRAV", "PROTEINUA", "GLUCUA", "KETONESU", "BILIRUBINUA", "OCCULTUA", "NITRITE", "UROBILINOGEN", "LEUKOCYTESUR", "RBCUA", "WBCUA", "BACTERIA", "SQUAMEPITHEL", "HYALINECASTS" in the last 48 hours.    Invalid input(s): "WRIGHTSUR"        Significant Imaging: I have reviewed all pertinent imaging results/findings within the past 24 " hours.  I have reviewed and interpreted all pertinent imaging results/findings within the past 24 hours.

## 2025-07-10 NOTE — PLAN OF CARE
Problem: Skin Injury Risk Increased  Goal: Skin Health and Integrity  Outcome: Progressing     Problem: Adult Inpatient Plan of Care  Goal: Plan of Care Review  Outcome: Progressing  Goal: Patient-Specific Goal (Individualized)  Outcome: Progressing  Goal: Absence of Hospital-Acquired Illness or Injury  Outcome: Progressing  Goal: Optimal Comfort and Wellbeing  Outcome: Progressing  Goal: Readiness for Transition of Care  Outcome: Progressing     Problem: Fall Injury Risk  Goal: Absence of Fall and Fall-Related Injury  Outcome: Progressing     Problem: Wound  Goal: Optimal Coping  Outcome: Progressing  Goal: Optimal Functional Ability  Outcome: Progressing  Goal: Absence of Infection Signs and Symptoms  Outcome: Progressing

## 2025-07-10 NOTE — ASSESSMENT & PLAN NOTE
Hyperkalemia is likely due to LOWELL.The patients most recent potassium results are listed below.  Recent Labs     07/08/25  0410 07/09/25  0917 07/10/25  0525   K 5.7* 5.1 5.0     Plan  - Monitor for arrhythmias with EKG and/or continuous telemetry.   - Treat the hyperkalemia with Potassium Binders.   - Monitor potassium:daily   - The patient's hyperkalemia is improving    Another dose of lokelma     7/9 Resolved

## 2025-07-10 NOTE — NURSING
MIRANDA PT GRANDDAUGHTER AT PT BEDSIDE. STATED THEY DID NOT RECEIVE PT MATTRESS YET DUE TO SOME CARD ISSUES THAT SHOULD BE RESOLVE TOMORROW .

## 2025-07-10 NOTE — PROGRESS NOTES
[facility] - Med Surg (3rd Fl)  Adult Nutrition  Progress Note    SUMMARY       Interventions   1. General healthful diet  2. Medical food supplement therapy    Recommendations  1. Continue regular diet.   2. Continue Curt BID and Boost Plus BID.   3. Please continue documenting meal intake; document intake of all ONS consumed daily (4 total).   4. RD to follow.    Goal #1: Patient will meet > 80% EEN/EPN prior to RD follow up.  Nutrition Goal Status #1: goal met    Goal #2: Meal consumption >/= 50% by RD follow up.  Nutrition Goal Status #2: goal new    Communication of RD Recs: RD note, POC    Nutrition Discharge Planning    General healthy diet  Oral supplement regimen (comments): Curt BID x 2-4 weeks from start date, Boost Plus or equivalent 2-3 x daily as warranted for wound healing promotion    Malnutrition Assessment   No NFPE - RD off site  Skin (Micronutrient): wounds unhealed    Nestor Score 12/high risk (nutrition reported as adequate)                          Reason for Assessment    Reason For Assessment: consult, pressure injury/ wound  Diagnosis: infection/sepsis  Past Medical History:   Diagnosis Date    Anemia     Anxiety     Aortic valve stenosis     CHF (congestive heart failure)     Chronic kidney disease (CKD)     Diastolic heart failure     Dyslipidemia     Encounter for blood transfusion     Glaucoma (increased eye pressure)     LEFT EYE    HHD (hypertensive heart disease)     Hypertension     Osteoarthritis     Retina disorder, bilateral     Severe aortic stenosis 11/9/2016    TR (tricuspid regurgitation)     UTI (urinary tract infection)      General Information Comments: RD providing remote coverage. Patient continues on regular diet with Curt BID and Boost Plus BID. Slight decrease in meal intake compared to initial consult, however, meal intake up to 100%. No ONS tracking per flowsheet. Spoke with RN vis secure chat, patient consuming ONS x 4. Meeting estimated energy and protein needs  "at this time. Encourage PO intake in attempt to maintain adequacy. LBM 7/9/25. Will continue to monitor.    Nutrition/Diet History    Spiritual, Cultural Beliefs, Nondenominational Practices, Values that Affect Care: other (see comments) (none identified)  Food Allergies: NKFA  Factors Affecting Nutritional Intake: None identified at this time  Nutrition-related SDOH: Unable to assess at this time    Anthropometrics    Height: 5' 9" (175.3 cm)  Height (inches): 69 in  Weight: 108.5 kg (239 lb 3.2 oz)  Weight (lb): 239.2 lb  Weight Method: Bed Scale  Ideal Body Weight (IBW), Female: 145 lb  % Ideal Body Weight, Female (lb): 164.97 %  BMI (Calculated): 35.3  BMI Grade: 35 - 39.9 - obesity - grade II     Lab/Procedures/Meds    Pertinent Labs Reviewed: reviewed  Pertinent Labs Comments: BUN: 52 (H), GFR: 37 (L), Ca: 7.7 (L), ALT: 8 (L)  Pertinent Medications Reviewed: reviewed  Scheduled Meds:   allopurinoL  100 mg Oral Daily    atorvastatin  10 mg Oral QHS    cephALEXin  500 mg Oral Q8H    collagenase   Topical (Top) Daily    ferrous sulfate  1 tablet Oral Daily    gabapentin  300 mg Oral BID    metoprolol succinate  100 mg Oral BID    miconazole NITRATE 2 %   Topical (Top) BID    pantoprazole  40 mg Oral Daily    polyethylene glycol  17 g Oral Daily    vitamin D  1,000 Units Oral Daily     Estimated/Assessed Needs    Weight Used For Calorie Calculations: 65.9 kg (145 lb 4.5 oz) (IBW)  Energy Calorie Requirements (kcal): 2307  Energy Need Method: Kcal/kg (35 kcal/kg IBW)  Protein Requirements: 109 g (1 g/kg)  Weight Used For Protein Calculations: 108.5 kg (239 lb 3.2 oz)  Fluid Requirements (mL): 1 mL/kcal  Estimated Fluid Requirement Method: RDA Method (or per MD)  RDA Method (mL): 2307     Nutrition Prescription Ordered    Current Diet Order: regular  Oral Nutrition Supplement: Boost Plus BID, Curt BID    Evaluation of Received Nutrient/Fluid Intake    Oral Fluid (mL): 354  % Kcal Needs: %  % Protein Needs: " %  IV Fluid (mL): 0  I/O: + 8.8 L since admit  Energy Calories Required: meeting needs  Protein Required: meeting needs  Fluid Required: not meeting needs  Tolerance: tolerating  % Intake of Estimated Energy Needs: 75 - 100 %  % Meal Intake: Other: %    PES Statement  Increased nutrient needs related to Acute illness, Wound healing, Increased demand for protein energy as evidenced by  (sepsis, full thickness pressure injuries/wounds x 3, meeting < 75% EPN despite % meal intake)  Status: Improving    Nutrition Risk    Level of Risk/Frequency of Follow-up: moderate - high (2 x per week)     Monitor and Evaluation    Monitor and Evaluation: Energy intake, Food and beverage intake, Protein intake, Diet order, Weight, Electrolyte and renal panel, Gastrointestinal profile, Skin     Nutrition Follow-Up    RD Follow-up?: Yes    Elba Stanford, BRITANYN, LDN

## 2025-07-10 NOTE — PLAN OF CARE
07/10/25 0944   Rounds   Attendance Provider;Nurse ;   Discharge Plan A Home with family;Home Health   Why the patient remains in the hospital Requires continued medical care   Transition of Care Barriers None     Care team reviewed plan of care and discharge plan with patient / family.  CM will continue to follow till discharge.

## 2025-07-10 NOTE — PROGRESS NOTES
[facility] ([address] [place] [address] Medicine  Progress Note    Patient Name: Emily Alicia  MRN: [id number]  Patient Class: IP- Inpatient   Admission Date: 7/1/2025  Length of Stay: 8 days  Attending Physician: Braeden Anderson MD  Primary Care Provider: Angel Pemberton MD        Subjective     Principal Problem:Sepsis        HPI:  Patient is an 84-year-old black female that presented to the emergency room with significant pain from her skin pressure injuries of her sacrum, buttocks.  Upon presentation she had a working diagnosis of sepsis so she was given IV fluids, IV antibiotics.  Workup unremarkable.  Physical exam reveals significant skin pressure injuries to the buttocks, sacrum, foot.  She is being admitted for pain control, IV antibiotics, wound care consultation.    Overview/Hospital Course:  Patient seen by wound care this morning. Recs placed. Patient without any complaints today at all. She is eating well. Afebrile. WBC ct pending.    7/4: clinically patient is doing quite well. She is tolerating PO intake. Has no complaints on rounds.  However, her WBC is not trending down. Seems she has mild chronic elevation of 14-15K but WBC 19K this mroning and 20K on admit. I still do not see any other sources of infections and wounds are not draining, no erythema, no fevers, etc. Cont clindamycin for now.  H/H 7.8/23 from 9.1/27 on admit. No signs of bleeding but with drop will monitor overnight. Also Cr 1.5 from 1.1 on admit. However, on chart review she does fluctuate around this isabella.     7/5:  Patient continues to feel well.  She has no acute complaints.  She denies chest pain, shortness of breath, dysuria, frequency, diarrhea, nausea and vomiting.  Her wounds are stable and do not show any signs of infection.  However, she remains with leukocytosis of [phone] which is not improved since admission.  Her H&H is decreasing in his knee or transfusion threshold.  Her MCV is low and she likely has  iron deficiency.  I am suspecting she has reactive leukocytosis due to this process.  Iron levels ordered.  If low we will replace IV and start p.o. tomorrow in an attempt to avoid blood transfusion.  We will continue clindamycin.  I see no other signs of infection and no indication to change antibiotics.  We will actually deescalate to oral today.  Creatinine remained stable at 1.5    7/6: Clinically this patient remains without complaints and at baseline. She has no fever, dysuria, abd pain, n/v/d/c, chest pains, SOB, cough, etc.  I have kept her inpatient following labs due to minor abnormalities:  Cr 1-1.5 at baseline and while did trend up to 1.5/1.7 this admission on chart review her renal fxn fluctuating here over at least last 1 year. Therefore I do not think this is indicative of LOWELL needing further inpatient management.  H/H down trended but has been stable last 3 days. She is not hemodynamically unstable and vitals remain normal. She has no evidence of bleeding. Stool will be sent for occult and if + can see GI outpatient. However, I am also concerned for primary bone marrow process. Will also work up hemolysis with labs today (h/o aortic stenosis s/p valve replacement).  She has chronic leukocytosis which has not been worked up to date. Plt are normal. I have no access to hematology while inpatient here so may discharge with patient needing to see hematology ASAP. She will need repeat CBC within next few days to monitor as well.   HypoCa noted; awaiting albumin. If lab unremarkable I believe she can discharge this afternoon with home health and home medical equipment and continue to follow up outpatient as we are only lab monitoring at this point in the hospital as no access to needed specialists and she is clinically stable. Dispo pending labs this morning    7/7 PT HD stable on room air.  Continued leukocytosis.  Continue IV abx for UTI.  Creatinine still bumped.  Will continue to trend. Gentle  hydration and holding valsartan.  [facility] for hyperkalemia.  Patient would like to go home with family and states her family can provide her with 24 hour care.  Patient high risk for medical decline.  She is malnourished and albumin 1.8. Consult to dietician.      7/8 PT Bp on lower side.  Pt h/h down trending.  Some dilutional component with frequent lab draws.  Discussed risk versus benefits with pt and daughters about blood transfusion and they are all in agreement.  Renal function slowly improving but pt has hypoalbuminemia.  She is at risk for medical decline.  Will set up family meeting to discuss code status and plan of care.      7/9 Renal function improving.  H/h mild increase but only received 1 out of 2 units of pRBC.  Will get repeat CBC tomorrow.     7/10 PT HD stable on room air.  Renal function back to baseline.  H/H stable.  If labs are stable tomorrow plan to discharge home with home health and 24 hour care.  Pt will need continued work up of leukocytosis and anemia outpatient.  Defer to PCP for management.          Review of Systems   Constitutional:  Negative for activity change, chills, fatigue, fever and unexpected weight change.   HENT:  Negative for sore throat and trouble swallowing.    Respiratory:  Negative for cough, chest tightness and shortness of breath.    Cardiovascular:  Negative for chest pain and leg swelling.   Gastrointestinal:  Negative for abdominal pain.   Endocrine: Negative for cold intolerance and heat intolerance.   Genitourinary:  Negative for difficulty urinating.   Musculoskeletal:  Positive for arthralgias, back pain and gait problem (chronic.  Bed-bound). Negative for joint swelling.   Skin:  Positive for wound. Negative for rash.   Neurological:  Negative for numbness.   Hematological:  Negative for adenopathy.   Psychiatric/Behavioral:  Negative for decreased concentration.      Objective:     Vital Signs (Most Recent):  Temp: 98.7 °F (37.1 °C) (07/10/25  0731)  Pulse: 88 (07/10/25 1000)  Resp: 18 (07/10/25 0731)  BP: 129/84 (07/10/25 0731)  SpO2: 95 % (07/10/25 0731) Vital Signs (24h Range):  Temp:  [97.2 °F (36.2 °C)-98.9 °F (37.2 °C)] 98.7 °F (37.1 °C)  Pulse:  [64-89] 88  Resp:  [18-20] 18  SpO2:  [94 %-100 %] 95 %  BP: (124-154)/(58-84) 129/84     Weight: 108.5 kg (239 lb 3.2 oz)  Body mass index is 35.32 kg/m².     Physical Exam  Vitals and nursing note reviewed.   Constitutional:       General: She is not in acute distress.     Appearance: She is not ill-appearing.      Comments: Lying in bed.  Bedridden.  Unable to ambulate.  She answers questions fairly well.   HENT:      Right Ear: Tympanic membrane normal.      Left Ear: Tympanic membrane normal.      Mouth/Throat:      Mouth: Mucous membranes are moist.   Eyes:      General:         Right eye: No discharge.         Left eye: No discharge.      Conjunctiva/sclera: Conjunctivae normal.   Cardiovascular:      Rate and Rhythm: Normal rate and regular rhythm.      Heart sounds: Murmur heard.   Pulmonary:      Effort: Pulmonary effort is normal.      Comments: Mild decrease breath sounds LL lobe   Abdominal:      General: Abdomen is flat.      Palpations: Abdomen is soft.   Musculoskeletal:      Cervical back: Normal range of motion and neck supple. No rigidity.      Right lower leg: Edema (2+ to thigh) present.      Left lower leg: Edema (2+ edema to thigh) present.   Skin:     Findings: No erythema.      Comments: See pictures.  Significant pressure injuries and ulcers of the sacrum, buttocks.  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   Neurological:      Mental Status: She is alert and oriented to person, place, and time. Mental status is at baseline.      Comments: Bed ridden                Significant Labs: All pertinent labs within the past 24 hours have been reviewed.  CBC:   Recent Labs   Lab 07/08/25  1942 07/09/25  0917 07/10/25  0525   WBC 19.78* 18.67* 19.08*   HGB 7.7* 7.2* 7.7*   HCT 23.0* 21.1*  "23.0*    235 198     CMP:   Recent Labs   Lab 07/09/25  0917 07/10/25  0525    139   K 5.1 5.0    108   CO2 21* 23   * 88   BUN 47* 52*   CREATININE 1.6* 1.4   CALCIUM 8.0* 7.7*   PROT 6.2 5.4*   ALBUMIN 1.8* 1.6*   BILITOT 0.3 0.4   ALKPHOS 125 102   AST 14 11   ALT 9* 8*   ANIONGAP 9 8     Lactic Acid:   No results for input(s): "LACTATE" in the last 48 hours.      POCT Glucose:   Recent Labs   Lab 07/08/25  1121 07/08/25  1322   POCTGLUCOSE 116* 135*     Troponin: No results for input(s): "TROPONINI", "TROPONINIHS" in the last 48 hours.  Urine Culture:   No results for input(s): "LABURIN" in the last 48 hours.    Urine Studies:   No results for input(s): "COLORU", "APPEARANCEUA", "PHUR", "SPECGRAV", "PROTEINUA", "GLUCUA", "KETONESU", "BILIRUBINUA", "OCCULTUA", "NITRITE", "UROBILINOGEN", "LEUKOCYTESUR", "RBCUA", "WBCUA", "BACTERIA", "SQUAMEPITHEL", "HYALINECASTS" in the last 48 hours.    Invalid input(s): "WRIGHTSUR"        Significant Imaging: I have reviewed all pertinent imaging results/findings within the past 24 hours.  I have reviewed and interpreted all pertinent imaging results/findings within the past 24 hours.      Assessment & Plan  Sepsis  Patient has sepsis without organ dysfunction secondary to Skin and Soft Tissue Infection: Cellulitis. A review of systems was completed. Patient's sepsis is improving    Current Antibiotics    cephALEXin capsule 500 mg, Every 8 hours, Oral    Lactate  Recent Labs   Lab 07/01/25  1909 07/07/25  0641 07/07/25  1240   LACTATE 1.9 0.9 1.3     Culture Data  Blood Cultures   Blood Culture   Date Value Ref Range Status   07/07/2025 No Growth After 48 Hours  Preliminary   07/01/2025 No Growth After 5 Days  Final   07/01/2025 No Growth After 5 Days  Final      Urine Culture   Urine Culture   Date Value Ref Range Status   07/07/2025 >100,000 cfu/ml Klebsiella pneumoniae (A)  Final     Urine Culture, Routine   Date Value Ref Range Status   02/09/2022 " No significant growth  Final      mSOFA  MSOFA Total  Min: 1   Min taken time: 07/10/25 0700  Max: 3   Max taken time: 07/10/25 1001    Plan  - Antibiotics as listed above  - Fluid resuscitation as follows:Fluid Not Needed - Patient is not hypotensive and/or lactate is less than 4.0.   - Trend lactate to resolution  - Follow up culture data  - Vasopressors were not needed  - The following services were consulted:Hospital Medicine.  - continue clindamycin for now    7/5:  Resolved.  Transition to oral clindamycin 07/05/2025.  We will need 7-10 days total of treatment    7/6: WBC remains elevated but no fevers. I am not findings any other sources of acute infection and seems to have chronic mild leukocytosis. Will cont clinda-transition to p.o. on 07/05/2025  Considering reactive leukocytosis vs primary bone marrow process. Needs hematology outpatient.   Diff is not concerning and morphology with hypochromia and target cells (no schistocytes) most consistent with Fe deficiency vs thalassemia regarding anemia and not concerning for active bacterial infection    7/7 Positive for UTI   '  7/8 Leukocytosis improving.  Gram negative rods on urine culture     7/10 Urine culture with klebsiella.  Continue keflex.    Essential hypertension  Patient's blood pressure range in the last 24 hours was: BP  Min: 124/61  Max: 154/66.The patient's inpatient anti-hypertensive regimen is listed below:  Current Antihypertensives  hydrALAZINE injection 10 mg, Every 6 hours PRN, Intravenous  metoprolol succinate (TOPROL-XL) 24 hr tablet 100 mg, 2 times daily, Oral    Plan  - BP is controlled, no changes needed to their regimen    7/7/25 d/c valsartan   Aortic valve stenosis  Echocardiogram with evidence of aortic stenosis that is moderate . The patient's most recent echocardiogram result is listed below. We will manage the valvular abnormality by status post TAVR    No echocardiogram results found for the past 12 months     7/6: clinically  stable      Hyperlipidemia LDL goal <70  Lipitor 10 mg daily    Pressure ulcer of left heel, unstageable  Consult wound care  Continue clindamycin  Oxycodone p.r.n. pain    Off load heels.  Order mattress for home.  Spinal stenosis of lumbar region with neurogenic claudication  Patient is bed ridden.  She will require frequent turning, PT, wound care.    Need to discuss with family about possible nursing home placement.  She does have home health.  She seems very difficult to care for.    Ordering low air loss mattress for home.  Pressure injury of skin of sacral region  Consult wound care   Continue clindamycin for now.    Will recheck CBC.   No signs of infection per wound care/eval.  Will likely cont on clinda and follow cultures. Orders per wound care and new mattress ordered.    7/6: WBC remains elevated but no fevers. I am not findings any other sources of acute infection and seems to have chronic mild leukocytosis. Will cont clinda-transition to p.o. on 07/05/2025  Considering reactive leukocytosis vs primary bone marrow process. Needs hematology outpatient.   Diff is not concerning and morphology with hypochromia and target cells (no schistocytes) most consistent with Fe deficiency vs thalassemia regarding anemia and not concerning for active bacterial infection    7/7 Appreciate wound care recommendations     S/P TAVR (transcatheter aortic valve replacement)  No treatment necessary continue valsartan  Hold amlodipine and Lasix for now.  Restart if pressure starts to recover  -continue to hold at discharge    D/c valsartan     Microcytic anemia  Anemia is likely due to Iron deficiency. Most recent hemoglobin and hematocrit are listed below.  Recent Labs     07/08/25  1942 07/09/25  0917 07/10/25  0525   HGB 7.7* 7.2* 7.7*   HCT 23.0* 21.1* 23.0*     Plan  - Monitor serial CBC: Daily  - Transfuse PRBC if patient becomes hemodynamically unstable, symptomatic or H/H drops below 7/21.  - Patient has not received  any PRBC transfusions to date  - Patient's anemia is currently worsening. Will adjust treatment as follows:  IV iron today  - H&H is worsening since admission.  Labs seem most consistent with iron deficiency.  Iron levels ordered and pending.  I do not see any clinical evidence of active bleeding at this time and remains hemodynamically stable.  In an attempt to avoid transfusion we will give IV iron today and follow labs    7/6: iron, B12 and folate normal  Stool sent for occult but no clinical signs of bleeding  Hemolysis labs sent  H/H low but stable for last 3 days.  If above work up negative she is otherwise clinically stable for discharge and close hematology follow up with outpatient labs as I do not have GI or hematology at this hospital to manage while inpatient.     Anemia stable. Mildly elevated haptoglobin and LDH.   Will monitor H/H/    7/8 h/h was stable but decreased on am labs. Dilutional component is a possibility and due to frequent lab draws in the setting of anemia of chronic disease.  2 units of pRBC ordered.    7/10 will monitor and if remains stable will discharge home tomorrow   Hyperkalemia  Hyperkalemia is likely due to LOWELL.The patients most recent potassium results are listed below.  Recent Labs     07/08/25  0410 07/09/25  0917 07/10/25  0525   K 5.7* 5.1 5.0     Plan  - Monitor for arrhythmias with EKG and/or continuous telemetry.   - Treat the hyperkalemia with Potassium Binders.   - Monitor potassium:daily   - The patient's hyperkalemia is improving    Another dose of lokelma     7/9 Resolved       LOWELL (acute kidney injury)  LOWELL is likely due to unsure etiology at this time. Baseline creatinine is 1.1. Most recent creatinine and eGFR are listed below.  Recent Labs     07/08/25 0410 07/09/25  0917 07/10/25  0525   CREATININE 1.9* 1.6* 1.4   EGFRNORACEVR 26* 32* 37*      Plan  - LOWELL is worsening. Will adjust treatment as follows: d/c valsartan, IV fluids, address hypoalbuminemia, urine  studies pending .    - Avoid nephrotoxins and renally dose meds for GFR listed above  - Monitor urine output, serial BMP, and adjust therapy as needed    Urine studies indeterminate.  Renal function slowly improving.      7/9 LOWELL resolving     7/10 LOWELL resolved        UTI (urinary tract infection)  U/A positive >100 on microscopic  Urine culture klebsiella  Continue Keflex     Goals of care, counseling/discussion  Would like to have family meeting to discuss goals of care.  Reached out to daughters Lubna and Aditi for this afternoon or tomorrow.  Pt seems to answer appropriately but unable to express full understanding of medical care and condition and is only able to say she is sick.      Waiting to hear back from family to discuss goals of care with multiple comorbidities, slow to improve and continued anemia requiring blood transfusion.   My understanding is that if she is medically stable to discharge she will go home and they will provide 24 hour care.      VTE Risk Mitigation (From admission, onward)           Ordered     IP VTE HIGH RISK PATIENT  Once         07/01/25 2343     Place sequential compression device  Until discontinued         07/01/25 2343                    Discharge Planning   LINA: 7/11/2025     Code Status: Full Code   Medical Readiness for Discharge Date:   Discharge Plan A: Home with family, Home Health                        Anjali Mesa PA-C  Department of Hospital Medicine   [place] - Med Surg (3rd Fl)

## 2025-07-11 PROBLEM — D72.829 LEUKOCYTOSIS: Status: ACTIVE | Noted: 2025-01-01

## 2025-07-11 NOTE — ASSESSMENT & PLAN NOTE
Anemia is likely due to Iron deficiency. Most recent hemoglobin and hematocrit are listed below.  Recent Labs     07/09/25  0917 07/10/25  0525 07/11/25  0436   HGB 7.2* 7.7* 8.1*   HCT 21.1* 23.0* 24.1*     Plan  - Monitor serial CBC: Daily  - Transfuse PRBC if patient becomes hemodynamically unstable, symptomatic or H/H drops below 7/21.  - Patient has not received any PRBC transfusions to date  - Patient's anemia is currently worsening. Will adjust treatment as follows:  IV iron today  - H&H is worsening since admission.  Labs seem most consistent with iron deficiency.  Iron levels ordered and pending.  I do not see any clinical evidence of active bleeding at this time and remains hemodynamically stable.  In an attempt to avoid transfusion we will give IV iron today and follow labs    7/6: iron, B12 and folate normal  Stool sent for occult but no clinical signs of bleeding  Hemolysis labs sent  H/H low but stable for last 3 days.  If above work up negative she is otherwise clinically stable for discharge and close hematology follow up with outpatient labs as I do not have GI or hematology at this hospital to manage while inpatient.     Anemia stable. Mildly elevated haptoglobin and LDH.   Will monitor H/H/    7/8 h/h was stable but decreased on am labs. Dilutional component is a possibility and due to frequent lab draws in the setting of anemia of chronic disease.  2 units of pRBC ordered.    7/10 will monitor and if remains stable will discharge home tomorrow

## 2025-07-11 NOTE — ASSESSMENT & PLAN NOTE
Hyperkalemia is likely due to LOWELL.The patients most recent potassium results are listed below.  Recent Labs     07/09/25  0917 07/10/25  0525 07/11/25  0436   K 5.1 5.0 4.9     Plan  - Monitor for arrhythmias with EKG and/or continuous telemetry.   - Treat the hyperkalemia with Potassium Binders.   - Monitor potassium:daily   - The patient's hyperkalemia is improving    Another dose of lokelma     7/9 Resolved

## 2025-07-11 NOTE — ASSESSMENT & PLAN NOTE
LOWELL is likely due to unsure etiology at this time. Baseline creatinine is 1.1. Most recent creatinine and eGFR are listed below.  Recent Labs     07/09/25  0917 07/10/25  0525 07/11/25  0436   CREATININE 1.6* 1.4 1.3   EGFRNORACEVR 32* 37* 41*      Plan  - LOWELL is worsening. Will adjust treatment as follows: d/c valsartan, IV fluids, address hypoalbuminemia, urine studies pending .    - Avoid nephrotoxins and renally dose meds for GFR listed above  - Monitor urine output, serial BMP, and adjust therapy as needed    Urine studies indeterminate.  Renal function slowly improving.      7/9 LOWELL resolving     7/10 LOWELL resolved         .

## 2025-07-11 NOTE — SUBJECTIVE & OBJECTIVE
Review of Systems   Constitutional:  Negative for activity change, chills, fatigue, fever and unexpected weight change.   HENT:  Negative for sore throat and trouble swallowing.    Respiratory:  Negative for cough, chest tightness and shortness of breath.    Cardiovascular:  Negative for chest pain and leg swelling.   Gastrointestinal:  Negative for abdominal pain.   Endocrine: Negative for cold intolerance and heat intolerance.   Genitourinary:  Negative for difficulty urinating.   Musculoskeletal:  Positive for arthralgias, back pain and gait problem (chronic.  Bed-bound). Negative for joint swelling.   Skin:  Positive for wound. Negative for rash.   Neurological:  Negative for numbness.   Hematological:  Negative for adenopathy.   Psychiatric/Behavioral:  Negative for decreased concentration.      Objective:     Vital Signs (Most Recent):  Temp: 98.4 °F (36.9 °C) (07/11/25 1143)  Pulse: 84 (07/11/25 1143)  Resp: 18 (07/11/25 1143)  BP: (!) 116/56 (07/11/25 1143)  SpO2: 99 % (07/11/25 1143) Vital Signs (24h Range):  Temp:  [97.8 °F (36.6 °C)-101.3 °F (38.5 °C)] 98.4 °F (36.9 °C)  Pulse:  [] 84  Resp:  [18-20] 18  SpO2:  [92 %-99 %] 99 %  BP: (113-170)/(55-84) 116/56     Weight: 108.5 kg (239 lb 3.2 oz)  Body mass index is 35.32 kg/m².     Physical Exam  Vitals and nursing note reviewed.   Constitutional:       General: She is not in acute distress.     Appearance: She is not ill-appearing.      Comments: Lying in bed.  Bedridden.  Unable to ambulate.  She answers questions fairly well. More lethargic today.     HENT:      Right Ear: Tympanic membrane normal.      Left Ear: Tympanic membrane normal.      Mouth/Throat:      Mouth: Mucous membranes are moist.   Eyes:      General:         Right eye: No discharge.         Left eye: No discharge.      Conjunctiva/sclera: Conjunctivae normal.   Cardiovascular:      Rate and Rhythm: Normal rate and regular rhythm.      Heart sounds: Murmur heard.   Pulmonary:  "     Effort: Pulmonary effort is normal.      Comments: Mild decrease breath sounds LL lobe   Abdominal:      General: Abdomen is flat.      Palpations: Abdomen is soft.   Musculoskeletal:      Cervical back: Normal range of motion and neck supple. No rigidity.      Right lower leg: Edema (2+ to thigh) present.      Left lower leg: Edema (2+ edema to thigh) present.      Comments: Left arm edema    Skin:     Findings: No erythema.      Comments: See pictures.  Significant pressure injuries and ulcers of the sacrum, buttocks.  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   Neurological:      Mental Status: She is oriented to person, place, and time. Mental status is at baseline.      Comments: Bed ridden                Significant Labs: All pertinent labs within the past 24 hours have been reviewed.  CBC:   Recent Labs   Lab 07/10/25  0525 07/11/25  0436   WBC 19.08* 47.11*   HGB 7.7* 8.1*   HCT 23.0* 24.1*    207     CMP:   Recent Labs   Lab 07/10/25  0525 07/11/25  0436    139   K 5.0 4.9    109   CO2 23 21*   GLU 88 99   BUN 52* 53*   CREATININE 1.4 1.3   CALCIUM 7.7* 8.1*   PROT 5.4* 5.8*   ALBUMIN 1.6* 1.6*   BILITOT 0.4 0.6   ALKPHOS 102 102   AST 11 13   ALT 8* 9*   ANIONGAP 8 9     Lactic Acid:   Recent Labs   Lab 07/10/25  1857   LACTATE 1.8         POCT Glucose:   No results for input(s): "POCTGLUCOSE" in the last 48 hours.    Troponin: No results for input(s): "TROPONINI", "TROPONINIHS" in the last 48 hours.  Urine Culture:   No results for input(s): "LABURIN" in the last 48 hours.    Urine Studies:   No results for input(s): "COLORU", "APPEARANCEUA", "PHUR", "SPECGRAV", "PROTEINUA", "GLUCUA", "KETONESU", "BILIRUBINUA", "OCCULTUA", "NITRITE", "UROBILINOGEN", "LEUKOCYTESUR", "RBCUA", "WBCUA", "BACTERIA", "SQUAMEPITHEL", "HYALINECASTS" in the last 48 hours.    Invalid input(s): "WRIGHTSUR"        Significant Imaging: I have reviewed all pertinent imaging results/findings within the past 24 " hours.  I have reviewed and interpreted all pertinent imaging results/findings within the past 24 hours.

## 2025-07-11 NOTE — CONSULTS
ANTONIA and STEFANIE Alba met with daughter, Lubna, Son, Humberto, Granddaughter, Brittany, and Jean Marienahid Aditi via phone at bedside with patient. Family confirmed patient's DNR status. Family is open to having patient at nursing home, but patient has capacity and at this time wishes to return home. Family given long term care medicaid information and [facility] contact Lazaro Gonzalez. Family will reach out and discuss services with [facility]. Family will also sit with patient today and correct the card issue preventing patient from getting low air loss pressure mattress. Patient not medically ready for discharge at this time.

## 2025-07-11 NOTE — PROGRESS NOTES
[facility] ([address] [place] [address] Medicine  Progress Note    Patient Name: Emily Alicia  MRN: [id number]  Patient Class: IP- Inpatient   Admission Date: 7/1/2025  Length of Stay: 9 days  Attending Physician: Braeden Anderson MD  Primary Care Provider: Angel Pemberton MD        Subjective     Principal Problem:Sepsis        HPI:  Patient is an 84-year-old black female that presented to the emergency room with significant pain from her skin pressure injuries of her sacrum, buttocks.  Upon presentation she had a working diagnosis of sepsis so she was given IV fluids, IV antibiotics.  Workup unremarkable.  Physical exam reveals significant skin pressure injuries to the buttocks, sacrum, foot.  She is being admitted for pain control, IV antibiotics, wound care consultation.    Overview/Hospital Course:  Patient seen by wound care this morning. Recs placed. Patient without any complaints today at all. She is eating well. Afebrile. WBC ct pending.    7/4: clinically patient is doing quite well. She is tolerating PO intake. Has no complaints on rounds.  However, her WBC is not trending down. Seems she has mild chronic elevation of 14-15K but WBC 19K this mroning and 20K on admit. I still do not see any other sources of infections and wounds are not draining, no erythema, no fevers, etc. Cont clindamycin for now.  H/H 7.8/23 from 9.1/27 on admit. No signs of bleeding but with drop will monitor overnight. Also Cr 1.5 from 1.1 on admit. However, on chart review she does fluctuate around this isabella.     7/5:  Patient continues to feel well.  She has no acute complaints.  She denies chest pain, shortness of breath, dysuria, frequency, diarrhea, nausea and vomiting.  Her wounds are stable and do not show any signs of infection.  However, she remains with leukocytosis of [id number] which is not improved since admission.  Her H&H is decreasing in his knee or transfusion threshold.  Her MCV is low and she likely has  iron deficiency.  I am suspecting she has reactive leukocytosis due to this process.  Iron levels ordered.  If low we will replace IV and start p.o. tomorrow in an attempt to avoid blood transfusion.  We will continue clindamycin.  I see no other signs of infection and no indication to change antibiotics.  We will actually deescalate to oral today.  Creatinine remained stable at 1.5    7/6: Clinically this patient remains without complaints and at baseline. She has no fever, dysuria, abd pain, n/v/d/c, chest pains, SOB, cough, etc.  I have kept her inpatient following labs due to minor abnormalities:  Cr 1-1.5 at baseline and while did trend up to 1.5/1.7 this admission on chart review her renal fxn fluctuating here over at least last 1 year. Therefore I do not think this is indicative of LOWELL needing further inpatient management.  H/H down trended but has been stable last 3 days. She is not hemodynamically unstable and vitals remain normal. She has no evidence of bleeding. Stool will be sent for occult and if + can see GI outpatient. However, I am also concerned for primary bone marrow process. Will also work up hemolysis with labs today (h/o aortic stenosis s/p valve replacement).  She has chronic leukocytosis which has not been worked up to date. Plt are normal. I have no access to hematology while inpatient here so may discharge with patient needing to see hematology ASAP. She will need repeat CBC within next few days to monitor as well.   HypoCa noted; awaiting albumin. If lab unremarkable I believe she can discharge this afternoon with home health and home medical equipment and continue to follow up outpatient as we are only lab monitoring at this point in the hospital as no access to needed specialists and she is clinically stable. Dispo pending labs this morning    7/7 PT HD stable on room air.  Continued leukocytosis.  Continue IV abx for UTI.  Creatinine still bumped.  Will continue to trend. Gentle  hydration and holding valsartan.  [facility] for hyperkalemia.  Patient would like to go home with family and states her family can provide her with 24 hour care.  Patient high risk for medical decline.  She is malnourished and albumin 1.8. Consult to dietician.      7/8 PT Bp on lower side.  Pt h/h down trending.  Some dilutional component with frequent lab draws.  Discussed risk versus benefits with pt and daughters about blood transfusion and they are all in agreement.  Renal function slowly improving but pt has hypoalbuminemia.  She is at risk for medical decline.  Will set up family meeting to discuss code status and plan of care.      7/9 Renal function improving.  H/h mild increase but only received 1 out of 2 units of pRBC.  Will get repeat CBC tomorrow.     7/10 PT HD stable on room air.  Renal function back to baseline.  H/H stable.  If labs are stable tomorrow plan to discharge home with home health and 24 hour care.  Pt will need continued work up of leukocytosis and anemia outpatient.  Defer to PCP for management.      7/11 PT HD stable on room air.  Patient more lethargic today but appropriate.  Renal function improved.  H/h stable.  Fever and leukocytosis overnight.  CXR pending.  LUE edema.  Venous u/s pending.  Long discussion about goals of care with family and patient.  She is a DNR.   giving family resources for care at home.           Review of Systems   Constitutional:  Negative for activity change, chills, fatigue, fever and unexpected weight change.   HENT:  Negative for sore throat and trouble swallowing.    Respiratory:  Negative for cough, chest tightness and shortness of breath.    Cardiovascular:  Negative for chest pain and leg swelling.   Gastrointestinal:  Negative for abdominal pain.   Endocrine: Negative for cold intolerance and heat intolerance.   Genitourinary:  Negative for difficulty urinating.   Musculoskeletal:  Positive for arthralgias, back pain and gait problem  (chronic.  Bed-bound). Negative for joint swelling.   Skin:  Positive for wound. Negative for rash.   Neurological:  Negative for numbness.   Hematological:  Negative for adenopathy.   Psychiatric/Behavioral:  Negative for decreased concentration.      Objective:     Vital Signs (Most Recent):  Temp: 98.4 °F (36.9 °C) (07/11/25 1143)  Pulse: 84 (07/11/25 1143)  Resp: 18 (07/11/25 1143)  BP: (!) 116/56 (07/11/25 1143)  SpO2: 99 % (07/11/25 1143) Vital Signs (24h Range):  Temp:  [97.8 °F (36.6 °C)-101.3 °F (38.5 °C)] 98.4 °F (36.9 °C)  Pulse:  [] 84  Resp:  [18-20] 18  SpO2:  [92 %-99 %] 99 %  BP: (113-170)/(55-84) 116/56     Weight: 108.5 kg (239 lb 3.2 oz)  Body mass index is 35.32 kg/m².     Physical Exam  Vitals and nursing note reviewed.   Constitutional:       General: She is not in acute distress.     Appearance: She is not ill-appearing.      Comments: Lying in bed.  Bedridden.  Unable to ambulate.  She answers questions fairly well. More lethargic today.     HENT:      Right Ear: Tympanic membrane normal.      Left Ear: Tympanic membrane normal.      Mouth/Throat:      Mouth: Mucous membranes are moist.   Eyes:      General:         Right eye: No discharge.         Left eye: No discharge.      Conjunctiva/sclera: Conjunctivae normal.   Cardiovascular:      Rate and Rhythm: Normal rate and regular rhythm.      Heart sounds: Murmur heard.   Pulmonary:      Effort: Pulmonary effort is normal.      Comments: Mild decrease breath sounds LL lobe   Abdominal:      General: Abdomen is flat.      Palpations: Abdomen is soft.   Musculoskeletal:      Cervical back: Normal range of motion and neck supple. No rigidity.      Right lower leg: Edema (2+ to thigh) present.      Left lower leg: Edema (2+ edema to thigh) present.      Comments: Left arm edema    Skin:     Findings: No erythema.      Comments: See pictures.  Significant pressure injuries and ulcers of the sacrum, buttocks.  She has foot ulcers that are  "chronic.   Neurological:      Mental Status: She is oriented to person, place, and time. Mental status is at baseline.      Comments: Bed ridden                Significant Labs: All pertinent labs within the past 24 hours have been reviewed.  CBC:   Recent Labs   Lab 07/10/25  0525 07/11/25  0436   WBC 19.08* 47.11*   HGB 7.7* 8.1*   HCT 23.0* 24.1*    207     CMP:   Recent Labs   Lab 07/10/25  0525 07/11/25  0436    139   K 5.0 4.9    109   CO2 23 21*   GLU 88 99   BUN 52* 53*   CREATININE 1.4 1.3   CALCIUM 7.7* 8.1*   PROT 5.4* 5.8*   ALBUMIN 1.6* 1.6*   BILITOT 0.4 0.6   ALKPHOS 102 102   AST 11 13   ALT 8* 9*   ANIONGAP 8 9     Lactic Acid:   Recent Labs   Lab 07/10/25  1857   LACTATE 1.8         POCT Glucose:   No results for input(s): "POCTGLUCOSE" in the last 48 hours.    Troponin: No results for input(s): "TROPONINI", "TROPONINIHS" in the last 48 hours.  Urine Culture:   No results for input(s): "LABURIN" in the last 48 hours.    Urine Studies:   No results for input(s): "COLORU", "APPEARANCEUA", "PHUR", "SPECGRAV", "PROTEINUA", "GLUCUA", "KETONESU", "BILIRUBINUA", "OCCULTUA", "NITRITE", "UROBILINOGEN", "LEUKOCYTESUR", "RBCUA", "WBCUA", "BACTERIA", "SQUAMEPITHEL", "HYALINECASTS" in the last 48 hours.    Invalid input(s): "WRIGHTSUR"        Significant Imaging: I have reviewed all pertinent imaging results/findings within the past 24 hours.  I have reviewed and interpreted all pertinent imaging results/findings within the past 24 hours.      Assessment & Plan  Sepsis  Patient has sepsis without organ dysfunction secondary to Skin and Soft Tissue Infection: Cellulitis. A review of systems was completed. Patient's sepsis is improving    Current Antibiotics    cephALEXin capsule 500 mg, Every 8 hours, Oral    Lactate  Recent Labs   Lab 07/07/25  0641 07/07/25  1240 07/10/25  1857   LACTATE 0.9 1.3 1.8     Culture Data  Blood Cultures   Blood Culture   Date Value Ref Range Status   07/10/2025 " No Growth After 6 Hours  Preliminary   07/07/2025 No Growth After 96 hours  Preliminary   07/01/2025 No Growth After 5 Days  Final   07/01/2025 No Growth After 5 Days  Final      Urine Culture   Urine Culture   Date Value Ref Range Status   07/07/2025 >100,000 cfu/ml Klebsiella pneumoniae (A)  Final     Urine Culture, Routine   Date Value Ref Range Status   02/09/2022 No significant growth  Final      mSOFA  MSOFA Total  Min: 1   Min taken time: 07/11/25 1201  Max: 3   Max taken time: 07/11/25 0700    Plan  - Antibiotics as listed above  - Fluid resuscitation as follows:Fluid Not Needed - Patient is not hypotensive and/or lactate is less than 4.0.   - Trend lactate to resolution  - Follow up culture data  - Vasopressors were not needed  - The following services were consulted:Hospital Medicine.  - continue clindamycin for now    7/5:  Resolved.  Transition to oral clindamycin 07/05/2025.  We will need 7-10 days total of treatment    7/6: WBC remains elevated but no fevers. I am not findings any other sources of acute infection and seems to have chronic mild leukocytosis. Will cont clinda-transition to p.o. on 07/05/2025  Considering reactive leukocytosis vs primary bone marrow process. Needs hematology outpatient.   Diff is not concerning and morphology with hypochromia and target cells (no schistocytes) most consistent with Fe deficiency vs thalassemia regarding anemia and not concerning for active bacterial infection    7/7 Positive for UTI   '  7/8 Leukocytosis improving.  Gram negative rods on urine culture     7/10 Urine culture with klebsiella.  Continue keflex.    Essential hypertension  Patient's blood pressure range in the last 24 hours was: BP  Min: 113/56  Max: 170/73.The patient's inpatient anti-hypertensive regimen is listed below:  Current Antihypertensives  hydrALAZINE injection 10 mg, Every 6 hours PRN, Intravenous  metoprolol succinate (TOPROL-XL) 24 hr tablet 100 mg, 2 times daily, Oral    Plan  -  BP is controlled, no changes needed to their regimen    7/7/25 d/c valsartan   Aortic valve stenosis  Echocardiogram with evidence of aortic stenosis that is moderate . The patient's most recent echocardiogram result is listed below. We will manage the valvular abnormality by status post TAVR    No echocardiogram results found for the past 12 months     7/6: clinically stable      Hyperlipidemia LDL goal <70  Lipitor 10 mg daily    Pressure ulcer of left heel, unstageable  Consult wound care  Continue clindamycin  Oxycodone p.r.n. pain    Off load heels.  Order mattress for home.  Spinal stenosis of lumbar region with neurogenic claudication  Patient is bed ridden.  She will require frequent turning, PT, wound care.    Need to discuss with family about possible nursing home placement.  She does have home health.  She seems very difficult to care for.    Ordering low air loss mattress for home.  Pressure injury of skin of sacral region  Consult wound care   Continue clindamycin for now.    Will recheck CBC.   No signs of infection per wound care/eval.  Will likely cont on clinda and follow cultures. Orders per wound care and new mattress ordered.    7/6: WBC remains elevated but no fevers. I am not findings any other sources of acute infection and seems to have chronic mild leukocytosis. Will cont clinda-transition to p.o. on 07/05/2025  Considering reactive leukocytosis vs primary bone marrow process. Needs hematology outpatient.   Diff is not concerning and morphology with hypochromia and target cells (no schistocytes) most consistent with Fe deficiency vs thalassemia regarding anemia and not concerning for active bacterial infection    7/7 Appreciate wound care recommendations     S/P TAVR (transcatheter aortic valve replacement)  No treatment necessary continue valsartan  Hold amlodipine and Lasix for now.  Restart if pressure starts to recover  -continue to hold at discharge    D/c valsartan     Microcytic  anemia  Anemia is likely due to Iron deficiency. Most recent hemoglobin and hematocrit are listed below.  Recent Labs     07/09/25  0917 07/10/25  0525 07/11/25  0436   HGB 7.2* 7.7* 8.1*   HCT 21.1* 23.0* 24.1*     Plan  - Monitor serial CBC: Daily  - Transfuse PRBC if patient becomes hemodynamically unstable, symptomatic or H/H drops below 7/21.  - Patient has not received any PRBC transfusions to date  - Patient's anemia is currently worsening. Will adjust treatment as follows:  IV iron today  - H&H is worsening since admission.  Labs seem most consistent with iron deficiency.  Iron levels ordered and pending.  I do not see any clinical evidence of active bleeding at this time and remains hemodynamically stable.  In an attempt to avoid transfusion we will give IV iron today and follow labs    7/6: iron, B12 and folate normal  Stool sent for occult but no clinical signs of bleeding  Hemolysis labs sent  H/H low but stable for last 3 days.  If above work up negative she is otherwise clinically stable for discharge and close hematology follow up with outpatient labs as I do not have GI or hematology at this hospital to manage while inpatient.     Anemia stable. Mildly elevated haptoglobin and LDH.   Will monitor H/H/    7/8 h/h was stable but decreased on am labs. Dilutional component is a possibility and due to frequent lab draws in the setting of anemia of chronic disease.  2 units of pRBC ordered.    7/10 will monitor and if remains stable will discharge home tomorrow   Hyperkalemia  Hyperkalemia is likely due to LOWELL.The patients most recent potassium results are listed below.  Recent Labs     07/09/25  0917 07/10/25  0525 07/11/25  0436   K 5.1 5.0 4.9     Plan  - Monitor for arrhythmias with EKG and/or continuous telemetry.   - Treat the hyperkalemia with Potassium Binders.   - Monitor potassium:daily   - The patient's hyperkalemia is improving    Another dose of lokelma     7/9 Resolved       LOWELL (acute kidney  injury)  LOWELL is likely due to unsure etiology at this time. Baseline creatinine is 1.1. Most recent creatinine and eGFR are listed below.  Recent Labs     07/09/25  0917 07/10/25  0525 07/11/25  0436   CREATININE 1.6* 1.4 1.3   EGFRNORACEVR 32* 37* 41*      Plan  - LOWELL is worsening. Will adjust treatment as follows: d/c valsartan, IV fluids, address hypoalbuminemia, urine studies pending .    - Avoid nephrotoxins and renally dose meds for GFR listed above  - Monitor urine output, serial BMP, and adjust therapy as needed    Urine studies indeterminate.  Renal function slowly improving.      7/9 LOWELL resolving     7/10 LOWELL resolved        UTI (urinary tract infection)  U/A positive >100 on microscopic  Urine culture klebsiella  Continue Keflex     Goals of care, counseling/discussion  Would like to have family meeting to discuss goals of care.  Reached out to daughters Lubna and Aditi for this afternoon or tomorrow.  Pt seems to answer appropriately but unable to express full understanding of medical care and condition and is only able to say she is sick.      Waiting to hear back from family to discuss goals of care with multiple comorbidities, slow to improve and continued anemia requiring blood transfusion.   My understanding is that if she is medically stable to discharge she will go home and they will provide 24 hour care.      7/11 Both daughters, son and granddaughter at beside.  Discussed plan of care.  Working up leukocytosis and fever.  CXR pending.  Discussed that patient is high risk for medical decline and infection due to age, hypoalbuminemia and comorbdities.  Although her leukocytosis is worse from her baseline she will need further work up outpatient.  She would like to be DNR.  Once medically stable family is deciding if they can take her home or if she will go to nursing home.  Social work is providing resources for at home services.      Leukocytosis  Worsening leukocytosis  Lactic acid WNL  CXR  pending  Blood culture pending   Venous u/s left upper extremity for edema pending     VTE Risk Mitigation (From admission, onward)           Ordered     IP VTE HIGH RISK PATIENT  Once         07/01/25 2343     Place sequential compression device  Until discontinued         07/01/25 2343                    Discharge Planning   LINA: 7/11/2025     Code Status: DNR   Medical Readiness for Discharge Date:   Discharge Plan A: Home with family, Home Health                        Anjali Mesa PA-C  Department of Hospital Medicine   [facility] ([facility])

## 2025-07-11 NOTE — ASSESSMENT & PLAN NOTE
Patient's blood pressure range in the last 24 hours was: BP  Min: 113/56  Max: 170/73.The patient's inpatient anti-hypertensive regimen is listed below:  Current Antihypertensives  hydrALAZINE injection 10 mg, Every 6 hours PRN, Intravenous  metoprolol succinate (TOPROL-XL) 24 hr tablet 100 mg, 2 times daily, Oral    Plan  - BP is controlled, no changes needed to their regimen    7/7/25 d/c valsartan

## 2025-07-11 NOTE — ASSESSMENT & PLAN NOTE
Would like to have family meeting to discuss goals of care.  Reached out to daughters Lubna and Aditi for this afternoon or tomorrow.  Pt seems to answer appropriately but unable to express full understanding of medical care and condition and is only able to say she is sick.      Waiting to hear back from family to discuss goals of care with multiple comorbidities, slow to improve and continued anemia requiring blood transfusion.   My understanding is that if she is medically stable to discharge she will go home and they will provide 24 hour care.      7/11 Both daughters, son and granddaughter at beside.  Discussed plan of care.  Working up leukocytosis and fever.  CXR pending.  Discussed that patient is high risk for medical decline and infection due to age, hypoalbuminemia and comorbdities.  Although her leukocytosis is worse from her baseline she will need further work up outpatient.  She would like to be DNR.  Once medically stable family is deciding if they can take her home or if she will go to nursing home.  Social work is providing resources for at home services.

## 2025-07-11 NOTE — PLAN OF CARE
Problem: Skin Injury Risk Increased  Goal: Skin Health and Integrity  Outcome: Progressing     Problem: Adult Inpatient Plan of Care  Goal: Plan of Care Review  Outcome: Progressing     Problem: Adult Inpatient Plan of Care  Goal: Patient-Specific Goal (Individualized)  Outcome: Progressing     Problem: Adult Inpatient Plan of Care  Goal: Absence of Hospital-Acquired Illness or Injury  Outcome: Progressing     Problem: Adult Inpatient Plan of Care  Goal: Optimal Comfort and Wellbeing  Outcome: Progressing     Problem: Adult Inpatient Plan of Care  Goal: Readiness for Transition of Care  Outcome: Progressing     Problem: Fall Injury Risk  Goal: Absence of Fall and Fall-Related Injury  Outcome: Progressing     Problem: Wound  Goal: Absence of Infection Signs and Symptoms  Outcome: Progressing     Problem: Wound  Goal: Improved Oral Intake  Outcome: Progressing     Problem: Wound  Goal: Skin Health and Integrity  Outcome: Progressing     Problem: Wound  Goal: Optimal Wound Healing  Outcome: Progressing

## 2025-07-11 NOTE — ASSESSMENT & PLAN NOTE
Worsening leukocytosis  Lactic acid WNL  CXR pending  Blood culture pending   Venous u/s left upper extremity for edema pending

## 2025-07-11 NOTE — ASSESSMENT & PLAN NOTE
Patient has sepsis without organ dysfunction secondary to Skin and Soft Tissue Infection: Cellulitis. A review of systems was completed. Patient's sepsis is improving    Current Antibiotics    cephALEXin capsule 500 mg, Every 8 hours, Oral    Lactate  Recent Labs   Lab 07/07/25  0641 07/07/25  1240 07/10/25  1857   LACTATE 0.9 1.3 1.8     Culture Data  Blood Cultures   Blood Culture   Date Value Ref Range Status   07/10/2025 No Growth After 6 Hours  Preliminary   07/07/2025 No Growth After 96 hours  Preliminary   07/01/2025 No Growth After 5 Days  Final   07/01/2025 No Growth After 5 Days  Final      Urine Culture   Urine Culture   Date Value Ref Range Status   07/07/2025 >100,000 cfu/ml Klebsiella pneumoniae (A)  Final     Urine Culture, Routine   Date Value Ref Range Status   02/09/2022 No significant growth  Final      mSOFA  MSOFA Total  Min: 1   Min taken time: 07/11/25 1201  Max: 3   Max taken time: 07/11/25 0700    Plan  - Antibiotics as listed above  - Fluid resuscitation as follows:Fluid Not Needed - Patient is not hypotensive and/or lactate is less than 4.0.   - Trend lactate to resolution  - Follow up culture data  - Vasopressors were not needed  - The following services were consulted:Hospital Medicine.  - continue clindamycin for now    7/5:  Resolved.  Transition to oral clindamycin 07/05/2025.  We will need 7-10 days total of treatment    7/6: WBC remains elevated but no fevers. I am not findings any other sources of acute infection and seems to have chronic mild leukocytosis. Will cont clinda-transition to p.o. on 07/05/2025  Considering reactive leukocytosis vs primary bone marrow process. Needs hematology outpatient.   Diff is not concerning and morphology with hypochromia and target cells (no schistocytes) most consistent with Fe deficiency vs thalassemia regarding anemia and not concerning for active bacterial infection    7/7 Positive for UTI   '  7/8 Leukocytosis improving.  Gram negative rods  on urine culture     7/10 Urine culture with klebsiella.  Continue keflex.

## 2025-07-11 NOTE — PROGRESS NOTES
Pharmacokinetic Initial Assessment: IV Vancomycin    Assessment/Plan:    Initiate intravenous vancomycin with loading dose of 250 mg once followed by a maintenance dose of vancomycin 1750mg IV every 24 hours  Desired empiric serum trough concentration is 10 to 15 mcg/mL  Draw vancomycin trough level 60 min prior to third dose on 7/13/25 at approximately 1530  Pharmacy will continue to follow and monitor vancomycin.      Please contact pharmacy at extension [phone] with any questions regarding this assessment.     Thank you for the consult,   Jarred Oakes       Patient brief summary:  Emily Alicia is a 84 y.o. female initiated on antimicrobial therapy with IV Vancomycin for treatment of suspected sepsis    Drug Allergies:   Review of patient's allergies indicates:  No Known Allergies    Actual Body Weight:   108.5kg    Renal Function:   Estimated Creatinine Clearance: 43 mL/min (based on SCr of 1.3 mg/dL).,     Dialysis Method (if applicable):  N/A    CBC (last 72 hours):  Recent Labs   Lab Result Units 07/08/25  1942 07/09/25  0917 07/10/25  0525 07/11/25  0436   WBC K/uL 19.78* 18.67* 19.08* 47.11*   HGB gm/dL 7.7* 7.2* 7.7* 8.1*   HCT % 23.0* 21.1* 23.0* 24.1*   Platelet Count K/uL 191 235 198 207   Lymph % % 19.6 25.8 21.7  --    Lymphocyte % %  --   --   --  6.0*   Mono % % 5.9 7.5 7.3  --    Monocyte % %  --   --   --  2.0*   Eos % % 2.2 2.5 2.5  --    Basophil % % 0.7 0.7 0.7  --        Metabolic Panel (last 72 hours):  Recent Labs   Lab Result Units 07/09/25  0917 07/10/25  0525 07/11/25  0436   Sodium mmol/L 139 139 139   Potassium mmol/L 5.1 5.0 4.9   Chloride mmol/L 109 108 109   CO2 mmol/L 21* 23 21*   Glucose mg/dL 112* 88 99   BUN mg/dL 47* 52* 53*   Creatinine mg/dL 1.6* 1.4 1.3   Albumin g/dL 1.8* 1.6* 1.6*   Bilirubin Total mg/dL 0.3 0.4 0.6   ALP unit/L 125 102 102   AST unit/L 14 11 13   ALT unit/L 9* 8* 9*   Magnesium  mg/dL 1.9 1.8 1.7       Drug levels (last 3 results):  No results for  "input(s): "VANCOMYCINRA", "VANCORANDOM", "VANCOMYCINPE", "VANCOPEAK", "VANCOMYCINTR", "VANCOTROUGH" in the last 72 hours.    Microbiologic Results:  Microbiology Results (last 7 days)       Procedure Component Value Units Date/Time    Blood culture [[id number]]  (Normal) Collected: 07/07/25 0641    Order Status: Completed Specimen: Blood Updated: 07/11/25 1201     Blood Culture No Growth After 96 hours    Blood culture [[id number]]  (Normal) Collected: 07/10/25 1648    Order Status: Completed Specimen: Blood Updated: 07/11/25 1102     Blood Culture No Growth After 6 Hours    Urine Culture High Risk ($$) [[id number]]  (Abnormal)  (Susceptibility) Collected: 07/07/25 0536    Order Status: Completed Specimen: Urine Updated: 07/09/25 1141     Urine Culture >100,000 cfu/ml Klebsiella pneumoniae    Blood culture #2 **CANNOT BE ORDERED STAT** [[id number]]  (Normal) Collected: 07/1941    Order Status: Completed Specimen: Blood Updated: 07/07/25 0800     Blood Culture No Growth After 5 Days    Blood culture #1 **CANNOT BE ORDERED STAT** [[id number]]  (Normal) Collected: 07/01/25 1807    Order Status: Completed Specimen: Blood from Peripheral, Antecubital, Left Updated: 07/07/25 0800     Blood Culture No Growth After 5 Days            "

## 2025-07-12 NOTE — ASSESSMENT & PLAN NOTE
Patient has sepsis without organ dysfunction secondary to Skin and Soft Tissue Infection: Cellulitis. A review of systems was completed. Patient's sepsis is improving    Current Antibiotics    cefTRIAXone injection 1 g, Every 24 hours (non-standard times), Intravenous    Lactate  Recent Labs   Lab 07/07/25  0641 07/07/25  1240 07/10/25  1857   LACTATE 0.9 1.3 1.8     Culture Data  Blood Cultures   Blood Culture   Date Value Ref Range Status   07/10/2025 No Growth After 24 Hours  Preliminary   07/07/2025 No Growth After 96 hours  Preliminary   07/01/2025 No Growth After 5 Days  Final   07/01/2025 No Growth After 5 Days  Final      Urine Culture   Urine Culture   Date Value Ref Range Status   07/07/2025 >100,000 cfu/ml Klebsiella pneumoniae (A)  Final     Urine Culture, Routine   Date Value Ref Range Status   02/09/2022 No significant growth  Final      mSOFA  MSOFA Total  Min: 1   Min taken time: 07/11/25 2300  Max: 4   Max taken time: 07/12/25 0700    Plan  - Antibiotics as listed above  - Fluid resuscitation as follows:Fluid Not Needed - Patient is not hypotensive and/or lactate is less than 4.0.   - Trend lactate to resolution  - Follow up culture data  - Vasopressors were not needed  - The following services were consulted:Hospital Medicine.  - continue clindamycin for now    7/5:  Resolved.  Transition to oral clindamycin 07/05/2025.  We will need 7-10 days total of treatment    7/6: WBC remains elevated but no fevers. I am not findings any other sources of acute infection and seems to have chronic mild leukocytosis. Will cont clinda-transition to p.o. on 07/05/2025  Considering reactive leukocytosis vs primary bone marrow process. Needs hematology outpatient.   Diff is not concerning and morphology with hypochromia and target cells (no schistocytes) most consistent with Fe deficiency vs thalassemia regarding anemia and not concerning for active bacterial infection    7/7 Positive for UTI   '  7/8 Leukocytosis  improving.  Gram negative rods on urine culture     7/10 Urine culture with klebsiella.   7/12 antibiotics were escalated to Zosyn and vancomycin.  Patient no longer febrile.  I will deescalate to Rocephin 1 g daily

## 2025-07-12 NOTE — PROGRESS NOTES
Therapy with vancomycin is complete and/or consult discontinued by provider.  Pharmacy will sign off, please re-consult as needed.       Thanks,   Jarred Oakes, PharmD.  07/12/2025

## 2025-07-12 NOTE — ASSESSMENT & PLAN NOTE
U/A positive >100 on microscopic  Urine culture klebsiella  Change Zosyn and vancomycin to Rocephin 1 g daily

## 2025-07-12 NOTE — ASSESSMENT & PLAN NOTE
Hyperkalemia is likely due to LOWELL.The patients most recent potassium results are listed below.  Recent Labs     07/10/25  0525 07/11/25  0436 07/12/25  0442   K 5.0 4.9 4.6     Plan  - Monitor for arrhythmias with EKG and/or continuous telemetry.   - Treat the hyperkalemia with Potassium Binders.   - Monitor potassium:daily   - The patient's hyperkalemia is improving    Another dose of lokelma     7/9 Resolved

## 2025-07-12 NOTE — PROGRESS NOTES
[facility] ([address] [place] [address] Medicine  Progress Note    Patient Name: Emily Alicia  MRN: [id number]  Patient Class: IP- Inpatient   Admission Date: 7/1/2025  Length of Stay: 10 days  Attending Physician: Braeden Anderson MD  Primary Care Provider: Angel Pemberton MD        Subjective     Principal Problem:Sepsis        HPI:  Patient is an 84-year-old black female that presented to the emergency room with significant pain from her skin pressure injuries of her sacrum, buttocks.  Upon presentation she had a working diagnosis of sepsis so she was given IV fluids, IV antibiotics.  Workup unremarkable.  Physical exam reveals significant skin pressure injuries to the buttocks, sacrum, foot.  She is being admitted for pain control, IV antibiotics, wound care consultation.    Overview/Hospital Course:  Patient seen by wound care this morning. Recs placed. Patient without any complaints today at all. She is eating well. Afebrile. WBC ct pending.    7/4: clinically patient is doing quite well. She is tolerating PO intake. Has no complaints on rounds.  However, her WBC is not trending down. Seems she has mild chronic elevation of 14-15K but WBC 19K this mroning and 20K on admit. I still do not see any other sources of infections and wounds are not draining, no erythema, no fevers, etc. Cont clindamycin for now.  H/H 7.8/23 from 9.1/27 on admit. No signs of bleeding but with drop will monitor overnight. Also Cr 1.5 from 1.1 on admit. However, on chart review she does fluctuate around this isabella.     7/5:  Patient continues to feel well.  She has no acute complaints.  She denies chest pain, shortness of breath, dysuria, frequency, diarrhea, nausea and vomiting.  Her wounds are stable and do not show any signs of infection.  However, she remains with leukocytosis of [phone] which is not improved since admission.  Her H&H is decreasing in his knee or transfusion threshold.  Her MCV is low and she likely has  iron deficiency.  I am suspecting she has reactive leukocytosis due to this process.  Iron levels ordered.  If low we will replace IV and start p.o. tomorrow in an attempt to avoid blood transfusion.  We will continue clindamycin.  I see no other signs of infection and no indication to change antibiotics.  We will actually deescalate to oral today.  Creatinine remained stable at 1.5    7/6: Clinically this patient remains without complaints and at baseline. She has no fever, dysuria, abd pain, n/v/d/c, chest pains, SOB, cough, etc.  I have kept her inpatient following labs due to minor abnormalities:  Cr 1-1.5 at baseline and while did trend up to 1.5/1.7 this admission on chart review her renal fxn fluctuating here over at least last 1 year. Therefore I do not think this is indicative of LOWELL needing further inpatient management.  H/H down trended but has been stable last 3 days. She is not hemodynamically unstable and vitals remain normal. She has no evidence of bleeding. Stool will be sent for occult and if + can see GI outpatient. However, I am also concerned for primary bone marrow process. Will also work up hemolysis with labs today (h/o aortic stenosis s/p valve replacement).  She has chronic leukocytosis which has not been worked up to date. Plt are normal. I have no access to hematology while inpatient here so may discharge with patient needing to see hematology ASAP. She will need repeat CBC within next few days to monitor as well.   HypoCa noted; awaiting albumin. If lab unremarkable I believe she can discharge this afternoon with home health and home medical equipment and continue to follow up outpatient as we are only lab monitoring at this point in the hospital as no access to needed specialists and she is clinically stable. Dispo pending labs this morning    7/7 PT HD stable on room air.  Continued leukocytosis.  Continue IV abx for UTI.  Creatinine still bumped.  Will continue to trend. Gentle  hydration and holding valsartan.  [facility] for hyperkalemia.  Patient would like to go home with family and states her family can provide her with 24 hour care.  Patient high risk for medical decline.  She is malnourished and albumin 1.8. Consult to dietician.      7/8 PT Bp on lower side.  Pt h/h down trending.  Some dilutional component with frequent lab draws.  Discussed risk versus benefits with pt and daughters about blood transfusion and they are all in agreement.  Renal function slowly improving but pt has hypoalbuminemia.  She is at risk for medical decline.  Will set up family meeting to discuss code status and plan of care.      7/9 Renal function improving.  H/h mild increase but only received 1 out of 2 units of pRBC.  Will get repeat CBC tomorrow.     7/10 PT HD stable on room air.  Renal function back to baseline.  H/H stable.  If labs are stable tomorrow plan to discharge home with home health and 24 hour care.  Pt will need continued work up of leukocytosis and anemia outpatient.  Defer to PCP for management.      7/11 PT HD stable on room air.  Patient more lethargic today but appropriate.  Renal function improved.  H/h stable.  Fever and leukocytosis overnight.  CXR pending.  LUE edema.  Venous u/s pending.  Long discussion about goals of care with family and patient.  She is a DNR.   giving family resources for care at home.       7/12-patient stable on room air.  H&H dropped.  Hemoglobin is now 6.8.  Renal function stable.  No more fever.  Still has a leukocytosis that is seems to be chronic.  Could this be CMO?  Venous ultrasound of the upper extremity negative for blood clots.  She will need blood transfusion.  I will also give her IV iron wound care with Santyl Continuing.  Patient is still receiving vancomycin and Zosyn.  Urine culture grew out Klebsiella pneumoniae sensitive to about everything.  I will discontinue vanc and Zosyn and start Rocephin.        Review of Systems    Constitutional:  Negative for activity change, chills, fatigue, fever and unexpected weight change.   HENT:  Negative for sore throat and trouble swallowing.    Respiratory:  Negative for cough, chest tightness and shortness of breath.    Cardiovascular:  Negative for chest pain and leg swelling.   Gastrointestinal:  Negative for abdominal pain.   Endocrine: Negative for cold intolerance and heat intolerance.   Genitourinary:  Negative for difficulty urinating.   Musculoskeletal:  Positive for arthralgias, back pain and gait problem (chronic.  Bed-bound). Negative for joint swelling.   Skin:  Positive for wound. Negative for rash.   Neurological:  Negative for numbness.   Hematological:  Negative for adenopathy.   Psychiatric/Behavioral:  Negative for decreased concentration.      Objective:     Vital Signs (Most Recent):  Temp: 98.9 °F (37.2 °C) (07/12/25 0740)  Pulse: 73 (07/12/25 1000)  Resp: 20 (07/12/25 0740)  BP: (!) 123/57 (07/12/25 0855)  SpO2: 98 % (07/12/25 0740) Vital Signs (24h Range):  Temp:  [98.4 °F (36.9 °C)-100.9 °F (38.3 °C)] 98.9 °F (37.2 °C)  Pulse:  [68-96] 73  Resp:  [16-20] 20  SpO2:  [94 %-99 %] 98 %  BP: ()/(45-60) 123/57     Weight: 108.5 kg (239 lb 3.2 oz)  Body mass index is 35.32 kg/m².     Physical Exam  Vitals and nursing note reviewed.   Constitutional:       General: She is not in acute distress.     Appearance: She is not ill-appearing.      Comments: Lying in bed.  Bedridden.  Unable to ambulate.  She answers questions fairly well. More lethargic today.     HENT:      Right Ear: Tympanic membrane normal.      Left Ear: Tympanic membrane normal.      Mouth/Throat:      Mouth: Mucous membranes are moist.   Eyes:      General:         Right eye: No discharge.         Left eye: No discharge.      Conjunctiva/sclera: Conjunctivae normal.   Cardiovascular:      Rate and Rhythm: Normal rate and regular rhythm.      Heart sounds: Murmur heard.   Pulmonary:      Effort: Pulmonary  "effort is normal.      Comments: Mild decrease breath sounds LL lobe   Abdominal:      General: Abdomen is flat.      Palpations: Abdomen is soft.   Musculoskeletal:      Cervical back: Normal range of motion and neck supple. No rigidity.      Right lower leg: Edema (Improved) present.      Left lower leg: Edema (Improve) present.      Comments: Left arm edema    Skin:     Findings: No erythema.      Comments: See pictures.  Significant pressure injuries and ulcers of the sacrum, buttocks.  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   Neurological:      Mental Status: She is oriented to person, place, and time. Mental status is at baseline.      Comments: Bed ridden                Significant Labs: All pertinent labs within the past 24 hours have been reviewed.  CBC:   Recent Labs   Lab 07/11/25  0436 07/12/25  0442   WBC 47.11* 36.71*   HGB 8.1* 6.8*   HCT 24.1* 20.6*    145*     CMP:   Recent Labs   Lab 07/11/25  0436 07/12/25  0442    137   K 4.9 4.6    107   CO2 21* 23   GLU 99 109   BUN 53* 59*   CREATININE 1.3 1.3   CALCIUM 8.1* 7.8*   PROT 5.8* 5.2*   ALBUMIN 1.6* 1.4*   BILITOT 0.6 0.5   ALKPHOS 102 88   AST 13 10*   ALT 9* 7*   ANIONGAP 9 7*     Lactic Acid:   Recent Labs   Lab 07/10/25  1857   LACTATE 1.8       Urine culture grew Klebsiella pneumoniae  POCT Glucose:   No results for input(s): "POCTGLUCOSE" in the last 48 hours.    Troponin: No results for input(s): "TROPONINI", "TROPONINIHS" in the last 48 hours.  Urine Culture:   No results for input(s): "LABURIN" in the last 48 hours.    Urine Studies:   No results for input(s): "COLORU", "APPEARANCEUA", "PHUR", "SPECGRAV", "PROTEINUA", "GLUCUA", "KETONESU", "BILIRUBINUA", "OCCULTUA", "NITRITE", "UROBILINOGEN", "LEUKOCYTESUR", "RBCUA", "WBCUA", "BACTERIA", "SQUAMEPITHEL", "HYALINECASTS" in the last 48 hours.    Invalid input(s): "WRIGHTSUR"      Urine Culture >100,000 cfu/ml Klebsiella pneumoniae Abnormal         Resulting Agency: [facility] "     Susceptibility     Klebsiella pneumoniae     BENITEZ     Ampicillin >16 µg/ml Resistant     Ampicillin/Sulbactam 16/8 µg/ml Intermediate     Cefazolin <=2 µg/ml Sensitive     Cefepime <=2 µg/ml Sensitive     Ceftriaxone <=1 µg/ml Sensitive     Ciprofloxacin <=0.25 µg/ml Sensitive     Ertapenem <=0.5 µg/ml Sensitive     Gentamicin <=2 µg/ml Sensitive     Levofloxacin <=0.5 µg/ml Sensitive     Meropenem <=1 µg/ml Sensitive     Nitrofurantoin 64 µg/ml Intermediate     Piperacillin/Tazobactam <=8 µg/ml Sensitive     Tobramycin <=2 µg/ml Sensitive     Trimeth/Sulfa <=2/38 µg/ml Sensitive                   Significant Imaging: I have reviewed all pertinent imaging results/findings within the past 24 hours.  I have reviewed and interpreted all pertinent imaging results/findings within the past 24 hours.      Assessment & Plan  Sepsis  Patient has sepsis without organ dysfunction secondary to Skin and Soft Tissue Infection: Cellulitis. A review of systems was completed. Patient's sepsis is improving    Current Antibiotics    cefTRIAXone injection 1 g, Every 24 hours (non-standard times), Intravenous    Lactate  Recent Labs   Lab 07/07/25  0641 07/07/25  1240 07/10/25  1857   LACTATE 0.9 1.3 1.8     Culture Data  Blood Cultures   Blood Culture   Date Value Ref Range Status   07/10/2025 No Growth After 24 Hours  Preliminary   07/07/2025 No Growth After 96 hours  Preliminary   07/01/2025 No Growth After 5 Days  Final   07/01/2025 No Growth After 5 Days  Final      Urine Culture   Urine Culture   Date Value Ref Range Status   07/07/2025 >100,000 cfu/ml Klebsiella pneumoniae (A)  Final     Urine Culture, Routine   Date Value Ref Range Status   02/09/2022 No significant growth  Final      mSOFA  MSOFA Total  Min: 1   Min taken time: 07/11/25 2300  Max: 4   Max taken time: 07/12/25 0700    Plan  - Antibiotics as listed above  - Fluid resuscitation as follows:Fluid Not Needed - Patient is not hypotensive and/or lactate is less than  4.0.   - Trend lactate to resolution  - Follow up culture data  - Vasopressors were not needed  - The following services were consulted:Hospital Medicine.  - continue clindamycin for now    7/5:  Resolved.  Transition to oral clindamycin 07/05/2025.  We will need 7-10 days total of treatment    7/6: WBC remains elevated but no fevers. I am not findings any other sources of acute infection and seems to have chronic mild leukocytosis. Will cont clinda-transition to p.o. on 07/05/2025  Considering reactive leukocytosis vs primary bone marrow process. Needs hematology outpatient.   Diff is not concerning and morphology with hypochromia and target cells (no schistocytes) most consistent with Fe deficiency vs thalassemia regarding anemia and not concerning for active bacterial infection    7/7 Positive for UTI   '  7/8 Leukocytosis improving.  Gram negative rods on urine culture     7/10 Urine culture with klebsiella.   7/12 antibiotics were escalated to Zosyn and vancomycin.  Patient no longer febrile.  I will deescalate to Rocephin 1 g daily  Essential hypertension  Patient's blood pressure range in the last 24 hours was: BP  Min: 90/45  Max: 133/60.The patient's inpatient anti-hypertensive regimen is listed below:  Current Antihypertensives  hydrALAZINE injection 10 mg, Every 6 hours PRN, Intravenous  metoprolol succinate (TOPROL-XL) 24 hr tablet 100 mg, 2 times daily, Oral    Plan  - BP is controlled, no changes needed to their regimen    7/7/25 d/c valsartan   Aortic valve stenosis  Echocardiogram with evidence of aortic stenosis that is moderate . The patient's most recent echocardiogram result is listed below. We will manage the valvular abnormality by status post TAVR    No echocardiogram results found for the past 12 months     7/6: clinically stable      Hyperlipidemia LDL goal <70  Lipitor 10 mg daily    Pressure ulcer of left heel, unstageable  Consult wound care  Oxycodone p.r.n. pain    Off load  heels.  Order mattress for home.  Spinal stenosis of lumbar region with neurogenic claudication  Patient is bed ridden.  She will require frequent turning, PT, wound care.    Need to discuss with family about possible nursing home placement.  She does have home health.  She seems very difficult to care for.    Ordering low air loss mattress for home.  Pressure injury of skin of sacral region  Consult wound care   Continue clindamycin for now.    Will recheck CBC.   No signs of infection per wound care/eval.  Will likely cont on clinda and follow cultures. Orders per wound care and new mattress ordered.    7/6: WBC remains elevated but no fevers. I am not findings any other sources of acute infection and seems to have chronic mild leukocytosis. Will cont clinda-transition to p.o. on 07/05/2025  Considering reactive leukocytosis vs primary bone marrow process. Needs hematology outpatient.   Diff is not concerning and morphology with hypochromia and target cells (no schistocytes) most consistent with Fe deficiency vs thalassemia regarding anemia and not concerning for active bacterial infection    7/7 Appreciate wound care recommendations     S/P TAVR (transcatheter aortic valve replacement)  Continue metoprolol 100 mg twice daily    Microcytic anemia  Anemia is likely due to Iron deficiency. Most recent hemoglobin and hematocrit are listed below.  Recent Labs     07/10/25  0525 07/11/25  0436 07/12/25  0442   HGB 7.7* 8.1* 6.8*   HCT 23.0* 24.1* 20.6*     Plan  - Monitor serial CBC: Daily  - Transfuse PRBC if patient becomes hemodynamically unstable, symptomatic or H/H drops below 7/21.  - Patient has not received any PRBC transfusions to date  - Patient's anemia is currently worsening. Will adjust treatment as follows:  IV iron today  - H&H is worsening since admission.  Labs seem most consistent with iron deficiency.  Iron levels ordered and pending.  I do not see any clinical evidence of active bleeding at this  time and remains hemodynamically stable.  In an attempt to avoid transfusion we will give IV iron today and follow labs    7/6: iron, B12 and folate normal  Stool sent for occult but no clinical signs of bleeding  Hemolysis labs sent  H/H low but stable for last 3 days.  If above work up negative she is otherwise clinically stable for discharge and close hematology follow up with outpatient labs as I do not have GI or hematology at this hospital to manage while inpatient.     Anemia stable. Mildly elevated haptoglobin and LDH.   Will monitor H/H/    7/8 h/h was stable but decreased on am labs. Dilutional component is a possibility and due to frequent lab draws in the setting of anemia of chronic disease.  2 units of pRBC ordered.    7/10 will monitor and if remains stable will discharge home tomorrow     7/12 patient will need 2 units packed red blood cells transfused.  Hyperkalemia  Hyperkalemia is likely due to LOWELL.The patients most recent potassium results are listed below.  Recent Labs     07/10/25  0525 07/11/25  0436 07/12/25  0442   K 5.0 4.9 4.6     Plan  - Monitor for arrhythmias with EKG and/or continuous telemetry.   - Treat the hyperkalemia with Potassium Binders.   - Monitor potassium:daily   - The patient's hyperkalemia is improving    Another dose of lokelma     7/9 Resolved       LOWELL (acute kidney injury)  LOWELL is likely due to unsure etiology at this time. Baseline creatinine is 1.1. Most recent creatinine and eGFR are listed below.  Recent Labs     07/10/25  0525 07/11/25  0436 07/12/25  0442   CREATININE 1.4 1.3 1.3   EGFRNORACEVR 37* 41* 41*      Plan  - LOWELL is worsening. Will adjust treatment as follows: d/c valsartan, IV fluids, address hypoalbuminemia, urine studies pending .    - Avoid nephrotoxins and renally dose meds for GFR listed above  - Monitor urine output, serial BMP, and adjust therapy as needed    Urine studies indeterminate.  Renal function slowly improving.      7/9 LOWELL resolving      7/10 LOWELL resolved        UTI (urinary tract infection)  U/A positive >100 on microscopic  Urine culture klebsiella  Change Zosyn and vancomycin to Rocephin 1 g daily    Goals of care, counseling/discussion  Would like to have family meeting to discuss goals of care.  Reached out to daughters Lubna and Aditi for this afternoon or tomorrow.  Pt seems to answer appropriately but unable to express full understanding of medical care and condition and is only able to say she is sick.      Waiting to hear back from family to discuss goals of care with multiple comorbidities, slow to improve and continued anemia requiring blood transfusion.   My understanding is that if she is medically stable to discharge she will go home and they will provide 24 hour care.      7/11 Both daughters, son and granddaughter at beside.  Discussed plan of care.  Working up leukocytosis and fever.  CXR pending.  Discussed that patient is high risk for medical decline and infection due to age, hypoalbuminemia and comorbdities.  Although her leukocytosis is worse from her baseline she will need further work up outpatient.  She would like to be DNR.  Once medically stable family is deciding if they can take her home or if she will go to nursing home.  Social work is providing resources for at home services.      Leukocytosis  Worsening leukocytosis  Lactic acid WNL  CXR pending  Blood culture pending   Venous u/s left upper extremity for edema pending   Would like to get hematology consult.  Possible CML    VTE Risk Mitigation (From admission, onward)           Ordered     IP VTE HIGH RISK PATIENT  Once         07/01/25 2343     Place sequential compression device  Until discontinued         07/01/25 2343                    Discharge Planning   LINA: 7/14/2025     Code Status: DNR   Medical Readiness for Discharge Date:   Discharge Plan A: Home with family, Home Health                        Braeden Anderson MD  Department of Hospital  Medicine   [place] [facility] S[facility] (3rd Fl)

## 2025-07-12 NOTE — ASSESSMENT & PLAN NOTE
Worsening leukocytosis  Lactic acid WNL  CXR pending  Blood culture pending   Venous u/s left upper extremity for edema pending   Would like to get hematology consult.  Possible CML

## 2025-07-12 NOTE — NURSING
Assumed care of patient, report received from Juan RN & Lashonda RN. Agree with charted shift assessment. Patient in stable condition, plan of care ongoing.

## 2025-07-12 NOTE — PLAN OF CARE
Problem: Adult Inpatient Plan of Care  Goal: Plan of Care Review  Outcome: Progressing     Problem: Skin Injury Risk Increased  Goal: Skin Health and Integrity  Outcome: Progressing     Problem: Fall Injury Risk  Goal: Absence of Fall and Fall-Related Injury  Outcome: Progressing     Problem: Wound  Goal: Skin Health and Integrity  Outcome: Progressing     Problem: Sepsis/Septic Shock  Goal: Absence of Infection Signs and Symptoms  Outcome: Progressing     Problem: Infection  Goal: Absence of Infection Signs and Symptoms  Outcome: Progressing     Problem: Acute Kidney Injury/Impairment  Goal: Effective Renal Function  Outcome: Progressing

## 2025-07-12 NOTE — ASSESSMENT & PLAN NOTE
LOWELL is likely due to unsure etiology at this time. Baseline creatinine is 1.1. Most recent creatinine and eGFR are listed below.  Recent Labs     07/10/25  0525 07/11/25  0436 07/12/25  0442   CREATININE 1.4 1.3 1.3   EGFRNORACEVR 37* 41* 41*      Plan  - LOWELL is worsening. Will adjust treatment as follows: d/c valsartan, IV fluids, address hypoalbuminemia, urine studies pending .    - Avoid nephrotoxins and renally dose meds for GFR listed above  - Monitor urine output, serial BMP, and adjust therapy as needed    Urine studies indeterminate.  Renal function slowly improving.      7/9 LOWELL resolving     7/10 LOWELL resolved

## 2025-07-12 NOTE — ASSESSMENT & PLAN NOTE
Anemia is likely due to Iron deficiency. Most recent hemoglobin and hematocrit are listed below.  Recent Labs     07/10/25  0525 07/11/25  0436 07/12/25  0442   HGB 7.7* 8.1* 6.8*   HCT 23.0* 24.1* 20.6*     Plan  - Monitor serial CBC: Daily  - Transfuse PRBC if patient becomes hemodynamically unstable, symptomatic or H/H drops below 7/21.  - Patient has not received any PRBC transfusions to date  - Patient's anemia is currently worsening. Will adjust treatment as follows:  IV iron today  - H&H is worsening since admission.  Labs seem most consistent with iron deficiency.  Iron levels ordered and pending.  I do not see any clinical evidence of active bleeding at this time and remains hemodynamically stable.  In an attempt to avoid transfusion we will give IV iron today and follow labs    7/6: iron, B12 and folate normal  Stool sent for occult but no clinical signs of bleeding  Hemolysis labs sent  H/H low but stable for last 3 days.  If above work up negative she is otherwise clinically stable for discharge and close hematology follow up with outpatient labs as I do not have GI or hematology at this hospital to manage while inpatient.     Anemia stable. Mildly elevated haptoglobin and LDH.   Will monitor H/H/    7/8 h/h was stable but decreased on am labs. Dilutional component is a possibility and due to frequent lab draws in the setting of anemia of chronic disease.  2 units of pRBC ordered.    7/10 will monitor and if remains stable will discharge home tomorrow     7/12 patient will need 2 units packed red blood cells transfused.

## 2025-07-12 NOTE — PLAN OF CARE
Problem: Skin Injury Risk Increased  Goal: Skin Health and Integrity  7/12/2025 1731 by Dariela Devine RN  Outcome: Progressing  7/12/2025 1730 by Dariela Devine RN  Outcome: Progressing     Problem: Adult Inpatient Plan of Care  Goal: Plan of Care Review  7/12/2025 1731 by Dariela Devine RN  Outcome: Progressing  7/12/2025 1730 by Dariela Devine RN  Outcome: Progressing  Goal: Patient-Specific Goal (Individualized)  7/12/2025 1731 by Dariela Devine RN  Outcome: Progressing  7/12/2025 1730 by Dariela Devine RN  Outcome: Progressing  Goal: Absence of Hospital-Acquired Illness or Injury  7/12/2025 1731 by Dariela Devine RN  Outcome: Progressing  7/12/2025 1730 by Dariela Devine RN  Outcome: Progressing  Goal: Optimal Comfort and Wellbeing  7/12/2025 1731 by Dariela Devine RN  Outcome: Progressing  7/12/2025 1730 by Dariela Devine RN  Outcome: Progressing  Goal: Readiness for Transition of Care  7/12/2025 1731 by Dariela Devine RN  Outcome: Progressing  7/12/2025 1730 by Dariela Devine RN  Outcome: Progressing     Problem: Fall Injury Risk  Goal: Absence of Fall and Fall-Related Injury  7/12/2025 1731 by Dariela Devine RN  Outcome: Progressing  7/12/2025 1730 by Dariela Devine RN  Outcome: Progressing     Problem: Wound  Goal: Optimal Coping  7/12/2025 1731 by Dariela Devine RN  Outcome: Progressing  7/12/2025 1730 by Dariela Devine RN  Outcome: Progressing  Goal: Optimal Functional Ability  7/12/2025 1731 by Dariela Devine RN  Outcome: Progressing  7/12/2025 1730 by Dariela Devine RN  Outcome: Progressing  Goal: Absence of Infection Signs and Symptoms  7/12/2025 1731 by Dariela Devine RN  Outcome: Progressing  7/12/2025 1730 by Dariela Devine RN  Outcome: Progressing  Goal: Improved Oral Intake  7/12/2025 1731 by Dariela Devine, RN  Outcome: Progressing  7/12/2025 1730 by Dariela Devine, RN  Outcome: Progressing  Goal: Optimal Pain Control and Function  7/12/2025 1731 by  Devine, Nasheka, RN  Outcome: Progressing  7/12/2025 1730 by Dariela Devine RN  Outcome: Progressing  Goal: Skin Health and Integrity  7/12/2025 1731 by Dariela Devine RN  Outcome: Progressing  7/12/2025 1730 by Dariela Devine RN  Outcome: Progressing  Goal: Optimal Wound Healing  7/12/2025 1731 by Dariela Devine RN  Outcome: Progressing  7/12/2025 1730 by Dariela Devine RN  Outcome: Progressing     Problem: Sepsis/Septic Shock  Goal: Optimal Coping  7/12/2025 1731 by Dariela Devine RN  Outcome: Progressing  7/12/2025 1730 by Dariela Devine RN  Outcome: Progressing  Goal: Absence of Bleeding  7/12/2025 1731 by Dariela Devine RN  Outcome: Progressing  7/12/2025 1730 by Dariela Devine RN  Outcome: Progressing  Goal: Blood Glucose Level Within Targeted Range  7/12/2025 1731 by Dariela Devine RN  Outcome: Progressing  7/12/2025 1730 by Dariela Devine RN  Outcome: Progressing  Goal: Absence of Infection Signs and Symptoms  7/12/2025 1731 by Dariela Devine RN  Outcome: Progressing  7/12/2025 1730 by Dariela Devine RN  Outcome: Progressing  Goal: Optimal Nutrition Intake  7/12/2025 1731 by Dariela Devine RN  Outcome: Progressing  7/12/2025 1730 by Dariela Devine RN  Outcome: Progressing     Problem: Infection  Goal: Absence of Infection Signs and Symptoms  7/12/2025 1731 by Dariela Devine RN  Outcome: Progressing  7/12/2025 1730 by Dariela Devine RN  Outcome: Progressing     Problem: Acute Kidney Injury/Impairment  Goal: Fluid and Electrolyte Balance  7/12/2025 1731 by Dariela Devine RN  Outcome: Progressing  7/12/2025 1730 by Dariela Devine RN  Outcome: Progressing  Goal: Improved Oral Intake  7/12/2025 1731 by Dariela Devine RN  Outcome: Progressing  7/12/2025 1730 by Dariela Devine RN  Outcome: Progressing  Goal: Effective Renal Function  7/12/2025 1731 by Dariela Devine RN  Outcome: Progressing  7/12/2025 1730 by Dariela Devine RN  Outcome: Progressing

## 2025-07-12 NOTE — SUBJECTIVE & OBJECTIVE
Review of Systems   Constitutional:  Negative for activity change, chills, fatigue, fever and unexpected weight change.   HENT:  Negative for sore throat and trouble swallowing.    Respiratory:  Negative for cough, chest tightness and shortness of breath.    Cardiovascular:  Negative for chest pain and leg swelling.   Gastrointestinal:  Negative for abdominal pain.   Endocrine: Negative for cold intolerance and heat intolerance.   Genitourinary:  Negative for difficulty urinating.   Musculoskeletal:  Positive for arthralgias, back pain and gait problem (chronic.  Bed-bound). Negative for joint swelling.   Skin:  Positive for wound. Negative for rash.   Neurological:  Negative for numbness.   Hematological:  Negative for adenopathy.   Psychiatric/Behavioral:  Negative for decreased concentration.      Objective:     Vital Signs (Most Recent):  Temp: 98.9 °F (37.2 °C) (07/12/25 0740)  Pulse: 73 (07/12/25 1000)  Resp: 20 (07/12/25 0740)  BP: (!) 123/57 (07/12/25 0855)  SpO2: 98 % (07/12/25 0740) Vital Signs (24h Range):  Temp:  [98.4 °F (36.9 °C)-100.9 °F (38.3 °C)] 98.9 °F (37.2 °C)  Pulse:  [68-96] 73  Resp:  [16-20] 20  SpO2:  [94 %-99 %] 98 %  BP: ()/(45-60) 123/57     Weight: 108.5 kg (239 lb 3.2 oz)  Body mass index is 35.32 kg/m².     Physical Exam  Vitals and nursing note reviewed.   Constitutional:       General: She is not in acute distress.     Appearance: She is not ill-appearing.      Comments: Lying in bed.  Bedridden.  Unable to ambulate.  She answers questions fairly well. More lethargic today.     HENT:      Right Ear: Tympanic membrane normal.      Left Ear: Tympanic membrane normal.      Mouth/Throat:      Mouth: Mucous membranes are moist.   Eyes:      General:         Right eye: No discharge.         Left eye: No discharge.      Conjunctiva/sclera: Conjunctivae normal.   Cardiovascular:      Rate and Rhythm: Normal rate and regular rhythm.      Heart sounds: Murmur heard.   Pulmonary:      " Effort: Pulmonary effort is normal.      Comments: Mild decrease breath sounds LL lobe   Abdominal:      General: Abdomen is flat.      Palpations: Abdomen is soft.   Musculoskeletal:      Cervical back: Normal range of motion and neck supple. No rigidity.      Right lower leg: Edema (Improved) present.      Left lower leg: Edema (Improve) present.      Comments: Left arm edema    Skin:     Findings: No erythema.      Comments: See pictures.  Significant pressure injuries and ulcers of the sacrum, buttocks.  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   Neurological:      Mental Status: She is oriented to person, place, and time. Mental status is at baseline.      Comments: Bed ridden                Significant Labs: All pertinent labs within the past 24 hours have been reviewed.  CBC:   Recent Labs   Lab 07/11/25 0436 07/12/25  0442   WBC 47.11* 36.71*   HGB 8.1* 6.8*   HCT 24.1* 20.6*    145*     CMP:   Recent Labs   Lab 07/11/25 0436 07/12/25  0442    137   K 4.9 4.6    107   CO2 21* 23   GLU 99 109   BUN 53* 59*   CREATININE 1.3 1.3   CALCIUM 8.1* 7.8*   PROT 5.8* 5.2*   ALBUMIN 1.6* 1.4*   BILITOT 0.6 0.5   ALKPHOS 102 88   AST 13 10*   ALT 9* 7*   ANIONGAP 9 7*     Lactic Acid:   Recent Labs   Lab 07/10/25  1857   LACTATE 1.8       Urine culture grew Klebsiella pneumoniae  POCT Glucose:   No results for input(s): "POCTGLUCOSE" in the last 48 hours.    Troponin: No results for input(s): "TROPONINI", "TROPONINIHS" in the last 48 hours.  Urine Culture:   No results for input(s): "LABURIN" in the last 48 hours.    Urine Studies:   No results for input(s): "COLORU", "APPEARANCEUA", "PHUR", "SPECGRAV", "PROTEINUA", "GLUCUA", "KETONESU", "BILIRUBINUA", "OCCULTUA", "NITRITE", "UROBILINOGEN", "LEUKOCYTESUR", "RBCUA", "WBCUA", "BACTERIA", "SQUAMEPITHEL", "HYALINECASTS" in the last 48 hours.    Invalid input(s): "WRIGHTSUR"      Urine Culture >100,000 cfu/ml Klebsiella pneumoniae Abnormal     "     Resulting Agency: [facility]     Susceptibility     Klebsiella pneumoniae     BENITEZ     Ampicillin >16 µg/ml Resistant     Ampicillin/Sulbactam 16/8 µg/ml Intermediate     Cefazolin <=2 µg/ml Sensitive     Cefepime <=2 µg/ml Sensitive     Ceftriaxone <=1 µg/ml Sensitive     Ciprofloxacin <=0.25 µg/ml Sensitive     Ertapenem <=0.5 µg/ml Sensitive     Gentamicin <=2 µg/ml Sensitive     Levofloxacin <=0.5 µg/ml Sensitive     Meropenem <=1 µg/ml Sensitive     Nitrofurantoin 64 µg/ml Intermediate     Piperacillin/Tazobactam <=8 µg/ml Sensitive     Tobramycin <=2 µg/ml Sensitive     Trimeth/Sulfa <=2/38 µg/ml Sensitive                   Significant Imaging: I have reviewed all pertinent imaging results/findings within the past 24 hours.  I have reviewed and interpreted all pertinent imaging results/findings within the past 24 hours.

## 2025-07-12 NOTE — NURSING
Made surgeon aware that patient needs a central line placed due to numerous past IVs  that have infiltrated. Patient currently have an IV that is working at the moment in the L upper arm.

## 2025-07-12 NOTE — NURSING
At 1217, patient's IV occulted and would not flush. Stopped blood. House Supervisor attempted to place another IV on patient via ultrasound guide, patient's family refused and wanted to wait for central line to be placed. Notified blood bank. Blood bank said that blood is no longer good to return to blood bank and to dispose of the blood. Disposed of blood in the proper container.

## 2025-07-12 NOTE — ASSESSMENT & PLAN NOTE
Patient's blood pressure range in the last 24 hours was: BP  Min: 90/45  Max: 133/60.The patient's inpatient anti-hypertensive regimen is listed below:  Current Antihypertensives  hydrALAZINE injection 10 mg, Every 6 hours PRN, Intravenous  metoprolol succinate (TOPROL-XL) 24 hr tablet 100 mg, 2 times daily, Oral    Plan  - BP is controlled, no changes needed to their regimen    7/7/25 d/c valsartan

## 2025-07-13 NOTE — ASSESSMENT & PLAN NOTE
Worsening leukocytosis  Lactic acid WNL  CXR pending  Blood culture pending   Venous u/s left upper extremity for edema pending   Obtained hematology consult.  Possible CML recommend peripheral smear

## 2025-07-13 NOTE — ASSESSMENT & PLAN NOTE
LOWELL is likely due to unsure etiology at this time. Baseline creatinine is 1.1. Most recent creatinine and eGFR are listed below.  Recent Labs     07/11/25  0436 07/12/25  0442 07/13/25  0648   CREATININE 1.3 1.3 1.6*   EGFRNORACEVR 41* 41* 32*      Plan  - LOWELL is worsening. Will adjust treatment as follows: d/c valsartan, IV fluids, address hypoalbuminemia, urine studies pending .    - Avoid nephrotoxins and renally dose meds for GFR listed above  - Monitor urine output, serial BMP, and adjust therapy as needed    Urine studies indeterminate.  Renal function slowly improving.      7/9 LOWELL resolving     7/10 LOWELL resolved    7/13 creatinine is now 1.6.  Monitor renal function daily.

## 2025-07-13 NOTE — ASSESSMENT & PLAN NOTE
Patient's blood pressure range in the last 24 hours was: BP  Min: 112/57  Max: 146/64.The patient's inpatient anti-hypertensive regimen is listed below:  Current Antihypertensives  hydrALAZINE injection 10 mg, Every 6 hours PRN, Intravenous  metoprolol succinate (TOPROL-XL) 24 hr tablet 100 mg, 2 times daily, Oral    Plan  - BP is controlled, no changes needed to their regimen    7/7/25 d/c valsartan

## 2025-07-13 NOTE — ASSESSMENT & PLAN NOTE
U/A positive >100 on microscopic  Urine culture klebsiella  Changed Zosyn and vancomycin to Rocephin 1 g daily

## 2025-07-13 NOTE — ASSESSMENT & PLAN NOTE
Anemia is likely due to Iron deficiency. Most recent hemoglobin and hematocrit are listed below.  Recent Labs     07/11/25  0436 07/12/25  0442 07/13/25  0648   HGB 8.1* 6.8* 11.6*   HCT 24.1* 20.6* 34.8*     Plan  - Monitor serial CBC: Daily  - Transfuse PRBC if patient becomes hemodynamically unstable, symptomatic or H/H drops below 7/21.  - Patient has not received any PRBC transfusions to date  - Patient's anemia is currently worsening. Will adjust treatment as follows:  IV iron today  - H&H is worsening since admission.  Labs seem most consistent with iron deficiency.  Iron levels ordered and pending.  I do not see any clinical evidence of active bleeding at this time and remains hemodynamically stable.  In an attempt to avoid transfusion we will give IV iron today and follow labs    7/6: iron, B12 and folate normal  Stool sent for occult but no clinical signs of bleeding  Hemolysis labs sent  H/H low but stable for last 3 days.  If above work up negative she is otherwise clinically stable for discharge and close hematology follow up with outpatient labs as I do not have GI or hematology at this hospital to manage while inpatient.     Anemia stable. Mildly elevated haptoglobin and LDH.   Will monitor H/H/    7/8 h/h was stable but decreased on am labs. Dilutional component is a possibility and due to frequent lab draws in the setting of anemia of chronic disease.  2 units of pRBC ordered.    7/10 will monitor and if remains stable will discharge home tomorrow     7/12 patient will need 2 units packed red blood cells transfused.    7/13 patient received IV iron yesterday.  She also received 2 units of packed red blood cells.  She has a little fluid overloaded this morning so IV Lasix 20 mg given.  H&H much improved

## 2025-07-13 NOTE — PLAN OF CARE
Problem: Adult Inpatient Plan of Care  Goal: Plan of Care Review  Outcome: Progressing     Problem: Skin Injury Risk Increased  Goal: Skin Health and Integrity  Outcome: Progressing     Problem: Fall Injury Risk  Goal: Absence of Fall and Fall-Related Injury  Outcome: Progressing     Problem: Wound  Goal: Optimal Pain Control and Function  Outcome: Progressing     Problem: Wound  Goal: Optimal Wound Healing  Outcome: Progressing     Problem: Infection  Goal: Absence of Infection Signs and Symptoms  Outcome: Progressing     Problem: Anemia  Goal: Anemia Symptom Improvement  Outcome: Progressing

## 2025-07-13 NOTE — ASSESSMENT & PLAN NOTE
Hyperkalemia is likely due to LOWELL.The patients most recent potassium results are listed below.  Recent Labs     07/11/25  0436 07/12/25  0442 07/13/25  0648   K 4.9 4.6 5.2*     Plan  - Monitor for arrhythmias with EKG and/or continuous telemetry.   - Treat the hyperkalemia with Potassium Binders.   - Monitor potassium:daily   - The patient's hyperkalemia is improving    Another dose of lokelma     7/9 Resolved

## 2025-07-13 NOTE — SUBJECTIVE & OBJECTIVE
Review of Systems   Constitutional:  Negative for activity change, chills, fatigue, fever and unexpected weight change.   HENT:  Negative for sore throat and trouble swallowing.    Respiratory:  Negative for cough, chest tightness and shortness of breath.    Cardiovascular:  Negative for chest pain and leg swelling.   Gastrointestinal:  Negative for abdominal pain.   Endocrine: Negative for cold intolerance and heat intolerance.   Genitourinary:  Negative for difficulty urinating.   Musculoskeletal:  Positive for arthralgias, back pain and gait problem (chronic.  Bed-bound). Negative for joint swelling.   Skin:  Positive for wound. Negative for rash.   Neurological:  Negative for numbness.   Hematological:  Negative for adenopathy.   Psychiatric/Behavioral:  Negative for decreased concentration.      Objective:     Vital Signs (Most Recent):  Temp: 98 °F (36.7 °C) (07/13/25 0734)  Pulse: 78 (07/13/25 1000)  Resp: 20 (07/13/25 0734)  BP: (!) 140/66 (07/13/25 0734)  SpO2: 97 % (07/13/25 0734) Vital Signs (24h Range):  Temp:  [97.5 °F (36.4 °C)-100 °F (37.8 °C)] 98 °F (36.7 °C)  Pulse:  [69-90] 78  Resp:  [16-20] 20  SpO2:  [97 %-99 %] 97 %  BP: (112-146)/(57-77) 140/66     Weight: 108.5 kg (239 lb 3.2 oz)  Body mass index is 35.32 kg/m².     Physical Exam  Vitals and nursing note reviewed.   Constitutional:       General: She is not in acute distress.     Appearance: She is not ill-appearing.      Comments: Lying in bed.  Bedridden.  Unable to ambulate.  She answers questions fairly well. More lethargic today.     HENT:      Right Ear: Tympanic membrane normal.      Left Ear: Tympanic membrane normal.      Mouth/Throat:      Mouth: Mucous membranes are moist.   Eyes:      General:         Right eye: No discharge.         Left eye: No discharge.      Conjunctiva/sclera: Conjunctivae normal.   Cardiovascular:      Rate and Rhythm: Normal rate and regular rhythm.      Heart sounds: Murmur heard.   Pulmonary:       "Effort: Pulmonary effort is normal.      Comments: Mild decrease breath sounds LL lobe   Abdominal:      General: Abdomen is flat.      Palpations: Abdomen is soft.   Musculoskeletal:      Cervical back: Normal range of motion and neck supple. No rigidity.      Right lower leg: Edema (Improved) present.      Left lower leg: Edema (Improve) present.      Comments: Left arm edema    Skin:     Findings: No erythema.      Comments: See pictures.  Significant pressure injuries and ulcers of the sacrum, buttocks.  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   Neurological:      Mental Status: She is oriented to person, place, and time. Mental status is at baseline.      Comments: Bed ridden                Significant Labs: All pertinent labs within the past 24 hours have been reviewed.  CBC:   Recent Labs   Lab 07/12/25 0442 07/13/25  0648   WBC 36.71* 32.29*   HGB 6.8* 11.6*   HCT 20.6* 34.8*   * 150     CMP:   Recent Labs   Lab 07/12/25  0442 07/13/25  0648    137   K 4.6 5.2*    104   CO2 23 23    99   BUN 59* 65*   CREATININE 1.3 1.6*   CALCIUM 7.8* 8.6*   PROT 5.2* 7.3   ALBUMIN 1.4* 1.9*   BILITOT 0.5 0.9   ALKPHOS 88 122   AST 10* 14   ALT 7* 12   ANIONGAP 7* 10     Lactic Acid:   No results for input(s): "LACTATE" in the last 48 hours.      Urine culture grew Klebsiella pneumoniae  POCT Glucose:   No results for input(s): "POCTGLUCOSE" in the last 48 hours.    Troponin: No results for input(s): "TROPONINI", "TROPONINIHS" in the last 48 hours.  Urine Culture:   No results for input(s): "LABURIN" in the last 48 hours.    Urine Studies:   No results for input(s): "COLORU", "APPEARANCEUA", "PHUR", "SPECGRAV", "PROTEINUA", "GLUCUA", "KETONESU", "BILIRUBINUA", "OCCULTUA", "NITRITE", "UROBILINOGEN", "LEUKOCYTESUR", "RBCUA", "WBCUA", "BACTERIA", "SQUAMEPITHEL", "HYALINECASTS" in the last 48 hours.    Invalid input(s): "WRIGHTSUR"      Urine Culture >100,000 cfu/ml Klebsiella pneumoniae Abnormal     "     Resulting Agency: [facility]     Susceptibility     Klebsiella pneumoniae     BENITEZ     Ampicillin >16 µg/ml Resistant     Ampicillin/Sulbactam 16/8 µg/ml Intermediate     Cefazolin <=2 µg/ml Sensitive     Cefepime <=2 µg/ml Sensitive     Ceftriaxone <=1 µg/ml Sensitive     Ciprofloxacin <=0.25 µg/ml Sensitive     Ertapenem <=0.5 µg/ml Sensitive     Gentamicin <=2 µg/ml Sensitive     Levofloxacin <=0.5 µg/ml Sensitive     Meropenem <=1 µg/ml Sensitive     Nitrofurantoin 64 µg/ml Intermediate     Piperacillin/Tazobactam <=8 µg/ml Sensitive     Tobramycin <=2 µg/ml Sensitive     Trimeth/Sulfa <=2/38 µg/ml Sensitive                   Significant Imaging: I have reviewed all pertinent imaging results/findings within the past 24 hours.  I have reviewed and interpreted all pertinent imaging results/findings within the past 24 hours.

## 2025-07-13 NOTE — ASSESSMENT & PLAN NOTE
Patient has sepsis without organ dysfunction secondary to Skin and Soft Tissue Infection: Cellulitis. A review of systems was completed. Patient's sepsis is improving    Current Antibiotics    cefTRIAXone injection 1 g, Every 24 hours (non-standard times), Intravenous    Lactate  Recent Labs   Lab 07/07/25  0641 07/07/25  1240 07/10/25  1857   LACTATE 0.9 1.3 1.8     Culture Data  Blood Cultures   Blood Culture   Date Value Ref Range Status   07/10/2025 No Growth After 48 Hours  Preliminary   07/07/2025 No Growth After 5 Days  Final   07/01/2025 No Growth After 5 Days  Final   07/01/2025 No Growth After 5 Days  Final      Urine Culture   Urine Culture   Date Value Ref Range Status   07/07/2025 >100,000 cfu/ml Klebsiella pneumoniae (A)  Final     Urine Culture, Routine   Date Value Ref Range Status   02/09/2022 No significant growth  Final      mSOFA  MSOFA Total  Min: 1   Min taken time: 07/13/25 1001  Max: 3   Max taken time: 07/12/25 1101    Plan  - Antibiotics as listed above  - Fluid resuscitation as follows:Fluid Not Needed - Patient is not hypotensive and/or lactate is less than 4.0.   - Trend lactate to resolution  - Follow up culture data  - Vasopressors were not needed  - The following services were consulted:Hospital Medicine.  - continue clindamycin for now    7/5:  Resolved.  Transition to oral clindamycin 07/05/2025.  We will need 7-10 days total of treatment    7/6: WBC remains elevated but no fevers. I am not findings any other sources of acute infection and seems to have chronic mild leukocytosis. Will cont clinda-transition to p.o. on 07/05/2025  Considering reactive leukocytosis vs primary bone marrow process. Needs hematology outpatient.   Diff is not concerning and morphology with hypochromia and target cells (no schistocytes) most consistent with Fe deficiency vs thalassemia regarding anemia and not concerning for active bacterial infection    7/7 Positive for UTI   '  7/8 Leukocytosis  improving.  Gram negative rods on urine culture     7/10 Urine culture with klebsiella.   7/12 antibiotics were escalated to Zosyn and vancomycin.  Patient no longer febrile.  I will deescalate to Rocephin 1 g daily  7/13 continue Rocephin.  Monitor CBC.  Peripheral smear ordered.

## 2025-07-13 NOTE — PROCEDURES
"Emily Alicia is a 84 y.o. female patient.    Temp: 99.2 °F (37.3 °C) (07/12/25 1553)  Pulse: 74 (07/12/25 1800)  Resp: 18 (07/12/25 1921)  BP: 133/60 (07/12/25 1553)  SpO2: 98 % (07/12/25 1904)  Weight: 108.5 kg (239 lb 3.2 oz) (07/02/25 0000)  Height: 5' 9" (175.3 cm) (07/02/25 0000)       Central Line    Date/Time: 7/12/2025 7:54 PM    Performed by: Surinder Mathews MD  Authorized by: Surinder Mathews MD    Location procedure was performed:  [facility]  Pre-operative diagnosis:  Anemia  Post-operative diagnosis:  Anemia  Consent Done ?:  Yes  Time out complete?: Verified correct patient, procedure, equipment, staff, and site/side    Indications:  Med administration  Anesthesia:  Local infiltration  Local anesthetic:  Lidocaine 1% without epinephrine  Anesthetic total (ml):  5  Preparation:  Skin prepped with ChloraPrep  Skin prep agent dried: Skin prep agent completely dried prior to procedure    Sterile barriers: All five maximal sterile barriers used - gloves, gown, cap, mask and large sterile sheet    Hand hygiene: Hand hygiene performed immediately prior to central venous catheter insertion    Location:  Right internal jugular  Catheter type:  Triple lumen  Catheter size:  7 Fr  Ultrasound guidance: Yes    Vessel Caliber:  Medium   patent  Comprressibility:  Poor  Doppler:  Not done  Needle advanced into vessel with real time ultrasound guidance.    Guidewire confirmed in vessel.    Steril sheath on probe.    Sterile gel used.  Manometry: No    Number of attempts:  1  Complications: Yes (specify)    Adverse Events:  Arterial cannulation  Other Complications:  Patient's anatomy overall was started secondary to contracture of her neck.  Her neck was held in a flexed position and tilted to the left.  There was poor visualization of the anatomy but it appeared the right internal jugular vein was visible.  A Finder needle was initially utilized into what was expected to be the vein and venous blood was " obtained.  The large needle was then advanced and still appeared to be venous blood.  Wire was then advanced followed by placement of the catheter.  When the wire was remove arterial flow was obtained.  The catheter was removed and pressure was held with good hemostasis.  Follow-up ultrasound showed no evidence of bleeding.  The patient's left neck is not accessible.  Her anatomy is distorted and subclavian attempt is not safe.    Femoral vessels were reviewed.  The femoral vein could not be identified with ultrasound.  Patient can not have central venous access with the current imaging capability available here.  I would recommend PICC line insertion for IV access.   Patient's anatomy overall was started secondary to contracture of her neck.  Her neck was held in a flexed position and tilted to the left.  There was poor visualization of the anatomy but it appeared the right internal jugular vein was visible.  A Finder needle was initially utilized into what was expected to be the vein and venous blood was obtained.  The large needle was then advanced and still appeared to be venous blood.  Wire was then advanced followed by placement of the catheter.  When the wire was remove arterial flow was obtained.  The catheter was removed and pressure was held with good hemostasis.  Follow-up ultrasound showed no evidence of bleeding.  The patient's left neck is not accessible.  Her anatomy is distorted and subclavian attempt is not safe.    Femoral vessels were reviewed.  The femoral vein could not be identified with ultrasound.  Patient can not have central venous access with the current imaging capability available here.  I would recommend PICC line insertion for IV access.      7/12/2025

## 2025-07-13 NOTE — PLAN OF CARE
Problem: Adult Inpatient Plan of Care  Goal: Plan of Care Review  Outcome: Progressing     Problem: Skin Injury Risk Increased  Goal: Skin Health and Integrity  Outcome: Progressing     Problem: Fall Injury Risk  Goal: Absence of Fall and Fall-Related Injury  Outcome: Progressing     Problem: Wound  Goal: Optimal Functional Ability  Outcome: Progressing     Problem: Sepsis/Septic Shock  Goal: Optimal Coping  Outcome: Progressing     Problem: Acute Kidney Injury/Impairment  Goal: Fluid and Electrolyte Balance  Outcome: Progressing     Problem: Anemia  Goal: Anemia Symptom Improvement  Outcome: Progressing

## 2025-07-13 NOTE — PROGRESS NOTES
formerly [facility] ([address] [place] [address] Medicine  Progress Note    Patient Name: Emily Alicia  MRN: [id number]  Patient Class: IP- Inpatient   Admission Date: 7/1/2025  Length of Stay: 11 days  Attending Physician: Braeden Anderson MD  Primary Care Provider: Angel Pemberton MD        Subjective     Principal Problem:Sepsis        HPI:  Patient is an 84-year-old black female that presented to the emergency room with significant pain from her skin pressure injuries of her sacrum, buttocks.  Upon presentation she had a working diagnosis of sepsis so she was given IV fluids, IV antibiotics.  Workup unremarkable.  Physical exam reveals significant skin pressure injuries to the buttocks, sacrum, foot.  She is being admitted for pain control, IV antibiotics, wound care consultation.    Overview/Hospital Course:  Patient seen by wound care this morning. Recs placed. Patient without any complaints today at all. She is eating well. Afebrile. WBC ct pending.    7/4: clinically patient is doing quite well. She is tolerating PO intake. Has no complaints on rounds.  However, her WBC is not trending down. Seems she has mild chronic elevation of 14-15K but WBC 19K this mroning and 20K on admit. I still do not see any other sources of infections and wounds are not draining, no erythema, no fevers, etc. Cont clindamycin for now.  H/H 7.8/23 from 9.1/27 on admit. No signs of bleeding but with drop will monitor overnight. Also Cr 1.5 from 1.1 on admit. However, on chart review she does fluctuate around this isabella.     7/5:  Patient continues to feel well.  She has no acute complaints.  She denies chest pain, shortness of breath, dysuria, frequency, diarrhea, nausea and vomiting.  Her wounds are stable and do not show any signs of infection.  However, she remains with leukocytosis of [phone] which is not improved since admission.  Her H&H is decreasing in his knee or transfusion threshold.  Her MCV is low and she likely has  iron deficiency.  I am suspecting she has reactive leukocytosis due to this process.  Iron levels ordered.  If low we will replace IV and start p.o. tomorrow in an attempt to avoid blood transfusion.  We will continue clindamycin.  I see no other signs of infection and no indication to change antibiotics.  We will actually deescalate to oral today.  Creatinine remained stable at 1.5    7/6: Clinically this patient remains without complaints and at baseline. She has no fever, dysuria, abd pain, n/v/d/c, chest pains, SOB, cough, etc.  I have kept her inpatient following labs due to minor abnormalities:  Cr 1-1.5 at baseline and while did trend up to 1.5/1.7 this admission on chart review her renal fxn fluctuating here over at least last 1 year. Therefore I do not think this is indicative of LOWELL needing further inpatient management.  H/H down trended but has been stable last 3 days. She is not hemodynamically unstable and vitals remain normal. She has no evidence of bleeding. Stool will be sent for occult and if + can see GI outpatient. However, I am also concerned for primary bone marrow process. Will also work up hemolysis with labs today (h/o aortic stenosis s/p valve replacement).  She has chronic leukocytosis which has not been worked up to date. Plt are normal. I have no access to hematology while inpatient here so may discharge with patient needing to see hematology ASAP. She will need repeat CBC within next few days to monitor as well.   HypoCa noted; awaiting albumin. If lab unremarkable I believe she can discharge this afternoon with home health and home medical equipment and continue to follow up outpatient as we are only lab monitoring at this point in the hospital as no access to needed specialists and she is clinically stable. Dispo pending labs this morning    7/7 PT HD stable on room air.  Continued leukocytosis.  Continue IV abx for UTI.  Creatinine still bumped.  Will continue to trend. Gentle  hydration and holding valsartan.  kelma for hyperkalemia.  Patient would like to go home with family and states her family can provide her with 24 hour care.  Patient high risk for medical decline.  She is malnourished and albumin 1.8. Consult to dietician.      7/8 PT Bp on lower side.  Pt h/h down trending.  Some dilutional component with frequent lab draws.  Discussed risk versus benefits with pt and daughters about blood transfusion and they are all in agreement.  Renal function slowly improving but pt has hypoalbuminemia.  She is at risk for medical decline.  Will set up family meeting to discuss code status and plan of care.      7/9 Renal function improving.  H/h mild increase but only received 1 out of 2 units of pRBC.  Will get repeat CBC tomorrow.     7/10 PT HD stable on room air.  Renal function back to baseline.  H/H stable.  If labs are stable tomorrow plan to discharge home with home health and 24 hour care.  Pt will need continued work up of leukocytosis and anemia outpatient.  Defer to PCP for management.      7/11 PT HD stable on room air.  Patient more lethargic today but appropriate.  Renal function improved.  H/h stable.  Fever and leukocytosis overnight.  CXR pending.  LUE edema.  Venous u/s pending.  Long discussion about goals of care with family and patient.  She is a DNR.   giving family resources for care at home.       7/12-patient stable on room air.  H&H dropped.  Hemoglobin is now 6.8.  Renal function stable.  No more fever.  Still has a leukocytosis that is seems to be chronic.  Could this be CML?  Venous ultrasound of the upper extremity negative for blood clots.  She will need blood transfusion.  I will also give her IV iron wound care with Santyl Continuing.  Patient is still receiving vancomycin and Zosyn.  Urine culture grew out Klebsiella pneumoniae sensitive to about everything.  I will discontinue vanc and Zosyn and start Rocephin.    7/13-patient required  central line placement but was unable to get it.  Finally nurse supervisor was able to find a good vein using ultrasound last night.  She received 2 units of packed red blood cells.  Now her hemoglobin is 11.6.  Patient did get IV iron as well.  I discussed the case with Hematology.  They recommend a peripheral smear to determine if this is a leukemoid reaction versus CML.  I think she has a leukemoid reaction causing her elevated white blood cell count.  She looked a little over hydrated this morning her BNP was 350 which is elevated for her.  Chest x-ray did not show any pulmonary edema.  I gave her 20 mg of Lasix IV push.          Review of Systems   Constitutional:  Negative for activity change, chills, fatigue, fever and unexpected weight change.   HENT:  Negative for sore throat and trouble swallowing.    Respiratory:  Negative for cough, chest tightness and shortness of breath.    Cardiovascular:  Negative for chest pain and leg swelling.   Gastrointestinal:  Negative for abdominal pain.   Endocrine: Negative for cold intolerance and heat intolerance.   Genitourinary:  Negative for difficulty urinating.   Musculoskeletal:  Positive for arthralgias, back pain and gait problem (chronic.  Bed-bound). Negative for joint swelling.   Skin:  Positive for wound. Negative for rash.   Neurological:  Negative for numbness.   Hematological:  Negative for adenopathy.   Psychiatric/Behavioral:  Negative for decreased concentration.      Objective:     Vital Signs (Most Recent):  Temp: 98 °F (36.7 °C) (07/13/25 0734)  Pulse: 78 (07/13/25 1000)  Resp: 20 (07/13/25 0734)  BP: (!) 140/66 (07/13/25 0734)  SpO2: 97 % (07/13/25 0734) Vital Signs (24h Range):  Temp:  [97.5 °F (36.4 °C)-100 °F (37.8 °C)] 98 °F (36.7 °C)  Pulse:  [69-90] 78  Resp:  [16-20] 20  SpO2:  [97 %-99 %] 97 %  BP: (112-146)/(57-77) 140/66     Weight: 108.5 kg (239 lb 3.2 oz)  Body mass index is 35.32 kg/m².     Physical Exam  Vitals and nursing note  "reviewed.   Constitutional:       General: She is not in acute distress.     Appearance: She is not ill-appearing.      Comments: Lying in bed.  Bedridden.  Unable to ambulate.  She answers questions fairly well. More lethargic today.     HENT:      Right Ear: Tympanic membrane normal.      Left Ear: Tympanic membrane normal.      Mouth/Throat:      Mouth: Mucous membranes are moist.   Eyes:      General:         Right eye: No discharge.         Left eye: No discharge.      Conjunctiva/sclera: Conjunctivae normal.   Cardiovascular:      Rate and Rhythm: Normal rate and regular rhythm.      Heart sounds: Murmur heard.   Pulmonary:      Effort: Pulmonary effort is normal.      Comments: Mild decrease breath sounds LL lobe   Abdominal:      General: Abdomen is flat.      Palpations: Abdomen is soft.   Musculoskeletal:      Cervical back: Normal range of motion and neck supple. No rigidity.      Right lower leg: Edema (Improved) present.      Left lower leg: Edema (Improve) present.      Comments: Left arm edema    Skin:     Findings: No erythema.      Comments: See pictures.  Significant pressure injuries and ulcers of the sacrum, buttocks.  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   Neurological:      Mental Status: She is oriented to person, place, and time. Mental status is at baseline.      Comments: Bed ridden                Significant Labs: All pertinent labs within the past 24 hours have been reviewed.  CBC:   Recent Labs   Lab 07/12/25  0442 07/13/25  0648   WBC 36.71* 32.29*   HGB 6.8* 11.6*   HCT 20.6* 34.8*   * 150     CMP:   Recent Labs   Lab 07/12/25  0442 07/13/25  0648    137   K 4.6 5.2*    104   CO2 23 23    99   BUN 59* 65*   CREATININE 1.3 1.6*   CALCIUM 7.8* 8.6*   PROT 5.2* 7.3   ALBUMIN 1.4* 1.9*   BILITOT 0.5 0.9   ALKPHOS 88 122   AST 10* 14   ALT 7* 12   ANIONGAP 7* 10     Lactic Acid:   No results for input(s): "LACTATE" in the last 48 hours.      Urine culture grew " "Klebsiella pneumoniae  POCT Glucose:   No results for input(s): "POCTGLUCOSE" in the last 48 hours.    Troponin: No results for input(s): "TROPONINI", "TROPONINIHS" in the last 48 hours.  Urine Culture:   No results for input(s): "LABURIN" in the last 48 hours.    Urine Studies:   No results for input(s): "COLORU", "APPEARANCEUA", "PHUR", "SPECGRAV", "PROTEINUA", "GLUCUA", "KETONESU", "BILIRUBINUA", "OCCULTUA", "NITRITE", "UROBILINOGEN", "LEUKOCYTESUR", "RBCUA", "WBCUA", "BACTERIA", "SQUAMEPITHEL", "HYALINECASTS" in the last 48 hours.    Invalid input(s): "WRIGHTSUR"      Urine Culture >100,000 cfu/ml Klebsiella pneumoniae Abnormal         Resulting Agency: [facility]     Susceptibility     Klebsiella pneumoniae     BENITEZ     Ampicillin >16 µg/ml Resistant     Ampicillin/Sulbactam 16/8 µg/ml Intermediate     Cefazolin <=2 µg/ml Sensitive     Cefepime <=2 µg/ml Sensitive     Ceftriaxone <=1 µg/ml Sensitive     Ciprofloxacin <=0.25 µg/ml Sensitive     Ertapenem <=0.5 µg/ml Sensitive     Gentamicin <=2 µg/ml Sensitive     Levofloxacin <=0.5 µg/ml Sensitive     Meropenem <=1 µg/ml Sensitive     Nitrofurantoin 64 µg/ml Intermediate     Piperacillin/Tazobactam <=8 µg/ml Sensitive     Tobramycin <=2 µg/ml Sensitive     Trimeth/Sulfa <=2/38 µg/ml Sensitive                   Significant Imaging: I have reviewed all pertinent imaging results/findings within the past 24 hours.  I have reviewed and interpreted all pertinent imaging results/findings within the past 24 hours.      Assessment & Plan  Sepsis  Patient has sepsis without organ dysfunction secondary to Skin and Soft Tissue Infection: Cellulitis. A review of systems was completed. Patient's sepsis is improving    Current Antibiotics    cefTRIAXone injection 1 g, Every 24 hours (non-standard times), Intravenous    Lactate  Recent Labs   Lab 07/07/25  0641 07/07/25  1240 07/10/25  1857   LACTATE 0.9 1.3 1.8     Culture Data  Blood Cultures   Blood Culture   Date Value Ref Range " Status   07/10/2025 No Growth After 48 Hours  Preliminary   07/07/2025 No Growth After 5 Days  Final   07/01/2025 No Growth After 5 Days  Final   07/01/2025 No Growth After 5 Days  Final      Urine Culture   Urine Culture   Date Value Ref Range Status   07/07/2025 >100,000 cfu/ml Klebsiella pneumoniae (A)  Final     Urine Culture, Routine   Date Value Ref Range Status   02/09/2022 No significant growth  Final      mSOFA  MSOFA Total  Min: 1   Min taken time: 07/13/25 1001  Max: 3   Max taken time: 07/12/25 1101    Plan  - Antibiotics as listed above  - Fluid resuscitation as follows:Fluid Not Needed - Patient is not hypotensive and/or lactate is less than 4.0.   - Trend lactate to resolution  - Follow up culture data  - Vasopressors were not needed  - The following services were consulted:Hospital Medicine.  - continue clindamycin for now    7/5:  Resolved.  Transition to oral clindamycin 07/05/2025.  We will need 7-10 days total of treatment    7/6: WBC remains elevated but no fevers. I am not findings any other sources of acute infection and seems to have chronic mild leukocytosis. Will cont clinda-transition to p.o. on 07/05/2025  Considering reactive leukocytosis vs primary bone marrow process. Needs hematology outpatient.   Diff is not concerning and morphology with hypochromia and target cells (no schistocytes) most consistent with Fe deficiency vs thalassemia regarding anemia and not concerning for active bacterial infection    7/7 Positive for UTI   '  7/8 Leukocytosis improving.  Gram negative rods on urine culture     7/10 Urine culture with klebsiella.   7/12 antibiotics were escalated to Zosyn and vancomycin.  Patient no longer febrile.  I will deescalate to Rocephin 1 g daily  7/13 continue Rocephin.  Monitor CBC.  Peripheral smear ordered.  Essential hypertension  Patient's blood pressure range in the last 24 hours was: BP  Min: 112/57  Max: 146/64.The patient's inpatient anti-hypertensive regimen is  listed below:  Current Antihypertensives  hydrALAZINE injection 10 mg, Every 6 hours PRN, Intravenous  metoprolol succinate (TOPROL-XL) 24 hr tablet 100 mg, 2 times daily, Oral    Plan  - BP is controlled, no changes needed to their regimen    7/7/25 d/c valsartan   Aortic valve stenosis  Echocardiogram with evidence of aortic stenosis that is moderate . The patient's most recent echocardiogram result is listed below. We will manage the valvular abnormality by status post TAVR    No echocardiogram results found for the past 12 months     7/6: clinically stable      Hyperlipidemia LDL goal <70  Lipitor 10 mg daily    Pressure ulcer of left heel, unstageable  Consult wound care  Oxycodone p.r.n. pain    Off load heels.  Order mattress for home.  Spinal stenosis of lumbar region with neurogenic claudication  Patient is bed ridden.  She will require frequent turning, PT, wound care.    Need to discuss with family about possible nursing home placement.  She does have home health.  She seems very difficult to care for.    Ordering low air loss mattress for home.  Pressure injury of skin of sacral region  Consult wound care   Continue clindamycin for now.    Will recheck CBC.   No signs of infection per wound care/eval.  Will likely cont on clinda and follow cultures. Orders per wound care and new mattress ordered.    7/6: WBC remains elevated but no fevers. I am not findings any other sources of acute infection and seems to have chronic mild leukocytosis. Will cont clinda-transition to p.o. on 07/05/2025  Considering reactive leukocytosis vs primary bone marrow process. Needs hematology outpatient.   Diff is not concerning and morphology with hypochromia and target cells (no schistocytes) most consistent with Fe deficiency vs thalassemia regarding anemia and not concerning for active bacterial infection    7/7 Appreciate wound care recommendations     S/P TAVR (transcatheter aortic valve replacement)  Continue metoprolol  100 mg twice daily    Microcytic anemia  Anemia is likely due to Iron deficiency. Most recent hemoglobin and hematocrit are listed below.  Recent Labs     07/11/25  0436 07/12/25  0442 07/13/25  0648   HGB 8.1* 6.8* 11.6*   HCT 24.1* 20.6* 34.8*     Plan  - Monitor serial CBC: Daily  - Transfuse PRBC if patient becomes hemodynamically unstable, symptomatic or H/H drops below 7/21.  - Patient has not received any PRBC transfusions to date  - Patient's anemia is currently worsening. Will adjust treatment as follows:  IV iron today  - H&H is worsening since admission.  Labs seem most consistent with iron deficiency.  Iron levels ordered and pending.  I do not see any clinical evidence of active bleeding at this time and remains hemodynamically stable.  In an attempt to avoid transfusion we will give IV iron today and follow labs    7/6: iron, B12 and folate normal  Stool sent for occult but no clinical signs of bleeding  Hemolysis labs sent  H/H low but stable for last 3 days.  If above work up negative she is otherwise clinically stable for discharge and close hematology follow up with outpatient labs as I do not have GI or hematology at this hospital to manage while inpatient.     Anemia stable. Mildly elevated haptoglobin and LDH.   Will monitor H/H/    7/8 h/h was stable but decreased on am labs. Dilutional component is a possibility and due to frequent lab draws in the setting of anemia of chronic disease.  2 units of pRBC ordered.    7/10 will monitor and if remains stable will discharge home tomorrow     7/12 patient will need 2 units packed red blood cells transfused.    7/13 patient received IV iron yesterday.  She also received 2 units of packed red blood cells.  She has a little fluid overloaded this morning so IV Lasix 20 mg given.  H&H much improved  Hyperkalemia  Hyperkalemia is likely due to LOWELL.The patients most recent potassium results are listed below.  Recent Labs     07/11/25 0436 07/12/25  0442  07/13/25  0648   K 4.9 4.6 5.2*     Plan  - Monitor for arrhythmias with EKG and/or continuous telemetry.   - Treat the hyperkalemia with Potassium Binders.   - Monitor potassium:daily   - The patient's hyperkalemia is improving    Another dose of lokelma     7/9 Resolved       LOWELL (acute kidney injury)  LOWELL is likely due to unsure etiology at this time. Baseline creatinine is 1.1. Most recent creatinine and eGFR are listed below.  Recent Labs     07/11/25  0436 07/12/25  0442 07/13/25  0648   CREATININE 1.3 1.3 1.6*   EGFRNORACEVR 41* 41* 32*      Plan  - LOWELL is worsening. Will adjust treatment as follows: d/c valsartan, IV fluids, address hypoalbuminemia, urine studies pending .    - Avoid nephrotoxins and renally dose meds for GFR listed above  - Monitor urine output, serial BMP, and adjust therapy as needed    Urine studies indeterminate.  Renal function slowly improving.      7/9 LOWELL resolving     7/10 LOWELL resolved    7/13 creatinine is now 1.6.  Monitor renal function daily.    UTI (urinary tract infection)  U/A positive >100 on microscopic  Urine culture klebsiella  Changed Zosyn and vancomycin to Rocephin 1 g daily    Goals of care, counseling/discussion  Would like to have family meeting to discuss goals of care.  Reached out to daughters Lubna and Aditi for this afternoon or tomorrow.  Pt seems to answer appropriately but unable to express full understanding of medical care and condition and is only able to say she is sick.      Waiting to hear back from family to discuss goals of care with multiple comorbidities, slow to improve and continued anemia requiring blood transfusion.   My understanding is that if she is medically stable to discharge she will go home and they will provide 24 hour care.      7/11 Both daughters, son and granddaughter at beside.  Discussed plan of care.  Working up leukocytosis and fever.  CXR pending.  Discussed that patient is high risk for medical decline and infection due to  age, hypoalbuminemia and comorbdities.  Although her leukocytosis is worse from her baseline she will need further work up outpatient.  She would like to be DNR.  Once medically stable family is deciding if they can take her home or if she will go to nursing home.  Social work is providing resources for at home services.      Leukocytosis  Worsening leukocytosis  Lactic acid WNL  CXR pending  Blood culture pending   Venous u/s left upper extremity for edema pending   Obtained hematology consult.  Possible CML recommend peripheral smear    VTE Risk Mitigation (From admission, onward)           Ordered     IP VTE HIGH RISK PATIENT  Once         07/01/25 2343     Place sequential compression device  Until discontinued         07/01/25 2343                    Discharge Planning   LINA: 7/14/2025     Code Status: DNR   Medical Readiness for Discharge Date:   Discharge Plan A: Home with family, Home Health                        Braeden Anderson MD  Department of Hospital Medicine   [facility] - Med Surg (3rd Fl)

## 2025-07-14 NOTE — PROGRESS NOTES
[facility] ([address] [place] [address] Medicine  Progress Note    Patient Name: Emily Alicia  MRN: [id number]  Patient Class: IP- Inpatient   Admission Date: 7/1/2025  Length of Stay: 12 days  Attending Physician: Braeden Anderson MD  Primary Care Provider: Angel Pemberton MD        Subjective     Principal Problem:Sepsis        HPI:  Patient is an 84-year-old black female that presented to the emergency room with significant pain from her skin pressure injuries of her sacrum, buttocks.  Upon presentation she had a working diagnosis of sepsis so she was given IV fluids, IV antibiotics.  Workup unremarkable.  Physical exam reveals significant skin pressure injuries to the buttocks, sacrum, foot.  She is being admitted for pain control, IV antibiotics, wound care consultation.    Overview/Hospital Course:  Patient seen by wound care this morning. Recs placed. Patient without any complaints today at all. She is eating well. Afebrile. WBC ct pending.    7/4: clinically patient is doing quite well. She is tolerating PO intake. Has no complaints on rounds.  However, her WBC is not trending down. Seems she has mild chronic elevation of 14-15K but WBC 19K this mroning and 20K on admit. I still do not see any other sources of infections and wounds are not draining, no erythema, no fevers, etc. Cont clindamycin for now.  H/H 7.8/23 from 9.1/27 on admit. No signs of bleeding but with drop will monitor overnight. Also Cr 1.5 from 1.1 on admit. However, on chart review she does fluctuate around this isabella.     7/5:  Patient continues to feel well.  She has no acute complaints.  She denies chest pain, shortness of breath, dysuria, frequency, diarrhea, nausea and vomiting.  Her wounds are stable and do not show any signs of infection.  However, she remains with leukocytosis of [phone] which is not improved since admission.  Her H&H is decreasing in his knee or transfusion threshold.  Her MCV is low and she likely has  iron deficiency.  I am suspecting she has reactive leukocytosis due to this process.  Iron levels ordered.  If low we will replace IV and start p.o. tomorrow in an attempt to avoid blood transfusion.  We will continue clindamycin.  I see no other signs of infection and no indication to change antibiotics.  We will actually deescalate to oral today.  Creatinine remained stable at 1.5    7/6: Clinically this patient remains without complaints and at baseline. She has no fever, dysuria, abd pain, n/v/d/c, chest pains, SOB, cough, etc.  I have kept her inpatient following labs due to minor abnormalities:  Cr 1-1.5 at baseline and while did trend up to 1.5/1.7 this admission on chart review her renal fxn fluctuating here over at least last 1 year. Therefore I do not think this is indicative of LOWELL needing further inpatient management.  H/H down trended but has been stable last 3 days. She is not hemodynamically unstable and vitals remain normal. She has no evidence of bleeding. Stool will be sent for occult and if + can see GI outpatient. However, I am also concerned for primary bone marrow process. Will also work up hemolysis with labs today (h/o aortic stenosis s/p valve replacement).  She has chronic leukocytosis which has not been worked up to date. Plt are normal. I have no access to hematology while inpatient here so may discharge with patient needing to see hematology ASAP. She will need repeat CBC within next few days to monitor as well.   HypoCa noted; awaiting albumin. If lab unremarkable I believe she can discharge this afternoon with home health and home medical equipment and continue to follow up outpatient as we are only lab monitoring at this point in the hospital as no access to needed specialists and she is clinically stable. Dispo pending labs this morning    7/7 PT HD stable on room air.  Continued leukocytosis.  Continue IV abx for UTI.  Creatinine still bumped.  Will continue to trend. Gentle  hydration and holding valsartan.  kelma for hyperkalemia.  Patient would like to go home with family and states her family can provide her with 24 hour care.  Patient high risk for medical decline.  She is malnourished and albumin 1.8. Consult to dietician.      7/8 PT Bp on lower side.  Pt h/h down trending.  Some dilutional component with frequent lab draws.  Discussed risk versus benefits with pt and daughters about blood transfusion and they are all in agreement.  Renal function slowly improving but pt has hypoalbuminemia.  She is at risk for medical decline.  Will set up family meeting to discuss code status and plan of care.      7/9 Renal function improving.  H/h mild increase but only received 1 out of 2 units of pRBC.  Will get repeat CBC tomorrow.     7/10 PT HD stable on room air.  Renal function back to baseline.  H/H stable.  If labs are stable tomorrow plan to discharge home with home health and 24 hour care.  Pt will need continued work up of leukocytosis and anemia outpatient.  Defer to PCP for management.      7/11 PT HD stable on room air.  Patient more lethargic today but appropriate.  Renal function improved.  H/h stable.  Fever and leukocytosis overnight.  CXR pending.  LUE edema.  Venous u/s pending.  Long discussion about goals of care with family and patient.  She is a DNR.   giving family resources for care at home.       7/12-patient stable on room air.  H&H dropped.  Hemoglobin is now 6.8.  Renal function stable.  No more fever.  Still has a leukocytosis that is seems to be chronic.  Could this be CML?  Venous ultrasound of the upper extremity negative for blood clots.  She will need blood transfusion.  I will also give her IV iron wound care with Santyl Continuing.  Patient is still receiving vancomycin and Zosyn.  Urine culture grew out Klebsiella pneumoniae sensitive to about everything.  I will discontinue vanc and Zosyn and start Rocephin.    7/13-patient required  central line placement but was unable to get it.  Finally nurse supervisor was able to find a good vein using ultrasound last night.  She received 2 units of packed red blood cells.  Now her hemoglobin is 11.6.  Patient did get IV iron as well.  I discussed the case with Hematology.  They recommend a peripheral smear to determine if this is a leukemoid reaction versus CML.  I think she has a leukemoid reaction causing her elevated white blood cell count.  She looked a little over hydrated this morning her BNP was 350 which is elevated for her.  Chest x-ray did not show any pulmonary edema.  I gave her 20 mg of Lasix IV push.      7/14 PT HD stable on room air.  Vitals wnl, Labs stable.  Will monitor. If patient remains stable for 24 hour will discharge home with 24 hour care. ST consult for dysphagia after attempted central line.           Review of Systems   Constitutional:  Negative for activity change, chills, fatigue, fever and unexpected weight change.   HENT:  Positive for trouble swallowing. Negative for sore throat.    Respiratory:  Negative for cough, chest tightness and shortness of breath.    Cardiovascular:  Negative for chest pain and leg swelling.   Gastrointestinal:  Negative for abdominal pain.   Endocrine: Negative for cold intolerance and heat intolerance.   Genitourinary:  Negative for difficulty urinating.   Musculoskeletal:  Positive for arthralgias, back pain and gait problem (chronic.  Bed-bound). Negative for joint swelling.   Skin:  Positive for wound. Negative for rash.   Neurological:  Negative for numbness.   Hematological:  Negative for adenopathy.   Psychiatric/Behavioral:  Negative for decreased concentration.      Objective:     Vital Signs (Most Recent):  Temp: 97.6 °F (36.4 °C) (07/14/25 1231)  Pulse: 83 (07/14/25 1231)  Resp: 20 (07/14/25 1231)  BP: (!) 169/76 (07/14/25 1231)  SpO2: 100 % (07/14/25 1231) Vital Signs (24h Range):  Temp:  [97.6 °F (36.4 °C)-99 °F (37.2 °C)] 97.6 °F  (36.4 °C)  Pulse:  [74-92] 83  Resp:  [18-20] 20  SpO2:  [95 %-100 %] 100 %  BP: (132-196)/(67-97) 169/76     Weight: 108.5 kg (239 lb 3.2 oz)  Body mass index is 35.32 kg/m².     Physical Exam  Vitals and nursing note reviewed.   Constitutional:       General: She is not in acute distress.     Appearance: She is not ill-appearing.      Comments: Lying in bed.  Bedridden.  Unable to ambulate.  She answers questions fairly well. More lethargic today.     HENT:      Right Ear: Tympanic membrane normal.      Left Ear: Tympanic membrane normal.      Mouth/Throat:      Mouth: Mucous membranes are moist.   Eyes:      General:         Right eye: No discharge.         Left eye: No discharge.      Conjunctiva/sclera: Conjunctivae normal.   Cardiovascular:      Rate and Rhythm: Normal rate and regular rhythm.      Heart sounds: Murmur heard.   Pulmonary:      Effort: Pulmonary effort is normal.      Comments: Mild decrease breath sounds LL lobe   Abdominal:      General: Abdomen is flat.      Palpations: Abdomen is soft.   Musculoskeletal:      Cervical back: Normal range of motion and neck supple. No rigidity.      Right lower leg: Edema (3+) present.      Left lower leg: Edema (3+) present.      Comments: Left arm edema    Skin:     Findings: No erythema.      Comments: See pictures.  Significant pressure injuries and ulcers of the sacrum, buttocks.  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   Neurological:      Mental Status: She is oriented to person, place, and time. Mental status is at baseline.      Comments: Bed ridden                Significant Labs: All pertinent labs within the past 24 hours have been reviewed.  CBC:   Recent Labs   Lab 07/13/25  0648 07/14/25  0637   WBC 32.29* 30.02*   HGB 11.6* 10.7*   HCT 34.8* 31.5*    156     CMP:   Recent Labs   Lab 07/13/25  0648 07/14/25  0637    136   K 5.2* 5.4*    105   CO2 23 20*   GLU 99 104   BUN 65* 69*   CREATININE 1.6* 1.5*   CALCIUM 8.6* 8.3*  "  PROT 7.3 6.5   ALBUMIN 1.9* 1.6*   BILITOT 0.9 0.7   ALKPHOS 122 110   AST 14 11   ALT 12 9*   ANIONGAP 10 11     Lactic Acid:   No results for input(s): "LACTATE" in the last 48 hours.      Urine culture grew Klebsiella pneumoniae  POCT Glucose:   No results for input(s): "POCTGLUCOSE" in the last 48 hours.    Troponin: No results for input(s): "TROPONINI", "TROPONINIHS" in the last 48 hours.  Urine Culture:   No results for input(s): "LABURIN" in the last 48 hours.    Urine Studies:   No results for input(s): "COLORU", "APPEARANCEUA", "PHUR", "SPECGRAV", "PROTEINUA", "GLUCUA", "KETONESU", "BILIRUBINUA", "OCCULTUA", "NITRITE", "UROBILINOGEN", "LEUKOCYTESUR", "RBCUA", "WBCUA", "BACTERIA", "SQUAMEPITHEL", "HYALINECASTS" in the last 48 hours.    Invalid input(s): "WRIGHTSUR"      Urine Culture >100,000 cfu/ml Klebsiella pneumoniae Abnormal         Resulting Agency: [facility]     Susceptibility     Klebsiella pneumoniae     BENITEZ     Ampicillin >16 µg/ml Resistant     Ampicillin/Sulbactam 16/8 µg/ml Intermediate     Cefazolin <=2 µg/ml Sensitive     Cefepime <=2 µg/ml Sensitive     Ceftriaxone <=1 µg/ml Sensitive     Ciprofloxacin <=0.25 µg/ml Sensitive     Ertapenem <=0.5 µg/ml Sensitive     Gentamicin <=2 µg/ml Sensitive     Levofloxacin <=0.5 µg/ml Sensitive     Meropenem <=1 µg/ml Sensitive     Nitrofurantoin 64 µg/ml Intermediate     Piperacillin/Tazobactam <=8 µg/ml Sensitive     Tobramycin <=2 µg/ml Sensitive     Trimeth/Sulfa <=2/38 µg/ml Sensitive                   Significant Imaging: I have reviewed all pertinent imaging results/findings within the past 24 hours.  I have reviewed and interpreted all pertinent imaging results/findings within the past 24 hours.      Assessment & Plan  Sepsis  Patient has sepsis without organ dysfunction secondary to Skin and Soft Tissue Infection: Cellulitis. A review of systems was completed. Patient's sepsis is improving    Current Antibiotics    cefTRIAXone injection 1 g, Every " 24 hours (non-standard times), Intravenous    Lactate  Recent Labs   Lab 07/07/25  0641 07/07/25  1240 07/10/25  1857   LACTATE 0.9 1.3 1.8     Culture Data  Blood Cultures   Blood Culture   Date Value Ref Range Status   07/10/2025 No Growth After 72 Hours  Preliminary   07/07/2025 No Growth After 5 Days  Final   07/01/2025 No Growth After 5 Days  Final   07/01/2025 No Growth After 5 Days  Final      Urine Culture   Urine Culture   Date Value Ref Range Status   07/07/2025 >100,000 cfu/ml Klebsiella pneumoniae (A)  Final     Urine Culture, Routine   Date Value Ref Range Status   02/09/2022 No significant growth  Final      mSOFA  MSOFA Total  Min: 1   Min taken time: 07/14/25 1201  Max: 3   Max taken time: 07/14/25 1301    Plan  - Antibiotics as listed above  - Fluid resuscitation as follows:Fluid Not Needed - Patient is not hypotensive and/or lactate is less than 4.0.   - Trend lactate to resolution  - Follow up culture data  - Vasopressors were not needed  - The following services were consulted:Hospital Medicine.  - continue clindamycin for now    7/5:  Resolved.  Transition to oral clindamycin 07/05/2025.  We will need 7-10 days total of treatment    7/6: WBC remains elevated but no fevers. I am not findings any other sources of acute infection and seems to have chronic mild leukocytosis. Will cont clinda-transition to p.o. on 07/05/2025  Considering reactive leukocytosis vs primary bone marrow process. Needs hematology outpatient.   Diff is not concerning and morphology with hypochromia and target cells (no schistocytes) most consistent with Fe deficiency vs thalassemia regarding anemia and not concerning for active bacterial infection    7/7 Positive for UTI   '  7/8 Leukocytosis improving.  Gram negative rods on urine culture     7/10 Urine culture with klebsiella.   7/12 antibiotics were escalated to Zosyn and vancomycin.  Patient no longer febrile.  I will deescalate to Rocephin 1 g daily  7/13 continue  Rocephin.  Monitor CBC.  Peripheral smear ordered.  Essential hypertension  Patient's blood pressure range in the last 24 hours was: BP  Min: 132/80  Max: 196/81.The patient's inpatient anti-hypertensive regimen is listed below:  Current Antihypertensives  hydrALAZINE injection 10 mg, Every 6 hours PRN, Intravenous  metoprolol succinate (TOPROL-XL) 24 hr tablet 100 mg, 2 times daily, Oral    Plan  - BP is controlled, no changes needed to their regimen    7/7/25 d/c valsartan   Aortic valve stenosis  Echocardiogram with evidence of aortic stenosis that is moderate . The patient's most recent echocardiogram result is listed below. We will manage the valvular abnormality by status post TAVR    No echocardiogram results found for the past 12 months     7/6: clinically stable          Hyperlipidemia LDL goal <70  Lipitor 10 mg daily    Pressure ulcer of left heel, unstageable  Consult wound care  Oxycodone p.r.n. pain    Off load heels.  Order mattress for home.  Spinal stenosis of lumbar region with neurogenic claudication  Patient is bed ridden.  She will require frequent turning, PT, wound care.    Need to discuss with family about possible nursing home placement.  She does have home health.  She seems very difficult to care for.    Ordering low air loss mattress for home.  Pressure injury of skin of sacral region  Consult wound care   Continue clindamycin for now.    Will recheck CBC.   No signs of infection per wound care/eval.  Will likely cont on clinda and follow cultures. Orders per wound care and new mattress ordered.    7/6: WBC remains elevated but no fevers. I am not findings any other sources of acute infection and seems to have chronic mild leukocytosis. Will cont clinda-transition to p.o. on 07/05/2025  Considering reactive leukocytosis vs primary bone marrow process. Needs hematology outpatient.   Diff is not concerning and morphology with hypochromia and target cells (no schistocytes) most consistent  with Fe deficiency vs thalassemia regarding anemia and not concerning for active bacterial infection    7/7 Appreciate wound care recommendations     S/P TAVR (transcatheter aortic valve replacement)  Continue metoprolol 100 mg twice daily    Microcytic anemia  Anemia is likely due to Iron deficiency. Most recent hemoglobin and hematocrit are listed below.  Recent Labs     07/12/25  0442 07/13/25  0648 07/14/25  0637   HGB 6.8* 11.6* 10.7*   HCT 20.6* 34.8* 31.5*     Plan  - Monitor serial CBC: Daily  - Transfuse PRBC if patient becomes hemodynamically unstable, symptomatic or H/H drops below 7/21.  - Patient has not received any PRBC transfusions to date  - Patient's anemia is currently worsening. Will adjust treatment as follows:  IV iron today  - H&H is worsening since admission.  Labs seem most consistent with iron deficiency.  Iron levels ordered and pending.  I do not see any clinical evidence of active bleeding at this time and remains hemodynamically stable.  In an attempt to avoid transfusion we will give IV iron today and follow labs    7/6: iron, B12 and folate normal  Stool sent for occult but no clinical signs of bleeding  Hemolysis labs sent  H/H low but stable for last 3 days.  If above work up negative she is otherwise clinically stable for discharge and close hematology follow up with outpatient labs as I do not have GI or hematology at this hospital to manage while inpatient.     Anemia stable. Mildly elevated haptoglobin and LDH.   Will monitor H/H/    7/8 h/h was stable but decreased on am labs. Dilutional component is a possibility and due to frequent lab draws in the setting of anemia of chronic disease.  2 units of pRBC ordered.    7/10 will monitor and if remains stable will discharge home tomorrow     7/12 patient will need 2 units packed red blood cells transfused.    7/13 patient received IV iron yesterday.  She also received 2 units of packed red blood cells.  She has a little fluid  overloaded this morning so IV Lasix 20 mg given.  H&H much improved    7/14 H/h stable.  Will continue to monitor  Hyperkalemia  Hyperkalemia is likely due to LOWELL.The patients most recent potassium results are listed below.  Recent Labs     07/12/25  0442 07/13/25  0648 07/14/25  0637   K 4.6 5.2* 5.4*     Plan  - Monitor for arrhythmias with EKG and/or continuous telemetry.   - Treat the hyperkalemia with Potassium Binders.   - Monitor potassium:daily   - The patient's hyperkalemia is improving    Another dose of lokelma     7/9 Resolved       LOWELL (acute kidney injury)  LOWELL is likely due to unsure etiology at this time. Baseline creatinine is 1.1. Most recent creatinine and eGFR are listed below.  Recent Labs     07/12/25 0442 07/13/25  0648 07/14/25  0637   CREATININE 1.3 1.6* 1.5*   EGFRNORACEVR 41* 32* 34*      Plan  - LOWELL is worsening. Will adjust treatment as follows: d/c valsartan, IV fluids, address hypoalbuminemia, urine studies pending .    - Avoid nephrotoxins and renally dose meds for GFR listed above  - Monitor urine output, serial BMP, and adjust therapy as needed    Urine studies indeterminate.  Renal function slowly improving.      7/9 LOWELL resolving     7/10 LOWELL resolved    7/13 creatinine is now 1.6.  Monitor renal function daily.    7/14 Stable     UTI (urinary tract infection)  U/A positive >100 on microscopic  Urine culture klebsiella  Changed Zosyn and vancomycin to Rocephin 1 g daily    Goals of care, counseling/discussion  Would like to have family meeting to discuss goals of care.  Reached out to daughters Lubna and Aditi for this afternoon or tomorrow.  Pt seems to answer appropriately but unable to express full understanding of medical care and condition and is only able to say she is sick.      Waiting to hear back from family to discuss goals of care with multiple comorbidities, slow to improve and continued anemia requiring blood transfusion.   My understanding is that if she is  medically stable to discharge she will go home and they will provide 24 hour care.      7/11 Both daughters, son and granddaughter at beside.  Discussed plan of care.  Working up leukocytosis and fever.  CXR pending.  Discussed that patient is high risk for medical decline and infection due to age, hypoalbuminemia and comorbdities.  Although her leukocytosis is worse from her baseline she will need further work up outpatient.  She would like to be DNR.  Once medically stable family is deciding if they can take her home or if she will go to nursing home.  Social work is providing resources for at home services.      7/14 Plan to discharge home with 24 hour care once pt remains medically stable     Leukocytosis  Worsening leukocytosis  Lactic acid WNL  CXR atelectasis at lung bases   Blood culture no growth   Venous u/s left upper extremity for edema negative for PE   Obtained hematology consult.  Possible leukomoid reaction but peripheral smear can be obtained to r/o CML     Leukocytosis stable         VTE Risk Mitigation (From admission, onward)           Ordered     IP VTE HIGH RISK PATIENT  Once         07/01/25 2343     Place sequential compression device  Until discontinued         07/01/25 2343                    Discharge Planning   LINA: 7/18/2025     Code Status: DNR   Medical Readiness for Discharge Date:   Discharge Plan A: Home with family                        Anjali Mesa PA-C  Department of Hospital Medicine   [facility] - Med Surg (3rd Fl)

## 2025-07-14 NOTE — ASSESSMENT & PLAN NOTE
Anemia is likely due to Iron deficiency. Most recent hemoglobin and hematocrit are listed below.  Recent Labs     07/12/25  0442 07/13/25  0648 07/14/25  0637   HGB 6.8* 11.6* 10.7*   HCT 20.6* 34.8* 31.5*     Plan  - Monitor serial CBC: Daily  - Transfuse PRBC if patient becomes hemodynamically unstable, symptomatic or H/H drops below 7/21.  - Patient has not received any PRBC transfusions to date  - Patient's anemia is currently worsening. Will adjust treatment as follows:  IV iron today  - H&H is worsening since admission.  Labs seem most consistent with iron deficiency.  Iron levels ordered and pending.  I do not see any clinical evidence of active bleeding at this time and remains hemodynamically stable.  In an attempt to avoid transfusion we will give IV iron today and follow labs    7/6: iron, B12 and folate normal  Stool sent for occult but no clinical signs of bleeding  Hemolysis labs sent  H/H low but stable for last 3 days.  If above work up negative she is otherwise clinically stable for discharge and close hematology follow up with outpatient labs as I do not have GI or hematology at this hospital to manage while inpatient.     Anemia stable. Mildly elevated haptoglobin and LDH.   Will monitor H/H/    7/8 h/h was stable but decreased on am labs. Dilutional component is a possibility and due to frequent lab draws in the setting of anemia of chronic disease.  2 units of pRBC ordered.    7/10 will monitor and if remains stable will discharge home tomorrow     7/12 patient will need 2 units packed red blood cells transfused.    7/13 patient received IV iron yesterday.  She also received 2 units of packed red blood cells.  She has a little fluid overloaded this morning so IV Lasix 20 mg given.  H&H much improved    7/14 H/h stable.  Will continue to monitor

## 2025-07-14 NOTE — PLAN OF CARE
Problem: Skin Injury Risk Increased  Goal: Skin Health and Integrity  Outcome: Progressing     Problem: Adult Inpatient Plan of Care  Goal: Plan of Care Review  Outcome: Progressing  Goal: Patient-Specific Goal (Individualized)  Outcome: Progressing  Goal: Absence of Hospital-Acquired Illness or Injury  Outcome: Progressing  Goal: Optimal Comfort and Wellbeing  Outcome: Progressing  Goal: Readiness for Transition of Care  Outcome: Progressing     Problem: Fall Injury Risk  Goal: Absence of Fall and Fall-Related Injury  Outcome: Progressing     Problem: Wound  Goal: Optimal Coping  Outcome: Progressing  Goal: Optimal Functional Ability  Outcome: Progressing  Goal: Absence of Infection Signs and Symptoms  Outcome: Progressing  Goal: Improved Oral Intake  Outcome: Progressing  Goal: Optimal Pain Control and Function  Outcome: Progressing  Goal: Skin Health and Integrity  Outcome: Progressing  Goal: Optimal Wound Healing  Outcome: Progressing     Problem: Sepsis/Septic Shock  Goal: Optimal Coping  Outcome: Progressing  Goal: Absence of Bleeding  Outcome: Progressing  Goal: Blood Glucose Level Within Targeted Range  Outcome: Progressing  Goal: Absence of Infection Signs and Symptoms  Outcome: Progressing  Goal: Optimal Nutrition Intake  Outcome: Progressing     Problem: Infection  Goal: Absence of Infection Signs and Symptoms  Outcome: Progressing     Problem: Acute Kidney Injury/Impairment  Goal: Fluid and Electrolyte Balance  Outcome: Progressing  Goal: Improved Oral Intake  Outcome: Progressing  Goal: Effective Renal Function  Outcome: Progressing     Problem: Anemia  Goal: Anemia Symptom Improvement  Outcome: Progressing

## 2025-07-14 NOTE — NURSING
Upon assessment, more edema observed face and extremities. MD notified, and lasix 20 mg IV was administered as ordered.

## 2025-07-14 NOTE — PLAN OF CARE
07/14/25 1007   Rounds   Attendance Nurse ;Provider;   Why the patient remains in the hospital Requires continued medical care   Transition of Care Barriers None     Care team reviewed plan of care and discharge plan with patient and family.  CM will continue to follow till discharge.

## 2025-07-14 NOTE — PROGRESS NOTES
The patient has had an E-Consult completed. Please refer to that note as needed. Please communicate the information below to the patient. Based on the recommendations of the specialist, I recommend that the patient do the following:    Discussed on phone with provider via referral center. Leukocytosis may be related to acute infection. Obtain pathology review of peripheral smear if concerned for myeloid malignancy.

## 2025-07-14 NOTE — ASSESSMENT & PLAN NOTE
Would like to have family meeting to discuss goals of care.  Reached out to daughters Lubna and Aditi for this afternoon or tomorrow.  Pt seems to answer appropriately but unable to express full understanding of medical care and condition and is only able to say she is sick.      Waiting to hear back from family to discuss goals of care with multiple comorbidities, slow to improve and continued anemia requiring blood transfusion.   My understanding is that if she is medically stable to discharge she will go home and they will provide 24 hour care.      7/11 Both daughters, son and granddaughter at beside.  Discussed plan of care.  Working up leukocytosis and fever.  CXR pending.  Discussed that patient is high risk for medical decline and infection due to age, hypoalbuminemia and comorbdities.  Although her leukocytosis is worse from her baseline she will need further work up outpatient.  She would like to be DNR.  Once medically stable family is deciding if they can take her home or if she will go to nursing home.  Social work is providing resources for at home services.      7/14 Plan to discharge home with 24 hour care once pt remains medically stable

## 2025-07-14 NOTE — ASSESSMENT & PLAN NOTE
Hyperkalemia is likely due to LOWELL.The patients most recent potassium results are listed below.  Recent Labs     07/12/25  0442 07/13/25  0648 07/14/25  0637   K 4.6 5.2* 5.4*     Plan  - Monitor for arrhythmias with EKG and/or continuous telemetry.   - Treat the hyperkalemia with Potassium Binders.   - Monitor potassium:daily   - The patient's hyperkalemia is improving    Another dose of lokelma     7/9 Resolved

## 2025-07-14 NOTE — PLAN OF CARE
Problem: Skin Injury Risk Increased  Goal: Skin Health and Integrity  Outcome: Progressing     Problem: Adult Inpatient Plan of Care  Goal: Plan of Care Review  Outcome: Progressing     Problem: Adult Inpatient Plan of Care  Goal: Optimal Comfort and Wellbeing  Outcome: Progressing     Problem: Wound  Goal: Optimal Coping  Outcome: Progressing     Problem: Wound  Goal: Skin Health and Integrity  Outcome: Progressing

## 2025-07-14 NOTE — ASSESSMENT & PLAN NOTE
Worsening leukocytosis  Lactic acid WNL  CXR atelectasis at lung bases   Blood culture no growth   Venous u/s left upper extremity for edema negative for PE   Obtained hematology consult.  Possible leukomoid reaction but peripheral smear can be obtained to r/o CML     Leukocytosis stable

## 2025-07-14 NOTE — ASSESSMENT & PLAN NOTE
LOWELL is likely due to unsure etiology at this time. Baseline creatinine is 1.1. Most recent creatinine and eGFR are listed below.  Recent Labs     07/12/25  0442 07/13/25  0648 07/14/25  0637   CREATININE 1.3 1.6* 1.5*   EGFRNORACEVR 41* 32* 34*      Plan  - LOWELL is worsening. Will adjust treatment as follows: d/c valsartan, IV fluids, address hypoalbuminemia, urine studies pending .    - Avoid nephrotoxins and renally dose meds for GFR listed above  - Monitor urine output, serial BMP, and adjust therapy as needed    Urine studies indeterminate.  Renal function slowly improving.      7/9 LOWELL resolving     7/10 LOWELL resolved    7/13 creatinine is now 1.6.  Monitor renal function daily.    7/14 Stable

## 2025-07-14 NOTE — ASSESSMENT & PLAN NOTE
Patient has sepsis without organ dysfunction secondary to Skin and Soft Tissue Infection: Cellulitis. A review of systems was completed. Patient's sepsis is improving    Current Antibiotics    cefTRIAXone injection 1 g, Every 24 hours (non-standard times), Intravenous    Lactate  Recent Labs   Lab 07/07/25  0641 07/07/25  1240 07/10/25  1857   LACTATE 0.9 1.3 1.8     Culture Data  Blood Cultures   Blood Culture   Date Value Ref Range Status   07/10/2025 No Growth After 72 Hours  Preliminary   07/07/2025 No Growth After 5 Days  Final   07/01/2025 No Growth After 5 Days  Final   07/01/2025 No Growth After 5 Days  Final      Urine Culture   Urine Culture   Date Value Ref Range Status   07/07/2025 >100,000 cfu/ml Klebsiella pneumoniae (A)  Final     Urine Culture, Routine   Date Value Ref Range Status   02/09/2022 No significant growth  Final      mSOFA  MSOFA Total  Min: 1   Min taken time: 07/14/25 1201  Max: 3   Max taken time: 07/14/25 1301    Plan  - Antibiotics as listed above  - Fluid resuscitation as follows:Fluid Not Needed - Patient is not hypotensive and/or lactate is less than 4.0.   - Trend lactate to resolution  - Follow up culture data  - Vasopressors were not needed  - The following services were consulted:Hospital Medicine.  - continue clindamycin for now    7/5:  Resolved.  Transition to oral clindamycin 07/05/2025.  We will need 7-10 days total of treatment    7/6: WBC remains elevated but no fevers. I am not findings any other sources of acute infection and seems to have chronic mild leukocytosis. Will cont clinda-transition to p.o. on 07/05/2025  Considering reactive leukocytosis vs primary bone marrow process. Needs hematology outpatient.   Diff is not concerning and morphology with hypochromia and target cells (no schistocytes) most consistent with Fe deficiency vs thalassemia regarding anemia and not concerning for active bacterial infection    7/7 Positive for UTI   '  7/8 Leukocytosis  improving.  Gram negative rods on urine culture     7/10 Urine culture with klebsiella.   7/12 antibiotics were escalated to Zosyn and vancomycin.  Patient no longer febrile.  I will deescalate to Rocephin 1 g daily  7/13 continue Rocephin.  Monitor CBC.  Peripheral smear ordered.

## 2025-07-14 NOTE — ASSESSMENT & PLAN NOTE
Patient's blood pressure range in the last 24 hours was: BP  Min: 132/80  Max: 196/81.The patient's inpatient anti-hypertensive regimen is listed below:  Current Antihypertensives  hydrALAZINE injection 10 mg, Every 6 hours PRN, Intravenous  metoprolol succinate (TOPROL-XL) 24 hr tablet 100 mg, 2 times daily, Oral    Plan  - BP is controlled, no changes needed to their regimen    7/7/25 d/c valsartan

## 2025-07-14 NOTE — SUBJECTIVE & OBJECTIVE
Review of Systems   Constitutional:  Negative for activity change, chills, fatigue, fever and unexpected weight change.   HENT:  Positive for trouble swallowing. Negative for sore throat.    Respiratory:  Negative for cough, chest tightness and shortness of breath.    Cardiovascular:  Negative for chest pain and leg swelling.   Gastrointestinal:  Negative for abdominal pain.   Endocrine: Negative for cold intolerance and heat intolerance.   Genitourinary:  Negative for difficulty urinating.   Musculoskeletal:  Positive for arthralgias, back pain and gait problem (chronic.  Bed-bound). Negative for joint swelling.   Skin:  Positive for wound. Negative for rash.   Neurological:  Negative for numbness.   Hematological:  Negative for adenopathy.   Psychiatric/Behavioral:  Negative for decreased concentration.      Objective:     Vital Signs (Most Recent):  Temp: 97.6 °F (36.4 °C) (07/14/25 1231)  Pulse: 83 (07/14/25 1231)  Resp: 20 (07/14/25 1231)  BP: (!) 169/76 (07/14/25 1231)  SpO2: 100 % (07/14/25 1231) Vital Signs (24h Range):  Temp:  [97.6 °F (36.4 °C)-99 °F (37.2 °C)] 97.6 °F (36.4 °C)  Pulse:  [74-92] 83  Resp:  [18-20] 20  SpO2:  [95 %-100 %] 100 %  BP: (132-196)/(67-97) 169/76     Weight: 108.5 kg (239 lb 3.2 oz)  Body mass index is 35.32 kg/m².     Physical Exam  Vitals and nursing note reviewed.   Constitutional:       General: She is not in acute distress.     Appearance: She is not ill-appearing.      Comments: Lying in bed.  Bedridden.  Unable to ambulate.  She answers questions fairly well. More lethargic today.     HENT:      Right Ear: Tympanic membrane normal.      Left Ear: Tympanic membrane normal.      Mouth/Throat:      Mouth: Mucous membranes are moist.   Eyes:      General:         Right eye: No discharge.         Left eye: No discharge.      Conjunctiva/sclera: Conjunctivae normal.   Cardiovascular:      Rate and Rhythm: Normal rate and regular rhythm.      Heart sounds: Murmur heard.  "  Pulmonary:      Effort: Pulmonary effort is normal.      Comments: Mild decrease breath sounds LL lobe   Abdominal:      General: Abdomen is flat.      Palpations: Abdomen is soft.   Musculoskeletal:      Cervical back: Normal range of motion and neck supple. No rigidity.      Right lower leg: Edema (3+) present.      Left lower leg: Edema (3+) present.      Comments: Left arm edema    Skin:     Findings: No erythema.      Comments: See pictures.  Significant pressure injuries and ulcers of the sacrum, buttocks.  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   Neurological:      Mental Status: She is oriented to person, place, and time. Mental status is at baseline.      Comments: Bed ridden                Significant Labs: All pertinent labs within the past 24 hours have been reviewed.  CBC:   Recent Labs   Lab 07/13/25  0648 07/14/25  0637   WBC 32.29* 30.02*   HGB 11.6* 10.7*   HCT 34.8* 31.5*    156     CMP:   Recent Labs   Lab 07/13/25  0648 07/14/25  0637    136   K 5.2* 5.4*    105   CO2 23 20*   GLU 99 104   BUN 65* 69*   CREATININE 1.6* 1.5*   CALCIUM 8.6* 8.3*   PROT 7.3 6.5   ALBUMIN 1.9* 1.6*   BILITOT 0.9 0.7   ALKPHOS 122 110   AST 14 11   ALT 12 9*   ANIONGAP 10 11     Lactic Acid:   No results for input(s): "LACTATE" in the last 48 hours.      Urine culture grew Klebsiella pneumoniae  POCT Glucose:   No results for input(s): "POCTGLUCOSE" in the last 48 hours.    Troponin: No results for input(s): "TROPONINI", "TROPONINIHS" in the last 48 hours.  Urine Culture:   No results for input(s): "LABURIN" in the last 48 hours.    Urine Studies:   No results for input(s): "COLORU", "APPEARANCEUA", "PHUR", "SPECGRAV", "PROTEINUA", "GLUCUA", "KETONESU", "BILIRUBINUA", "OCCULTUA", "NITRITE", "UROBILINOGEN", "LEUKOCYTESUR", "RBCUA", "WBCUA", "BACTERIA", "SQUAMEPITHEL", "HYALINECASTS" in the last 48 hours.    Invalid input(s): "WRIGHTSUR"      Urine Culture >100,000 cfu/ml Klebsiella pneumoniae " Abnormal         Resulting Agency: [facility]     Susceptibility     Klebsiella pneumoniae     BENITEZ     Ampicillin >16 µg/ml Resistant     Ampicillin/Sulbactam 16/8 µg/ml Intermediate     Cefazolin <=2 µg/ml Sensitive     Cefepime <=2 µg/ml Sensitive     Ceftriaxone <=1 µg/ml Sensitive     Ciprofloxacin <=0.25 µg/ml Sensitive     Ertapenem <=0.5 µg/ml Sensitive     Gentamicin <=2 µg/ml Sensitive     Levofloxacin <=0.5 µg/ml Sensitive     Meropenem <=1 µg/ml Sensitive     Nitrofurantoin 64 µg/ml Intermediate     Piperacillin/Tazobactam <=8 µg/ml Sensitive     Tobramycin <=2 µg/ml Sensitive     Trimeth/Sulfa <=2/38 µg/ml Sensitive                   Significant Imaging: I have reviewed all pertinent imaging results/findings within the past 24 hours.  I have reviewed and interpreted all pertinent imaging results/findings within the past 24 hours.

## 2025-07-15 PROBLEM — L03.90 CELLULITIS: Status: ACTIVE | Noted: 2025-01-01

## 2025-07-15 PROBLEM — R13.10 DYSPHAGIA: Status: ACTIVE | Noted: 2025-01-01

## 2025-07-15 NOTE — ASSESSMENT & PLAN NOTE
Anemia is likely due to Iron deficiency. Most recent hemoglobin and hematocrit are listed below.  Recent Labs     07/13/25  0648 07/14/25  0637 07/15/25  0348   HGB 11.6* 10.7* 10.2*   HCT 34.8* 31.5* 30.6*     Plan  - Monitor serial CBC: Daily  - Transfuse PRBC if patient becomes hemodynamically unstable, symptomatic or H/H drops below 7/21.  - Patient has not received any PRBC transfusions to date  - Patient's anemia is currently worsening. Will adjust treatment as follows:  IV iron today  - H&H is worsening since admission.  Labs seem most consistent with iron deficiency.  Iron levels ordered and pending.  I do not see any clinical evidence of active bleeding at this time and remains hemodynamically stable.  In an attempt to avoid transfusion we will give IV iron today and follow labs    7/6: iron, B12 and folate normal  Stool sent for occult but no clinical signs of bleeding  Hemolysis labs sent  H/H low but stable for last 3 days.  If above work up negative she is otherwise clinically stable for discharge and close hematology follow up with outpatient labs as I do not have GI or hematology at this hospital to manage while inpatient.     Anemia stable. Mildly elevated haptoglobin and LDH.   Will monitor H/H/    7/8 h/h was stable but decreased on am labs. Dilutional component is a possibility and due to frequent lab draws in the setting of anemia of chronic disease.  2 units of pRBC ordered.    7/10 will monitor and if remains stable will discharge home tomorrow     7/12 patient will need 2 units packed red blood cells transfused.    7/13 patient received IV iron yesterday.  She also received 2 units of packed red blood cells.  She has a little fluid overloaded this morning so IV Lasix 20 mg given.  H&H much improved    7/14 H/h stable.  Will continue to monitor

## 2025-07-15 NOTE — ASSESSMENT & PLAN NOTE
Worsening leukocytosis  Lactic acid WNL  CXR atelectasis at lung bases   Blood culture no growth   Venous u/s left upper extremity for edema negative for PE   Obtained hematology consult.  Possible leukomoid reaction but peripheral smear can be obtained to r/o CML     Leukocytosis slowly improving.  MRI left foot negative for osteomyelitis.  Will continue IV abx for cellulitis of left foot

## 2025-07-15 NOTE — PLAN OF CARE
Problem: Skin Injury Risk Increased  Goal: Skin Health and Integrity  Outcome: Progressing     Problem: Adult Inpatient Plan of Care  Goal: Absence of Hospital-Acquired Illness or Injury  Outcome: Progressing  Goal: Optimal Comfort and Wellbeing  Outcome: Progressing

## 2025-07-15 NOTE — ASSESSMENT & PLAN NOTE
Hyperkalemia is likely due to LOWELL.The patients most recent potassium results are listed below.  Recent Labs     07/13/25  0648 07/14/25  0637 07/15/25  0348   K 5.2* 5.4* 5.3*     Plan  - Monitor for arrhythmias with EKG and/or continuous telemetry.   - Treat the hyperkalemia with Potassium Binders.   - Monitor potassium:daily   - The patient's hyperkalemia is improving    Another dose of lokelma     7/9 Resolved

## 2025-07-15 NOTE — PROGRESS NOTES
[facility] ([address] [place] [address] Medicine  Progress Note    Patient Name: Emily Alicia  MRN: [id number]  Patient Class: IP- Inpatient   Admission Date: 7/1/2025  Length of Stay: 13 days  Attending Physician: Braeden Anderson MD  Primary Care Provider: Angel Pemberton MD        Subjective     Principal Problem:Sepsis        HPI:  Patient is an 84-year-old black female that presented to the emergency room with significant pain from her skin pressure injuries of her sacrum, buttocks.  Upon presentation she had a working diagnosis of sepsis so she was given IV fluids, IV antibiotics.  Workup unremarkable.  Physical exam reveals significant skin pressure injuries to the buttocks, sacrum, foot.  She is being admitted for pain control, IV antibiotics, wound care consultation.    Overview/Hospital Course:  Patient seen by wound care this morning. Recs placed. Patient without any complaints today at all. She is eating well. Afebrile. WBC ct pending.    7/4: clinically patient is doing quite well. She is tolerating PO intake. Has no complaints on rounds.  However, her WBC is not trending down. Seems she has mild chronic elevation of 14-15K but WBC 19K this mroning and 20K on admit. I still do not see any other sources of infections and wounds are not draining, no erythema, no fevers, etc. Cont clindamycin for now.  H/H 7.8/23 from 9.1/27 on admit. No signs of bleeding but with drop will monitor overnight. Also Cr 1.5 from 1.1 on admit. However, on chart review she does fluctuate around this isabella.     7/5:  Patient continues to feel well.  She has no acute complaints.  She denies chest pain, shortness of breath, dysuria, frequency, diarrhea, nausea and vomiting.  Her wounds are stable and do not show any signs of infection.  However, she remains with leukocytosis of [phone] which is not improved since admission.  Her H&H is decreasing in his knee or transfusion threshold.  Her MCV is low and she likely has  iron deficiency.  I am suspecting she has reactive leukocytosis due to this process.  Iron levels ordered.  If low we will replace IV and start p.o. tomorrow in an attempt to avoid blood transfusion.  We will continue clindamycin.  I see no other signs of infection and no indication to change antibiotics.  We will actually deescalate to oral today.  Creatinine remained stable at 1.5    7/6: Clinically this patient remains without complaints and at baseline. She has no fever, dysuria, abd pain, n/v/d/c, chest pains, SOB, cough, etc.  I have kept her inpatient following labs due to minor abnormalities:  Cr 1-1.5 at baseline and while did trend up to 1.5/1.7 this admission on chart review her renal fxn fluctuating here over at least last 1 year. Therefore I do not think this is indicative of LOWELL needing further inpatient management.  H/H down trended but has been stable last 3 days. She is not hemodynamically unstable and vitals remain normal. She has no evidence of bleeding. Stool will be sent for occult and if + can see GI outpatient. However, I am also concerned for primary bone marrow process. Will also work up hemolysis with labs today (h/o aortic stenosis s/p valve replacement).  She has chronic leukocytosis which has not been worked up to date. Plt are normal. I have no access to hematology while inpatient here so may discharge with patient needing to see hematology ASAP. She will need repeat CBC within next few days to monitor as well.   HypoCa noted; awaiting albumin. If lab unremarkable I believe she can discharge this afternoon with home health and home medical equipment and continue to follow up outpatient as we are only lab monitoring at this point in the hospital as no access to needed specialists and she is clinically stable. Dispo pending labs this morning    7/7 PT HD stable on room air.  Continued leukocytosis.  Continue IV abx for UTI.  Creatinine still bumped.  Will continue to trend. Gentle  hydration and holding valsartan.  kelma for hyperkalemia.  Patient would like to go home with family and states her family can provide her with 24 hour care.  Patient high risk for medical decline.  She is malnourished and albumin 1.8. Consult to dietician.      7/8 PT Bp on lower side.  Pt h/h down trending.  Some dilutional component with frequent lab draws.  Discussed risk versus benefits with pt and daughters about blood transfusion and they are all in agreement.  Renal function slowly improving but pt has hypoalbuminemia.  She is at risk for medical decline.  Will set up family meeting to discuss code status and plan of care.      7/9 Renal function improving.  H/h mild increase but only received 1 out of 2 units of pRBC.  Will get repeat CBC tomorrow.     7/10 PT HD stable on room air.  Renal function back to baseline.  H/H stable.  If labs are stable tomorrow plan to discharge home with home health and 24 hour care.  Pt will need continued work up of leukocytosis and anemia outpatient.  Defer to PCP for management.      7/11 PT HD stable on room air.  Patient more lethargic today but appropriate.  Renal function improved.  H/h stable.  Fever and leukocytosis overnight.  CXR pending.  LUE edema.  Venous u/s pending.  Long discussion about goals of care with family and patient.  She is a DNR.   giving family resources for care at home.       7/12-patient stable on room air.  H&H dropped.  Hemoglobin is now 6.8.  Renal function stable.  No more fever.  Still has a leukocytosis that is seems to be chronic.  Could this be CML?  Venous ultrasound of the upper extremity negative for blood clots.  She will need blood transfusion.  I will also give her IV iron wound care with Santyl Continuing.  Patient is still receiving vancomycin and Zosyn.  Urine culture grew out Klebsiella pneumoniae sensitive to about everything.  I will discontinue vanc and Zosyn and start Rocephin.    7/13-patient required  central line placement but was unable to get it.  Finally nurse supervisor was able to find a good vein using ultrasound last night.  She received 2 units of packed red blood cells.  Now her hemoglobin is 11.6.  Patient did get IV iron as well.  I discussed the case with Hematology.  They recommend a peripheral smear to determine if this is a leukemoid reaction versus CML.  I think she has a leukemoid reaction causing her elevated white blood cell count.  She looked a little over hydrated this morning her BNP was 350 which is elevated for her.  Chest x-ray did not show any pulmonary edema.  I gave her 20 mg of Lasix IV push.      7/14 PT HD stable on room air.  Vitals wnl, Labs stable.  Will monitor. If patient remains stable for 24 hour will discharge home with 24 hour care. ST consult for dysphagia after attempted central line.     7/15 PT HD stable on room air.  Labs stable.  Pt having difficulty swallowing and increased secretions.  She appears mildly uncomfortable but is not in respiratory distress.  CT neck did not show any neck mass or soft tissue edema.   ST evaluated patient and concern for cranial nerve 10 injury.  Will monitor and will consider transfer if patient does not improve.            Review of Systems   Constitutional:  Negative for activity change, chills, fatigue, fever and unexpected weight change.   HENT:  Positive for trouble swallowing. Negative for sore throat.    Respiratory:  Negative for cough, chest tightness and shortness of breath.    Cardiovascular:  Negative for chest pain and leg swelling.   Gastrointestinal:  Negative for abdominal pain.   Endocrine: Negative for cold intolerance and heat intolerance.   Genitourinary:  Negative for difficulty urinating.   Musculoskeletal:  Positive for arthralgias, back pain and gait problem (chronic.  Bed-bound). Negative for joint swelling.   Skin:  Positive for wound. Negative for rash.   Neurological:  Negative for numbness.   Hematological:   Negative for adenopathy.   Psychiatric/Behavioral:  Negative for decreased concentration.      Objective:     Vital Signs (Most Recent):  Temp: 97.1 °F (36.2 °C) (07/15/25 1542)  Pulse: 74 (07/15/25 1600)  Resp: 18 (07/15/25 1542)  BP: (!) 171/87 (07/15/25 1542)  SpO2: (!) 94 % (07/15/25 1542) Vital Signs (24h Range):  Temp:  [96.9 °F (36.1 °C)-98.5 °F (36.9 °C)] 97.1 °F (36.2 °C)  Pulse:  [73-91] 74  Resp:  [14-21] 18  SpO2:  [93 %-97 %] 94 %  BP: (118-171)/() 171/87     Weight: 108.5 kg (239 lb 3.2 oz)  Body mass index is 35.32 kg/m².     Physical Exam  Vitals and nursing note reviewed.   Constitutional:       General: She is not in acute distress.     Appearance: She is not ill-appearing.      Comments: Appears uncomfortable, awake   Increased secretions    HENT:      Right Ear: Tympanic membrane normal.      Left Ear: Tympanic membrane normal.      Mouth/Throat:      Mouth: Mucous membranes are moist.   Eyes:      General:         Right eye: No discharge.         Left eye: No discharge.      Conjunctiva/sclera: Conjunctivae normal.   Cardiovascular:      Rate and Rhythm: Normal rate and regular rhythm.      Heart sounds: Murmur heard.   Pulmonary:      Effort: Pulmonary effort is normal.      Comments: Mild decrease breath sounds LL lobe   Abdominal:      General: Abdomen is flat.      Palpations: Abdomen is soft.   Musculoskeletal:      Cervical back: Normal range of motion and neck supple. No rigidity.      Right lower leg: Edema (3+) present.      Left lower leg: Edema (3+) present.      Comments: Left arm edema    Skin:     Findings: No erythema.      Comments: See pictures.  Significant pressure injuries and ulcers of the sacrum, buttocks.  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   Neurological:      Mental Status: She is oriented to person, place, and time. Mental status is at baseline.      Comments: Bed ridden                Significant Labs: All pertinent labs within the past 24 hours have been  "reviewed.  CBC:   Recent Labs   Lab 07/14/25  0637 07/15/25  0348   WBC 30.02* 25.88*   HGB 10.7* 10.2*   HCT 31.5* 30.6*    157     CMP:   Recent Labs   Lab 07/14/25  0637 07/15/25  0348    139   K 5.4* 5.3*    106   CO2 20* 21*    102   BUN 69* 67*   CREATININE 1.5* 1.4   CALCIUM 8.3* 8.5*   PROT 6.5 6.5   ALBUMIN 1.6* 1.7*   BILITOT 0.7 0.9   ALKPHOS 110 126   AST 11 14   ALT 9* 10   ANIONGAP 11 12     Lactic Acid:   No results for input(s): "LACTATE" in the last 48 hours.      Urine culture grew Klebsiella pneumoniae  POCT Glucose:   No results for input(s): "POCTGLUCOSE" in the last 48 hours.    Troponin: No results for input(s): "TROPONINI", "TROPONINIHS" in the last 48 hours.  Urine Culture:   No results for input(s): "LABURIN" in the last 48 hours.    Urine Studies:   No results for input(s): "COLORU", "APPEARANCEUA", "PHUR", "SPECGRAV", "PROTEINUA", "GLUCUA", "KETONESU", "BILIRUBINUA", "OCCULTUA", "NITRITE", "UROBILINOGEN", "LEUKOCYTESUR", "RBCUA", "WBCUA", "BACTERIA", "SQUAMEPITHEL", "HYALINECASTS" in the last 48 hours.    Invalid input(s): "WRIGHTSUR"      Urine Culture >100,000 cfu/ml Klebsiella pneumoniae Abnormal         Resulting Agency: [facility]     Susceptibility     Klebsiella pneumoniae     BENITEZ     Ampicillin >16 µg/ml Resistant     Ampicillin/Sulbactam 16/8 µg/ml Intermediate     Cefazolin <=2 µg/ml Sensitive     Cefepime <=2 µg/ml Sensitive     Ceftriaxone <=1 µg/ml Sensitive     Ciprofloxacin <=0.25 µg/ml Sensitive     Ertapenem <=0.5 µg/ml Sensitive     Gentamicin <=2 µg/ml Sensitive     Levofloxacin <=0.5 µg/ml Sensitive     Meropenem <=1 µg/ml Sensitive     Nitrofurantoin 64 µg/ml Intermediate     Piperacillin/Tazobactam <=8 µg/ml Sensitive     Tobramycin <=2 µg/ml Sensitive     Trimeth/Sulfa <=2/38 µg/ml Sensitive                   Significant Imaging: I have reviewed all pertinent imaging results/findings within the past 24 hours.  I have reviewed and interpreted all " pertinent imaging results/findings within the past 24 hours.      Assessment & Plan  Sepsis  Patient has sepsis without organ dysfunction secondary to Skin and Soft Tissue Infection: Cellulitis. A review of systems was completed. Patient's sepsis is improving    Current Antibiotics    cefTRIAXone injection 1 g, Every 24 hours (non-standard times), Intravenous    Lactate  Recent Labs   Lab 07/07/25  0641 07/07/25  1240 07/10/25  1857   LACTATE 0.9 1.3 1.8     Culture Data  Blood Cultures   Blood Culture   Date Value Ref Range Status   07/10/2025 No Growth After 96 hours  Preliminary   07/07/2025 No Growth After 5 Days  Final   07/01/2025 No Growth After 5 Days  Final   07/01/2025 No Growth After 5 Days  Final      Urine Culture   Urine Culture   Date Value Ref Range Status   07/07/2025 >100,000 cfu/ml Klebsiella pneumoniae (A)  Final     Urine Culture, Routine   Date Value Ref Range Status   02/09/2022 No significant growth  Final      mSOFA  MSOFA Total  Min: 1   Min taken time: 07/15/25 1701  Max: 3   Max taken time: 07/15/25 1501    Plan  - Antibiotics as listed above  - Fluid resuscitation as follows:Fluid Not Needed - Patient is not hypotensive and/or lactate is less than 4.0.   - Trend lactate to resolution  - Follow up culture data  - Vasopressors were not needed  - The following services were consulted:Hospital Medicine.  - continue clindamycin for now    7/5:  Resolved.  Transition to oral clindamycin 07/05/2025.  We will need 7-10 days total of treatment    7/6: WBC remains elevated but no fevers. I am not findings any other sources of acute infection and seems to have chronic mild leukocytosis. Will cont clinda-transition to p.o. on 07/05/2025  Considering reactive leukocytosis vs primary bone marrow process. Needs hematology outpatient.   Diff is not concerning and morphology with hypochromia and target cells (no schistocytes) most consistent with Fe deficiency vs thalassemia regarding anemia and not  concerning for active bacterial infection    7/7 Positive for UTI   '  7/8 Leukocytosis improving.  Gram negative rods on urine culture     7/10 Urine culture with klebsiella.   7/12 antibiotics were escalated to Zosyn and vancomycin.  Patient no longer febrile.  I will deescalate to Rocephin 1 g daily  7/13 continue Rocephin.  Monitor CBC.  Peripheral smear ordered.    7/15 Leukocytosis slowly improving   Essential hypertension  Patient's blood pressure range in the last 24 hours was: BP  Min: 118/60  Max: 171/87.The patient's inpatient anti-hypertensive regimen is listed below:  Current Antihypertensives  hydrALAZINE injection 10 mg, Every 6 hours PRN, Intravenous  metoprolol succinate (TOPROL-XL) 24 hr tablet 100 mg, 2 times daily, Oral    Plan  - BP is controlled, no changes needed to their regimen    7/7/25 d/c valsartan   Aortic valve stenosis  Echocardiogram with evidence of aortic stenosis that is moderate . The patient's most recent echocardiogram result is listed below. We will manage the valvular abnormality by status post TAVR    No echocardiogram results found for the past 12 months     7/6: clinically stable          Hyperlipidemia LDL goal <70  Lipitor 10 mg daily    Pressure ulcer of left heel, unstageable  Consult wound care  Oxycodone p.r.n. pain    Off load heels.  Order mattress for home.  Spinal stenosis of lumbar region with neurogenic claudication  Patient is bed ridden.  She will require frequent turning, PT, wound care.    Need to discuss with family about possible nursing home placement.  She does have home health.  She seems very difficult to care for.    Ordering low air loss mattress for home.  Pressure injury of skin of sacral region  Consult wound care   Continue clindamycin for now.    Will recheck CBC.   No signs of infection per wound care/eval.  Will likely cont on clinda and follow cultures. Orders per wound care and new mattress ordered.    7/6: WBC remains elevated but no  fevers. I am not findings any other sources of acute infection and seems to have chronic mild leukocytosis. Will cont clinda-transition to p.o. on 07/05/2025  Considering reactive leukocytosis vs primary bone marrow process. Needs hematology outpatient.   Diff is not concerning and morphology with hypochromia and target cells (no schistocytes) most consistent with Fe deficiency vs thalassemia regarding anemia and not concerning for active bacterial infection    7/7 Appreciate wound care recommendations     S/P TAVR (transcatheter aortic valve replacement)  Continue metoprolol 100 mg twice daily    Microcytic anemia  Anemia is likely due to Iron deficiency. Most recent hemoglobin and hematocrit are listed below.  Recent Labs     07/13/25  0648 07/14/25  0637 07/15/25  0348   HGB 11.6* 10.7* 10.2*   HCT 34.8* 31.5* 30.6*     Plan  - Monitor serial CBC: Daily  - Transfuse PRBC if patient becomes hemodynamically unstable, symptomatic or H/H drops below 7/21.  - Patient has not received any PRBC transfusions to date  - Patient's anemia is currently worsening. Will adjust treatment as follows:  IV iron today  - H&H is worsening since admission.  Labs seem most consistent with iron deficiency.  Iron levels ordered and pending.  I do not see any clinical evidence of active bleeding at this time and remains hemodynamically stable.  In an attempt to avoid transfusion we will give IV iron today and follow labs    7/6: iron, B12 and folate normal  Stool sent for occult but no clinical signs of bleeding  Hemolysis labs sent  H/H low but stable for last 3 days.  If above work up negative she is otherwise clinically stable for discharge and close hematology follow up with outpatient labs as I do not have GI or hematology at this hospital to manage while inpatient.     Anemia stable. Mildly elevated haptoglobin and LDH.   Will monitor H/H/    7/8 h/h was stable but decreased on am labs. Dilutional component is a possibility and due  to frequent lab draws in the setting of anemia of chronic disease.  2 units of pRBC ordered.    7/10 will monitor and if remains stable will discharge home tomorrow     7/12 patient will need 2 units packed red blood cells transfused.    7/13 patient received IV iron yesterday.  She also received 2 units of packed red blood cells.  She has a little fluid overloaded this morning so IV Lasix 20 mg given.  H&H much improved    7/14 H/h stable.  Will continue to monitor  Hyperkalemia  Hyperkalemia is likely due to LOWELL.The patients most recent potassium results are listed below.  Recent Labs     07/13/25  0648 07/14/25  0637 07/15/25  0348   K 5.2* 5.4* 5.3*     Plan  - Monitor for arrhythmias with EKG and/or continuous telemetry.   - Treat the hyperkalemia with Potassium Binders.   - Monitor potassium:daily   - The patient's hyperkalemia is improving    Another dose of lokelma     7/9 Resolved       LOWELL (acute kidney injury)  LOWELL is likely due to unsure etiology at this time. Baseline creatinine is 1.1. Most recent creatinine and eGFR are listed below.  Recent Labs     07/13/25  0648 07/14/25  0637 07/15/25  0348   CREATININE 1.6* 1.5* 1.4   EGFRNORACEVR 32* 34* 37*      Plan  - LOWELL is worsening. Will adjust treatment as follows: d/c valsartan, IV fluids, address hypoalbuminemia, urine studies pending .    - Avoid nephrotoxins and renally dose meds for GFR listed above  - Monitor urine output, serial BMP, and adjust therapy as needed    Urine studies indeterminate.  Renal function slowly improving.      7/9 LOWELL resolving     7/10 LOWELL resolved    7/13 creatinine is now 1.6.  Monitor renal function daily.    7/14 Stable     UTI (urinary tract infection)  U/A positive >100 on microscopic  Urine culture klebsiella  Changed Zosyn and vancomycin to Rocephin 1 g daily    Goals of care, counseling/discussion  Would like to have family meeting to discuss goals of care.  Reached out to daughters Lubna and Aditi for this  afternoon or tomorrow.  Pt seems to answer appropriately but unable to express full understanding of medical care and condition and is only able to say she is sick.      Waiting to hear back from family to discuss goals of care with multiple comorbidities, slow to improve and continued anemia requiring blood transfusion.   My understanding is that if she is medically stable to discharge she will go home and they will provide 24 hour care.      7/11 Both daughters, son and granddaughter at beside.  Discussed plan of care.  Working up leukocytosis and fever.  CXR pending.  Discussed that patient is high risk for medical decline and infection due to age, hypoalbuminemia and comorbdities.  Although her leukocytosis is worse from her baseline she will need further work up outpatient.  She would like to be DNR.  Once medically stable family is deciding if they can take her home or if she will go to nursing home.  Social work is providing resources for at home services.      7/14 Plan to discharge home with 24 hour care once pt remains medically stable     Leukocytosis  Worsening leukocytosis  Lactic acid WNL  CXR atelectasis at lung bases   Blood culture no growth   Venous u/s left upper extremity for edema negative for PE   Obtained hematology consult.  Possible leukomoid reaction but peripheral smear can be obtained to r/o CML     Leukocytosis slowly improving.  MRI left foot negative for osteomyelitis.  Will continue IV abx for cellulitis of left foot        Dysphagia  Worsening dysphagia  CT neck: no mass or soft tissue edema   ST concerned for cranial nerve 10 injury  Pt medically stable and will reevaulate tomorrow.  Will consider ENT consult tomorrow.              Cellulitis  Cellulitis seen on MRI of left foot.  Leukocytosis improving on IV abx.      VTE Risk Mitigation (From admission, onward)           Ordered     IP VTE HIGH RISK PATIENT  Once         07/01/25 0573     Place sequential compression device  Until  discontinued         07/01/25 2343                    Discharge Planning   LINA: 7/18/2025     Code Status: DNR   Medical Readiness for Discharge Date:   Discharge Plan A: Home with family                        Anjali Mesa PA-C  Department of Hospital Medicine   [place] [facility] (3rd Fl)

## 2025-07-15 NOTE — ASSESSMENT & PLAN NOTE
Worsening dysphagia  CT neck: no mass or soft tissue edema   ST concerned for cranial nerve 10 injury  Pt medically stable and will reevaulate tomorrow.  Will consider ENT consult tomorrow.

## 2025-07-15 NOTE — SUBJECTIVE & OBJECTIVE
Review of Systems   Constitutional:  Negative for activity change, chills, fatigue, fever and unexpected weight change.   HENT:  Positive for trouble swallowing. Negative for sore throat.    Respiratory:  Negative for cough, chest tightness and shortness of breath.    Cardiovascular:  Negative for chest pain and leg swelling.   Gastrointestinal:  Negative for abdominal pain.   Endocrine: Negative for cold intolerance and heat intolerance.   Genitourinary:  Negative for difficulty urinating.   Musculoskeletal:  Positive for arthralgias, back pain and gait problem (chronic.  Bed-bound). Negative for joint swelling.   Skin:  Positive for wound. Negative for rash.   Neurological:  Negative for numbness.   Hematological:  Negative for adenopathy.   Psychiatric/Behavioral:  Negative for decreased concentration.      Objective:     Vital Signs (Most Recent):  Temp: 97.1 °F (36.2 °C) (07/15/25 1542)  Pulse: 74 (07/15/25 1600)  Resp: 18 (07/15/25 1542)  BP: (!) 171/87 (07/15/25 1542)  SpO2: (!) 94 % (07/15/25 1542) Vital Signs (24h Range):  Temp:  [96.9 °F (36.1 °C)-98.5 °F (36.9 °C)] 97.1 °F (36.2 °C)  Pulse:  [73-91] 74  Resp:  [14-21] 18  SpO2:  [93 %-97 %] 94 %  BP: (118-171)/() 171/87     Weight: 108.5 kg (239 lb 3.2 oz)  Body mass index is 35.32 kg/m².     Physical Exam  Vitals and nursing note reviewed.   Constitutional:       General: She is not in acute distress.     Appearance: She is not ill-appearing.      Comments: Appears uncomfortable, awake   Increased secretions    HENT:      Right Ear: Tympanic membrane normal.      Left Ear: Tympanic membrane normal.      Mouth/Throat:      Mouth: Mucous membranes are moist.   Eyes:      General:         Right eye: No discharge.         Left eye: No discharge.      Conjunctiva/sclera: Conjunctivae normal.   Cardiovascular:      Rate and Rhythm: Normal rate and regular rhythm.      Heart sounds: Murmur heard.   Pulmonary:      Effort: Pulmonary effort is normal.  "     Comments: Mild decrease breath sounds LL lobe   Abdominal:      General: Abdomen is flat.      Palpations: Abdomen is soft.   Musculoskeletal:      Cervical back: Normal range of motion and neck supple. No rigidity.      Right lower leg: Edema (3+) present.      Left lower leg: Edema (3+) present.      Comments: Left arm edema    Skin:     Findings: No erythema.      Comments: See pictures.  Significant pressure injuries and ulcers of the sacrum, buttocks.  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   Neurological:      Mental Status: She is oriented to person, place, and time. Mental status is at baseline.      Comments: Bed ridden                Significant Labs: All pertinent labs within the past 24 hours have been reviewed.  CBC:   Recent Labs   Lab 07/14/25  0637 07/15/25  0348   WBC 30.02* 25.88*   HGB 10.7* 10.2*   HCT 31.5* 30.6*    157     CMP:   Recent Labs   Lab 07/14/25  0637 07/15/25  0348    139   K 5.4* 5.3*    106   CO2 20* 21*    102   BUN 69* 67*   CREATININE 1.5* 1.4   CALCIUM 8.3* 8.5*   PROT 6.5 6.5   ALBUMIN 1.6* 1.7*   BILITOT 0.7 0.9   ALKPHOS 110 126   AST 11 14   ALT 9* 10   ANIONGAP 11 12     Lactic Acid:   No results for input(s): "LACTATE" in the last 48 hours.      Urine culture grew Klebsiella pneumoniae  POCT Glucose:   No results for input(s): "POCTGLUCOSE" in the last 48 hours.    Troponin: No results for input(s): "TROPONINI", "TROPONINIHS" in the last 48 hours.  Urine Culture:   No results for input(s): "LABURIN" in the last 48 hours.    Urine Studies:   No results for input(s): "COLORU", "APPEARANCEUA", "PHUR", "SPECGRAV", "PROTEINUA", "GLUCUA", "KETONESU", "BILIRUBINUA", "OCCULTUA", "NITRITE", "UROBILINOGEN", "LEUKOCYTESUR", "RBCUA", "WBCUA", "BACTERIA", "SQUAMEPITHEL", "HYALINECASTS" in the last 48 hours.    Invalid input(s): "WRIGHTSUR"      Urine Culture >100,000 cfu/ml Klebsiella pneumoniae Abnormal         Resulting Agency: [facility] "     Susceptibility     Klebsiella pneumoniae     BENITEZ     Ampicillin >16 µg/ml Resistant     Ampicillin/Sulbactam 16/8 µg/ml Intermediate     Cefazolin <=2 µg/ml Sensitive     Cefepime <=2 µg/ml Sensitive     Ceftriaxone <=1 µg/ml Sensitive     Ciprofloxacin <=0.25 µg/ml Sensitive     Ertapenem <=0.5 µg/ml Sensitive     Gentamicin <=2 µg/ml Sensitive     Levofloxacin <=0.5 µg/ml Sensitive     Meropenem <=1 µg/ml Sensitive     Nitrofurantoin 64 µg/ml Intermediate     Piperacillin/Tazobactam <=8 µg/ml Sensitive     Tobramycin <=2 µg/ml Sensitive     Trimeth/Sulfa <=2/38 µg/ml Sensitive                   Significant Imaging: I have reviewed all pertinent imaging results/findings within the past 24 hours.  I have reviewed and interpreted all pertinent imaging results/findings within the past 24 hours.

## 2025-07-15 NOTE — PT/OT/SLP EVAL
Speech Language Pathology Evaluation  Bedside Swallow    Patient Name:  Emily Alicia   MRN:  [id number]  Admitting Diagnosis: Sepsis    Recommendations:     General Recommendations:   Diagnostic swallow tx to guide further management.  Diet recommendations:   Minced & moist solids (IDDSI 5)  Thin liquids (IDDSI 0)  PO meds crushed in puree/pudding.  Aspiration Precautions:   Standard aspiration/reflux precautions. May benefit from assistance w/ meals.  Upright position during + 30-mins post meals.  Monitor for s/s of aspiration.  Oral hygiene q4h.  HOB upright as tolerated.  General Precautions: Standard, aspiration  Communication strategies:  n/a  Discharge recommendations:  TBD  Barriers to Discharge:  Level of Skilled Assistance Needed      Assessment:     Emily Alicia is an 85 y/o F who presents w/ possible signs of pharyngeal dysphagia. Symptoms are reportedly of acute onset s/p attempted central line. Precipitating/predisposing dysphagia risk factors include CHF & GERD in this pt w/ UTI, sepsis, and generalized weakness. Further diagnostic swallow tx is warranted in order to guide further management. Pt appears safe for continuation of PO intake, though diet modification combined w/ aspiration precautions are recommended at this time current time. SLP will continue to follow.    History:     Past Medical History:   Diagnosis Date    Anemia     Anxiety     Aortic valve stenosis     CHF (congestive heart failure)     Chronic kidney disease (CKD)     Diastolic heart failure     Dyslipidemia     Encounter for blood transfusion     Glaucoma (increased eye pressure)     LEFT EYE    HHD (hypertensive heart disease)     Hypertension     Osteoarthritis     Retina disorder, bilateral     Severe aortic stenosis 11/9/2016    TR (tricuspid regurgitation)     UTI (urinary tract infection)        Past Surgical History:   Procedure Laterality Date    CARDIAC CATHETERIZATION      CARDIAC VALVE SURGERY      CATARACT EXTRACTION W/  "INTRAOCULAR LENS  IMPLANT, BILATERAL      CHOLECYSTECTOMY      COLONOSCOPY N/A 2/7/2021    Procedure: COLONOSCOPY;  Surgeon: Gaston Pablo MD;  Location: [facility] ENDO;  Service: Endoscopy;  Laterality: N/A;    FLEXIBLE SIGMOIDOSCOPY N/A 2/4/2021    Procedure: SIGMOIDOSCOPY, FLEXIBLE;  Surgeon: Jarred Forrester MD;  Location: [facility] ENDO;  Service: Gastroenterology;  Laterality: N/A;    HYSTERECTOMY      JOINT REPLACEMENT      right knee    JOINT REPLACEMENT      left knee    KNEE SURGERY Left     SPINE SURGERY      lumbar    TRANSCATHETER AORTIC VALVE REPLACEMENT       Prior Intubation HX:  n/a    Modified Barium Swallow: n/a    Imaging:   Imaging Results              X-Ray Chest AP Portable (Final result)  Result time 07/01/25 20:14:38      Final result by Gautam Matta MD (07/01/25 20:14:38)                   Impression:      No acute findings.      Electronically signed by: Gautam Matta  Date:    07/01/2025  Time:    20:14               Narrative:    EXAMINATION:  XR CHEST AP PORTABLE    CLINICAL HISTORY:  Sepsis, unspecified organism    TECHNIQUE:  Single frontal view of the chest was performed.    COMPARISON:  01/09/2024    FINDINGS:  Lungs are clear. No focal consolidation. No pleural effusion. No pneumothorax. Normal heart size.                                    Prior diet: IDDSI 7/0    Subjective     Pt seen for clinical swallow evaluation. SLP consulted for acute onset dysphagia s/p attempted central line. Pt received in bed, appeared to be in pain/distress, and demonstrated fair motivation & participation in exam. Limited exam in the setting of pain. Pt denied chronic dysphagia reports/concerns. Endorsed "it's getting better," in reference to acute onset dysphagia. No clarification on dysphagia characteristics provided.  Patient goals: pain relief; nursing notified    Pain/Comfort:  Pain Rating 1: 8/10  Location 1: other (see comments) (on bottom at site of wound)  Pain Addressed 1: " Nurse notified, Distraction    Respiratory Status: Room air    Objective:     Oral Musculature Evaluation  Oral Musculature: general weakness, other (see comments) (assessment limited 2/2 reduced participation due to discomfort/pain)  Oral Labial Strength and Mobility: other (see comments), functional seal (question lower facial assymmetry? - will continue to assess)  Lingual Strength and Mobility: functional anterior elevation, functional lateral movement, functional protrusion  Volitional Cough: perceptually adequate vs somewhat reduced strength  Voice Prior to PO Intake: perceptually WFL    Bedside Swallow Eval:   Consistencies Assessed:  Thin liquids pt regulated single straw sips and sequential straw drinking  Puree via tsp presentations   Further trials deferred at this time given pt discomfort/self-limiting behaviors    Oral Phase:   Decreased closure around utensil though grossly functional  No oral residue noted given limited presentations/consistencies    Pharyngeal Phase:   No change in vocal quality  No reports of globus sensation  Intermittent, excessive coughing throughout entirety of exam. However, it should be noted that coughing behaviors were demonstrated both before, during, and after PO presentations/trials. Unclear clinical significance at this time; however, PO trials did not appears to change frequency or nature of coughing demonstrated. No vocal quality change demonstrated.  Notes: pt appeared to demonstrate audible, wet-like quality to breathing that was present prior to any PO administration. Prompted throat clear resulted in no perceptual changes to breathing. It should be highlighted that no wet-quality to voicing was appreciated.    Compensatory Strategies  Solid diet modification  Bolus size modification    Goals:   Multidisciplinary Problems       SLP Goals          Problem: SLP    Goal Priority Disciplines Outcome   SLP Goal     SLP    Description: GOALS:    1) Pt will tolerate  least-restrictive oral diet to maintain adequate nutrition/hydration w/o acute dysphagia-related complication                       Plan:     Patient to be seen:  5 x/week   Plan of Care expires:  07/28/25  Plan of Care reviewed with:  patient   SLP Follow-Up:  Yes       Time Tracking:     SLP Treatment Date:   07/14/25  Speech Start Time:  1454  Speech Stop Time:  1516     Speech Total Time (min):  22 min    Billable Minutes: Eval Swallow and Oral Function 22 minutes    07/14/2025

## 2025-07-15 NOTE — PT/OT/SLP PROGRESS
Speech Language Pathology Treatment    Patient Name:  Emily Alicia   MRN:  [id number]  Admitting Diagnosis: Sepsis    Recommendations:     General Recommendations:  Further medical workup of dysphagia etiology is recommended should symptoms persist and w/ consideration given to overall goals of care -- Consider ENT & laryngoscopy.  Dysphagia tx  Diet recommendations:  Strict NPO, pending further workup.  Consider short-term enteral feeding route, per MD discretion.  Aspiration Precautions:  Oral hygiene q4h.  HOB upright as tolerated.  General Precautions: Standard, aspiration  Communication strategies:  go to room if call light pushed  Discharge recommendations:   (TBD)   Barriers to Discharge:  Level of Skilled Assistance Needed      Assessment:     Emily Alicia is an 85 y/o F who presents w/ clinical signs of acute onset pharyngeal dysphagia & suspected laryngeal dysfunction of unknown etiology. There is high suspicion for acute R CN X involvement given sudden onset of dysphagia s/p central line attempt & given close proximity of CN X to carotid in light of events during central line attempt. Secretion management is impaired. Given severity of clinical symptoms, pt does not appear safe for oral intake at this time. Further medical workup is recommended should symptoms persist -- Consider ENT consult & laryngoscopy. Swallow prognosis TBD pending etiology of dysphagia.    Subjective     Pt seen for dysphagia f/u. Pt now w/ NPO status and requiring suctioning via yankauer at bedside. Pt found sleeping in room, drooling noted. Johnston easily to verbal stim. Perceptually, wet quality to breathing appreciated. Good participation noted; however, pt appears fatigued.  Patient goals: none stated     Pain/Comfort:  Pain Rating 1: 0/10    Respiratory Status: Room air    Objective:     Has the patient been evaluated by SLP for swallowing?   Yes  Keep patient NPO? Yes   Current Respiratory Status: 94% SpO2    Laryngeal function exam  revealed drooling noted upon SLP entering pt's room, coughing, perceptual wet quality to breathing, mild intermittent wet quality to voicing--though infrequent during course of session, reduced ability to manage secretions requiring intermittent suctioning via yankauer. MPT is greatly reduced. Pitch range is perceptually reduced. Cough appears to be w/ reduced strength.    Diagnostic swallow tx completed--though was limited for pt's safety. The following oral trials were administered:    - Less than 1 mL water via tsp x2  - Further trials deferred for pt's airway protection    Oral phase:  - Decreased closure around utensil though grossly functional. Facial asymmetry appreciated, though reportedly chronic?    Pharyngeal phase:  - Multiple swallows, prolong coughing immediately s/p swallow across both trials, reports of globus s/p 2nd trial    Pt educated on updated SLP impressions/recommendation including strict NPO status, and SLP plan of care. Pt verbalized understanding. All questions answered to satisfactory level.    This SLP updated nursing and PA on all updated SLP impressions/recs immediately post session.    Goals:   Multidisciplinary Problems       SLP Goals          Problem: SLP    Goal Priority Disciplines Outcome   SLP Goal     SLP Not Progressing   Description: GOALS:    1) Pt will tolerate least-restrictive oral diet to maintain adequate nutrition/hydration w/o acute dysphagia-related complication  2) Pt will participate in diagnostic swallow tx to guide further dysphagia management                       Plan:     Patient to be seen:  5 x/week   Plan of Care expires:  07/28/25  Plan of Care reviewed with:  patient   SLP Follow-Up:  Yes       Time Tracking:     SLP Treatment Date:   07/15/25  Speech Start Time:  1634  Speech Stop Time:  1654     Speech Total Time (min):  20 min    Billable Minutes: Treatment Swallowing Dysfunction 20 minutes    07/15/2025

## 2025-07-15 NOTE — ASSESSMENT & PLAN NOTE
LOWELL is likely due to unsure etiology at this time. Baseline creatinine is 1.1. Most recent creatinine and eGFR are listed below.  Recent Labs     07/13/25  0648 07/14/25  0637 07/15/25  0348   CREATININE 1.6* 1.5* 1.4   EGFRNORACEVR 32* 34* 37*      Plan  - LOWELL is worsening. Will adjust treatment as follows: d/c valsartan, IV fluids, address hypoalbuminemia, urine studies pending .    - Avoid nephrotoxins and renally dose meds for GFR listed above  - Monitor urine output, serial BMP, and adjust therapy as needed    Urine studies indeterminate.  Renal function slowly improving.      7/9 LOWELL resolving     7/10 LOWELL resolved    7/13 creatinine is now 1.6.  Monitor renal function daily.    7/14 Stable

## 2025-07-15 NOTE — PT/OT/SLP PROGRESS
Speech Language Pathology      Emily Alicai  MRN: [id number]    This SLP attempted to see pt at bedside. Patient unable to be seen at this time 2/2 nursing care. Will re-attempt evaluation at a later time today.    07/14/2025

## 2025-07-15 NOTE — ASSESSMENT & PLAN NOTE
Patient has sepsis without organ dysfunction secondary to Skin and Soft Tissue Infection: Cellulitis. A review of systems was completed. Patient's sepsis is improving    Current Antibiotics    cefTRIAXone injection 1 g, Every 24 hours (non-standard times), Intravenous    Lactate  Recent Labs   Lab 07/07/25  0641 07/07/25  1240 07/10/25  1857   LACTATE 0.9 1.3 1.8     Culture Data  Blood Cultures   Blood Culture   Date Value Ref Range Status   07/10/2025 No Growth After 96 hours  Preliminary   07/07/2025 No Growth After 5 Days  Final   07/01/2025 No Growth After 5 Days  Final   07/01/2025 No Growth After 5 Days  Final      Urine Culture   Urine Culture   Date Value Ref Range Status   07/07/2025 >100,000 cfu/ml Klebsiella pneumoniae (A)  Final     Urine Culture, Routine   Date Value Ref Range Status   02/09/2022 No significant growth  Final      mSOFA  MSOFA Total  Min: 1   Min taken time: 07/15/25 1701  Max: 3   Max taken time: 07/15/25 1501    Plan  - Antibiotics as listed above  - Fluid resuscitation as follows:Fluid Not Needed - Patient is not hypotensive and/or lactate is less than 4.0.   - Trend lactate to resolution  - Follow up culture data  - Vasopressors were not needed  - The following services were consulted:Hospital Medicine.  - continue clindamycin for now    7/5:  Resolved.  Transition to oral clindamycin 07/05/2025.  We will need 7-10 days total of treatment    7/6: WBC remains elevated but no fevers. I am not findings any other sources of acute infection and seems to have chronic mild leukocytosis. Will cont clinda-transition to p.o. on 07/05/2025  Considering reactive leukocytosis vs primary bone marrow process. Needs hematology outpatient.   Diff is not concerning and morphology with hypochromia and target cells (no schistocytes) most consistent with Fe deficiency vs thalassemia regarding anemia and not concerning for active bacterial infection    7/7 Positive for UTI   '  7/8 Leukocytosis  improving.  Gram negative rods on urine culture     7/10 Urine culture with klebsiella.   7/12 antibiotics were escalated to Zosyn and vancomycin.  Patient no longer febrile.  I will deescalate to Rocephin 1 g daily  7/13 continue Rocephin.  Monitor CBC.  Peripheral smear ordered.    7/15 Leukocytosis slowly improving

## 2025-07-15 NOTE — ASSESSMENT & PLAN NOTE
Patient's blood pressure range in the last 24 hours was: BP  Min: 118/60  Max: 171/87.The patient's inpatient anti-hypertensive regimen is listed below:  Current Antihypertensives  hydrALAZINE injection 10 mg, Every 6 hours PRN, Intravenous  metoprolol succinate (TOPROL-XL) 24 hr tablet 100 mg, 2 times daily, Oral    Plan  - BP is controlled, no changes needed to their regimen    7/7/25 d/c valsartan

## 2025-07-16 PROBLEM — Z51.5 COMFORT MEASURES ONLY STATUS: Status: ACTIVE | Noted: 2025-01-01

## 2025-07-16 NOTE — PT/OT/SLP PROGRESS
Speech Language Pathology      Emily Alicia  MRN: [id number]    Patient not seen today secondary to provider hold (via verbal communication, PA deferring SLP f/u at this time in the setting of goals of care change). Will follow-up as appropriate.    7/16/2025

## 2025-07-16 NOTE — PLAN OF CARE
Plan of care discussed with patient & family at start of shift. Patient resting comfortably in bed. No reports of pain. VSS. Disoriented to time. No shortness of breath, chest pain, nausea. Saline lock maintained for IVBP antibiotics. NPO status maintained. Oral secretions suctioned with yankauer per patient request. No acute distress noted. Incontinent of urine. No BM this shift. Fall/Safety maintained, bed low, locked, side rails up x 2.Wound care performed.

## 2025-07-16 NOTE — NURSING
Left heel foam dressing changed. Pictured. Site cleansed with sterile NS.     Right heel dressing changed - site cleansed with sterile NS, santyl ointment applied.     Left fore arm dressing changed - site cleansed with sterile NS.    Black dry areas on toes cleansed with sterile NS & painted with betadine. Left open to air.     Extremities elevated on pillows. Heel protectors on.

## 2025-07-16 NOTE — ASSESSMENT & PLAN NOTE
Anemia is likely due to Iron deficiency. Most recent hemoglobin and hematocrit are listed below.  Recent Labs     07/14/25  0637 07/15/25  0348 07/16/25  0412   HGB 10.7* 10.2* 9.4*   HCT 31.5* 30.6* 27.7*     Plan  - Monitor serial CBC: Daily  - Transfuse PRBC if patient becomes hemodynamically unstable, symptomatic or H/H drops below 7/21.  - Patient has not received any PRBC transfusions to date  - Patient's anemia is currently worsening. Will adjust treatment as follows:  IV iron today  - H&H is worsening since admission.  Labs seem most consistent with iron deficiency.  Iron levels ordered and pending.  I do not see any clinical evidence of active bleeding at this time and remains hemodynamically stable.  In an attempt to avoid transfusion we will give IV iron today and follow labs    7/6: iron, B12 and folate normal  Stool sent for occult but no clinical signs of bleeding  Hemolysis labs sent  H/H low but stable for last 3 days.  If above work up negative she is otherwise clinically stable for discharge and close hematology follow up with outpatient labs as I do not have GI or hematology at this hospital to manage while inpatient.     Anemia stable. Mildly elevated haptoglobin and LDH.   Will monitor H/H/    7/8 h/h was stable but decreased on am labs. Dilutional component is a possibility and due to frequent lab draws in the setting of anemia of chronic disease.  2 units of pRBC ordered.    7/10 will monitor and if remains stable will discharge home tomorrow     7/12 patient will need 2 units packed red blood cells transfused.    7/13 patient received IV iron yesterday.  She also received 2 units of packed red blood cells.  She has a little fluid overloaded this morning so IV Lasix 20 mg given.  H&H much improved    7/14 H/h stable.  Will continue to monitor

## 2025-07-16 NOTE — PROGRESS NOTES
[facility] - [facility] (3rd Fl)  Adult Nutrition  Progress Note    SUMMARY       Interventions  1. NPO  2. Advance diet as tolerated  3. Enteral/parenteral nutrition management    Recommendations  1. RD to monitor NPO status for diet advancement if/when medically able.   2. Consider alternate means of nutrition.   3. RD to follow.    Goal #1: Meal consumption of greater than or equal to 50% by RD follow up  Nutrition Goal Status #1: goal not met    Goal #2: PO intake will meet greater than or equal to 75% of estimated needs by RD follow up  Nutrition Goal Status #2: goal not met    Communication of RD Recs: other (comment) (RD note, POC)    Nutrition Discharge Planning    Nutrition Discharge Planning: Too early to determine, pending clinical course    Malnutrition Assessment     Skin (Micronutrient): wounds unhealed                               Reason for Assessment    Reason For Assessment: RD follow-up  Diagnosis: infection/sepsis  Past Medical History:   Diagnosis Date    Anemia     Anxiety     Aortic valve stenosis     CHF (congestive heart failure)     Chronic kidney disease (CKD)     Diastolic heart failure     Dyslipidemia     Encounter for blood transfusion     Glaucoma (increased eye pressure)     LEFT EYE    HHD (hypertensive heart disease)     Hypertension     Osteoarthritis     Retina disorder, bilateral     Severe aortic stenosis 11/9/2016    TR (tricuspid regurgitation)     UTI (urinary tract infection)      General Information Comments: Patient has been experiencing difficulty swallowing since last f/u. Was downgraded to minced and moist - level 5 texture modifications and was later made NPO per SLP recommendations since 7/15/25. Last PO intakes when on oral diet between 0-25%. Will continue to monitor for diet advancement.    Nutrition/Diet History    Spiritual, Cultural Beliefs, Mu-ism Practices, Values that Affect Care: no  Food Allergies: NKFA  Factors Affecting Nutritional Intake:  "difficulty/impaired swallowing, impaired cognitive status/motor control  Nutrition-related SDOH: Unable to assess at this time    Anthropometrics    Height: 5' 9" (175.3 cm)  Height (inches): 69 in  Weight: 108.5 kg (239 lb 3.2 oz)  Weight (lb): 239.2 lb  Weight Method: Bed Scale  Ideal Body Weight (IBW), Female: 145 lb  % Ideal Body Weight, Female (lb): 164.97 %  BMI (Calculated): 35.3  BMI Grade: 35 - 39.9 - obesity - grade II     Lab/Procedures/Meds    Pertinent Labs Reviewed: reviewed  Pertinent Labs Comments: K: 5.2 (H), CO2: 22 (L), BUN: 68 (H), GFR: 41 (L), Ca: 8.4 (L)  Pertinent Medications Reviewed: reviewed  Scheduled Meds:   allopurinoL  100 mg Oral Daily    atorvastatin  10 mg Oral QHS    cefTRIAXone (Rocephin) IV (PEDS and ADULTS)  1 g Intravenous Q24H    collagenase   Topical (Top) Daily    ferrous sulfate  1 tablet Oral Daily    gabapentin  300 mg Oral BID    metoprolol succinate  100 mg Oral BID    miconazole NITRATE 2 %   Topical (Top) BID    pantoprazole  40 mg Oral Daily    polyethylene glycol  17 g Oral Daily    vitamin D  1,000 Units Oral Daily     Estimated/Assessed Needs    Weight Used For Calorie Calculations: 65.9 kg (145 lb 4.5 oz) (IBW)  Energy Calorie Requirements (kcal): 2307  Energy Need Method: Kcal/kg (35 kcal/kg IBW)  Protein Requirements: 109 g (1 g/kg)  Weight Used For Protein Calculations: 108.5 kg (239 lb 3.2 oz)  Fluid Requirements (mL): 1 mL/kcal  Estimated Fluid Requirement Method: RDA Method (or per MD)  RDA Method (mL): 2307     Nutrition Prescription Ordered    Current Diet Order: NPO    Evaluation of Received Nutrient/Fluid Intake    I/O: +11.1 L since admit  Energy Calories Required: not meeting needs  Protein Required: not meeting needs  Fluid Required: not meeting needs  Tolerance: other (see comments) (NPO)  % Intake of Estimated Energy Needs: 0 - 25 %  % Meal Intake: NPO    PES Statement  Increased nutrient needs related to Acute illness, Wound healing, Increased " demand for protein energy as evidenced by sepsis, full thickness pressure injuries/wounds x 3, difficulty swallowing, inability to advance from NPO status  Status: Worsening    Nutrition Risk    Level of Risk/Frequency of Follow-up: moderate - high (2 x per week)     Monitor and Evaluation    Monitor and Evaluation: Diet order, Weight, Enteral and parenteral nutrition administration, Electrolyte and renal panel, Skin     Nutrition Follow-Up    RD Follow-up?: Yes    Elba Stanford, BRITANYN, LDN   Fair Good Fair Fair Fair Poor Fair Fair Good Good

## 2025-07-16 NOTE — PROGRESS NOTES
[facility] ([address] [place] [address] Medicine  Progress Note    Patient Name: Emily Alicia  MRN: [id number]  Patient Class: IP- Inpatient   Admission Date: 7/1/2025  Length of Stay: 14 days  Attending Physician: Braeden Anderson MD  Primary Care Provider: Angel Pemberton MD        Subjective     Principal Problem:Sepsis        HPI:  Patient is an 84-year-old black female that presented to the emergency room with significant pain from her skin pressure injuries of her sacrum, buttocks.  Upon presentation she had a working diagnosis of sepsis so she was given IV fluids, IV antibiotics.  Workup unremarkable.  Physical exam reveals significant skin pressure injuries to the buttocks, sacrum, foot.  She is being admitted for pain control, IV antibiotics, wound care consultation.    Overview/Hospital Course:  Patient seen by wound care this morning. Recs placed. Patient without any complaints today at all. She is eating well. Afebrile. WBC ct pending.    7/4: clinically patient is doing quite well. She is tolerating PO intake. Has no complaints on rounds.  However, her WBC is not trending down. Seems she has mild chronic elevation of 14-15K but WBC 19K this mroning and 20K on admit. I still do not see any other sources of infections and wounds are not draining, no erythema, no fevers, etc. Cont clindamycin for now.  H/H 7.8/23 from 9.1/27 on admit. No signs of bleeding but with drop will monitor overnight. Also Cr 1.5 from 1.1 on admit. However, on chart review she does fluctuate around this isabella.     7/5:  Patient continues to feel well.  She has no acute complaints.  She denies chest pain, shortness of breath, dysuria, frequency, diarrhea, nausea and vomiting.  Her wounds are stable and do not show any signs of infection.  However, she remains with leukocytosis of [id number] which is not improved since admission.  Her H&H is decreasing in his knee or transfusion threshold.  Her MCV is low and she likely has  iron deficiency.  I am suspecting she has reactive leukocytosis due to this process.  Iron levels ordered.  If low we will replace IV and start p.o. tomorrow in an attempt to avoid blood transfusion.  We will continue clindamycin.  I see no other signs of infection and no indication to change antibiotics.  We will actually deescalate to oral today.  Creatinine remained stable at 1.5    7/6: Clinically this patient remains without complaints and at baseline. She has no fever, dysuria, abd pain, n/v/d/c, chest pains, SOB, cough, etc.  I have kept her inpatient following labs due to minor abnormalities:  Cr 1-1.5 at baseline and while did trend up to 1.5/1.7 this admission on chart review her renal fxn fluctuating here over at least last 1 year. Therefore I do not think this is indicative of LOWELL needing further inpatient management.  H/H down trended but has been stable last 3 days. She is not hemodynamically unstable and vitals remain normal. She has no evidence of bleeding. Stool will be sent for occult and if + can see GI outpatient. However, I am also concerned for primary bone marrow process. Will also work up hemolysis with labs today (h/o aortic stenosis s/p valve replacement).  She has chronic leukocytosis which has not been worked up to date. Plt are normal. I have no access to hematology while inpatient here so may discharge with patient needing to see hematology ASAP. She will need repeat CBC within next few days to monitor as well.   HypoCa noted; awaiting albumin. If lab unremarkable I believe she can discharge this afternoon with home health and home medical equipment and continue to follow up outpatient as we are only lab monitoring at this point in the hospital as no access to needed specialists and she is clinically stable. Dispo pending labs this morning    7/7 PT HD stable on room air.  Continued leukocytosis.  Continue IV abx for UTI.  Creatinine still bumped.  Will continue to trend. Gentle  hydration and holding valsartan.  kelma for hyperkalemia.  Patient would like to go home with family and states her family can provide her with 24 hour care.  Patient high risk for medical decline.  She is malnourished and albumin 1.8. Consult to dietician.      7/8 PT Bp on lower side.  Pt h/h down trending.  Some dilutional component with frequent lab draws.  Discussed risk versus benefits with pt and daughters about blood transfusion and they are all in agreement.  Renal function slowly improving but pt has hypoalbuminemia.  She is at risk for medical decline.  Will set up family meeting to discuss code status and plan of care.      7/9 Renal function improving.  H/h mild increase but only received 1 out of 2 units of pRBC.  Will get repeat CBC tomorrow.     7/10 PT HD stable on room air.  Renal function back to baseline.  H/H stable.  If labs are stable tomorrow plan to discharge home with home health and 24 hour care.  Pt will need continued work up of leukocytosis and anemia outpatient.  Defer to PCP for management.      7/11 PT HD stable on room air.  Patient more lethargic today but appropriate.  Renal function improved.  H/h stable.  Fever and leukocytosis overnight.  CXR pending.  LUE edema.  Venous u/s pending.  Long discussion about goals of care with family and patient.  She is a DNR.   giving family resources for care at home.       7/12-patient stable on room air.  H&H dropped.  Hemoglobin is now 6.8.  Renal function stable.  No more fever.  Still has a leukocytosis that is seems to be chronic.  Could this be CML?  Venous ultrasound of the upper extremity negative for blood clots.  She will need blood transfusion.  I will also give her IV iron wound care with Santyl Continuing.  Patient is still receiving vancomycin and Zosyn.  Urine culture grew out Klebsiella pneumoniae sensitive to about everything.  I will discontinue vanc and Zosyn and start Rocephin.    7/13-patient required  central line placement but was unable to get it.  Finally nurse supervisor was able to find a good vein using ultrasound last night.  She received 2 units of packed red blood cells.  Now her hemoglobin is 11.6.  Patient did get IV iron as well.  I discussed the case with Hematology.  They recommend a peripheral smear to determine if this is a leukemoid reaction versus CML.  I think she has a leukemoid reaction causing her elevated white blood cell count.  She looked a little over hydrated this morning her BNP was 350 which is elevated for her.  Chest x-ray did not show any pulmonary edema.  I gave her 20 mg of Lasix IV push.      7/14 PT HD stable on room air.  Vitals wnl, Labs stable.  Will monitor. If patient remains stable for 24 hour will discharge home with 24 hour care. ST consult for dysphagia after attempted central line.     7/15 PT HD stable on room air.  Labs stable.  Pt having difficulty swallowing and increased secretions.  She appears mildly uncomfortable but is not in respiratory distress.  CT neck did not show any neck mass or soft tissue edema.   ST evaluated patient and concern for cranial nerve 10 injury.  Will monitor and will consider transfer if patient does not improve.      7/16 PT HD stable on room air.  Still having dysphagia and increased secretions.  ST made NPO.  ENT econsult recommended PEG tube.  However, direct visualization may be indicated and patient does not want PEG tube.  Requesting transfer.            Review of Systems   Constitutional:  Negative for activity change, chills, fatigue, fever and unexpected weight change.   HENT:  Positive for trouble swallowing. Negative for sore throat.    Respiratory:  Negative for cough, chest tightness and shortness of breath.    Cardiovascular:  Negative for chest pain and leg swelling.   Gastrointestinal:  Negative for abdominal pain.   Endocrine: Negative for cold intolerance and heat intolerance.   Genitourinary:  Negative for difficulty  urinating.   Musculoskeletal:  Positive for arthralgias, back pain and gait problem (chronic.  Bed-bound). Negative for joint swelling.   Skin:  Positive for wound. Negative for rash.   Neurological:  Negative for numbness.   Hematological:  Negative for adenopathy.   Psychiatric/Behavioral:  Negative for decreased concentration.      Objective:     Vital Signs (Most Recent):  Temp: 97 °F (36.1 °C) (07/16/25 0724)  Pulse: 87 (07/16/25 0800)  Resp: 14 (07/16/25 0724)  BP: (!) 118/58 (07/16/25 0724)  SpO2: 98 % (07/16/25 0725) Vital Signs (24h Range):  Temp:  [97 °F (36.1 °C)-98.4 °F (36.9 °C)] 97 °F (36.1 °C)  Pulse:  [74-94] 87  Resp:  [14-21] 14  SpO2:  [94 %-98 %] 98 %  BP: (113-171)/() 118/58     Weight: 108.5 kg (239 lb 3.2 oz)  Body mass index is 35.32 kg/m².     Physical Exam  Vitals and nursing note reviewed.   Constitutional:       General: She is not in acute distress.     Appearance: She is not ill-appearing.      Comments: Appears uncomfortable, awake   Increased secretions    HENT:      Right Ear: Tympanic membrane normal.      Left Ear: Tympanic membrane normal.      Mouth/Throat:      Mouth: Mucous membranes are moist.   Eyes:      General:         Right eye: No discharge.         Left eye: No discharge.      Conjunctiva/sclera: Conjunctivae normal.   Cardiovascular:      Rate and Rhythm: Normal rate and regular rhythm.      Heart sounds: Murmur heard.   Pulmonary:      Effort: Pulmonary effort is normal.      Comments: Mild decrease breath sounds LL lobe   Abdominal:      General: Abdomen is flat.      Palpations: Abdomen is soft.   Musculoskeletal:      Cervical back: Normal range of motion and neck supple. No rigidity.      Right lower leg: Edema (3+) present.      Left lower leg: Edema (3+) present.      Comments: Left arm edema      Skin:     Findings: No erythema.      Comments: See pictures.  Significant pressure injuries and ulcers of the sacrum, buttocks.  She has foot ulcers that are  "chronic.   Neurological:      Mental Status: She is oriented to person, place, and time. Mental status is at baseline.      Comments: Bed ridden                Significant Labs: All pertinent labs within the past 24 hours have been reviewed.  CBC:   Recent Labs   Lab 07/15/25  0348 07/16/25  0412   WBC 25.88* 26.77*   HGB 10.2* 9.4*   HCT 30.6* 27.7*    162     CMP:   Recent Labs   Lab 07/15/25  0348 07/16/25 0412    140   K 5.3* 5.2*    107   CO2 21* 22*    83   BUN 67* 68*   CREATININE 1.4 1.3   CALCIUM 8.5* 8.4*   PROT 6.5  --    ALBUMIN 1.7*  --    BILITOT 0.9  --    ALKPHOS 126  --    AST 14  --    ALT 10  --    ANIONGAP 12 11     Lactic Acid:   No results for input(s): "LACTATE" in the last 48 hours.      Urine culture grew Klebsiella pneumoniae  POCT Glucose:   No results for input(s): "POCTGLUCOSE" in the last 48 hours.    Troponin: No results for input(s): "TROPONINI", "TROPONINIHS" in the last 48 hours.  Urine Culture:   No results for input(s): "LABURIN" in the last 48 hours.    Urine Studies:   No results for input(s): "COLORU", "APPEARANCEUA", "PHUR", "SPECGRAV", "PROTEINUA", "GLUCUA", "KETONESU", "BILIRUBINUA", "OCCULTUA", "NITRITE", "UROBILINOGEN", "LEUKOCYTESUR", "RBCUA", "WBCUA", "BACTERIA", "SQUAMEPITHEL", "HYALINECASTS" in the last 48 hours.    Invalid input(s): "WRIGHTSUR"      Urine Culture >100,000 cfu/ml Klebsiella pneumoniae Abnormal         Resulting Agency: [facility]     Susceptibility     Klebsiella pneumoniae     BENITEZ     Ampicillin >16 µg/ml Resistant     Ampicillin/Sulbactam 16/8 µg/ml Intermediate     Cefazolin <=2 µg/ml Sensitive     Cefepime <=2 µg/ml Sensitive     Ceftriaxone <=1 µg/ml Sensitive     Ciprofloxacin <=0.25 µg/ml Sensitive     Ertapenem <=0.5 µg/ml Sensitive     Gentamicin <=2 µg/ml Sensitive     Levofloxacin <=0.5 µg/ml Sensitive     Meropenem <=1 µg/ml Sensitive     Nitrofurantoin 64 µg/ml Intermediate     Piperacillin/Tazobactam <=8 µg/ml " Sensitive     Tobramycin <=2 µg/ml Sensitive     Trimeth/Sulfa <=2/38 µg/ml Sensitive                   Significant Imaging: I have reviewed all pertinent imaging results/findings within the past 24 hours.  I have reviewed and interpreted all pertinent imaging results/findings within the past 24 hours.      Assessment & Plan  Sepsis  Patient has sepsis without organ dysfunction secondary to Skin and Soft Tissue Infection: Cellulitis. A review of systems was completed. Patient's sepsis is improving    Current Antibiotics    cefTRIAXone injection 1 g, Every 24 hours (non-standard times), Intravenous    Lactate  Recent Labs   Lab 07/07/25  0641 07/07/25  1240 07/10/25  1857   LACTATE 0.9 1.3 1.8     Culture Data  Blood Cultures   Blood Culture   Date Value Ref Range Status   07/10/2025 No Growth After 5 Days  Final   07/07/2025 No Growth After 5 Days  Final   07/01/2025 No Growth After 5 Days  Final   07/01/2025 No Growth After 5 Days  Final      Urine Culture   Urine Culture   Date Value Ref Range Status   07/07/2025 >100,000 cfu/ml Klebsiella pneumoniae (A)  Final     Urine Culture, Routine   Date Value Ref Range Status   02/09/2022 No significant growth  Final      mSOFA  MSOFA Total  Min: 1   Min taken time: 07/16/25 0901  Max: 3   Max taken time: 07/15/25 1501    Plan  - Antibiotics as listed above  - Fluid resuscitation as follows:Fluid Not Needed - Patient is not hypotensive and/or lactate is less than 4.0.   - Trend lactate to resolution  - Follow up culture data  - Vasopressors were not needed  - The following services were consulted:Hospital Medicine.  - continue clindamycin for now    7/5:  Resolved.  Transition to oral clindamycin 07/05/2025.  We will need 7-10 days total of treatment    7/6: WBC remains elevated but no fevers. I am not findings any other sources of acute infection and seems to have chronic mild leukocytosis. Will cont clinda-transition to p.o. on 07/05/2025  Considering reactive  leukocytosis vs primary bone marrow process. Needs hematology outpatient.   Diff is not concerning and morphology with hypochromia and target cells (no schistocytes) most consistent with Fe deficiency vs thalassemia regarding anemia and not concerning for active bacterial infection    7/7 Positive for UTI   '  7/8 Leukocytosis improving.  Gram negative rods on urine culture     7/10 Urine culture with klebsiella.   7/12 antibiotics were escalated to Zosyn and vancomycin.  Patient no longer febrile.  I will deescalate to Rocephin 1 g daily  7/13 continue Rocephin.  Monitor CBC.  Peripheral smear ordered.    7/15 Leukocytosis slowly improving   Essential hypertension  Patient's blood pressure range in the last 24 hours was: BP  Min: 113/73  Max: 171/87.The patient's inpatient anti-hypertensive regimen is listed below:  Current Antihypertensives  hydrALAZINE injection 10 mg, Every 6 hours PRN, Intravenous  metoprolol succinate (TOPROL-XL) 24 hr tablet 100 mg, 2 times daily, Oral    Plan  - BP is controlled, no changes needed to their regimen    7/7/25 d/c valsartan   Aortic valve stenosis  Echocardiogram with evidence of aortic stenosis that is moderate . The patient's most recent echocardiogram result is listed below. We will manage the valvular abnormality by status post TAVR    No echocardiogram results found for the past 12 months     7/6: clinically stable          Hyperlipidemia LDL goal <70  Lipitor 10 mg daily    Pressure ulcer of left heel, unstageable  Consult wound care  Oxycodone p.r.n. pain    Off load heels.  Order mattress for home.  Spinal stenosis of lumbar region with neurogenic claudication  Patient is bed ridden.  She will require frequent turning, PT, wound care.    Need to discuss with family about possible nursing home placement.  She does have home health.  She seems very difficult to care for.    Ordering low air loss mattress for home.  Pressure injury of skin of sacral region  Consult wound  care   Continue clindamycin for now.    Will recheck CBC.   No signs of infection per wound care/eval.  Will likely cont on clinda and follow cultures. Orders per wound care and new mattress ordered.    7/6: WBC remains elevated but no fevers. I am not findings any other sources of acute infection and seems to have chronic mild leukocytosis. Will cont clinda-transition to p.o. on 07/05/2025  Considering reactive leukocytosis vs primary bone marrow process. Needs hematology outpatient.   Diff is not concerning and morphology with hypochromia and target cells (no schistocytes) most consistent with Fe deficiency vs thalassemia regarding anemia and not concerning for active bacterial infection    7/7 Appreciate wound care recommendations     S/P TAVR (transcatheter aortic valve replacement)  Continue metoprolol 100 mg twice daily    Microcytic anemia  Anemia is likely due to Iron deficiency. Most recent hemoglobin and hematocrit are listed below.  Recent Labs     07/14/25  0637 07/15/25  0348 07/16/25  0412   HGB 10.7* 10.2* 9.4*   HCT 31.5* 30.6* 27.7*     Plan  - Monitor serial CBC: Daily  - Transfuse PRBC if patient becomes hemodynamically unstable, symptomatic or H/H drops below 7/21.  - Patient has not received any PRBC transfusions to date  - Patient's anemia is currently worsening. Will adjust treatment as follows:  IV iron today  - H&H is worsening since admission.  Labs seem most consistent with iron deficiency.  Iron levels ordered and pending.  I do not see any clinical evidence of active bleeding at this time and remains hemodynamically stable.  In an attempt to avoid transfusion we will give IV iron today and follow labs    7/6: iron, B12 and folate normal  Stool sent for occult but no clinical signs of bleeding  Hemolysis labs sent  H/H low but stable for last 3 days.  If above work up negative she is otherwise clinically stable for discharge and close hematology follow up with outpatient labs as I do not  have GI or hematology at this hospital to manage while inpatient.     Anemia stable. Mildly elevated haptoglobin and LDH.   Will monitor H/H/    7/8 h/h was stable but decreased on am labs. Dilutional component is a possibility and due to frequent lab draws in the setting of anemia of chronic disease.  2 units of pRBC ordered.    7/10 will monitor and if remains stable will discharge home tomorrow     7/12 patient will need 2 units packed red blood cells transfused.    7/13 patient received IV iron yesterday.  She also received 2 units of packed red blood cells.  She has a little fluid overloaded this morning so IV Lasix 20 mg given.  H&H much improved    7/14 H/h stable.  Will continue to monitor  Hyperkalemia  Hyperkalemia is likely due to LOWELL.The patients most recent potassium results are listed below.  Recent Labs     07/14/25  0637 07/15/25  0348 07/16/25  0412   K 5.4* 5.3* 5.2*     Plan  - Monitor for arrhythmias with EKG and/or continuous telemetry.   - Treat the hyperkalemia with Potassium Binders.   - Monitor potassium:daily   - The patient's hyperkalemia is improving    Another dose of lokelma     7/9 Resolved       LOWELL (acute kidney injury)  LOWELL is likely due to unsure etiology at this time. Baseline creatinine is 1.1. Most recent creatinine and eGFR are listed below.  Recent Labs     07/14/25  0637 07/15/25  0348 07/16/25  0412   CREATININE 1.5* 1.4 1.3   EGFRNORACEVR 34* 37* 41*      Plan  - LOWELL is worsening. Will adjust treatment as follows: d/c valsartan, IV fluids, address hypoalbuminemia, urine studies pending .    - Avoid nephrotoxins and renally dose meds for GFR listed above  - Monitor urine output, serial BMP, and adjust therapy as needed    Urine studies indeterminate.  Renal function slowly improving.      7/9 LOWELL resolving     7/10 LOWELL resolved    7/13 creatinine is now 1.6.  Monitor renal function daily.    7/14 Stable     UTI (urinary tract infection)  U/A positive >100 on  microscopic  Urine culture klebsiella  Changed Zosyn and vancomycin to Rocephin 1 g daily    Goals of care, counseling/discussion  Would like to have family meeting to discuss goals of care.  Reached out to daughters Lubna and Aditi for this afternoon or tomorrow.  Pt seems to answer appropriately but unable to express full understanding of medical care and condition and is only able to say she is sick.      Waiting to hear back from family to discuss goals of care with multiple comorbidities, slow to improve and continued anemia requiring blood transfusion.   My understanding is that if she is medically stable to discharge she will go home and they will provide 24 hour care.      7/11 Both daughters, son and granddaughter at beside.  Discussed plan of care.  Working up leukocytosis and fever.  CXR pending.  Discussed that patient is high risk for medical decline and infection due to age, hypoalbuminemia and comorbdities.  Although her leukocytosis is worse from her baseline she will need further work up outpatient.  She would like to be DNR.  Once medically stable family is deciding if they can take her home or if she will go to nursing home.  Social work is providing resources for at home services.      7/14 Plan to discharge home with 24 hour care once pt remains medically stable     Leukocytosis  Worsening leukocytosis  Lactic acid WNL  CXR atelectasis at lung bases   Blood culture no growth   Venous u/s left upper extremity for edema negative for PE   Obtained hematology consult.  Possible leukomoid reaction but peripheral smear can be obtained to r/o CML     Leukocytosis slowly improving.  MRI left foot negative for osteomyelitis.  Will continue IV abx for cellulitis of left foot        Dysphagia  Worsening dysphagia  CT neck: no mass or soft tissue edema   ST concerned for cranial nerve 10 injury  Pt medically stable and will reevaulate tomorrow.  Will consider ENT consult tomorrow.      ENT recommending  PEG tube.  Requesting transfer for direct visualization.  Patient's lapost consistent with no artifical nutrition.              Cellulitis  Cellulitis seen on MRI of left foot.  Leukocytosis improving on IV abx.    Left arm erythema and edema.  Venous u/s negative for DVT.  Arterial u/s pending.      VTE Risk Mitigation (From admission, onward)           Ordered     IP VTE HIGH RISK PATIENT  Once         07/01/25 2343     Place sequential compression device  Until discontinued         07/01/25 2343                    Discharge Planning   LINA: 7/18/2025     Code Status: DNR   Medical Readiness for Discharge Date:   Discharge Plan A: Home Health                        Anjali Mesa PA-C  Department of Hospital Medicine   [place] [place] [facility] (3rd Fl)

## 2025-07-16 NOTE — ASSESSMENT & PLAN NOTE
Patient's blood pressure range in the last 24 hours was: BP  Min: 113/73  Max: 171/87.The patient's inpatient anti-hypertensive regimen is listed below:  Current Antihypertensives  hydrALAZINE injection 10 mg, Every 6 hours PRN, Intravenous  metoprolol succinate (TOPROL-XL) 24 hr tablet 100 mg, 2 times daily, Oral    Plan  - BP is controlled, no changes needed to their regimen    7/7/25 d/c valsartan

## 2025-07-16 NOTE — PLAN OF CARE
Problem: Adult Inpatient Plan of Care  Goal: Plan of Care Review  Outcome: Progressing     Problem: Skin Injury Risk Increased  Goal: Skin Health and Integrity  Outcome: Progressing     Problem: Fall Injury Risk  Goal: Absence of Fall and Fall-Related Injury  Outcome: Progressing     Problem: Wound  Goal: Skin Health and Integrity  Outcome: Progressing     Problem: Sepsis/Septic Shock  Goal: Absence of Infection Signs and Symptoms  Outcome: Progressing     Problem: Acute Kidney Injury/Impairment  Goal: Effective Renal Function  Outcome: Progressing     Problem: Anemia  Goal: Anemia Symptom Improvement  Outcome: Progressing

## 2025-07-16 NOTE — CONSULTS
07/16/25 1337   Post-Acute Status   Post-Acute Authorization Hospice   Hospice Status Referrals Sent   Discharge Delays None known at this time   Discharge Plan   Discharge Plan A Hospice/home   Discharge Plan B Hospice/home       Patient would like to transition to hospice services. Met with patient and two daughters at bedside. They would like to move forward with [facility]. Referral sent.

## 2025-07-16 NOTE — PLAN OF CARE
Recommendations  1. RD to monitor NPO status for diet advancement if/when medically able.   2. Consider alternate means of nutrition.   3. RD to follow.    Goal #1: Meal consumption of greater than or equal to 50% by RD follow up  Nutrition Goal Status #1: goal not met    Goal #2: PO intake will meet greater than or equal to 75% of estimated needs by RD follow up  Nutrition Goal Status #2: goal not met

## 2025-07-16 NOTE — PLAN OF CARE
07/16/25 0881   Rounds   Attendance Provider;Nurse    Discharge Plan A Home Health   Why the patient remains in the hospital Requires continued medical care   Transition of Care Barriers None     Care team reviewed plan of care and discharge plan with patient.  CM will continue to follow till discharge.

## 2025-07-16 NOTE — ASSESSMENT & PLAN NOTE
LOWELL is likely due to unsure etiology at this time. Baseline creatinine is 1.1. Most recent creatinine and eGFR are listed below.  Recent Labs     07/14/25  0637 07/15/25  0348 07/16/25  0412   CREATININE 1.5* 1.4 1.3   EGFRNORACEVR 34* 37* 41*      Plan  - LOWELL is worsening. Will adjust treatment as follows: d/c valsartan, IV fluids, address hypoalbuminemia, urine studies pending .    - Avoid nephrotoxins and renally dose meds for GFR listed above  - Monitor urine output, serial BMP, and adjust therapy as needed    Urine studies indeterminate.  Renal function slowly improving.      7/9 LOWELL resolving     7/10 LOWELL resolved    7/13 creatinine is now 1.6.  Monitor renal function daily.    7/14 Stable

## 2025-07-16 NOTE — ASSESSMENT & PLAN NOTE
Worsening dysphagia  CT neck: no mass or soft tissue edema   ST concerned for cranial nerve 10 injury  Pt medically stable and will reevaulate tomorrow.  Will consider ENT consult tomorrow.      ENT recommending PEG tube.  Requesting transfer for direct visualization.  Patient's lapost consistent with no artifical nutrition.

## 2025-07-16 NOTE — ASSESSMENT & PLAN NOTE
Patient has sepsis without organ dysfunction secondary to Skin and Soft Tissue Infection: Cellulitis. A review of systems was completed. Patient's sepsis is improving    Current Antibiotics    cefTRIAXone injection 1 g, Every 24 hours (non-standard times), Intravenous    Lactate  Recent Labs   Lab 07/07/25  0641 07/07/25  1240 07/10/25  1857   LACTATE 0.9 1.3 1.8     Culture Data  Blood Cultures   Blood Culture   Date Value Ref Range Status   07/10/2025 No Growth After 5 Days  Final   07/07/2025 No Growth After 5 Days  Final   07/01/2025 No Growth After 5 Days  Final   07/01/2025 No Growth After 5 Days  Final      Urine Culture   Urine Culture   Date Value Ref Range Status   07/07/2025 >100,000 cfu/ml Klebsiella pneumoniae (A)  Final     Urine Culture, Routine   Date Value Ref Range Status   02/09/2022 No significant growth  Final      mSOFA  MSOFA Total  Min: 1   Min taken time: 07/16/25 0901  Max: 3   Max taken time: 07/15/25 1501    Plan  - Antibiotics as listed above  - Fluid resuscitation as follows:Fluid Not Needed - Patient is not hypotensive and/or lactate is less than 4.0.   - Trend lactate to resolution  - Follow up culture data  - Vasopressors were not needed  - The following services were consulted:Hospital Medicine.  - continue clindamycin for now    7/5:  Resolved.  Transition to oral clindamycin 07/05/2025.  We will need 7-10 days total of treatment    7/6: WBC remains elevated but no fevers. I am not findings any other sources of acute infection and seems to have chronic mild leukocytosis. Will cont clinda-transition to p.o. on 07/05/2025  Considering reactive leukocytosis vs primary bone marrow process. Needs hematology outpatient.   Diff is not concerning and morphology with hypochromia and target cells (no schistocytes) most consistent with Fe deficiency vs thalassemia regarding anemia and not concerning for active bacterial infection    7/7 Positive for UTI   '  7/8 Leukocytosis improving.  Gram  negative rods on urine culture     7/10 Urine culture with klebsiella.   7/12 antibiotics were escalated to Zosyn and vancomycin.  Patient no longer febrile.  I will deescalate to Rocephin 1 g daily  7/13 continue Rocephin.  Monitor CBC.  Peripheral smear ordered.    7/15 Leukocytosis slowly improving

## 2025-07-16 NOTE — ASSESSMENT & PLAN NOTE
Hyperkalemia is likely due to LOWELL.The patients most recent potassium results are listed below.  Recent Labs     07/14/25  0637 07/15/25  0348 07/16/25  0412   K 5.4* 5.3* 5.2*     Plan  - Monitor for arrhythmias with EKG and/or continuous telemetry.   - Treat the hyperkalemia with Potassium Binders.   - Monitor potassium:daily   - The patient's hyperkalemia is improving    Another dose of lokelma     7/9 Resolved

## 2025-07-16 NOTE — ASSESSMENT & PLAN NOTE
Cellulitis seen on MRI of left foot.  Leukocytosis improving on IV abx.    Left arm erythema and edema.  Venous u/s negative for DVT.  Arterial u/s pending.

## 2025-07-17 PROBLEM — E87.5 HYPERKALEMIA: Status: RESOLVED | Noted: 2025-01-01 | Resolved: 2025-01-01

## 2025-07-17 PROBLEM — L89.620 PRESSURE ULCER OF LEFT HEEL, UNSTAGEABLE: Status: RESOLVED | Noted: 2021-03-08 | Resolved: 2025-01-01

## 2025-07-17 PROBLEM — N39.0 UTI (URINARY TRACT INFECTION): Status: RESOLVED | Noted: 2025-01-01 | Resolved: 2025-01-01

## 2025-07-17 PROBLEM — D50.9 MICROCYTIC ANEMIA: Status: RESOLVED | Noted: 2025-01-01 | Resolved: 2025-01-01

## 2025-07-17 PROBLEM — Z95.3 S/P TAVR (TRANSCATHETER AORTIC VALVE REPLACEMENT): Status: RESOLVED | Noted: 2021-03-27 | Resolved: 2025-01-01

## 2025-07-17 PROBLEM — D72.829 LEUKOCYTOSIS: Status: RESOLVED | Noted: 2025-01-01 | Resolved: 2025-01-01

## 2025-07-17 PROBLEM — M48.062 SPINAL STENOSIS OF LUMBAR REGION WITH NEUROGENIC CLAUDICATION: Status: RESOLVED | Noted: 2021-03-11 | Resolved: 2025-01-01

## 2025-07-17 PROBLEM — L89.159 PRESSURE INJURY OF SKIN OF SACRAL REGION: Status: RESOLVED | Noted: 2021-03-11 | Resolved: 2025-01-01

## 2025-07-17 PROBLEM — A41.9 SEPSIS: Status: RESOLVED | Noted: 2025-01-01 | Resolved: 2025-01-01

## 2025-07-17 PROBLEM — E78.5 HYPERLIPIDEMIA LDL GOAL <70: Chronic | Status: RESOLVED | Noted: 2018-09-13 | Resolved: 2025-01-01

## 2025-07-17 PROBLEM — N17.9 AKI (ACUTE KIDNEY INJURY): Status: RESOLVED | Noted: 2025-01-01 | Resolved: 2025-01-01

## 2025-07-17 NOTE — DISCHARGE SUMMARY
[facility] Surg ([facility])  [facility] Medicine  Discharge Summary      Patient Name: Emily Alicia  MRN: [id number]  MARLINE: [id number]  Patient Class: IP- Inpatient  Admission Date: 7/1/2025  Hospital Length of Stay: 15 days  Discharge Date and Time: 7/17/2025  4:25 AM  Attending Physician: No att. providers found   Discharging Provider: Anjali Mesa PA-C  Primary Care Provider: Angel Pemberton MD    Primary Care Team: Networked reference to record PCT     HPI:   Patient is an 84-year-old black female that presented to the emergency room with significant pain from her skin pressure injuries of her sacrum, buttocks.  Upon presentation she had a working diagnosis of sepsis so she was given IV fluids, IV antibiotics.  Workup unremarkable.  Physical exam reveals significant skin pressure injuries to the buttocks, sacrum, foot.  She is being admitted for pain control, IV antibiotics, wound care consultation.    * No surgery found *      Hospital Course:   Patient seen by wound care this morning. Recs placed. Patient without any complaints today at all. She is eating well. Afebrile. WBC ct pending.    7/4: clinically patient is doing quite well. She is tolerating PO intake. Has no complaints on rounds.  However, her WBC is not trending down. Seems she has mild chronic elevation of 14-15K but WBC 19K this mroning and 20K on admit. I still do not see any other sources of infections and wounds are not draining, no erythema, no fevers, etc. Cont clindamycin for now.  H/H 7.8/23 from 9.1/27 on admit. No signs of bleeding but with drop will monitor overnight. Also Cr 1.5 from 1.1 on admit. However, on chart review she does fluctuate around this isabella.     7/5:  Patient continues to feel well.  She has no acute complaints.  She denies chest pain, shortness of breath, dysuria, frequency, diarrhea, nausea and vomiting.  Her wounds are stable and do not show any signs of infection.  However, she remains with leukocytosis  of 19-20000 which is not improved since admission.  Her H&H is decreasing in his knee or transfusion threshold.  Her MCV is low and she likely has iron deficiency.  I am suspecting she has reactive leukocytosis due to this process.  Iron levels ordered.  If low we will replace IV and start p.o. tomorrow in an attempt to avoid blood transfusion.  We will continue clindamycin.  I see no other signs of infection and no indication to change antibiotics.  We will actually deescalate to oral today.  Creatinine remained stable at 1.5    7/6: Clinically this patient remains without complaints and at baseline. She has no fever, dysuria, abd pain, n/v/d/c, chest pains, SOB, cough, etc.  I have kept her inpatient following labs due to minor abnormalities:  Cr 1-1.5 at baseline and while did trend up to 1.5/1.7 this admission on chart review her renal fxn fluctuating here over at least last 1 year. Therefore I do not think this is indicative of LOWELL needing further inpatient management.  H/H down trended but has been stable last 3 days. She is not hemodynamically unstable and vitals remain normal. She has no evidence of bleeding. Stool will be sent for occult and if + can see GI outpatient. However, I am also concerned for primary bone marrow process. Will also work up hemolysis with labs today (h/o aortic stenosis s/p valve replacement).  She has chronic leukocytosis which has not been worked up to date. Plt are normal. I have no access to hematology while inpatient here so may discharge with patient needing to see hematology ASAP. She will need repeat CBC within next few days to monitor as well.   HypoCa noted; awaiting albumin. If lab unremarkable I believe she can discharge this afternoon with home health and home medical equipment and continue to follow up outpatient as we are only lab monitoring at this point in the hospital as no access to needed specialists and she is clinically stable. Dispo pending labs this  morning    7/7 PT HD stable on room air.  Continued leukocytosis.  Continue IV abx for UTI.  Creatinine still bumped.  Will continue to trend. Gentle hydration and holding valsartan.  Lokelma for hyperkalemia.  Patient would like to go home with family and states her family can provide her with 24 hour care.  Patient high risk for medical decline.  She is malnourished and albumin 1.8. Consult to dietician.      7/8 PT Bp on lower side.  Pt h/h down trending.  Some dilutional component with frequent lab draws.  Discussed risk versus benefits with pt and daughters about blood transfusion and they are all in agreement.  Renal function slowly improving but pt has hypoalbuminemia.  She is at risk for medical decline.  Will set up family meeting to discuss code status and plan of care.      7/9 Renal function improving.  H/h mild increase but only received 1 out of 2 units of pRBC.  Will get repeat CBC tomorrow.     7/10 PT HD stable on room air.  Renal function back to baseline.  H/H stable.  If labs are stable tomorrow plan to discharge home with home health and 24 hour care.  Pt will need continued work up of leukocytosis and anemia outpatient.  Defer to PCP for management.      7/11 PT HD stable on room air.  Patient more lethargic today but appropriate.  Renal function improved.  H/h stable.  Fever and leukocytosis overnight.  CXR pending.  LUE edema.  Venous u/s pending.  Long discussion about goals of care with family and patient.  She is a DNR.   giving family resources for care at home.       7/12-patient stable on room air.  H&H dropped.  Hemoglobin is now 6.8.  Renal function stable.  No more fever.  Still has a leukocytosis that is seems to be chronic.  Could this be CML?  Venous ultrasound of the upper extremity negative for blood clots.  She will need blood transfusion.  I will also give her IV iron wound care with Cierrayl Continuing.  Patient is still receiving vancomycin and Zosyn.  Urine  "culture grew out Klebsiella pneumoniae sensitive to about everything.  I will discontinue vanc and Zosyn and start Rocephin.    7/13-patient required central line placement but was unable to get it.  Finally nurse supervisor was able to find a good vein using ultrasound last night.  She received 2 units of packed red blood cells.  Now her hemoglobin is 11.6.  Patient did get IV iron as well.  I discussed the case with Hematology.  They recommend a peripheral smear to determine if this is a leukemoid reaction versus CML.  I think she has a leukemoid reaction causing her elevated white blood cell count.  She looked a little over hydrated this morning her BNP was 350 which is elevated for her.  Chest x-ray did not show any pulmonary edema.  I gave her 20 mg of Lasix IV push.      7/14 PT HD stable on room air.  Vitals wnl, Labs stable.  Will monitor. If patient remains stable for 24 hour will discharge home with 24 hour care. ST consult for dysphagia after attempted central line.     7/15 PT HD stable on room air.  Labs stable.  Pt having difficulty swallowing and increased secretions.  She appears mildly uncomfortable but is not in respiratory distress.  CT neck did not show any neck mass or soft tissue edema.   ST evaluated patient and concern for cranial nerve 10 injury.  Will monitor and will consider transfer if patient does not improve.      7/16: Attempted to transfer pt for ENT services to further evaluate acute dysphagia.  Additionally with renal function unable to get CT neck with contrast.  ENT recommending PEG tube and f/u outpatient for further evaluation.  However,  long discussion about goals of care with daughters and patient.    PT does not want artifical nutrition and is "tired and just wants to go home".  Her goal of care is comfort focus and would like to transition to hospice.  Pt is able to verbalize her understanding of what hospice is.  Plan to discharge to hospice but patient passed away " overnight.  This is not unexpected, bed bound status, malnutrition, age and multiple comorbidities.         Goals of Care Treatment Preferences:  Code Status: DNR    Living Will: Yes  POLST: Yes              Consults:   Consults (From admission, onward)          Status Ordering Provider     Inpatient consult to Social Work  Once        Provider:  (Not yet assigned)    Completed SIENNA VILLANUEVA     Inpatient consult to Social Work  Once        Provider:  (Not yet assigned)    Completed JANEE MALONEY     Inpatient consult to Registered Dietitian/Nutritionist  Once        Provider:  (Not yet assigned)    Completed SIENNA VILLANUEVA     Inpatient consult to [facility]  Once        Provider:  (Not yet assigned)    Completed RAFFI MARI            Assessment & Plan  Essential hypertension (Resolved: 7/17/2025)  Patient's blood pressure range in the last 24 hours was: BP  Min: 120/69  Max: 133/54.The patient's inpatient anti-hypertensive regimen is listed below:  Current Antihypertensives       Plan  - BP is controlled, no changes needed to their regimen    7/7/25 d/c valsartan   Aortic valve stenosis (Resolved: 7/17/2025)  Echocardiogram with evidence of aortic stenosis that is moderate . The patient's most recent echocardiogram result is listed below. We will manage the valvular abnormality by status post TAVR    No echocardiogram results found for the past 12 months     7/6: clinically stable          Hyperlipidemia LDL goal <70 (Resolved: 7/17/2025)  Lipitor 10 mg daily    Transitioned to hospice     Pressure ulcer of left heel, unstageable (Resolved: 7/17/2025)  Consult wound care  Oxycodone p.r.n. pain    Off load heels.  Order mattress for home.  Spinal stenosis of lumbar region with neurogenic claudication (Resolved: 7/17/2025)  Patient is bed ridden.  She will require frequent turning, PT, wound care.    Need to discuss with family about possible nursing home placement.  She does have home  health.  She seems very difficult to care for.    Ordering low air loss mattress for home.  Pressure injury of skin of sacral region (Resolved: 7/17/2025)  Consult wound care   Continue clindamycin for now.    Will recheck CBC.   No signs of infection per wound care/eval.  Will likely cont on clinda and follow cultures. Orders per wound care and new mattress ordered.    7/6: WBC remains elevated but no fevers. I am not findings any other sources of acute infection and seems to have chronic mild leukocytosis. Will cont clinda-transition to p.o. on 07/05/2025  Considering reactive leukocytosis vs primary bone marrow process. Needs hematology outpatient.   Diff is not concerning and morphology with hypochromia and target cells (no schistocytes) most consistent with Fe deficiency vs thalassemia regarding anemia and not concerning for active bacterial infection    7/7 Appreciate wound care recommendations     Goals of care, counseling/discussion  Would like to have family meeting to discuss goals of care.  Reached out to daughters Lubna and Aditi for this afternoon or tomorrow.  Pt seems to answer appropriately but unable to express full understanding of medical care and condition and is only able to say she is sick.      Waiting to hear back from family to discuss goals of care with multiple comorbidities, slow to improve and continued anemia requiring blood transfusion.   My understanding is that if she is medically stable to discharge she will go home and they will provide 24 hour care.      7/11 Both daughters, son and granddaughter at beside.  Discussed plan of care.  Working up leukocytosis and fever.  CXR pending.  Discussed that patient is high risk for medical decline and infection due to age, hypoalbuminemia and comorbdities.  Although her leukocytosis is worse from her baseline she will need further work up outpatient.  She would like to be DNR.  Once medically stable family is deciding if they can take her  "home or if she will go to nursing home.  Social work is providing resources for at home services.      7/14 Plan to discharge home with 24 hour care once pt remains medically stable     7/17: Attempted to transfer pt for ENT services to further evaluate acute dysphagia.  Additionally with renal function unable to get CT neck with contrast.  ENT recommending PEG tube and f/u outpatient for further evaluation.  However,  long discussion about goals of care with daughters and patient.    PT does not want artifical nutrition and is "tired and just wants to go home".  Her goal of care is comfort focus and would like to transition to hospice.  Pt is able to verbalize her understanding of what hospice is.  Plan to discharge to hospice but patient passed away overnight.  This is not unexpected, bed bound status, malnutrition, age and multiple comorbidities.  25min     Dysphagia  Worsening dysphagia  CT neck: no mass or soft tissue edema   ST concerned for cranial nerve 10 injury  Pt medically stable and will reevaulate tomorrow.  Will consider ENT consult tomorrow.      ENT recommending PEG tube.  Requesting transfer for direct visualization but declined.  Patient's lapost consistent with no artifical nutrition and pt does not want feeding tubes.  Discussed PEG and NG tube             Cellulitis  Cellulitis seen on MRI of left foot.  Leukocytosis improving on IV abx.    Left arm erythema and edema.  Venous u/s negative for DVT.  Arterial u/s pending.      Transitioned to hospice but passed away   Comfort measures only status      Final Active Diagnoses:    Diagnosis Date Noted POA    Comfort measures only status [Z51.5] 07/16/2025 Not Applicable    Dysphagia [R13.10] 07/15/2025 Yes    Cellulitis [L03.90] 07/15/2025 Yes    Goals of care, counseling/discussion [Z71.89] 07/08/2025 Not Applicable      Problems Resolved During this Admission:    Diagnosis Date Noted Date Resolved POA    PRINCIPAL PROBLEM:  Sepsis [A41.9] " "2025 Yes    Leukocytosis [D72.829] 2025 Yes    Hyperkalemia [E87.5] 2025 Yes    LOWELL (acute kidney injury) [N17.9] 2025 No    UTI (urinary tract infection) [N39.0] 2025 Yes    Microcytic anemia [D50.9] 2025 Yes    S/P TAVR (transcatheter aortic valve replacement) [Z95.3] 2021 Not Applicable    Pressure injury of skin of sacral region [L89.159] 2021 Yes    Spinal stenosis of lumbar region with neurogenic claudication [M48.062] 2021 Yes    Pressure ulcer of left heel, unstageable [L89.620] 2021 Yes    Hyperlipidemia LDL goal <70 [E78.5] 2018 Yes     Chronic    Aortic valve stenosis [I35.0] 2016 Yes    Essential hypertension [I10] 2013 Yes       Discharged Condition:     Disposition:     Follow Up:   Follow-up Information       Angel Pemberton MD. Call.    Specialty: Internal Medicine  Why: CALL ON MONDAY TO SCHEDULE HOSPITAL FOLLOW UP  Contact information:  [address] [facility] [address]  [phone]               Long Term Medicaid Follow up.    Contact information:  [phone]    or apply in local medicaid office                         Patient Instructions:      HME - OTHER     Order Specific Question Answer Comments   Type of Equipment: Mattress - Low Air Loss    Height: 5' 9" (1.753 m)    Weight: 108.5 kg (239 lb 3.2 oz)    Does patient have medical equipment at home? hospital bed    Does patient have medical equipment at home? lift device        Significant Diagnostic Studies: see A&P    Pending Diagnostic Studies:       None           Medications:  None    Indwelling Lines/Drains at time of discharge:   Lines/Drains/Airways       None                       Time spent on the discharge of patient: 25 minutes         Anajli Mesa PA-C  Department of " [facility] Surg (3rd Fl)

## 2025-07-17 NOTE — PROGRESS NOTES
The patient has had an E-Consult completed. Please refer to that note as needed. Please communicate the information below to the patient. Based on the recommendations of the specialist, I recommend that the patient do the following:    No further workup

## 2025-07-17 NOTE — PLAN OF CARE
Problem: Adult Inpatient Plan of Care  Goal: Plan of Care Review  Outcome: Progressing     Problem: Skin Injury Risk Increased  Goal: Skin Health and Integrity  Outcome: Progressing     Problem: Fall Injury Risk  Goal: Absence of Fall and Fall-Related Injury  Outcome: Progressing     Problem: Wound  Goal: Skin Health and Integrity  Outcome: Progressing     Problem: Sepsis/Septic Shock  Goal: Absence of Infection Signs and Symptoms  Outcome: Progressing     Problem: Infection  Goal: Absence of Infection Signs and Symptoms  Outcome: Progressing     Problem: Acute Kidney Injury/Impairment  Goal: Effective Renal Function  Outcome: Progressing     Problem: Anemia  Goal: Anemia Symptom Improvement  Outcome: Progressing     Problem: Palliative Care  Goal: Enhanced Quality of Life  Outcome: Progressing

## 2025-07-17 NOTE — ASSESSMENT & PLAN NOTE
"Would like to have family meeting to discuss goals of care.  Reached out to daughters Lubna and Aditi for this afternoon or tomorrow.  Pt seems to answer appropriately but unable to express full understanding of medical care and condition and is only able to say she is sick.      Waiting to hear back from family to discuss goals of care with multiple comorbidities, slow to improve and continued anemia requiring blood transfusion.   My understanding is that if she is medically stable to discharge she will go home and they will provide 24 hour care.      7/11 Both daughters, son and granddaughter at beside.  Discussed plan of care.  Working up leukocytosis and fever.  CXR pending.  Discussed that patient is high risk for medical decline and infection due to age, hypoalbuminemia and comorbdities.  Although her leukocytosis is worse from her baseline she will need further work up outpatient.  She would like to be DNR.  Once medically stable family is deciding if they can take her home or if she will go to nursing home.  Social work is providing resources for at home services.      7/14 Plan to discharge home with 24 hour care once pt remains medically stable     7/17: Attempted to transfer pt for ENT services to further evaluate acute dysphagia.  Additionally with renal function unable to get CT neck with contrast.  ENT recommending PEG tube and f/u outpatient for further evaluation.  However,  long discussion about goals of care with daughters and patient.    PT does not want artifical nutrition and is "tired and just wants to go home".  Her goal of care is comfort focus and would like to transition to hospice.  Pt is able to verbalize her understanding of what hospice is.  Plan to discharge to hospice but patient passed away overnight.  This is not unexpected, bed bound status, malnutrition, age and multiple comorbidities.  25min     "

## 2025-07-17 NOTE — ASSESSMENT & PLAN NOTE
Worsening dysphagia  CT neck: no mass or soft tissue edema   ST concerned for cranial nerve 10 injury  Pt medically stable and will reevaulate tomorrow.  Will consider ENT consult tomorrow.      ENT recommending PEG tube.  Requesting transfer for direct visualization but declined.  Patient's lapost consistent with no artifical nutrition and pt does not want feeding tubes.  Discussed PEG and NG tube

## 2025-07-17 NOTE — NURSING
11:58 Heart rate went down to 36 on telemetry monitor and when I went to check, patient is not responding to verbal and tactile stimuli.patient does not have heartbeat or breath sound on auscultation.no palpable pulses. House supervisor notified and she also came and see and we also notified the ED doctor.informed to family member and also notified to dr hammer.  12:20 Chas Martin came and see the patient and pronounced.

## 2025-07-17 NOTE — CONSULTS
[facility] Head/[id number]  Response for E-Consult     Patient Name: Emily Alicia  MRN: [id number]  Primary Care Provider: Angel Pemberton MD   Requesting Provider: Braeden Anderson MD  Consults    Unfortunately, this eConsult has been declined due to: Other    Other:  This eConsult submission cannot be completed at this time due to pt deceaced.      Thank you for this eConsult referral.     Navin Walls MD  [facility] Head/[id number]

## 2025-07-17 NOTE — PROVIDER PROGRESS NOTES - EMERGENCY DEPT.
Encounter Date: 7/1/2025    ED Physician Progress Notes            Death Note    Called to bedside by patient's nurse. Nursing supervisor notified. Attempted call to notify patients family.     Patient is not responding to verbal or tactile stimuli. Patient does not have a papillary or corneal reflex. Their pupils are fixed and dilated. No heart or breath sounds on auscultation. No respirations. No palpable pulses. Asystole noted on telemetry.     Time of death:  07/17/2025 12:20     Cause of Death:  Cardiopulmonary Arrest

## 2025-07-17 NOTE — ASSESSMENT & PLAN NOTE
Cellulitis seen on MRI of left foot.  Leukocytosis improving on IV abx.    Left arm erythema and edema.  Venous u/s negative for DVT.  Arterial u/s pending.      Transitioned to hospice but passed away

## 2025-07-17 NOTE — PLAN OF CARE
[place] - Med Surg (3rd Fl)  Discharge Final Note    Primary Care Provider: Angel Pemberton MD    Expected Discharge Date: 2025    Final Discharge Note (most recent)       Final Note - [id number]          Final Note    Assessment Type Final Discharge Note (P)      Anticipated Discharge Disposition  (P)                      Important Message from Medicare

## 2025-07-17 NOTE — ASSESSMENT & PLAN NOTE
Patient's blood pressure range in the last 24 hours was: BP  Min: 120/69  Max: 133/54.The patient's inpatient anti-hypertensive regimen is listed below:  Current Antihypertensives       Plan  - BP is controlled, no changes needed to their regimen    7/7/25 d/c valsartan

## 2025-07-22 NOTE — PROGRESS NOTES
The patient has had an E-Consult completed. Please refer to that note as needed. Please communicate the information below to the patient. Based on the recommendations of the specialist, I recommend that the patient do the following:  N/a

## 2025-08-01 ENCOUNTER — TELEPHONE (OUTPATIENT)
Dept: INTERNAL MEDICINE | Facility: CLINIC | Age: 85
End: 2025-08-01
Payer: MEDICARE

## 2025-08-01 NOTE — TELEPHONE ENCOUNTER
Felipe with [facility] states that pt passed on 25, and that they have been told that Dr. Deras will be signing the death certificate. Please advise

## 2025-08-01 NOTE — TELEPHONE ENCOUNTER
----- Message from [place] sent at 2025  1:33 PM CDT -----  Contact: Felipe Alicia  MRN: [id number]  : 1940  PCP: Angel Pemberton  Home Phone      [phone]  Work Phone      Not on file.  Mobile          [phone]      MESSAGE:     [facility]ipe [facility] states that pt passed on 25, and that they have been told that Dr. Deras will be signing the death certificate.  Please call to confirm    [phone]